# Patient Record
Sex: MALE | Race: WHITE | NOT HISPANIC OR LATINO | Employment: PART TIME | URBAN - METROPOLITAN AREA
[De-identification: names, ages, dates, MRNs, and addresses within clinical notes are randomized per-mention and may not be internally consistent; named-entity substitution may affect disease eponyms.]

---

## 2020-05-14 ENCOUNTER — TRANSFERRED RECORDS (OUTPATIENT)
Dept: HEALTH INFORMATION MANAGEMENT | Facility: CLINIC | Age: 70
End: 2020-05-14

## 2020-05-19 ENCOUNTER — TRANSFERRED RECORDS (OUTPATIENT)
Dept: HEALTH INFORMATION MANAGEMENT | Facility: CLINIC | Age: 70
End: 2020-05-19

## 2020-05-19 LAB — EJECTION FRACTION: >75 %

## 2020-06-12 LAB
CREAT SERPL-MCNC: 0.92 MG/DL (ref 0.72–1.25)
GFR SERPL CREATININE-BSD FRML MDRD: >60 ML/MIN/1.73M2
GLUCOSE SERPL-MCNC: 123 MG/DL (ref 65–100)
POTASSIUM SERPL-SCNC: 3.8 MMOL/L (ref 3.5–5)

## 2020-07-28 ENCOUNTER — HOSPITAL ENCOUNTER (OUTPATIENT)
Dept: CARDIAC REHAB | Facility: CLINIC | Age: 70
End: 2020-07-28
Attending: INTERNAL MEDICINE
Payer: MEDICARE

## 2020-07-28 PROCEDURE — 40000116 ZZH STATISTIC OP CR VISIT: Performed by: OCCUPATIONAL THERAPIST

## 2020-07-28 PROCEDURE — 93798 PHYS/QHP OP CAR RHAB W/ECG: CPT | Performed by: OCCUPATIONAL THERAPIST

## 2020-07-28 PROCEDURE — 93797 PHYS/QHP OP CAR RHAB WO ECG: CPT | Performed by: OCCUPATIONAL THERAPIST

## 2020-07-28 PROCEDURE — 40000575 ZZH STATISTIC OP CARDIAC VISIT #2: Performed by: OCCUPATIONAL THERAPIST

## 2020-07-31 ENCOUNTER — HOSPITAL ENCOUNTER (OUTPATIENT)
Dept: CARDIAC REHAB | Facility: CLINIC | Age: 70
End: 2020-07-31
Attending: INTERNAL MEDICINE
Payer: MEDICARE

## 2020-07-31 PROCEDURE — 40000116 ZZH STATISTIC OP CR VISIT

## 2020-07-31 PROCEDURE — 93798 PHYS/QHP OP CAR RHAB W/ECG: CPT

## 2020-08-04 ENCOUNTER — HOSPITAL ENCOUNTER (OUTPATIENT)
Dept: CARDIAC REHAB | Facility: CLINIC | Age: 70
End: 2020-08-04
Attending: INTERNAL MEDICINE
Payer: MEDICARE

## 2020-08-04 PROCEDURE — 93798 PHYS/QHP OP CAR RHAB W/ECG: CPT

## 2020-08-04 PROCEDURE — 40000116 ZZH STATISTIC OP CR VISIT

## 2020-08-05 ENCOUNTER — HOSPITAL ENCOUNTER (OUTPATIENT)
Dept: CARDIAC REHAB | Facility: CLINIC | Age: 70
End: 2020-08-05
Attending: INTERNAL MEDICINE
Payer: MEDICARE

## 2020-08-05 PROCEDURE — 40000116 ZZH STATISTIC OP CR VISIT

## 2020-08-05 PROCEDURE — 93798 PHYS/QHP OP CAR RHAB W/ECG: CPT

## 2020-08-07 ENCOUNTER — HOSPITAL ENCOUNTER (OUTPATIENT)
Dept: CARDIAC REHAB | Facility: CLINIC | Age: 70
End: 2020-08-07
Attending: INTERNAL MEDICINE
Payer: MEDICARE

## 2020-08-07 PROCEDURE — 40000116 ZZH STATISTIC OP CR VISIT

## 2020-08-07 PROCEDURE — 93798 PHYS/QHP OP CAR RHAB W/ECG: CPT

## 2020-08-10 ENCOUNTER — HOSPITAL ENCOUNTER (OUTPATIENT)
Dept: CARDIAC REHAB | Facility: CLINIC | Age: 70
End: 2020-08-10
Attending: INTERNAL MEDICINE
Payer: MEDICARE

## 2020-08-10 PROCEDURE — 93798 PHYS/QHP OP CAR RHAB W/ECG: CPT

## 2020-08-10 PROCEDURE — 40000116 ZZH STATISTIC OP CR VISIT

## 2020-08-12 ENCOUNTER — HOSPITAL ENCOUNTER (OUTPATIENT)
Dept: CARDIAC REHAB | Facility: CLINIC | Age: 70
End: 2020-08-12
Attending: INTERNAL MEDICINE
Payer: MEDICARE

## 2020-08-12 PROCEDURE — 40000116 ZZH STATISTIC OP CR VISIT

## 2020-08-12 PROCEDURE — 93798 PHYS/QHP OP CAR RHAB W/ECG: CPT

## 2020-08-14 ENCOUNTER — HOSPITAL ENCOUNTER (OUTPATIENT)
Dept: CARDIAC REHAB | Facility: CLINIC | Age: 70
End: 2020-08-14
Attending: INTERNAL MEDICINE
Payer: MEDICARE

## 2020-08-14 PROCEDURE — 40000116 ZZH STATISTIC OP CR VISIT: Performed by: REHABILITATION PRACTITIONER

## 2020-08-14 PROCEDURE — 93798 PHYS/QHP OP CAR RHAB W/ECG: CPT | Performed by: REHABILITATION PRACTITIONER

## 2020-08-18 ENCOUNTER — HOSPITAL ENCOUNTER (OUTPATIENT)
Dept: CARDIAC REHAB | Facility: CLINIC | Age: 70
End: 2020-08-18
Attending: INTERNAL MEDICINE
Payer: MEDICARE

## 2020-08-18 PROCEDURE — 40000116 ZZH STATISTIC OP CR VISIT

## 2020-08-18 PROCEDURE — 93798 PHYS/QHP OP CAR RHAB W/ECG: CPT

## 2020-08-19 ENCOUNTER — HOSPITAL ENCOUNTER (OUTPATIENT)
Dept: CARDIAC REHAB | Facility: CLINIC | Age: 70
End: 2020-08-19
Attending: INTERNAL MEDICINE
Payer: MEDICARE

## 2020-08-19 PROCEDURE — 93798 PHYS/QHP OP CAR RHAB W/ECG: CPT

## 2020-08-19 PROCEDURE — 40000116 ZZH STATISTIC OP CR VISIT

## 2020-08-21 ENCOUNTER — HOSPITAL ENCOUNTER (OUTPATIENT)
Dept: CARDIAC REHAB | Facility: CLINIC | Age: 70
End: 2020-08-21
Attending: INTERNAL MEDICINE
Payer: MEDICARE

## 2020-08-21 PROCEDURE — 93798 PHYS/QHP OP CAR RHAB W/ECG: CPT

## 2020-08-21 PROCEDURE — 40000116 ZZH STATISTIC OP CR VISIT

## 2020-08-24 ENCOUNTER — HOSPITAL ENCOUNTER (OUTPATIENT)
Dept: CARDIAC REHAB | Facility: CLINIC | Age: 70
End: 2020-08-24
Attending: INTERNAL MEDICINE
Payer: MEDICARE

## 2020-08-24 PROCEDURE — 93798 PHYS/QHP OP CAR RHAB W/ECG: CPT

## 2020-08-24 PROCEDURE — 40000116 ZZH STATISTIC OP CR VISIT

## 2020-08-26 ENCOUNTER — HOSPITAL ENCOUNTER (OUTPATIENT)
Dept: CARDIAC REHAB | Facility: CLINIC | Age: 70
End: 2020-08-26
Attending: INTERNAL MEDICINE
Payer: MEDICARE

## 2020-08-26 PROCEDURE — 40000116 ZZH STATISTIC OP CR VISIT: Performed by: REHABILITATION PRACTITIONER

## 2020-08-26 PROCEDURE — 93798 PHYS/QHP OP CAR RHAB W/ECG: CPT | Performed by: REHABILITATION PRACTITIONER

## 2020-09-04 ENCOUNTER — HOSPITAL ENCOUNTER (OUTPATIENT)
Dept: CARDIAC REHAB | Facility: CLINIC | Age: 70
End: 2020-09-04
Attending: INTERNAL MEDICINE
Payer: MEDICARE

## 2020-09-04 PROCEDURE — 40000116 ZZH STATISTIC OP CR VISIT

## 2020-09-04 PROCEDURE — 93798 PHYS/QHP OP CAR RHAB W/ECG: CPT

## 2020-09-09 ENCOUNTER — HOSPITAL ENCOUNTER (OUTPATIENT)
Dept: CARDIAC REHAB | Facility: CLINIC | Age: 70
End: 2020-09-09
Attending: INTERNAL MEDICINE
Payer: MEDICARE

## 2020-09-09 PROCEDURE — 40000116 ZZH STATISTIC OP CR VISIT: Performed by: REHABILITATION PRACTITIONER

## 2020-09-09 PROCEDURE — 93798 PHYS/QHP OP CAR RHAB W/ECG: CPT | Performed by: REHABILITATION PRACTITIONER

## 2020-09-11 ENCOUNTER — HOSPITAL ENCOUNTER (OUTPATIENT)
Dept: CARDIAC REHAB | Facility: CLINIC | Age: 70
End: 2020-09-11
Attending: INTERNAL MEDICINE
Payer: MEDICARE

## 2020-09-11 PROCEDURE — 40000116 ZZH STATISTIC OP CR VISIT: Performed by: REHABILITATION PRACTITIONER

## 2020-09-11 PROCEDURE — 93798 PHYS/QHP OP CAR RHAB W/ECG: CPT | Performed by: REHABILITATION PRACTITIONER

## 2020-09-14 ENCOUNTER — HOSPITAL ENCOUNTER (OUTPATIENT)
Dept: CARDIAC REHAB | Facility: CLINIC | Age: 70
End: 2020-09-14
Attending: INTERNAL MEDICINE
Payer: MEDICARE

## 2020-09-14 PROCEDURE — 40000116 ZZH STATISTIC OP CR VISIT

## 2020-09-14 PROCEDURE — 93798 PHYS/QHP OP CAR RHAB W/ECG: CPT

## 2020-09-16 ENCOUNTER — HOSPITAL ENCOUNTER (OUTPATIENT)
Dept: CARDIAC REHAB | Facility: CLINIC | Age: 70
End: 2020-09-16
Attending: INTERNAL MEDICINE
Payer: MEDICARE

## 2020-09-16 PROCEDURE — 40000116 ZZH STATISTIC OP CR VISIT

## 2020-09-16 PROCEDURE — 93798 PHYS/QHP OP CAR RHAB W/ECG: CPT

## 2020-09-18 ENCOUNTER — HOSPITAL ENCOUNTER (OUTPATIENT)
Dept: CARDIAC REHAB | Facility: CLINIC | Age: 70
End: 2020-09-18
Attending: INTERNAL MEDICINE
Payer: MEDICARE

## 2020-09-18 PROCEDURE — 40000116 ZZH STATISTIC OP CR VISIT

## 2020-09-18 PROCEDURE — 93798 PHYS/QHP OP CAR RHAB W/ECG: CPT

## 2020-09-21 ENCOUNTER — HOSPITAL ENCOUNTER (OUTPATIENT)
Dept: CARDIAC REHAB | Facility: CLINIC | Age: 70
End: 2020-09-21
Attending: INTERNAL MEDICINE
Payer: MEDICARE

## 2020-09-21 PROCEDURE — 40000116 ZZH STATISTIC OP CR VISIT

## 2020-09-21 PROCEDURE — 93798 PHYS/QHP OP CAR RHAB W/ECG: CPT

## 2020-09-23 ENCOUNTER — HOSPITAL ENCOUNTER (OUTPATIENT)
Dept: CARDIAC REHAB | Facility: CLINIC | Age: 70
End: 2020-09-23
Attending: INTERNAL MEDICINE
Payer: MEDICARE

## 2020-09-23 PROCEDURE — 93798 PHYS/QHP OP CAR RHAB W/ECG: CPT | Performed by: REHABILITATION PRACTITIONER

## 2020-09-23 PROCEDURE — 40000116 ZZH STATISTIC OP CR VISIT: Performed by: REHABILITATION PRACTITIONER

## 2020-09-25 ENCOUNTER — HOSPITAL ENCOUNTER (OUTPATIENT)
Dept: CARDIAC REHAB | Facility: CLINIC | Age: 70
End: 2020-09-25
Attending: INTERNAL MEDICINE
Payer: MEDICARE

## 2020-09-25 PROCEDURE — 93798 PHYS/QHP OP CAR RHAB W/ECG: CPT | Performed by: REHABILITATION PRACTITIONER

## 2020-09-25 PROCEDURE — 40000116 ZZH STATISTIC OP CR VISIT: Performed by: REHABILITATION PRACTITIONER

## 2020-09-28 ENCOUNTER — HOSPITAL ENCOUNTER (OUTPATIENT)
Dept: CARDIAC REHAB | Facility: CLINIC | Age: 70
End: 2020-09-28
Attending: INTERNAL MEDICINE
Payer: MEDICARE

## 2020-09-28 PROCEDURE — 93798 PHYS/QHP OP CAR RHAB W/ECG: CPT

## 2020-09-28 PROCEDURE — 40000116 ZZH STATISTIC OP CR VISIT

## 2020-09-30 ENCOUNTER — HOSPITAL ENCOUNTER (OUTPATIENT)
Dept: CARDIAC REHAB | Facility: CLINIC | Age: 70
End: 2020-09-30
Attending: INTERNAL MEDICINE
Payer: MEDICARE

## 2020-09-30 PROCEDURE — 40000116 ZZH STATISTIC OP CR VISIT

## 2020-09-30 PROCEDURE — 93798 PHYS/QHP OP CAR RHAB W/ECG: CPT

## 2020-10-02 ENCOUNTER — HOSPITAL ENCOUNTER (OUTPATIENT)
Dept: CARDIAC REHAB | Facility: CLINIC | Age: 70
End: 2020-10-02
Attending: INTERNAL MEDICINE
Payer: MEDICARE

## 2020-10-02 PROCEDURE — 999N000109 HC STATISTIC OP CR VISIT: Performed by: OCCUPATIONAL THERAPIST

## 2020-10-02 PROCEDURE — 93798 PHYS/QHP OP CAR RHAB W/ECG: CPT | Performed by: OCCUPATIONAL THERAPIST

## 2020-10-05 ENCOUNTER — HOSPITAL ENCOUNTER (OUTPATIENT)
Dept: CARDIAC REHAB | Facility: CLINIC | Age: 70
End: 2020-10-05
Attending: INTERNAL MEDICINE
Payer: MEDICARE

## 2020-10-05 PROCEDURE — 999N000109 HC STATISTIC OP CR VISIT: Performed by: REHABILITATION PRACTITIONER

## 2020-10-05 PROCEDURE — 93798 PHYS/QHP OP CAR RHAB W/ECG: CPT | Performed by: REHABILITATION PRACTITIONER

## 2020-10-07 ENCOUNTER — HOSPITAL ENCOUNTER (OUTPATIENT)
Dept: CARDIAC REHAB | Facility: CLINIC | Age: 70
End: 2020-10-07
Attending: INTERNAL MEDICINE
Payer: MEDICARE

## 2020-10-07 PROCEDURE — 999N000109 HC STATISTIC OP CR VISIT: Performed by: REHABILITATION PRACTITIONER

## 2020-10-07 PROCEDURE — 93798 PHYS/QHP OP CAR RHAB W/ECG: CPT | Performed by: REHABILITATION PRACTITIONER

## 2020-10-09 ENCOUNTER — HOSPITAL ENCOUNTER (OUTPATIENT)
Dept: CARDIAC REHAB | Facility: CLINIC | Age: 70
End: 2020-10-09
Attending: INTERNAL MEDICINE
Payer: MEDICARE

## 2020-10-09 PROCEDURE — 93798 PHYS/QHP OP CAR RHAB W/ECG: CPT

## 2020-10-09 PROCEDURE — 999N000109 HC STATISTIC OP CR VISIT

## 2020-10-12 ENCOUNTER — HOSPITAL ENCOUNTER (OUTPATIENT)
Dept: CARDIAC REHAB | Facility: CLINIC | Age: 70
End: 2020-10-12
Attending: INTERNAL MEDICINE
Payer: MEDICARE

## 2020-10-12 PROCEDURE — 999N000109 HC STATISTIC OP CR VISIT

## 2020-10-12 PROCEDURE — 93798 PHYS/QHP OP CAR RHAB W/ECG: CPT

## 2020-10-14 ENCOUNTER — HOSPITAL ENCOUNTER (OUTPATIENT)
Dept: CARDIAC REHAB | Facility: CLINIC | Age: 70
End: 2020-10-14
Attending: INTERNAL MEDICINE
Payer: MEDICARE

## 2020-10-14 PROCEDURE — 999N000109 HC STATISTIC OP CR VISIT: Performed by: REHABILITATION PRACTITIONER

## 2020-10-14 PROCEDURE — 93798 PHYS/QHP OP CAR RHAB W/ECG: CPT | Performed by: REHABILITATION PRACTITIONER

## 2020-10-16 ENCOUNTER — HOSPITAL ENCOUNTER (OUTPATIENT)
Dept: CARDIAC REHAB | Facility: CLINIC | Age: 70
End: 2020-10-16
Attending: INTERNAL MEDICINE
Payer: MEDICARE

## 2020-10-16 PROCEDURE — 93798 PHYS/QHP OP CAR RHAB W/ECG: CPT

## 2020-10-16 PROCEDURE — 999N000109 HC STATISTIC OP CR VISIT

## 2020-10-19 ENCOUNTER — HOSPITAL ENCOUNTER (OUTPATIENT)
Dept: CARDIAC REHAB | Facility: CLINIC | Age: 70
End: 2020-10-19
Attending: INTERNAL MEDICINE
Payer: MEDICARE

## 2020-10-19 PROCEDURE — 93798 PHYS/QHP OP CAR RHAB W/ECG: CPT

## 2020-10-19 PROCEDURE — 999N000109 HC STATISTIC OP CR VISIT

## 2020-10-21 ENCOUNTER — HOSPITAL ENCOUNTER (OUTPATIENT)
Dept: CARDIAC REHAB | Facility: CLINIC | Age: 70
End: 2020-10-21
Attending: INTERNAL MEDICINE
Payer: MEDICARE

## 2020-10-21 PROCEDURE — 999N000109 HC STATISTIC OP CR VISIT

## 2020-10-21 PROCEDURE — 93798 PHYS/QHP OP CAR RHAB W/ECG: CPT

## 2020-10-23 ENCOUNTER — HOSPITAL ENCOUNTER (OUTPATIENT)
Dept: CARDIAC REHAB | Facility: CLINIC | Age: 70
End: 2020-10-23
Attending: INTERNAL MEDICINE
Payer: MEDICARE

## 2020-10-23 PROCEDURE — 93798 PHYS/QHP OP CAR RHAB W/ECG: CPT

## 2020-10-23 PROCEDURE — 999N000109 HC STATISTIC OP CR VISIT

## 2020-10-26 ENCOUNTER — HOSPITAL ENCOUNTER (OUTPATIENT)
Dept: CARDIAC REHAB | Facility: CLINIC | Age: 70
End: 2020-10-26
Attending: INTERNAL MEDICINE
Payer: MEDICARE

## 2020-10-26 PROCEDURE — 93798 PHYS/QHP OP CAR RHAB W/ECG: CPT

## 2020-10-26 PROCEDURE — 999N000109 HC STATISTIC OP CR VISIT

## 2020-11-01 ENCOUNTER — HOSPITAL ENCOUNTER (EMERGENCY)
Facility: CLINIC | Age: 70
Discharge: HOME | End: 2020-11-01
Attending: EMERGENCY MEDICINE | Admitting: EMERGENCY MEDICINE
Payer: MEDICARE

## 2020-11-01 VITALS
OXYGEN SATURATION: 97 % | DIASTOLIC BLOOD PRESSURE: 67 MMHG | BODY MASS INDEX: 23.7 KG/M2 | TEMPERATURE: 98.4 F | SYSTOLIC BLOOD PRESSURE: 128 MMHG | RESPIRATION RATE: 17 BRPM | HEIGHT: 67 IN | WEIGHT: 151 LBS | HEART RATE: 80 BPM

## 2020-11-01 DIAGNOSIS — R42 VERTIGO: ICD-10-CM

## 2020-11-01 PROCEDURE — 93005 ELECTROCARDIOGRAM TRACING: CPT

## 2020-11-01 PROCEDURE — 99283 EMERGENCY DEPT VISIT LOW MDM: CPT

## 2020-11-01 ASSESSMENT — ENCOUNTER SYMPTOMS
NUMBNESS: 0
LIGHT-HEADEDNESS: 0
DIZZINESS: 1
CHILLS: 0
FEVER: 0
COUGH: 0
SHORTNESS OF BREATH: 0
HEADACHES: 0

## 2020-11-01 ASSESSMENT — MIFFLIN-ST. JEOR: SCORE: 1408.56

## 2020-11-01 NOTE — ED AVS SNAPSHOT
[facility]  [address]  [facility] [address]  Phone: [phone]  Fax: [phone]                                    Jaylen Lamas   MRN: [id number]    Department: [facility]   Date of Visit: 11/1/2020           After Visit Summary Signature Page    I have received my discharge instructions, and my questions have been answered. I have discussed any challenges I see with this plan with the nurse or doctor.    ..........................................................................................................................................  Patient/Patient Representative Signature      ..........................................................................................................................................  Patient Representative Print Name and Relationship to Patient    ..................................................               ................................................  Date                                   Time    ..........................................................................................................................................  Reviewed by Signature/Title    ...................................................              ..............................................  Date                                               Time          22EPIC Rev 08/18

## 2020-11-02 LAB — INTERPRETATION ECG - MUSE: NORMAL

## 2020-11-02 NOTE — ED NOTES
Writing RN just took over care for patient. Patient very upset he cannot get MRI here. MRI tech came and spoke with RN, stating we cannot perform MRI's on patient's with pacemakers here - they would need a special device or programming beforehand. MD updated. Patient very upset and would like to be discharged right now.

## 2020-11-02 NOTE — ED PROVIDER NOTES
History   Chief Complaint  Dizziness    The history is provided by the patient, the spouse and medical records.      Jaylen Lamas is a 70 year old male with a history of atrial fibrillation, CAD, CKD, COPD, and a PE who presents with her wife for evaluation of dizziness. 6 days ago, the patient reports receiving his last heart rehab session in the morning. That afternoon, the patient developed the onset of lightheadedness, dizziness, feeling faint and imbalanced.  The next day, the patient woke up, stood up, then became dizzy. He then had to sit back down which improved his symptoms. Four days ago, the same thing happened, however, he also had one episode of emesis that was clear in nature. He was seen in  that day and was diagnosed with vertigo and discharged with Meclizine. He was told to take one pill when he feels his symptoms develop. Later that day, his symptoms returned, and took one pill. This provided no relief, so he took another one shortly after taking the first. Once again, he saw no relief. His symptoms then persisted and took four more Meclizine pills that day, with no improvement in his symptoms. Yesterday, the patient reports he was trimming his trees, when he slipped and fell, hitting his head on the ground. He did not lose consciousness but complains of pain in his right thumb and an abrasion to his right wrist.    Tonight, the patient developed abnormal movement while at rest, which is usually not the case, that worsens with movement. He denies a headache or a history of vertigo or strokes.     Lab Work from  on 10/28/2020:    CBC: WBC: 12.1 (H), HGB: 13.0 (L), PLT: 294  BMP: WNL (Creatinine: 1.11)    INR: 1.8 (H)  Protime: 21.2 (H)    Allergies  Azithromycin  Cefprozil  Ciprofloxacin  Erythromycin  Metronidazole  Tiotropium  Venlafaxine    Medications  Coumadin  Digoxin  Atorvastatin  Symbicort inhaler  Lomotil  Duoneb  Aspirin 81 mg    Past Medical History  Chronic heart failure with  "preserved ejection fraction  Pulmonary emphysema  Pericardial effusion  PE  Bacterial endocarditis  CKD stage 3  Depression with anxiety  Dyslipidemia  CAD  Atrial fibrillation  COPD    Past Surgical History  Tonsillectomy  Orchiectomy  Mitral valve replacement  Aortic valve replacement  Ablation atrial fibrillation  L4-5 foraminal microdiscectomy  L3-4 and L4-5 hemilaminectomy and microdiscectomy  Pacemaker placement  Tunneled dialysis line placement     Family History  Father - stroke  Mother - pancreatic cancer     Social History  The patient was accompanied to the ED by his wife.  Smoking Status: former smoker  Smokeless Tobacco: never  Alcohol Use: not currently    Review of Systems   Constitutional: Negative for chills and fever.   Respiratory: Negative for cough and shortness of breath.    Neurological: Positive for dizziness. Negative for syncope, light-headedness, numbness and headaches.   All other systems reviewed and are negative.    Physical Exam     Patient Vitals for the past 24 hrs:   BP Temp Temp src Pulse Resp SpO2 Height Weight   11/01/20 2245 -- -- -- 80 17 -- -- --   11/01/20 2230 -- -- -- 80 19 -- -- --   11/01/20 2215 -- -- -- 80 16 -- -- --   11/01/20 2145 -- -- -- -- -- 97 % -- --   11/01/20 2130 128/67 -- -- -- -- 96 % -- --   11/01/20 2101 131/67 98.4  F (36.9  C) Temporal 83 20 94 % 1.702 m (5' 7\") 68.5 kg (151 lb)       Physical Exam    General: Patient is alert and cooperative.  HENT:  Normal appearance; no facial weakness or asymmetry.  Eyes: EOMI. No nystagmus.   Neck:  Normal range of motion and appearance.   Cardiovascular:  Normal rate, regular rhythm.   Pulmonary/Chest:  Effort normal.   Abdominal: Soft. No distension or tenderness.     Musculoskeletal: Normal range of motion. No edema or tenderness.   Neurological: oriented, normal strength, sensation, and coordination, including finger to nose with each arm.    Skin: Warm and dry. No rash or bruising.   Psychiatric: Normal mood " and affect. Normal behavior and judgement.    Emergency Department Course   EKG  Indication: Dizziness  Time: 2119  Vent. Rate 83 bpm. NM interval 176. QRS duration 132. QT/QTc 364/427. P-R-T axis * 109 36. Atrial-paced rhythm. Right bundle branch block. Abnormal ECG. Read time: 2120    Emergency Department Course:  Past medical records, nursing notes, and vitals reviewed.    9:14 PM I physically examined the patient as documented above.    EKG obtained in the ED, see results above.    2330 We were informed that the patient is unable to undergo an MRI secondary to his pacemaker.     2338 I rechecked the patient and discussed the findings of their workup thus far.     Findings and plan explained to the Patient and wife. Patient discharged home with instructions regarding supportive care, medications, and reasons to return. The importance of close follow-up was reviewed.     I personally answered all related questions prior to discharge.     Impression & Plan   Medical Decision Making:      Jaylen Lamas is a 70 year old male who presents for evaluation of vertigo. The differential diagnosis of vertigo is broad and includes common etiologies such as menieres disease, labyrinthitis, benign positional vertigo, otitis media, etc. More serious etiologies considered include central etiologies such as tumor, intracerebral bleed, dissection, ischemic cerebral vascular accident.  Given his age, comorbidities, and persistent symptoms, despite his normal neurologic exam, I recommended neuro imaging to exclude a central process such as a cerebellar stroke or evidence of vertebrobasilar insufficiency.  He has a cardiac pacemaker and aortic valve replacement but reports that he is safely undergone previous MRIs with them on numerous occasions.  This would be the best study to evaluate a central cause as I explained to him and I therefore ordered an MRI of the brain and MRA head and neck.  Unfortunately, due to high volumes, after  waiting over an hour, he was informed by the MR I technician that they are not able to perform an MRI given his pacemaker and valve without arrangements performed ahead of time.  I did not have a chance to speak with the tech but when informed of this, I apologized to patient who understandably was upset and declines an alternative imaging modality such as a CT head citing preference to follow-up with his clinic for further management.      Diagnosis:    ICD-10-CM    1. Vertigo  R42        Disposition:  Discharged to home.    Scribe Disclosure:  I, Iris Morrow, am serving as a scribe on 11/1/2020 at 10:48 PM to personally document services performed by Poncho Hurt MD based on my observations and the provider's statements to me.      Poncho Hurt MD  11/02/20 1121     Improved

## 2020-11-02 NOTE — ED TRIAGE NOTES
Monday started having off and on dizziness and balance issues. This week finished cardiac rehab for valve replacement a few months ago. Saw a provider this week who prescribed meclizine. Says medicine doesn't help. Worsening dizziness tonight. Had a fall yesterday and hurt his head and wrist. Endorses generalized weakness.

## 2023-01-01 ENCOUNTER — HOSPITAL ENCOUNTER (INPATIENT)
Facility: CLINIC | Age: 73
LOS: 2 days | Discharge: HOME | DRG: 177 | End: 2023-05-18
Attending: EMERGENCY MEDICINE | Admitting: STUDENT IN AN ORGANIZED HEALTH CARE EDUCATION/TRAINING PROGRAM
Payer: MEDICARE

## 2023-01-01 ENCOUNTER — PATIENT OUTREACH (OUTPATIENT)
Dept: CARE COORDINATION | Facility: CLINIC | Age: 73
End: 2023-01-01
Payer: MEDICARE

## 2023-01-01 ENCOUNTER — HOSPITAL ENCOUNTER (EMERGENCY)
Facility: CLINIC | Age: 73
Discharge: HOME | End: 2023-12-04
Attending: EMERGENCY MEDICINE | Admitting: EMERGENCY MEDICINE
Payer: MEDICARE

## 2023-01-01 ENCOUNTER — APPOINTMENT (OUTPATIENT)
Dept: CT IMAGING | Facility: CLINIC | Age: 73
End: 2023-01-01
Attending: EMERGENCY MEDICINE
Payer: MEDICARE

## 2023-01-01 ENCOUNTER — APPOINTMENT (OUTPATIENT)
Dept: CT IMAGING | Facility: CLINIC | Age: 73
DRG: 177 | End: 2023-01-01
Attending: EMERGENCY MEDICINE
Payer: MEDICARE

## 2023-01-01 ENCOUNTER — DOCUMENTATION ONLY (OUTPATIENT)
Dept: CARDIOLOGY | Facility: CLINIC | Age: 73
End: 2023-01-01
Payer: MEDICARE

## 2023-01-01 VITALS
WEIGHT: 139.1 LBS | TEMPERATURE: 97.8 F | RESPIRATION RATE: 18 BRPM | HEART RATE: 81 BPM | BODY MASS INDEX: 21.83 KG/M2 | HEIGHT: 67 IN | SYSTOLIC BLOOD PRESSURE: 142 MMHG | DIASTOLIC BLOOD PRESSURE: 80 MMHG | OXYGEN SATURATION: 96 %

## 2023-01-01 VITALS
SYSTOLIC BLOOD PRESSURE: 120 MMHG | RESPIRATION RATE: 19 BRPM | TEMPERATURE: 98.1 F | OXYGEN SATURATION: 96 % | HEART RATE: 80 BPM | DIASTOLIC BLOOD PRESSURE: 63 MMHG

## 2023-01-01 DIAGNOSIS — U07.1 INFECTION DUE TO 2019 NOVEL CORONAVIRUS: Primary | ICD-10-CM

## 2023-01-01 DIAGNOSIS — J96.01 ACUTE RESPIRATORY FAILURE WITH HYPOXIA (H): ICD-10-CM

## 2023-01-01 DIAGNOSIS — J44.0 CHRONIC OBSTRUCTIVE PULMONARY DISEASE WITH ACUTE LOWER RESPIRATORY INFECTION (H): ICD-10-CM

## 2023-01-01 DIAGNOSIS — R27.0 ATAXIA: ICD-10-CM

## 2023-01-01 DIAGNOSIS — H53.2 DOUBLE VISION: ICD-10-CM

## 2023-01-01 LAB
ALBUMIN SERPL BCG-MCNC: 3.2 G/DL (ref 3.5–5.2)
ALP SERPL-CCNC: 82 U/L (ref 40–129)
ALT SERPL W P-5'-P-CCNC: 14 U/L (ref 10–50)
ANION GAP SERPL CALCULATED.3IONS-SCNC: 11 MMOL/L (ref 7–15)
ANION GAP SERPL CALCULATED.3IONS-SCNC: 8 MMOL/L (ref 7–15)
ANION GAP SERPL CALCULATED.3IONS-SCNC: 9 MMOL/L (ref 7–15)
ANION GAP SERPL CALCULATED.3IONS-SCNC: 9 MMOL/L (ref 7–15)
AST SERPL W P-5'-P-CCNC: 28 U/L (ref 10–50)
ATRIAL RATE - MUSE: 84 BPM
BASE EXCESS BLDV CALC-SCNC: 4 MMOL/L (ref -7.7–1.9)
BASOPHILS # BLD AUTO: 0 10E3/UL (ref 0–0.2)
BASOPHILS NFR BLD AUTO: 0 %
BASOPHILS NFR BLD AUTO: 0 %
BASOPHILS NFR BLD AUTO: 1 %
BILIRUB DIRECT SERPL-MCNC: <0.2 MG/DL (ref 0–0.3)
BILIRUB SERPL-MCNC: 0.4 MG/DL
BUN SERPL-MCNC: 16.2 MG/DL (ref 8–23)
BUN SERPL-MCNC: 19.1 MG/DL (ref 8–23)
BUN SERPL-MCNC: 27.4 MG/DL (ref 8–23)
BUN SERPL-MCNC: 30.2 MG/DL (ref 8–23)
CALCIUM SERPL-MCNC: 8.1 MG/DL (ref 8.8–10.2)
CALCIUM SERPL-MCNC: 8.5 MG/DL (ref 8.8–10.2)
CALCIUM SERPL-MCNC: 8.8 MG/DL (ref 8.8–10.2)
CALCIUM SERPL-MCNC: 9 MG/DL (ref 8.8–10.2)
CHLORIDE SERPL-SCNC: 101 MMOL/L (ref 98–107)
CHLORIDE SERPL-SCNC: 102 MMOL/L (ref 98–107)
CHLORIDE SERPL-SCNC: 102 MMOL/L (ref 98–107)
CHLORIDE SERPL-SCNC: 99 MMOL/L (ref 98–107)
CREAT SERPL-MCNC: 0.89 MG/DL (ref 0.67–1.17)
CREAT SERPL-MCNC: 0.94 MG/DL (ref 0.67–1.17)
CREAT SERPL-MCNC: 1.24 MG/DL (ref 0.67–1.17)
CREAT SERPL-MCNC: 1.25 MG/DL (ref 0.67–1.17)
CRP SERPL-MCNC: 141.74 MG/L
CRP SERPL-MCNC: 32.95 MG/L
CRP SERPL-MCNC: 79.27 MG/L
D DIMER PPP FEU-MCNC: 1.48 UG/ML FEU (ref 0–0.5)
D DIMER PPP FEU-MCNC: 1.66 UG/ML FEU (ref 0–0.5)
D DIMER PPP FEU-MCNC: 1.75 UG/ML FEU (ref 0–0.5)
DEPRECATED HCO3 PLAS-SCNC: 24 MMOL/L (ref 22–29)
DEPRECATED HCO3 PLAS-SCNC: 24 MMOL/L (ref 22–29)
DEPRECATED HCO3 PLAS-SCNC: 27 MMOL/L (ref 22–29)
DEPRECATED HCO3 PLAS-SCNC: 28 MMOL/L (ref 22–29)
DIASTOLIC BLOOD PRESSURE - MUSE: NORMAL MMHG
EGFRCR SERPLBLD CKD-EPI 2021: 61 ML/MIN/1.73M2
EOSINOPHIL # BLD AUTO: 0 10E3/UL (ref 0–0.7)
EOSINOPHIL # BLD AUTO: 0.1 10E3/UL (ref 0–0.7)
EOSINOPHIL # BLD AUTO: 0.2 10E3/UL (ref 0–0.7)
EOSINOPHIL NFR BLD AUTO: 0 %
EOSINOPHIL NFR BLD AUTO: 2 %
EOSINOPHIL NFR BLD AUTO: 3 %
ERYTHROCYTE [DISTWIDTH] IN BLOOD BY AUTOMATED COUNT: 13 % (ref 10–15)
ERYTHROCYTE [DISTWIDTH] IN BLOOD BY AUTOMATED COUNT: 13.1 % (ref 10–15)
ERYTHROCYTE [DISTWIDTH] IN BLOOD BY AUTOMATED COUNT: 13.2 % (ref 10–15)
ERYTHROCYTE [DISTWIDTH] IN BLOOD BY AUTOMATED COUNT: 14.7 % (ref 10–15)
GFR SERPL CREATININE-BSD FRML MDRD: 62 ML/MIN/1.73M2
GFR SERPL CREATININE-BSD FRML MDRD: 87 ML/MIN/1.73M2
GFR SERPL CREATININE-BSD FRML MDRD: >90 ML/MIN/1.73M2
GLUCOSE BLDC GLUCOMTR-MCNC: 146 MG/DL (ref 70–99)
GLUCOSE SERPL-MCNC: 108 MG/DL (ref 70–99)
GLUCOSE SERPL-MCNC: 128 MG/DL (ref 70–99)
GLUCOSE SERPL-MCNC: 153 MG/DL (ref 70–99)
GLUCOSE SERPL-MCNC: 161 MG/DL (ref 70–99)
HCO3 BLDV-SCNC: 29 MMOL/L (ref 21–28)
HCT VFR BLD AUTO: 36.9 % (ref 40–53)
HCT VFR BLD AUTO: 37.8 % (ref 40–53)
HCT VFR BLD AUTO: 37.8 % (ref 40–53)
HCT VFR BLD AUTO: 43.1 % (ref 40–53)
HGB BLD-MCNC: 11.8 G/DL (ref 13.3–17.7)
HGB BLD-MCNC: 12 G/DL (ref 13.3–17.7)
HGB BLD-MCNC: 12.1 G/DL (ref 13.3–17.7)
HGB BLD-MCNC: 13.3 G/DL (ref 13.3–17.7)
HOLD SPECIMEN: NORMAL
IMM GRANULOCYTES # BLD: 0 10E3/UL
IMM GRANULOCYTES # BLD: 0 10E3/UL
IMM GRANULOCYTES # BLD: 0.2 10E3/UL
IMM GRANULOCYTES NFR BLD: 1 %
IMM GRANULOCYTES NFR BLD: 1 %
IMM GRANULOCYTES NFR BLD: 2 %
INTERPRETATION ECG - MUSE: NORMAL
LYMPHOCYTES # BLD AUTO: 0.5 10E3/UL (ref 0.8–5.3)
LYMPHOCYTES # BLD AUTO: 0.6 10E3/UL (ref 0.8–5.3)
LYMPHOCYTES # BLD AUTO: 1.2 10E3/UL (ref 0.8–5.3)
LYMPHOCYTES NFR BLD AUTO: 10 %
LYMPHOCYTES NFR BLD AUTO: 15 %
LYMPHOCYTES NFR BLD AUTO: 5 %
MCH RBC QN AUTO: 26.2 PG (ref 26.5–33)
MCH RBC QN AUTO: 27.1 PG (ref 26.5–33)
MCH RBC QN AUTO: 27.1 PG (ref 26.5–33)
MCH RBC QN AUTO: 27.2 PG (ref 26.5–33)
MCHC RBC AUTO-ENTMCNC: 30.9 G/DL (ref 31.5–36.5)
MCHC RBC AUTO-ENTMCNC: 31.7 G/DL (ref 31.5–36.5)
MCHC RBC AUTO-ENTMCNC: 32 G/DL (ref 31.5–36.5)
MCHC RBC AUTO-ENTMCNC: 32 G/DL (ref 31.5–36.5)
MCV RBC AUTO: 85 FL (ref 78–100)
MCV RBC AUTO: 86 FL (ref 78–100)
MONOCYTES # BLD AUTO: 0.3 10E3/UL (ref 0–1.3)
MONOCYTES # BLD AUTO: 0.6 10E3/UL (ref 0–1.3)
MONOCYTES # BLD AUTO: 0.7 10E3/UL (ref 0–1.3)
MONOCYTES NFR BLD AUTO: 11 %
MONOCYTES NFR BLD AUTO: 2 %
MONOCYTES NFR BLD AUTO: 8 %
NEUTROPHILS # BLD AUTO: 4.8 10E3/UL (ref 1.6–8.3)
NEUTROPHILS # BLD AUTO: 6 10E3/UL (ref 1.6–8.3)
NEUTROPHILS # BLD AUTO: 9.6 10E3/UL (ref 1.6–8.3)
NEUTROPHILS NFR BLD AUTO: 73 %
NEUTROPHILS NFR BLD AUTO: 75 %
NEUTROPHILS NFR BLD AUTO: 91 %
NRBC # BLD AUTO: 0 10E3/UL
NRBC BLD AUTO-RTO: 0 /100
NT-PROBNP SERPL-MCNC: 4008 PG/ML (ref 0–900)
O2/TOTAL GAS SETTING VFR VENT: 99 %
P AXIS - MUSE: 79 DEGREES
PCO2 BLDV: 45 MM HG (ref 40–50)
PH BLDV: 7.42 [PH] (ref 7.32–7.43)
PLATELET # BLD AUTO: 183 10E3/UL (ref 150–450)
PLATELET # BLD AUTO: 191 10E3/UL (ref 150–450)
PLATELET # BLD AUTO: 222 10E3/UL (ref 150–450)
PLATELET # BLD AUTO: 262 10E3/UL (ref 150–450)
PO2 BLDV: 38 MM HG (ref 25–47)
POTASSIUM SERPL-SCNC: 4 MMOL/L (ref 3.4–5.3)
POTASSIUM SERPL-SCNC: 4.2 MMOL/L (ref 3.4–5.3)
POTASSIUM SERPL-SCNC: 4.5 MMOL/L (ref 3.4–5.3)
POTASSIUM SERPL-SCNC: 4.8 MMOL/L (ref 3.4–5.3)
PR INTERVAL - MUSE: 210 MS
PROT SERPL-MCNC: 5.9 G/DL (ref 6.4–8.3)
QRS DURATION - MUSE: 148 MS
QT - MUSE: 376 MS
QTC - MUSE: 444 MS
R AXIS - MUSE: 105 DEGREES
RBC # BLD AUTO: 4.36 10E6/UL (ref 4.4–5.9)
RBC # BLD AUTO: 4.41 10E6/UL (ref 4.4–5.9)
RBC # BLD AUTO: 4.46 10E6/UL (ref 4.4–5.9)
RBC # BLD AUTO: 5.08 10E6/UL (ref 4.4–5.9)
SODIUM SERPL-SCNC: 135 MMOL/L (ref 136–145)
SODIUM SERPL-SCNC: 135 MMOL/L (ref 136–145)
SODIUM SERPL-SCNC: 136 MMOL/L (ref 136–145)
SODIUM SERPL-SCNC: 138 MMOL/L (ref 135–145)
SYSTOLIC BLOOD PRESSURE - MUSE: NORMAL MMHG
T AXIS - MUSE: 27 DEGREES
TROPONIN T SERPL HS-MCNC: 27 NG/L
TROPONIN T SERPL HS-MCNC: 32 NG/L
TROPONIN T SERPL HS-MCNC: 33 NG/L
VENTRICULAR RATE- MUSE: 84 BPM
WBC # BLD AUTO: 10.5 10E3/UL (ref 4–11)
WBC # BLD AUTO: 6.3 10E3/UL (ref 4–11)
WBC # BLD AUTO: 7.4 10E3/UL (ref 4–11)
WBC # BLD AUTO: 8 10E3/UL (ref 4–11)

## 2023-01-01 PROCEDURE — 80048 BASIC METABOLIC PNL TOTAL CA: CPT | Performed by: EMERGENCY MEDICINE

## 2023-01-01 PROCEDURE — 250N000012 HC RX MED GY IP 250 OP 636 PS 637: Performed by: PHYSICIAN ASSISTANT

## 2023-01-01 PROCEDURE — 250N000011 HC RX IP 250 OP 636: Performed by: PHYSICIAN ASSISTANT

## 2023-01-01 PROCEDURE — 999N000157 HC STATISTIC RCP TIME EA 10 MIN

## 2023-01-01 PROCEDURE — 258N000003 HC RX IP 258 OP 636: Performed by: PHYSICIAN ASSISTANT

## 2023-01-01 PROCEDURE — C9113 INJ PANTOPRAZOLE SODIUM, VIA: HCPCS | Performed by: PHYSICIAN ASSISTANT

## 2023-01-01 PROCEDURE — 36415 COLL VENOUS BLD VENIPUNCTURE: CPT | Performed by: EMERGENCY MEDICINE

## 2023-01-01 PROCEDURE — 82248 BILIRUBIN DIRECT: CPT | Performed by: PHYSICIAN ASSISTANT

## 2023-01-01 PROCEDURE — 36415 COLL VENOUS BLD VENIPUNCTURE: CPT | Performed by: PHYSICIAN ASSISTANT

## 2023-01-01 PROCEDURE — 82310 ASSAY OF CALCIUM: CPT | Performed by: EMERGENCY MEDICINE

## 2023-01-01 PROCEDURE — G1010 CDSM STANSON: HCPCS

## 2023-01-01 PROCEDURE — 250N000011 HC RX IP 250 OP 636: Performed by: EMERGENCY MEDICINE

## 2023-01-01 PROCEDURE — 84484 ASSAY OF TROPONIN QUANT: CPT | Performed by: EMERGENCY MEDICINE

## 2023-01-01 PROCEDURE — 85027 COMPLETE CBC AUTOMATED: CPT | Performed by: PHYSICIAN ASSISTANT

## 2023-01-01 PROCEDURE — 82962 GLUCOSE BLOOD TEST: CPT

## 2023-01-01 PROCEDURE — 250N000009 HC RX 250: Performed by: EMERGENCY MEDICINE

## 2023-01-01 PROCEDURE — 86140 C-REACTIVE PROTEIN: CPT | Performed by: PHYSICIAN ASSISTANT

## 2023-01-01 PROCEDURE — 70498 CT ANGIOGRAPHY NECK: CPT | Mod: MG

## 2023-01-01 PROCEDURE — 85025 COMPLETE CBC W/AUTO DIFF WBC: CPT | Performed by: STUDENT IN AN ORGANIZED HEALTH CARE EDUCATION/TRAINING PROGRAM

## 2023-01-01 PROCEDURE — 85014 HEMATOCRIT: CPT | Performed by: EMERGENCY MEDICINE

## 2023-01-01 PROCEDURE — 93005 ELECTROCARDIOGRAM TRACING: CPT

## 2023-01-01 PROCEDURE — 36415 COLL VENOUS BLD VENIPUNCTURE: CPT | Performed by: STUDENT IN AN ORGANIZED HEALTH CARE EDUCATION/TRAINING PROGRAM

## 2023-01-01 PROCEDURE — 70496 CT ANGIOGRAPHY HEAD: CPT | Mod: MG

## 2023-01-01 PROCEDURE — 85379 FIBRIN DEGRADATION QUANT: CPT | Performed by: EMERGENCY MEDICINE

## 2023-01-01 PROCEDURE — 85379 FIBRIN DEGRADATION QUANT: CPT | Performed by: PHYSICIAN ASSISTANT

## 2023-01-01 PROCEDURE — 82803 BLOOD GASES ANY COMBINATION: CPT | Performed by: EMERGENCY MEDICINE

## 2023-01-01 PROCEDURE — 84484 ASSAY OF TROPONIN QUANT: CPT | Performed by: PHYSICIAN ASSISTANT

## 2023-01-01 PROCEDURE — 94640 AIRWAY INHALATION TREATMENT: CPT

## 2023-01-01 PROCEDURE — 120N000001 HC R&B MED SURG/OB

## 2023-01-01 PROCEDURE — 99223 1ST HOSP IP/OBS HIGH 75: CPT | Performed by: PHYSICIAN ASSISTANT

## 2023-01-01 PROCEDURE — XW033E5 INTRODUCTION OF REMDESIVIR ANTI-INFECTIVE INTO PERIPHERAL VEIN, PERCUTANEOUS APPROACH, NEW TECHNOLOGY GROUP 5: ICD-10-PCS | Performed by: STUDENT IN AN ORGANIZED HEALTH CARE EDUCATION/TRAINING PROGRAM

## 2023-01-01 PROCEDURE — 99232 SBSQ HOSP IP/OBS MODERATE 35: CPT | Performed by: STUDENT IN AN ORGANIZED HEALTH CARE EDUCATION/TRAINING PROGRAM

## 2023-01-01 PROCEDURE — 82310 ASSAY OF CALCIUM: CPT | Performed by: PHYSICIAN ASSISTANT

## 2023-01-01 PROCEDURE — 96374 THER/PROPH/DIAG INJ IV PUSH: CPT | Mod: 59

## 2023-01-01 PROCEDURE — 80048 BASIC METABOLIC PNL TOTAL CA: CPT | Performed by: PHYSICIAN ASSISTANT

## 2023-01-01 PROCEDURE — 99285 EMERGENCY DEPT VISIT HI MDM: CPT | Mod: 25

## 2023-01-01 PROCEDURE — 250N000013 HC RX MED GY IP 250 OP 250 PS 637: Performed by: PHYSICIAN ASSISTANT

## 2023-01-01 PROCEDURE — 85025 COMPLETE CBC W/AUTO DIFF WBC: CPT | Performed by: EMERGENCY MEDICINE

## 2023-01-01 PROCEDURE — 83880 ASSAY OF NATRIURETIC PEPTIDE: CPT | Performed by: EMERGENCY MEDICINE

## 2023-01-01 PROCEDURE — 99239 HOSP IP/OBS DSCHRG MGMT >30: CPT | Performed by: HOSPITALIST

## 2023-01-01 RX ORDER — CEFTRIAXONE 1 G/1
1 INJECTION, POWDER, FOR SOLUTION INTRAMUSCULAR; INTRAVENOUS ONCE
Status: COMPLETED | OUTPATIENT
Start: 2023-01-01 | End: 2023-01-01

## 2023-01-01 RX ORDER — FUROSEMIDE 10 MG/ML
20 INJECTION INTRAMUSCULAR; INTRAVENOUS ONCE
Status: COMPLETED | OUTPATIENT
Start: 2023-01-01 | End: 2023-01-01

## 2023-01-01 RX ORDER — AMOXICILLIN 250 MG
2 CAPSULE ORAL 2 TIMES DAILY PRN
Status: DISCONTINUED | OUTPATIENT
Start: 2023-01-01 | End: 2023-01-01 | Stop reason: HOSPADM

## 2023-01-01 RX ORDER — FLUTICASONE PROPIONATE 50 MCG
2 SPRAY, SUSPENSION (ML) NASAL DAILY PRN
Status: DISCONTINUED | OUTPATIENT
Start: 2023-01-01 | End: 2023-01-01 | Stop reason: HOSPADM

## 2023-01-01 RX ORDER — ACETAMINOPHEN 325 MG/1
650 TABLET ORAL EVERY 6 HOURS PRN
Status: DISCONTINUED | OUTPATIENT
Start: 2023-01-01 | End: 2023-01-01 | Stop reason: HOSPADM

## 2023-01-01 RX ORDER — LIDOCAINE 40 MG/G
CREAM TOPICAL
Status: DISCONTINUED | OUTPATIENT
Start: 2023-01-01 | End: 2023-01-01 | Stop reason: HOSPADM

## 2023-01-01 RX ORDER — BENZONATATE 100 MG/1
100 CAPSULE ORAL 3 TIMES DAILY PRN
Status: DISCONTINUED | OUTPATIENT
Start: 2023-01-01 | End: 2023-01-01 | Stop reason: HOSPADM

## 2023-01-01 RX ORDER — IOPAMIDOL 755 MG/ML
500 INJECTION, SOLUTION INTRAVASCULAR ONCE
Status: COMPLETED | OUTPATIENT
Start: 2023-01-01 | End: 2023-01-01

## 2023-01-01 RX ORDER — DEXAMETHASONE SODIUM PHOSPHATE 10 MG/ML
6 INJECTION, SOLUTION INTRAMUSCULAR; INTRAVENOUS ONCE
Status: COMPLETED | OUTPATIENT
Start: 2023-01-01 | End: 2023-01-01

## 2023-01-01 RX ORDER — ACETAMINOPHEN 650 MG/1
650 SUPPOSITORY RECTAL EVERY 6 HOURS PRN
Status: DISCONTINUED | OUTPATIENT
Start: 2023-01-01 | End: 2023-01-01 | Stop reason: HOSPADM

## 2023-01-01 RX ORDER — FLUTICASONE FUROATE AND VILANTEROL 100; 25 UG/1; UG/1
1 POWDER RESPIRATORY (INHALATION) DAILY
Status: DISCONTINUED | OUTPATIENT
Start: 2023-01-01 | End: 2023-01-01 | Stop reason: HOSPADM

## 2023-01-01 RX ORDER — AMOXICILLIN 250 MG
1 CAPSULE ORAL 2 TIMES DAILY PRN
Status: DISCONTINUED | OUTPATIENT
Start: 2023-01-01 | End: 2023-01-01 | Stop reason: HOSPADM

## 2023-01-01 RX ORDER — PREDNISONE 10 MG/1
TABLET ORAL
Qty: 30 TABLET | Refills: 0 | Status: SHIPPED | OUTPATIENT
Start: 2023-01-01 | End: 2023-01-01

## 2023-01-01 RX ORDER — ALBUTEROL SULFATE 0.83 MG/ML
2.5 SOLUTION RESPIRATORY (INHALATION)
Status: DISCONTINUED | OUTPATIENT
Start: 2023-01-01 | End: 2023-01-01 | Stop reason: HOSPADM

## 2023-01-01 RX ORDER — METOPROLOL SUCCINATE 50 MG/1
50 TABLET, EXTENDED RELEASE ORAL AT BEDTIME
Status: DISCONTINUED | OUTPATIENT
Start: 2023-01-01 | End: 2023-01-01 | Stop reason: HOSPADM

## 2023-01-01 RX ORDER — LEVOFLOXACIN 750 MG/1
750 TABLET, FILM COATED ORAL DAILY
Start: 2023-01-01 | End: 2024-01-01

## 2023-01-01 RX ORDER — DOXYCYCLINE 100 MG/10ML
100 INJECTION, POWDER, LYOPHILIZED, FOR SOLUTION INTRAVENOUS EVERY 12 HOURS
Status: DISCONTINUED | OUTPATIENT
Start: 2023-01-01 | End: 2023-01-01 | Stop reason: HOSPADM

## 2023-01-01 RX ORDER — FLUTICASONE PROPIONATE 50 MCG
2 SPRAY, SUSPENSION (ML) NASAL DAILY PRN
COMMUNITY
Start: 2023-03-17

## 2023-01-01 RX ORDER — DOXYCYCLINE 100 MG/10ML
100 INJECTION, POWDER, LYOPHILIZED, FOR SOLUTION INTRAVENOUS ONCE
Status: COMPLETED | OUTPATIENT
Start: 2023-01-01 | End: 2023-01-01

## 2023-01-01 RX ORDER — FLUTICASONE FUROATE AND VILANTEROL TRIFENATATE 100; 25 UG/1; UG/1
1 POWDER RESPIRATORY (INHALATION) DAILY
COMMUNITY
Start: 2023-04-19

## 2023-01-01 RX ORDER — ALBUTEROL SULFATE 90 UG/1
2 AEROSOL, METERED RESPIRATORY (INHALATION) EVERY 6 HOURS PRN
COMMUNITY
Start: 2023-04-15

## 2023-01-01 RX ORDER — ONDANSETRON 4 MG/1
4 TABLET, ORALLY DISINTEGRATING ORAL EVERY 6 HOURS PRN
Status: DISCONTINUED | OUTPATIENT
Start: 2023-01-01 | End: 2023-01-01 | Stop reason: HOSPADM

## 2023-01-01 RX ORDER — LEVOFLOXACIN 750 MG/1
1 TABLET, FILM COATED ORAL DAILY
Status: ON HOLD | COMMUNITY
Start: 2023-01-01 | End: 2023-01-01

## 2023-01-01 RX ORDER — ONDANSETRON 2 MG/ML
4 INJECTION INTRAMUSCULAR; INTRAVENOUS EVERY 6 HOURS PRN
Status: DISCONTINUED | OUTPATIENT
Start: 2023-01-01 | End: 2023-01-01 | Stop reason: HOSPADM

## 2023-01-01 RX ORDER — ESCITALOPRAM OXALATE 20 MG/1
TABLET ORAL
COMMUNITY
Start: 2023-03-22

## 2023-01-01 RX ORDER — IOPAMIDOL 755 MG/ML
55 INJECTION, SOLUTION INTRAVASCULAR ONCE
Status: COMPLETED | OUTPATIENT
Start: 2023-01-01 | End: 2023-01-01

## 2023-01-01 RX ORDER — GUAIFENESIN/DEXTROMETHORPHAN 100-10MG/5
10 SYRUP ORAL EVERY 4 HOURS PRN
Status: DISCONTINUED | OUTPATIENT
Start: 2023-01-01 | End: 2023-01-01 | Stop reason: HOSPADM

## 2023-01-01 RX ORDER — ESCITALOPRAM OXALATE 20 MG/1
20 TABLET ORAL DAILY
Status: DISCONTINUED | OUTPATIENT
Start: 2023-01-01 | End: 2023-01-01 | Stop reason: HOSPADM

## 2023-01-01 RX ORDER — METOPROLOL SUCCINATE 50 MG/1
50 TABLET, EXTENDED RELEASE ORAL AT BEDTIME
COMMUNITY
Start: 2023-03-22

## 2023-01-01 RX ORDER — DIGOXIN 125 MCG
125 TABLET ORAL EVERY EVENING
COMMUNITY
Start: 2023-04-13

## 2023-01-01 RX ORDER — APIXABAN 5 MG/1
5 TABLET, FILM COATED ORAL 2 TIMES DAILY
COMMUNITY
Start: 2023-04-27

## 2023-01-01 RX ORDER — CEFTRIAXONE 1 G/1
1 INJECTION, POWDER, FOR SOLUTION INTRAMUSCULAR; INTRAVENOUS EVERY 24 HOURS
Status: DISCONTINUED | OUTPATIENT
Start: 2023-01-01 | End: 2023-01-01 | Stop reason: HOSPADM

## 2023-01-01 RX ORDER — DIGOXIN 125 MCG
125 TABLET ORAL EVERY EVENING
Status: DISCONTINUED | OUTPATIENT
Start: 2023-01-01 | End: 2023-01-01 | Stop reason: HOSPADM

## 2023-01-01 RX ORDER — BENZONATATE 100 MG/1
100 CAPSULE ORAL 3 TIMES DAILY PRN
Qty: 20 CAPSULE | Refills: 0 | Status: SHIPPED | OUTPATIENT
Start: 2023-01-01 | End: 2024-01-01

## 2023-01-01 RX ADMIN — IOPAMIDOL 67 ML: 755 INJECTION, SOLUTION INTRAVENOUS at 19:19

## 2023-01-01 RX ADMIN — APIXABAN 5 MG: 5 TABLET, FILM COATED ORAL at 08:48

## 2023-01-01 RX ADMIN — ESCITALOPRAM OXALATE 20 MG: 20 TABLET, FILM COATED ORAL at 14:45

## 2023-01-01 RX ADMIN — FLUTICASONE FUROATE AND VILANTEROL TRIFENATATE 1 PUFF: 100; 25 POWDER RESPIRATORY (INHALATION) at 08:10

## 2023-01-01 RX ADMIN — METOPROLOL SUCCINATE 50 MG: 50 TABLET, EXTENDED RELEASE ORAL at 21:25

## 2023-01-01 RX ADMIN — DEXAMETHASONE 6 MG: 2 TABLET ORAL at 12:37

## 2023-01-01 RX ADMIN — METOPROLOL SUCCINATE 50 MG: 50 TABLET, EXTENDED RELEASE ORAL at 21:55

## 2023-01-01 RX ADMIN — APIXABAN 5 MG: 5 TABLET, FILM COATED ORAL at 14:45

## 2023-01-01 RX ADMIN — APIXABAN 5 MG: 5 TABLET, FILM COATED ORAL at 10:04

## 2023-01-01 RX ADMIN — APIXABAN 5 MG: 5 TABLET, FILM COATED ORAL at 21:25

## 2023-01-01 RX ADMIN — GUAIFENESIN AND DEXTROMETHORPHAN 10 ML: 100; 10 SYRUP ORAL at 05:02

## 2023-01-01 RX ADMIN — SODIUM CHLORIDE 50 ML: 9 INJECTION, SOLUTION INTRAVENOUS at 12:33

## 2023-01-01 RX ADMIN — CEFTRIAXONE 1 G: 1 INJECTION, POWDER, FOR SOLUTION INTRAMUSCULAR; INTRAVENOUS at 10:17

## 2023-01-01 RX ADMIN — REMDESIVIR 200 MG: 100 INJECTION, POWDER, LYOPHILIZED, FOR SOLUTION INTRAVENOUS at 14:56

## 2023-01-01 RX ADMIN — SODIUM CHLORIDE 50 ML: 9 INJECTION, SOLUTION INTRAVENOUS at 17:55

## 2023-01-01 RX ADMIN — SODIUM CHLORIDE 50 ML: 9 INJECTION, SOLUTION INTRAVENOUS at 13:00

## 2023-01-01 RX ADMIN — DIGOXIN 125 MCG: 125 TABLET ORAL at 21:55

## 2023-01-01 RX ADMIN — DOXYCYCLINE 100 MG: 100 INJECTION, POWDER, LYOPHILIZED, FOR SOLUTION INTRAVENOUS at 11:00

## 2023-01-01 RX ADMIN — DIGOXIN 125 MCG: 125 TABLET ORAL at 21:22

## 2023-01-01 RX ADMIN — FLUTICASONE FUROATE AND VILANTEROL TRIFENATATE 1 PUFF: 100; 25 POWDER RESPIRATORY (INHALATION) at 11:13

## 2023-01-01 RX ADMIN — REMDESIVIR 100 MG: 100 INJECTION, POWDER, LYOPHILIZED, FOR SOLUTION INTRAVENOUS at 12:30

## 2023-01-01 RX ADMIN — DOXYCYCLINE 100 MG: 100 INJECTION, POWDER, LYOPHILIZED, FOR SOLUTION INTRAVENOUS at 11:33

## 2023-01-01 RX ADMIN — APIXABAN 5 MG: 5 TABLET, FILM COATED ORAL at 21:55

## 2023-01-01 RX ADMIN — CEFTRIAXONE 1 G: 1 INJECTION, POWDER, FOR SOLUTION INTRAMUSCULAR; INTRAVENOUS at 08:48

## 2023-01-01 RX ADMIN — IOPAMIDOL 55 ML: 755 INJECTION, SOLUTION INTRAVENOUS at 07:22

## 2023-01-01 RX ADMIN — DOXYCYCLINE 100 MG: 100 INJECTION, POWDER, LYOPHILIZED, FOR SOLUTION INTRAVENOUS at 21:56

## 2023-01-01 RX ADMIN — ESCITALOPRAM OXALATE 20 MG: 20 TABLET, FILM COATED ORAL at 10:04

## 2023-01-01 RX ADMIN — FUROSEMIDE 20 MG: 10 INJECTION, SOLUTION INTRAMUSCULAR; INTRAVENOUS at 09:52

## 2023-01-01 RX ADMIN — DEXAMETHASONE SODIUM PHOSPHATE 6 MG: 10 INJECTION, SOLUTION INTRAMUSCULAR; INTRAVENOUS at 06:30

## 2023-01-01 RX ADMIN — DOXYCYCLINE 100 MG: 100 INJECTION, POWDER, LYOPHILIZED, FOR SOLUTION INTRAVENOUS at 21:25

## 2023-01-01 RX ADMIN — CEFTRIAXONE 1 G: 1 INJECTION, POWDER, FOR SOLUTION INTRAMUSCULAR; INTRAVENOUS at 08:28

## 2023-01-01 RX ADMIN — DOXYCYCLINE 100 MG: 100 INJECTION, POWDER, FOR SOLUTION INTRAVENOUS at 09:13

## 2023-01-01 RX ADMIN — DEXAMETHASONE 6 MG: 2 TABLET ORAL at 12:52

## 2023-01-01 RX ADMIN — BENZONATATE 100 MG: 100 CAPSULE ORAL at 08:48

## 2023-01-01 RX ADMIN — SODIUM CHLORIDE 80 ML: 9 INJECTION, SOLUTION INTRAVENOUS at 19:19

## 2023-01-01 RX ADMIN — PANTOPRAZOLE SODIUM 40 MG: 40 INJECTION, POWDER, LYOPHILIZED, FOR SOLUTION INTRAVENOUS at 10:04

## 2023-01-01 RX ADMIN — PANTOPRAZOLE SODIUM 40 MG: 40 INJECTION, POWDER, LYOPHILIZED, FOR SOLUTION INTRAVENOUS at 08:48

## 2023-01-01 RX ADMIN — ESCITALOPRAM OXALATE 20 MG: 20 TABLET, FILM COATED ORAL at 08:48

## 2023-01-01 RX ADMIN — REMDESIVIR 100 MG: 100 INJECTION, POWDER, LYOPHILIZED, FOR SOLUTION INTRAVENOUS at 12:50

## 2023-01-01 ASSESSMENT — ACTIVITIES OF DAILY LIVING (ADL)
ADLS_ACUITY_SCORE: 22
ADLS_ACUITY_SCORE: 20
ADLS_ACUITY_SCORE: 22
ADLS_ACUITY_SCORE: 35
ADLS_ACUITY_SCORE: 22
ADLS_ACUITY_SCORE: 22
ADLS_ACUITY_SCORE: 35
ADLS_ACUITY_SCORE: 22
ADLS_ACUITY_SCORE: 22
ADLS_ACUITY_SCORE: 20
ADLS_ACUITY_SCORE: 35
ADLS_ACUITY_SCORE: 22
ADLS_ACUITY_SCORE: 35
ADLS_ACUITY_SCORE: 22
ADLS_ACUITY_SCORE: 20
ADLS_ACUITY_SCORE: 22
ADLS_ACUITY_SCORE: 35
ADLS_ACUITY_SCORE: 22
ADLS_ACUITY_SCORE: 22

## 2023-05-16 PROBLEM — J96.01 ACUTE RESPIRATORY FAILURE WITH HYPOXIA (H): Status: ACTIVE | Noted: 2023-01-01

## 2023-05-16 PROBLEM — J44.0 CHRONIC OBSTRUCTIVE PULMONARY DISEASE WITH ACUTE LOWER RESPIRATORY INFECTION (H): Status: ACTIVE | Noted: 2023-01-01

## 2023-05-16 NOTE — ED NOTES
Pt desatted to 83% w/ 0.5L NC,  pt was then bumped up to 2L NC w/ sats returning to 95%. RN and MD notified

## 2023-05-16 NOTE — ED TRIAGE NOTES
Pt comes from home. Wasn't feeling well Sunday and went in to be tested for COVID. Got message Monday morning that it was positive. HX of CHF & COPD. EMS arrived and o2 was in the upper 80's. Placed on 3 liters was 100%. RA on arrival 91%. Reports productive cough.     Triage Assessment     Row Name 05/16/23 0158       Triage Assessment (Adult)    Airway WDL WDL    Additional Documentation Breath Sounds (Group)       Respiratory WDL    Respiratory WDL WDL       Skin Circulation/Temperature WDL    Skin Circulation/Temperature WDL WDL       Cardiac WDL    Cardiac WDL WDL       Peripheral/Neurovascular WDL    Peripheral Neurovascular WDL WDL       Cognitive/Neuro/Behavioral WDL    Cognitive/Neuro/Behavioral WDL WDL

## 2023-05-16 NOTE — H&P
[facility]    History and Physical - Hospitalist Service       Date of Admission:  5/16/2023    Assessment & Plan      Jaylen Lamas is a medically complex 72 year old male with history of COPD, chronic right ventricular dysfunction with preserved ejection fraction, bioprosthetic aortic valve replacement, bioprosthetic mitral valve replacement, paroxysmal atrial fibrillation s/p ablation and MAZE procedure, complete heart block (due to endocarditis)  status post pacemaker insertion, nonobstructive coronary artery disease, history of C. difficile, hypertension, CKD, history of duodenal ulcer, depression/anxiety, history of testicular cancer s/p orchiectomy and previous smoker admitted on 5/16/2023 with known COVID and episode of severe shortness of breath.    In the ED temperature 99.3, pulse 81, pressure 155/93 and breathing comfortably on 2 L of oxygen satting 94%.  Lab work remarkable for creatinine 1.24, sodium 135, normal electrolytes, normal LFTs, glucose 108, BNP 4008, high-sensitivity troponin 33 and CBC remarkable for hemoglobin 11.8 but otherwise normal.  VBG obtained showing a normal pH and PCO2 of 45.  D-dimer elevated at 1.75 and CT PE study showed no PE but extensive consolidation at the right upper lobe with patchy bibasilar opacities noted.  Also noted is peribronchial wall thickening and mucus impaction.  He was given dexamethasone 6 mg, ceftriaxone 1 g and doxycycline with request for admission.    #Acute hypoxemic respiratory failure likely multifactorial related to COVID-19 pneumonia, bacterial pneumonia, underlying COPD and CHF exacerbation  -Symptoms started 5/13 primarily described as cough, shortness of breath and mild chest pressure  -Seen in urgent care on 5/14 with chest x-ray showing airspace opacities throughout the mid and upper right lung and to a lesser extent the left lung.  Provider prescribed Paxlovid which she started on 5/15  -Overnight 5/15 he developed  significant shortness of breath prompting him to come to the ED.  Requiring 2 L of oxygen to keep sats above 90%  -CT PE study is negative for PE but shows a right upper lobe consolidation with patchy bibasilar opacities and peribronchial wall thickening  -On exam I do not hear wheezes or rhonchi but do hear bilateral crackles with BNP elevated to 4008.  He reports breathing is worse if he is lying flat.  -Agree with ED starting ceftriaxone and doxycycline which she will continue  -Start remdesivir in place to Paxlovid  -Continue dexamethasone 6 mg  -Give Lasix 20 mg IV  with strict intake/output and daily weights.  Reassess if further diuresis is needed tomorrow  -We will have Tessalon Perles, Robitussin and albuterol available for cough/congestion  -Encourage pulmonary toileting and wean oxygen as tolerated    #Elevated troponin  -History of nonobstructive coronary artery disease  -Describes mild chest pressure with cough  -Troponin elevation to 33  -Plan to repeat and monitor on telemetry      #History of HFpEF with chronic right ventricular dysfunction  -Last echo in January 2023 shows normal left ventricular systolic function with EF of 61%, right ventricle is mildly enlarged with moderately reduced global RV function.  Aortic and mitral valve appear to be well functioning  -BNP elevated, patient reports some degree of orthopnea with crackles on exam suggesting some degree of fluid overload in the lungs  -Plan for diuresis as above and reassess    #History of COPD  -Normally not oxygen dependent  -No wheezes or rhonchi on exam  -Continue Breo Ellipta, albuterol nebulizer and Flonase    #Bioprosthetic aortic valve replacement  #Bioprosthetic mitral valve replacement  #History of paroxysmal atrial fibrillation s/p ablation and MAZE procedure  #History of complete heart block status post pacemaker placement  #Nonobstructive coronary artery disease  #Hypertension  -Continue PTA Toprol XL 50 mg, digoxin 0.125 mg  daily Lipitor 20 mg  -Apixaban for stroke prophylaxis      #History of C. Difficile  -Positive 4/2019    #CKD  -Baseline creatinine 1-1.1 with admission creatinine slightly elevated 1.24  -Diuresis as above  -Avoid nephrotoxins    #History of duodenal ulcer  -IV protonix while admitted    #Depression/anxiety  -Continue Lexapro    #History of testicular cancer s/p orchiectomy             Diet: Regular Diet Adult    DVT Prophylaxis: DOAC  Krueger Catheter: Not present  Lines: None     Cardiac Monitoring: None  Code Status:  Full code         Disposition Plan      Expected Discharge Date: 05/18/2023                The patient's care was discussed with the Attending Physician, Dr. Wiley, Patient, Patient's Family and ED provider.    Glenys Bella PA-C  Hospitalist Service  [facility]  Securely message with Phone Warrior (more info)  Text page via [facility] Paging/Directory     ______________________________________________________________________    Chief Complaint   Cough with shortness of breath      History of Present Illness   Jaylen Lamas is a 72 year old male who presents with productive cough, shortness of breath and chest pressure.  He states his symptoms started on 5/13.  He went to urgent care and had a COVID test and chest x-ray done.  He was started on Paxlovid on 5/15 a.m. but overnight reports an episode of severe shortness of breath prompting him to come to the ED.  His breathing feels improved after having oxygen put on but he does note if he lays flat his breathing is harder.  He reports coughing up green and brown sputum.  He denies fever, chills, nausea, vomiting and diarrhea.  He has been eating and drinking without difficulty.  He does not smoke cigarettes or drink alcohol and has been taking all of his medicines as prescribed.      Past Medical History    No past medical history on file.    Past Surgical History   No past surgical history on file.    Prior to Admission  Medications   None        Physical Exam   Vital Signs: Temp: 99.3  F (37.4  C) Temp src: Temporal BP: (!) 146/84 Pulse: 79   Resp: 20 SpO2: 92 % O2 Device: Nasal cannula Oxygen Delivery: 2 LPM  Weight: 0 lbs 0 oz    General Appearance: Alert and oriented x3  Respiratory: Patient is breathing comfortably on 2 L.  No wheezes or rhonchi heard on exam but there are fine crackles in the bases bilaterally  Cardiovascular: RRR without murmur  GI: Bowel sounds are present without tenderness  Skin: No rashes or open sores are noted.  Other: No lower extremity swelling noted.    Medical Decision Making       55 MINUTES SPENT BY ME on the date of service doing chart review, history, exam, documentation & further activities per the note.      Data     I have personally reviewed the following data over the past 24 hrs:    6.3  \   11.8 (L)   / 183     135 (L) 99 19.1 /  108 (H)   4.0 28 1.24 (H) \       ALT: 14 AST: 28 AP: 82 TBILI: 0.4   ALB: 3.2 (L) TOT PROTEIN: 5.9 (L) LIPASE: N/A       Trop: 33 (H) BNP: 4,008 (H)       INR:  N/A PTT:  N/A   D-dimer:  1.75 (H) Fibrinogen:  N/A       Imaging results reviewed over the past 24 hrs:   Recent Results (from the past 24 hour(s))   CT Chest Pulmonary Embolism w Contrast    Narrative    CT CHEST PULMONARY EMBOLISM WITH CONTRAST May 16, 2023 7:27 AM    CLINICAL HISTORY: COVID positive. Chronic obstructive pulmonary  disease, sudden shortness of breath last night. D-dimer 1.75.    TECHNIQUE: CT angiogram chest during arterial phase injection IV  contrast. 2D and 3D MIP reconstructions were performed by the CT  technologist. Dose reduction techniques were used.  CONTRAST: 55mL Isovue-370.    COMPARISON: None.    FINDINGS:  ANGIOGRAM CHEST: Pulmonary arteries are normal caliber and negative  for pulmonary emboli. Limited opacification of the thoracic aorta  limits assessment. No convincing dissection. Diffuse thoracic aortic  calcifications. No CT evidence of right heart strain.    LUNGS  AND PLEURA: Trace effusions. Emphysema with prominent pulmonary  bulla at the right mid to inferior lung. Dense consolidation at the  right upper lobe series 7 image 65 and adjacent images. Interstitial  thickening at the lower lungs particularly at the periphery of right  lower lobe and posterior right upper lobe. Patchy opacities at the  bilateral lower lungs and bilateral peribronchial wall thickening with  areas of mucous impaction noted.    MEDIASTINUM/AXILLAE: Left chest pacemaker. Several prominent lymph  nodes are ill-defined. Pretracheal example is 1.3 cm series 6 image  92. Subcarinal presumed lymph node is 1.4 cm image 125. There are  other examples.    CORONARY ARTERY CALCIFICATION: At least moderate.    UPPER ABDOMEN: Prominent spleen measuring 13.2 cm. Some small reflux  of contrast into the hepatic venous system.    MUSCULOSKELETAL: Sternotomy. No acute osseous abnormality.      Impression    IMPRESSION:  1.  Extensive consolidation at the right upper lobe and patchy  bibasilar opacities also noted. Findings worrisome for pneumonia.  There are areas of peribronchial wall thickening and mucus impaction  as well.  2.  No evidence for pulmonary embolism.  3.  Emphysema.  4.  Enlarged thoracic lymph nodes identified that are nonspecific.  5.  Splenomegaly.

## 2023-05-16 NOTE — ED NOTES
RECEIVING UNIT ED HANDOFF REVIEW    Above ED Nurse Handoff Report was reviewed: Yes  Reviewed by: Joseph Braun RN on May 16, 2023 at 10:53 AM         [facility]  ED Nurse Handoff Report    ED Chief complaint: Shortness of Breath  . ED Diagnosis:   Final diagnoses:   Chronic obstructive pulmonary disease with acute lower respiratory infection (H)   Acute respiratory failure with hypoxia (H)       Allergies:   Allergies   Allergen Reactions     Azithromycin Other (See Comments)     Causes C-Diff-  Patients PCP states this is not true     Cefprozil      Other reaction(s): Gastrointestinal, GI Upset     Ciprofloxacin      Other reaction(s): Gastrointestinal, GI Upset     Erythromycin      Other reaction(s): Gastrointestinal, GI Upset     Metronidazole      Other reaction(s): Gastrointestinal     Other (Do Not Use) Unknown     Spiriva caused vision disturbances     Tiotropium Visual Disturbance     Venlafaxine Nausea and Vomiting       Code Status: Full Code    Activity level - Baseline/Home:   Up by self  Activity Level - Current:   Regular activity.   Lift room needed: No.   Bariatric: No   Needed: No   Isolation: Yes.   Infection: Not Applicable  COVID r/o and special precautions.     Respiratory status: Nasal cannula    Vital Signs (within 30 minutes):   Vitals:    05/16/23 0746 05/16/23 0800 05/16/23 0819 05/16/23 0834   BP:  (!) 146/84 (!) 142/123 (!) 143/85   Pulse:  79  82   Resp:    13   Temp:       TempSrc:       SpO2: 92%   100%       Cardiac Rhythm:  ,      Pain level:    Patient confused: No.   Patient Falls Risk: nonskid shoes/slippers when out of bed.   Elimination Status: Has voided     Patient Report - Initial Complaint: NSR.   Focused Assessment: Alert states feels better then this AM. Breathing improved denies any pain. Congested nonproductive cough noted. O2 at 2 nasal cannula saturations will drop into the mid 80's on room air when he falls asleep. Does have a  history of COPD and runs in the high 80's pr patient.      Abnormal Results:   Labs Ordered and Resulted from Time of ED Arrival to Time of ED Departure   D DIMER QUANTITATIVE - Abnormal       Result Value    D-Dimer Quantitative 1.75 (*)    BASIC METABOLIC PANEL - Abnormal    Sodium 135 (*)     Potassium 4.0      Chloride 99      Carbon Dioxide (CO2) 28      Anion Gap 8      Urea Nitrogen 19.1      Creatinine 1.24 (*)     Calcium 8.1 (*)     Glucose 108 (*)     GFR Estimate 62     TROPONIN T, HIGH SENSITIVITY - Abnormal    Troponin T, High Sensitivity 33 (*)    NT PROBNP INPATIENT - Abnormal    N terminal Pro BNP Inpatient 4,008 (*)    BLOOD GAS VENOUS - Abnormal    pH Venous 7.42      pCO2 Venous 45      pO2 Venous 38      Bicarbonate Venous 29 (*)     Base Excess/Deficit (+/-) 4.0 (*)     FIO2 99     CBC WITH PLATELETS AND DIFFERENTIAL - Abnormal    WBC Count 6.3      RBC Count 4.36 (*)     Hemoglobin 11.8 (*)     Hematocrit 36.9 (*)     MCV 85      MCH 27.1      MCHC 32.0      RDW 13.2      Platelet Count 183      % Neutrophils 75      % Lymphocytes 10      % Monocytes 11      % Eosinophils 3      % Basophils 0      % Immature Granulocytes 1      NRBCs per 100 WBC 0      Absolute Neutrophils 4.8      Absolute Lymphocytes 0.6 (*)     Absolute Monocytes 0.7      Absolute Eosinophils 0.2      Absolute Basophils 0.0      Absolute Immature Granulocytes 0.0      Absolute NRBCs 0.0     HEPATIC FUNCTION PANEL - Abnormal    Protein Total 5.9 (*)     Albumin 3.2 (*)     Bilirubin Total 0.4      Alkaline Phosphatase 82      AST 28      ALT 14      Bilirubin Direct <0.20          CT Chest Pulmonary Embolism w Contrast   Preliminary Result   IMPRESSION:   1.  Extensive consolidation at the right upper lobe and patchy   bibasilar opacities also noted. Findings worrisome for pneumonia.   There are areas of peribronchial wall thickening and mucus impaction   as well.   2.  No evidence for pulmonary embolism.   3.  Emphysema.    4.  Enlarged thoracic lymph nodes identified that are nonspecific.   5.  Splenomegaly.          Treatments provided: Antibiotics  Family Comments: No family here  OBS brochure/video discussed/provided to patient:  N/A  ED Medications:   Medications   doxycycline (VIBRAMYCIN) 100 mg vial to attach to  mL bag (has no administration in time range)   dexamethasone PF (DECADRON) injection 6 mg (6 mg Intravenous $Given 5/16/23 9069)   iopamidol (ISOVUE-370) solution 55 mL (55 mLs Intravenous $Given 5/16/23 0722)   sodium chloride (PF) 0.9% PF flush 76 mL (76 mLs Intravenous $Given 5/16/23 0723)   cefTRIAXone (ROCEPHIN) 1 g vial to attach to  mL bag for ADULTS or NS 50 mL bag for PEDS (1 g Intravenous $New Bag 5/16/23 0882)       Drips infusing:  No  For the majority of the shift this patient was Green.   Interventions performed were None.    Sepsis treatment initiated: No    Cares/treatment/interventions/medications to be completed following ED care: See hospitalist note    ED Nurse Name: Izzy Reyes RN  9:06 AM

## 2023-05-16 NOTE — ED NOTES
Pt having episodes of apnea with sleep lasting as long as 10 seconds and dropping his O2 sats into the low 80's (82% at times).  Bounces back up to 94-96% when awake.  Pt states he often feels sleepy when he wakes up in the morning and throughout the day.  Writer encouraged talking to primary about a sleep study.  Will continue to assess and monitor

## 2023-05-16 NOTE — PHARMACY-ADMISSION MEDICATION HISTORY
Pharmacist Admission Medication History    Admission medication history is complete. The information provided in this note is only as accurate as the sources available at the time of the update.    Medication reconciliation/reorder completed by provider prior to medication history? No    Information Source(s): Patient and CareEverywhere/SureScripts via phone    Pertinent Information:     Levofloxacin: Started 5/14 x 5 days  Paxlovid: started 5/15 x 5 days    Changes made to PTA medication list:    Added: All PTA Meds were added    Deleted: None    Changed: None    Medication Affordability:  Not including over the counter (OTC) medications, was there a time in the past 3 months when you did not take your medications as prescribed because of cost?: No    Allergies reviewed with patient and updates made in EHR: yes    Medication History Completed By:    Sandra PaulinoD, [facility]   Emergency Medicine Pharmacist  [phone] or [facility]  May 16, 2023    Prior to Admission medications    Medication Sig Last Dose Taking? Auth Provider Long Term End Date   BREO ELLIPTA 100-25 MCG/ACT inhaler Inhale 1 puff into the lungs daily 5/15/2023 at am Yes Unknown, Entered By History     digoxin (LANOXIN) 125 MCG tablet Take 125 mcg by mouth daily 5/15/2023 at am Yes Unknown, Entered By History Yes    ELIQUIS ANTICOAGULANT 5 MG tablet Take 5 mg by mouth 2 times daily 5/15/2023 at pm Yes Unknown, Entered By History     escitalopram (LEXAPRO) 20 MG tablet take 1 tablet by mouth once daily 5/15/2023 at am Yes Unknown, Entered By History Yes    levofloxacin (LEVAQUIN) 750 MG tablet Take 1 tablet by mouth daily 5/15/2023 at am Yes Unknown, Entered By History  5/18/23   nirmatrelvir and ritonavir (PAXLOVID) 300 mg/100 mg therapy pack Take 2 tablets by mouth 2 times daily 5/15/2023 at pm Yes Unknown, Entered By History  5/19/23   albuterol (PROAIR HFA/PROVENTIL HFA/VENTOLIN HFA) 108 (90 Base) MCG/ACT inhaler Inhale 2 puffs into the  lungs every 6 hours as needed for shortness of breath or cough prn at prn  Unknown, Entered By History Yes    fluticasone (FLONASE) 50 MCG/ACT nasal spray Spray 2 sprays into both nostrils daily as needed for rhinitis or allergies prn at prn  Unknown, Entered By History     metoprolol succinate ER (TOPROL XL) 50 MG 24 hr tablet Take 50 mg by mouth At Bedtime   Unknown, Entered By History Yes

## 2023-05-16 NOTE — ED PROVIDER NOTES
History     Chief Complaint:  Shortness of Breath       HPI   Jaylen Lamas is a 72 year old male with a history of copd  who presents with shortness of breath    Patient is a 72-year-old male with a known history of COPD.  Patient recently developed symptoms of COVID was tested on few days ago and started on Paxlovid.  Patient has had progressively worsening shortness of breath acutely last night felt short of breath woke up he states difficulty getting bright area in and out using inhalers without relief call 911 noted to be hypoxic brought to the emergency room has had some pressure in the back but no chest pain.  No productive cough.  Patient feels better on oxygen..      Independent Historian:   None - Patient Only    Review of External Notes:      ROS:  Review of Systems    Allergies:  Azithromycin  Cefprozil  Ciprofloxacin  Erythromycin  Metronidazole  Other (Do Not Use)  Tiotropium  Venlafaxine     Medications:    No current outpatient medications on file.      Past Medical History:    No past medical history on file.    Past Surgical History:    No past surgical history on file.     Family History:    family history is not on file.    Social History:     PCP: Alexys Bella     Physical Exam   Patient Vitals for the past 24 hrs:   BP Temp Temp src Pulse Resp SpO2   05/16/23 0557 (!) 155/93 99.3  F (37.4  C) Temporal 81 20 94 %        Physical Exam  Vitals reviewed.   HENT:      Head: Normocephalic.   Cardiovascular:      Rate and Rhythm: Normal rate and regular rhythm.   Pulmonary:      Effort: Tachypnea present.      Breath sounds: Wheezing present.   Musculoskeletal:         General: Normal range of motion.      Cervical back: Normal range of motion.   Skin:     General: Skin is warm.      Capillary Refill: Capillary refill takes less than 2 seconds.   Neurological:      General: No focal deficit present.      Mental Status: He is alert.   Psychiatric:         Mood and Affect: Mood normal.            Emergency Department Course       Imaging:  CT Chest Pulmonary Embolism w Contrast   Final Result   IMPRESSION:   1.  Extensive consolidation at the right upper lobe and patchy   bibasilar opacities also noted. Findings worrisome for pneumonia.   There are areas of peribronchial wall thickening and mucus impaction   as well.   2.  No evidence for pulmonary embolism.   3.  Emphysema.   4.  Enlarged thoracic lymph nodes identified that are nonspecific.   5.  Splenomegaly.      LOIS PARK MD            SYSTEM ID:  [id number]         Report per radiology    Laboratory:  Labs Ordered and Resulted from Time of ED Arrival to Time of ED Departure   D DIMER QUANTITATIVE - Abnormal       Result Value    D-Dimer Quantitative 1.75 (*)    BASIC METABOLIC PANEL - Abnormal    Sodium 135 (*)     Potassium 4.0      Chloride 99      Carbon Dioxide (CO2) 28      Anion Gap 8      Urea Nitrogen 19.1      Creatinine 1.24 (*)     Calcium 8.1 (*)     Glucose 108 (*)     GFR Estimate 62     TROPONIN T, HIGH SENSITIVITY - Abnormal    Troponin T, High Sensitivity 33 (*)    NT PROBNP INPATIENT - Abnormal    N terminal Pro BNP Inpatient 4,008 (*)    BLOOD GAS VENOUS - Abnormal    pH Venous 7.42      pCO2 Venous 45      pO2 Venous 38      Bicarbonate Venous 29 (*)     Base Excess/Deficit (+/-) 4.0 (*)     FIO2 99     CBC WITH PLATELETS AND DIFFERENTIAL - Abnormal    WBC Count 6.3      RBC Count 4.36 (*)     Hemoglobin 11.8 (*)     Hematocrit 36.9 (*)     MCV 85      MCH 27.1      MCHC 32.0      RDW 13.2      Platelet Count 183      % Neutrophils 75      % Lymphocytes 10      % Monocytes 11      % Eosinophils 3      % Basophils 0      % Immature Granulocytes 1      NRBCs per 100 WBC 0      Absolute Neutrophils 4.8      Absolute Lymphocytes 0.6 (*)     Absolute Monocytes 0.7      Absolute Eosinophils 0.2      Absolute Basophils 0.0      Absolute Immature Granulocytes 0.0      Absolute NRBCs 0.0     HEPATIC FUNCTION PANEL - Abnormal     Protein Total 5.9 (*)     Albumin 3.2 (*)     Bilirubin Total 0.4      Alkaline Phosphatase 82      AST 28      ALT 14      Bilirubin Direct <0.20          Emergency Department Course & Assessments:             Interventions:  Medications - No data to display     Assessments:      Independent Interpretation  X-rays, CTs, rhythm strip):  None    Consultations/Discussion of Management or Tests:  None        Social Determinants of Health affecting care:   None    Disposition:  The patient was admitted to the hospital under the care of Dr. caceres.     Impression & Plan      Medical Decision Making:  Patient presents with worsening shortness of breath.  Recent history of positive COVID test already placed on Paxlovid.  Sats progressively worse.  Noted to be tachypneic and audibly wheezing.  Lack of improvement with oxygen saturations on 3 L. patient due to acute hypoxia in the setting of pneumonia.  CT performed due to concerns for PE in the setting of COVID and elevated D-dimer.  Due to acute respiratory failure with hypoxia in the setting of pneumonia and COVID we recommend admission for further assessment care was discussed with the hospitalist and admitted as an inpatient.    Critical Care time:  was 0 minutes for this patient excluding procedures.    Diagnosis:    ICD-10-CM    1. Chronic obstructive pulmonary disease with acute lower respiratory infection (H)  J44.0       2. Acute respiratory failure with hypoxia (H)  J96.01            Discharge Medications:  New Prescriptions    No medications on file          Poncho Manzanares MD  5/16/2023   Poncho Manzanares MD Goodman, Brian Samuel, MD  05/17/23 4420

## 2023-05-16 NOTE — PLAN OF CARE
Goal Outcome Evaluation:      Plan of Care Reviewed With: patient    Overall Patient Progress: improvingOverall Patient Progress: improving      Orientation: A&O x4  VS: Temp: 97.6  F (36.4  C) Temp src: Oral BP: 133/73 Pulse: 82   Resp: 24 SpO2: 96 % O2 Device: Nasal cannula Oxygen Delivery: 2 LPM  Resp: 2L Nasal Canular  Pain: Pt. Denies.  Tele: SR, A-Paced  Activity: Standby Assist  Diet: Regular  : WDL  GI: WDL  Skin: Bruising bilateral UE.  LDAs: Rt. PIV infusing remdesivir at 125 ml/hr.    Plan: Expected discharge 5/18/2032

## 2023-05-17 NOTE — PROGRESS NOTES
[facility]    Medicine Progress Note - Hospitalist Service    Date of Admission:  5/16/2023    Assessment & Plan     Jaylen Lamas is a medically complex 72 year old male with history of COPD, chronic right ventricular dysfunction with preserved ejection fraction, bioprosthetic aortic valve replacement, bioprosthetic mitral valve replacement, paroxysmal atrial fibrillation s/p ablation and MAZE procedure, complete heart block (due to endocarditis)  status post pacemaker insertion, nonobstructive coronary artery disease, history of C. difficile, hypertension, CKD, history of duodenal ulcer, depression/anxiety, history of testicular cancer s/p orchiectomy and previous smoker admitted on 5/16/2023 with known COVID and episode of severe shortness of breath.     In the ED temperature 99.3, pulse 81, pressure 155/93 and breathing comfortably on 2 L of oxygen satting 94%.  Lab work remarkable for creatinine 1.24, sodium 135, normal electrolytes, normal LFTs, glucose 108, BNP 4008, high-sensitivity troponin 33 and CBC remarkable for hemoglobin 11.8 but otherwise normal.  VBG obtained showing a normal pH and PCO2 of 45.  D-dimer elevated at 1.75 and CT PE study showed no PE but extensive consolidation at the right upper lobe with patchy bibasilar opacities noted.  Also noted is peribronchial wall thickening and mucus impaction.  He was given dexamethasone 6 mg, ceftriaxone 1 g and doxycycline with request for admission.     #Acute hypoxemic respiratory failure likely multifactorial related to COVID-19 pneumonia, bacterial pneumonia, underlying COPD and CHF exacerbation  -Symptoms started 5/13 primarily described as cough, shortness of breath and mild chest pressure  -Seen in urgent care on 5/14 with chest x-ray showing airspace opacities throughout the mid and upper right lung and to a lesser extent the left lung.  Provider prescribed Paxlovid which she started on 5/15  -Overnight 5/15 he developed  significant shortness of breath prompting him to come to the ED.  Requiring 2 L of oxygen to keep sats above 90%  -CT PE study is negative for PE but shows a right upper lobe consolidation with patchy bibasilar opacities and peribronchial wall thickening  -On exam I do not hear wheezes or rhonchi but do hear bilateral crackles with BNP elevated to 4008.  He reports breathing is worse if he is lying flat.  -Continue ceftriaxone and doxycycline   -Start remdesivir in place to Paxlovid  -Continue dexamethasone 6 mg  -Was given Lasix 20 mg IV x1, IV creatinine is further diuresis  -We will have Tessalon Perles, Robitussin and albuterol available for cough/congestion  -Encourage pulmonary toileting and wean oxygen as tolerated     #Elevated troponin  -History of nonobstructive coronary artery disease  -Describes mild chest pressure with cough  -Troponin elevation to 33  -Plan to repeat and monitor on telemetry        #Acute on chronic heart failure with preserved ejection fraction.  History of HFpEF with chronic right ventricular dysfunction  -Last echo in January 2023 shows normal left ventricular systolic function with EF of 61%, right ventricle is mildly enlarged with moderately reduced global RV function.  Aortic and mitral valve appear to be well functioning  -BNP elevated, patient reports some degree of orthopnea with crackles on exam suggesting some degree of fluid overload in the lungs  -Plan for diuresis as above and reassess     #History of COPD  -Normally not oxygen dependent  -No wheezes or rhonchi on exam  -Continue Breo Ellipta, albuterol nebulizer and Flonase     #Bioprosthetic aortic valve replacement  #Bioprosthetic mitral valve replacement  #History of paroxysmal atrial fibrillation s/p ablation and MAZE procedure  #History of complete heart block status post pacemaker placement  #Nonobstructive coronary artery disease  #Hypertension  -Continue PTA Toprol XL 50 mg, digoxin 0.125 mg daily Lipitor 20  mg  -Apixaban for stroke prophylaxis        #History of C. Difficile  -Positive 4/2019     #LINDA on CKD stage II.  -Baseline creatinine 1-1.1 with admission creatinine slightly elevated 1.24, improved to 0.94 elevated  -Diuresis as above  -Avoid nephrotoxins     #History of duodenal ulcer  -IV protonix while admitted     #Depression/anxiety  -Continue Lexapro     #History of testicular cancer s/p orchiectomy     Diet: Combination Diet Regular Diet Adult    DVT Prophylaxis: DOAC  Krueger Catheter: Not present  Lines: None     Cardiac Monitoring: ACTIVE order. Indication: Chest pain/ ACS rule out (24 hours)  Code Status: Full Code      Clinically Significant Risk Factors          # Hypocalcemia: Lowest Ca = 8.1 mg/dL in last 2 days, will monitor and replace as appropriate     # Hypoalbuminemia: Lowest albumin = 3.2 g/dL at 5/16/2023  6:28 AM, will monitor as appropriate                     Disposition Plan      Expected Discharge Date: 05/18/2023                  Eliel Wiley MD  Hospitalist Service  [facility]  Securely message with [employer] (more info)  Text page via [employer] Paging/Directory   ______________________________________________________________________    Interval History   Denies shortness of breath at rest.  Was able to ambulate but oxygen saturation dropped to 89%.    Physical Exam   Vital Signs: Temp: 98.2  F (36.8  C) Temp src: Oral BP: 134/72 Pulse: 81   Resp: 20 SpO2: 96 % O2 Device: None (Room air) Oxygen Delivery: 2 LPM  Weight: 139 lbs 1.76 oz    Alert awake and x3.  Comfortable at rest.  Chest bilateral crackles.  Cardiac exam, regular rate and rhythm.  Abdomen.  Nontender.  Extremities.  No edema    Medical Decision Making       MANAGEMENT DISCUSSED with the following over the past 24 hours: Patient and RN       Data   Imaging results reviewed over the past 24 hrs:   No results found for this or any previous visit (from the past 24 hour(s)).  Recent Labs   Lab 05/17/23  0739  05/16/23  0628   WBC 7.4 6.3   HGB 12.0* 11.8*   MCV 86 85    183    135*   POTASSIUM 4.2 4.0   CHLORIDE 101 99   CO2 24 28   BUN 27.4* 19.1   CR 0.94 1.24*   ANIONGAP 11 8   MELO 8.5* 8.1*   * 108*   ALBUMIN  --  3.2*   PROTTOTAL  --  5.9*   BILITOTAL  --  0.4   ALKPHOS  --  82   ALT  --  14   AST  --  28

## 2023-05-17 NOTE — PROGRESS NOTES
Patient has been assessed for Home Oxygen needs.  Oxygen readings:   *   RA - at rest  Pulse oximetry SPO2 94 %  *   RA - during activity/with exercise SPO2 89 %  *   O2 at 1  liters/minute (at rest) ...SPO2 96%  *   O2 at  1  liters/minute (during activity/with exercise) ...SPO2 93 %

## 2023-05-17 NOTE — PLAN OF CARE
Goal Outcome Evaluation:      Plan of Care Reviewed With: patient    Overall Patient Progress: improvingOverall Patient Progress: improving      Orientation: A&O x4  VS: Temp: 98.2  F (36.8  C) Temp src: Oral BP: 134/72 Pulse: 81   Resp: 20 SpO2: 96 % O2 Device: None (Room air) Oxygen Delivery: 2 LPM   Resp: RA  Pain: Currently Denies. Reported mild mid back pain for previous shift.  Tele: 100% A-Paced  Activity: Standby Assist  Diet: Regular  : WDL   GI: WDL  Skin: Brusing on forearms.  LDAs: R. PIV Saline Locked.  Needs frequent flushing with 10 mL NS.     Plan: Pt. Discharge pending home oxygen assessment.  Pt. Does not have oxygen at home and believe his SPO2 will drop upon ambulation.      Problem: Breathing Pattern Ineffective  Goal: Effective Breathing Pattern  Outcome: Progressing  Intervention: Promote Improved Breathing Pattern  Recent Flowsheet Documentation  Taken 5/17/2023 1100 by Joseph Braun, RN  Head of Bed (HOB) Positioning: HOB at 30-45 degrees

## 2023-05-17 NOTE — PLAN OF CARE
"Goal Outcome Evaluation:      Plan of Care Reviewed With: patient      Pt A&O x 4. VSS. Up with SBA. On 1L of oxygen. BS+. Denies pain or nausea. PIV SL. On Lasix, Rocephin, Vibramycin and Remdesivir IV. Tolerating regular diet    /73 (BP Location: Left arm)   Pulse 84   Temp 97.6  F (36.4  C) (Oral)   Resp 22   Ht 1.702 m (5' 7\")   Wt 61.2 kg (134 lb 14.4 oz)   SpO2 93%   BMI 21.13 kg/m               "

## 2023-05-17 NOTE — UTILIZATION REVIEW
Admission Status; Secondary Review Determination     Admission Date: 5/16/2023  5:49 AM       Under the authority of the Utilization Management Committee, the utilization review process indicated a secondary review on the above patient.  The review outcome is based on review of the medical records, discussions with staff, and applying clinical experience noted on the date of the review.        (x)      Inpatient Status Appropriate - This patient's medical care is consistent with medical management for inpatient care and reasonable inpatient medical practice.       RATIONALE FOR DETERMINATION      Brief clinical presentation, information copied from the chart, abbreviated and edited for relevant content:     Jaylen Lamas is a medically complex 72 year old male with history of COPD, chronic right ventricular dysfunction with preserved ejection fraction, bioprosthetic aortic valve replacement, bioprosthetic mitral valve replacement, paroxysmal atrial fibrillation s/p ablation and MAZE procedure, complete heart block (due to endocarditis)  status post pacemaker insertion, nonobstructive coronary artery disease, history of C. difficile, hypertension, CKD, history of duodenal ulcer, depression/anxiety, history of testicular cancer s/p orchiectomy and previous smoker admitted on 5/16/2023 with known COVID and episode of severe shortness of breath. Had started Paxlovid but developed shortness of breath. Hypoxic on admission.  D-dimer elevated at 1.75 and CT PE study showed no PE but extensive consolidation at the right upper lobe with patchy bibasilar opacities noted.  Also noted is peribronchial wall thickening and mucus impaction.  He was given dexamethasone 6 mg, ceftriaxone 1 g and doxycycline in ER. admission.BNP elevated to 4008.  He reports breathing is worse if he is lying flat. Plan to continue dexamethasone 6 mg. Started Remdesivir. Trial of IV lasix. Not stable for discharge.      At the time of admission with  the information available to the attending physician, more than 2 nights hospital complex care was anticipated. Also, there was a risk of adverse outcome if patient was treated outpatient or observation. High intensity of services anticipated. Inpatient admission appropriate based on Medicare guidelines.       The information on this document is developed by the utilization review team in order for the business office to ensure compliance.  This only denotes the appropriateness of proper admission status and does not reflect the quality of care rendered.         The definitions of Inpatient Status and Observation Status used in making the determination above are those provided in the CMS Coverage Manual, Chapter 1 and Chapter 6, section 70.4.      Sincerely,      Sonia Gayle MD   Utilization Review/ Case Management  [facility].

## 2023-05-17 NOTE — PLAN OF CARE
Goal Outcome Evaluation:      Plan of Care Reviewed With: patient    Overall Patient Progress: improvingOverall Patient Progress: improving    [id number]: VSS on RA. Spo2 94-95%. LS clear/dim. A&Ox4. SBA. Tele 100% A paced. Denies pain/sob. Voiding & had 1x bowel on nights. On Abx  & antiviral. PIV intact. Safety checks completed and call light within reach. Continue POC.

## 2023-05-18 PROBLEM — U07.1 INFECTION DUE TO 2019 NOVEL CORONAVIRUS: Status: ACTIVE | Noted: 2023-01-01

## 2023-05-18 NOTE — PLAN OF CARE
Goal Outcome Evaluation:         Admitting Diagnosis:  COVID+   Pertinent History:COPD, Afib, HTN, CKD & testicular cancer. Pacemaker. For vitals and assessment please see flow sheet.   Living Situation: Home  Pain plan: denies pain.  Mobility:  assistance, stand-by  Baseline activity: Independent.  Alarms/Safety: steady gait.   LDA's:  PIV.   Pertinent test results: K+ 4.2, Cr: 0.94.   Consults:  none  Abnormals/Pending: none  Other Cares/Comments:A & O,4, VSS, LS diminished/crackles, O2 95% RA. IV abx given. Home o2 test done.   Discharge Disposition:  Possible discharge tomorrow.   Discharge Time:  TBD.

## 2023-05-18 NOTE — DISCHARGE SUMMARY
[facility]  Hospitalist Discharge Summary      Date of Admission:  5/16/2023  Date of Discharge:  5/18/2023  Discharging Provider: Kellee Mcclelland MD  Discharge Service: Hospitalist Service    Discharge Diagnoses   Principal Problem:    Acute respiratory failure with hypoxia (H)  Active Problems:    Chronic obstructive pulmonary disease with acute lower respiratory infection (H)    Infection due to 2019 novel coronavirus      Clinically Significant Risk Factors          Follow-ups Needed After Discharge   Follow-up Appointments     Follow-up and recommended labs and tests       Follow up with primary care provider, Alexys Bella, within 7 days for   hospital follow- up.  No follow up labs or test are needed.             Unresulted Labs Ordered in the Past 30 Days of this Admission     No orders found from 4/16/2023 to 5/17/2023.        Discharge Disposition   Discharged to home  Condition at discharge: Stable    Hospital Course   Jaylen Lamas is a medically complex 72 year old male with history of COPD, chronic right ventricular dysfunction with preserved ejection fraction, bioprosthetic aortic valve replacement, bioprosthetic mitral valve replacement, paroxysmal atrial fibrillation s/p ablation and MAZE procedure, complete heart block (due to endocarditis)  status post pacemaker insertion, nonobstructive coronary artery disease, history of C. difficile, hypertension, CKD, history of duodenal ulcer, depression/anxiety, history of testicular cancer s/p orchiectomy and previous smoker admitted on 5/16/2023 with known COVID and episode of severe shortness of breath.     In the ED temperature 99.3, pulse 81, pressure 155/93 and breathing comfortably on 2 L of oxygen satting 94%.  Lab work remarkable for creatinine 1.24, sodium 135, normal electrolytes, normal LFTs, glucose 108, BNP 4008, high-sensitivity troponin 33 and CBC remarkable for hemoglobin 11.8 but otherwise normal.  VBG obtained  showing a normal pH and PCO2 of 45.  D-dimer elevated at 1.75 and CT PE study showed no PE but extensive consolidation at the right upper lobe with patchy bibasilar opacities noted.  Also noted is peribronchial wall thickening and mucus impaction.  He was given dexamethasone 6 mg, ceftriaxone 1 g and doxycycline with request for admission.     #Acute hypoxemic respiratory failure likely multifactorial related to COVID-19 pneumonia, bacterial pneumonia, underlying COPD and CHF exacerbation  -Symptoms started 5/13 primarily described as cough, shortness of breath and mild chest pressure  -Seen in urgent care on 5/14 with chest x-ray showing airspace opacities throughout the mid and upper right lung and to a lesser extent the left lung.  Provider prescribed Paxlovid which she started on 5/15  -Overnight 5/15 he developed significant shortness of breath prompting him to come to the ED.  Requiring 2 L of oxygen to keep sats above 90%  -CT PE study is negative for PE but shows a right upper lobe consolidation with patchy bibasilar opacities and peribronchial wall thickening  -On exam I do not hear wheezes or rhonchi but do hear bilateral crackles with BNP elevated to 4008.  He reports breathing is worse if he is lying flat.  Patient was started on ceftriaxone, doxycycline and his Paxil it was changed to remdesivir.  Patient was started on Decadron.  Patient improved really well.  Patient is stable to be discharged home.  They did home oxygen qualify yesterday but he did not qualify for it.  Patient is getting discharged on prednisone taper.  He is already on Levaquin so he will complete the course.  Patient already has a pulmonologist who he follows as outpatient.  He will follow-up with them whenever he is scheduled to be seen.  We will follow-up with the PCP within next week     #Elevated troponin  -History of nonobstructive coronary artery disease  -Describes mild chest pressure with cough  -Troponin elevation to  33  -Patient remained stable and he did not complain of any chest pain today.      Acute on Chronic HF with preserved EF-History of HFpEF with chronic right ventricular dysfunction  -Last echo in January 2023 shows normal left ventricular systolic function with EF of 61%, right ventricle is mildly enlarged with moderately reduced global RV function.  Aortic and mitral valve appear to be well functioning  -BNP elevated, patient reports some degree of orthopnea with crackles on exam suggesting some degree of fluid overload in the lungs  -He was diuresed with IV Lasix and he remained stable after that.     History of COPD  -Normally not oxygen dependent  -No wheezes or rhonchi on exam  -Continue Breo Ellipta, albuterol nebulizer and Flonase     #Bioprosthetic aortic valve replacement  #Bioprosthetic mitral valve replacement  #History of paroxysmal atrial fibrillation s/p ablation and MAZE procedure  #History of complete heart block status post pacemaker placement  #Nonobstructive coronary artery disease  #Hypertension  -Continue PTA Toprol XL 50 mg, digoxin 0.125 mg daily Lipitor 20 mg  -Apixaban for stroke prophylaxis        #History of C. Difficile  -Positive 4/2019  - no diarrhea in hospital          Consultations This Hospital Stay   None    Code Status   Full Code    Time Spent on this Encounter   I, Kellee Mcclelland MD, personally saw the patient today and spent greater than 30 minutes discharging this patient.       Kellee Mcclelland MD  [place] [facility] [address] [facility]  [address] [place] [address]  Phone: [phone]  Fax: [phone]  ______________________________________________________________________    Physical Exam   Vital Signs: Temp: 97.8  F (36.6  C) Temp src: Oral BP: (!) 142/80 Pulse: 81   Resp: 18 SpO2: 96 % O2 Device: None (Room air)    Weight: 139 lbs 1.6 oz  Constitutional: awake, alert, cooperative, no apparent distress, and appears stated age  Eyes: Lids and  lashes normal, pupils equal, round and reactive to light, extra ocular muscles intact, sclera clear, conjunctiva normal  Respiratory: No increased work of breathing, good air exchange, clear to auscultation bilaterally, no crackles or wheezing  Cardiovascular: Normal apical impulse, regular rate and rhythm, normal S1 and S2, no S3 or S4, and no murmur noted  GI: No scars, normal bowel sounds, soft, non-distended, non-tender, no masses palpated, no hepatosplenomegally       Primary Care Physician   Alexys Bella    Discharge Orders      Reason for your hospital stay    Acute respiratory failure with pneumonia, COVID     Follow-up and recommended labs and tests     Follow up with primary care provider, Alexys Bella, within 7 days for hospital follow- up.  No follow up labs or test are needed.     Activity    Your activity upon discharge: activity as tolerated     Diet    Follow this diet upon discharge: Orders Placed This Encounter      Combination Diet Regular Diet Adult       Significant Results and Procedures   Most Recent 3 CBC's:  Recent Labs   Lab Test 05/18/23  0700 05/17/23  0739 05/16/23  0628   WBC 10.5 7.4 6.3   HGB 12.1* 12.0* 11.8*   MCV 85 86 85    222 183     Most Recent 3 BMP's:  Recent Labs   Lab Test 05/18/23  0700 05/17/23  0739 05/16/23  0628   * 136 135*   POTASSIUM 4.8 4.2 4.0   CHLORIDE 102 101 99   CO2 24 24 28   BUN 30.2* 27.4* 19.1   CR 0.89 0.94 1.24*   ANIONGAP 9 11 8   MELO 8.8 8.5* 8.1*   * 161* 108*   ,   Results for orders placed or performed during the hospital encounter of 05/16/23   CT Chest Pulmonary Embolism w Contrast    Narrative    CT CHEST PULMONARY EMBOLISM WITH CONTRAST May 16, 2023 7:27 AM    CLINICAL HISTORY: COVID positive. Chronic obstructive pulmonary  disease, sudden shortness of breath last night. D-dimer 1.75.    TECHNIQUE: CT angiogram chest during arterial phase injection IV  contrast. 2D and 3D MIP reconstructions were performed by the  CT  technologist. Dose reduction techniques were used.  CONTRAST: 55mL Isovue-370.    COMPARISON: None.    FINDINGS:  ANGIOGRAM CHEST: Pulmonary arteries are normal caliber and negative  for pulmonary emboli. Limited opacification of the thoracic aorta  limits assessment. No convincing dissection. Diffuse thoracic aortic  calcifications. No CT evidence of right heart strain.    LUNGS AND PLEURA: Trace effusions. Emphysema with prominent pulmonary  bulla at the right mid to inferior lung. Dense consolidation at the  right upper lobe series 7 image 65 and adjacent images. Interstitial  thickening at the lower lungs particularly at the periphery of right  lower lobe and posterior right upper lobe. Patchy opacities at the  bilateral lower lungs and bilateral peribronchial wall thickening with  areas of mucous impaction noted.    MEDIASTINUM/AXILLAE: Left chest pacemaker. Several prominent lymph  nodes are ill-defined. Pretracheal example is 1.3 cm series 6 image  92. Subcarinal presumed lymph node is 1.4 cm image 125. There are  other examples.    CORONARY ARTERY CALCIFICATION: At least moderate.    UPPER ABDOMEN: Prominent spleen measuring 13.2 cm. Some small reflux  of contrast into the hepatic venous system.    MUSCULOSKELETAL: Sternotomy. No acute osseous abnormality.      Impression    IMPRESSION:  1.  Extensive consolidation at the right upper lobe and patchy  bibasilar opacities also noted. Findings worrisome for pneumonia.  There are areas of peribronchial wall thickening and mucus impaction  as well.  2.  No evidence for pulmonary embolism.  3.  Emphysema.  4.  Enlarged thoracic lymph nodes identified that are nonspecific.  5.  Splenomegaly.    LOIS PARK MD         SYSTEM ID:  [id number]       Discharge Medications   Current Discharge Medication List      START taking these medications    Details   benzonatate (TESSALON) 100 MG capsule Take 1 capsule (100 mg) by mouth 3 times daily as needed for cough  Qty: 20  capsule, Refills: 0    Associated Diagnoses: Chronic obstructive pulmonary disease with acute lower respiratory infection (H); Acute respiratory failure with hypoxia (H); Infection due to 2019 novel coronavirus      predniSONE (DELTASONE) 10 MG tablet Take 4 tablets (40 mg) by mouth daily for 3 days, THEN 3 tablets (30 mg) daily for 3 days, THEN 2 tablets (20 mg) daily for 3 days, THEN 1 tablet (10 mg) daily for 3 days.  Qty: 30 tablet, Refills: 0    Associated Diagnoses: Acute respiratory failure with hypoxia (H); Infection due to 2019 novel coronavirus         CONTINUE these medications which have CHANGED    Details   levofloxacin (LEVAQUIN) 750 MG tablet Take 1 tablet (750 mg) by mouth daily    Comments: Complete the course  Associated Diagnoses: Chronic obstructive pulmonary disease with acute lower respiratory infection (H)         CONTINUE these medications which have NOT CHANGED    Details   BREO ELLIPTA 100-25 MCG/ACT inhaler Inhale 1 puff into the lungs daily      digoxin (LANOXIN) 125 MCG tablet Take 125 mcg by mouth every evening      ELIQUIS ANTICOAGULANT 5 MG tablet Take 5 mg by mouth 2 times daily      escitalopram (LEXAPRO) 20 MG tablet take 1 tablet by mouth once daily      metoprolol succinate ER (TOPROL XL) 50 MG 24 hr tablet Take 50 mg by mouth At Bedtime      albuterol (PROAIR HFA/PROVENTIL HFA/VENTOLIN HFA) 108 (90 Base) MCG/ACT inhaler Inhale 2 puffs into the lungs every 6 hours as needed for shortness of breath or cough      fluticasone (FLONASE) 50 MCG/ACT nasal spray Spray 2 sprays into both nostrils daily as needed for rhinitis or allergies         STOP taking these medications       nirmatrelvir and ritonavir (PAXLOVID) 300 mg/100 mg therapy pack Comments:   Reason for Stopping:             Allergies   Allergies   Allergen Reactions     Azithromycin Other (See Comments)     Causes C-Diff-  Patients PCP states this is not true     Cefprozil GI Disturbance     Ciprofloxacin GI Disturbance      Erythromycin GI Disturbance     Metronidazole GI Disturbance     Spiriva Respimat [Tiotropium Bromide Monohydrate] Visual Disturbance     Venlafaxine Nausea and Vomiting

## 2023-05-18 NOTE — PLAN OF CARE
Pt A/O x4.  VSS. On room air. Up SBA. Patient wanted to go home with home oxygen but does not qualify. Advised demanding activity until he can see his lung doctor. IV doxycyline and Remdesivir infused.    Patient discharge documents reviewed and questions answered. Discharged home with medication. Discharged to home with spouse

## 2023-05-18 NOTE — PLAN OF CARE
[id number]: VSS on RA. A&Ox4. Independent/SBA. Denies pain/sob. Voiding well, BM x2 noc. On Abx  & antiviral. Cough syrup given for dry cough. Potential discharge today. PIV intact. Safety checks completed and call light within reach. Continue POC.

## 2023-05-19 NOTE — PROGRESS NOTES
Clinic Care Coordination Contact  [facility]: Post-Discharge Note  SITUATION                                                      Admission:    Admission Date: 05/16/23   Reason for Admission: Acute respiratory failure with pneumonia, COVID  Discharge:   Discharge Date: 05/18/23  Discharge Diagnosis: Acute respiratory failure with hypoxia (H)    Chronic obstructive pulmonary disease with acute lower respiratory infection (H)    Infection due to 2019 novel coronavirus    BACKGROUND                                                      Per hospital discharge summary and inpatient provider notes:  Jaylen Lamas is a medically complex 72 year old male with history of COPD, chronic right ventricular dysfunction with preserved ejection fraction, bioprosthetic aortic valve replacement, bioprosthetic mitral valve replacement, paroxysmal atrial fibrillation s/p ablation and MAZE procedure, complete heart block (due to endocarditis)  status post pacemaker insertion, nonobstructive coronary artery disease, history of C. difficile, hypertension, CKD, history of duodenal ulcer, depression/anxiety, history of testicular cancer s/p orchiectomy and previous smoker admitted on 5/16/2023 with known COVID and episode of severe shortness of breath.     In the ED temperature 99.3, pulse 81, pressure 155/93 and breathing comfortably on 2 L of oxygen satting 94%.  Lab work remarkable for creatinine 1.24, sodium 135, normal electrolytes, normal LFTs, glucose 108, BNP 4008, high-sensitivity troponin 33 and CBC remarkable for hemoglobin 11.8 but otherwise normal.  VBG obtained showing a normal pH and PCO2 of 45.  D-dimer elevated at 1.75 and CT PE study showed no PE but extensive consolidation at the right upper lobe with patchy bibasilar opacities noted.  Also noted is peribronchial wall thickening and mucus impaction.  He was given dexamethasone 6 mg, ceftriaxone 1 g and doxycycline with request for admission.       ASSESSMENT            Discharge Assessment  How are you doing now that you are home?: great had the best experience at the hospital  How are your symptoms? (Red Flag symptoms escalate to triage hotline per guidelines): Improved  Do you feel your condition is stable enough to be safe at home until your provider visit?: Yes  Does the patient have their discharge instructions? : Yes  Does the patient have questions regarding their discharge instructions? : No  Were you started on any new medications or were there changes to any of your previous medications? : Yes  Does the patient have all of their medications?: Yes  Do you have questions regarding any of your medications? : No  Do you have all of your needed medical supplies or equipment (DME)?  (i.e. oxygen tank, CPAP, cane, etc.): Yes  Discharge follow-up appointment scheduled within 14 calendar days? : Yes  Discharge Follow Up Appointment Scheduled with?: Specialty Care Provider                  PLAN                                                      Outpatient Plan:  Follow-up Appointments     Follow-up and recommended labs and tests       Follow up with primary care provider, Alexys Bella, within 7 days for   hospital follow- up.  No follow up labs or test are needed.       No future appointments.      For any urgent concerns, please contact our 24 hour nurse triage line: [phone])         Darlin Boss MA

## 2023-12-05 NOTE — DISCHARGE INSTRUCTIONS
You were seen today for double vision and difficulty walking.  We are concerned that the symptoms could represent a stroke.  Fortunately, your CT scans do not suggest a head bleed, mass, but it is incomplete information.      You need an MRI, and unfortunately we cannot provide this here due to your pacemaker.  [facility], the [facility], and [facility] all should have capabilities to do this test.  Call your doctor or proceed to 1 of these locations tomorrow to try and get this obtained.  You are always welcome to return here as well, but we would need to transfer you in order to get this study.    Please call 911 if you develop worsening symptoms, severe headache, vomiting, facial droop, difficulty speaking, weakness, or for any other concerns.

## 2023-12-05 NOTE — ED TRIAGE NOTES
Patient reports double vision, alerted gait, and nausea that started around 1pm. Patient states he was sent from [facility].      Triage Assessment (Adult)       Row Name 12/04/23 1823          Triage Assessment    Airway WDL WDL        Respiratory WDL    Respiratory WDL WDL        Skin Circulation/Temperature WDL    Skin Circulation/Temperature WDL WDL        Cardiac WDL    Cardiac WDL WDL        Peripheral/Neurovascular WDL    Peripheral Neurovascular WDL WDL        Cognitive/Neuro/Behavioral WDL    Cognitive/Neuro/Behavioral WDL WDL

## 2023-12-05 NOTE — CONSULTS
"  [facility]    Stroke Telephone Note    I was called by Ruslan Regalado on 12/04/23 regarding patient Jaylen Lamas. The patient is a 73 year old male with Afib on eliquis, CAD, MVR, aortic valve replacement comes in with double vision and ataxia. Last known well 11 am. Per Dr Regalado double vision improve on one eye closed. Image from one eye appears to be diagonal to the other eye. Subjective report of imbalance with walking but ED has not attempted ambulation yet. CT head without any acute ischemic stroke and hemorrhage. CTA head and neck very small calibar L vert, seems like some loss of flow in distal V2 segment, athero changes in arch and bilateral carotid less than 50%.   Vitals  BP: 120/63   Pulse: 80   Resp: 19   Temp: 98.1  F (36.7  C)          Impression  High suspicion of acute ischemic stroke involving brain stem    Recommendations  - Neurochecks and Vital Signs every 4h   - Permissive HTN; goal SBP < 220 mmHg  - Statin: will initiate based on lipid profile  - TTE (with Bubble Study if age 60 yrs or less)  - Telemetry, EKG  - Bedside Glucose Monitoring  - A1c, Lipid Panel, Troponin x 3  - PT/OT/SLP  - Stroke Education  - Euthermia, Euglycemia  - MRI brain if able to obtain otherwise repeat CT head in 24 hours. Concern of pacemaker incompatability    My recommendations are based on the information provided over the phone by Jaylen Lamas's in-person providers. They are not intended to replace the clinical judgment of his in-person providers. I was not requested to personally see or examine the patient at this time.     The Stroke Staff is Dr. Green.    Arleth Reyes MD  Vascular Neurology Fellow    To page me or covering stroke neurology team member, click here: [email or web address]  Choose \"On Call\" tab at top, then select \"NEUROLOGY/ALL SITES\" from middle drop-down box, press Enter, then look for \"stroke\" or \"telestroke\" for your site.        "

## 2023-12-09 NOTE — ED PROVIDER NOTES
History     Chief Complaint:  Eye Problem       HPI   Jaylen Lamas is a 73 year old male with past medical history CVA, A-fib on Eliquis, CAD, and more who presents with chief complaint double vision and dizziness.  Symptoms began 1 PM today.  He reports he has had similar symptoms multiple times in the past, but never persistent.  Out of concern for stroke he presented initially to urgent care who referred him here.      Independent Historian:   None - Patient Only    Review of External Notes:   Reviewed urgent care note from prior to arrival.      Medications:    albuterol (PROAIR HFA/PROVENTIL HFA/VENTOLIN HFA) 108 (90 Base) MCG/ACT inhaler  benzonatate (TESSALON) 100 MG capsule  BREO ELLIPTA 100-25 MCG/ACT inhaler  digoxin (LANOXIN) 125 MCG tablet  ELIQUIS ANTICOAGULANT 5 MG tablet  escitalopram (LEXAPRO) 20 MG tablet  fluticasone (FLONASE) 50 MCG/ACT nasal spray  levofloxacin (LEVAQUIN) 750 MG tablet  metoprolol succinate ER (TOPROL XL) 50 MG 24 hr tablet        Past Medical History:    Atrial fibrillation  CVA  CAD  Kidney stone  COPD  Diverticulitis  Hyperlipidemia  Patent foramen ovale    Past Surgical History:    ([place]) [place] ABLATE HEART DYSRHYTHM FOCUS   Ablation, supraventricular arrhythmogenic focus   AORTIC VALVE REPLACEMENT 2009   Aortic valve replacement   atrial fib ablation 11/15/2010   BRONCHOSCOPY 03/12/2018   COLONOSCOPY 2009   nl biopsy   ct guided drain placement RLQ abdomen 5-13-08   abcess-   HX COLECTOMY, appy 4/28/2008   diverticulitis,[facility] laparascopy, lysis of adhesions cecal resection   Left L3-4 and L4-5 Hemilaminectomy & Microdiscectomy 9/30/2016   MITRAL VALVE REPLACEMENT 2009   Mitral valve replacement   ORCHIECTOMY Right 1982   PACEMAKER PLACEMENT Left 03/26/2018   AV block; [employer] [place] XT DR DESI Maloney [id number]   Redo L4-5 foraminal microdiscectomy 05/22/2015   Right L4-5 foraminal microdiscectomy 1/16/2013   CHRISTIN 03/12/2018   TONSILLECTOMY child    tunneled dialysis line placement Right 06/01/2020     Physical Exam     Physical Exam  Nursing note and vitals reviewed.  HENT:   Mouth/Throat: Moist mucous membranes.   Eyes: EOMI, nonicteric sclera  Cardiovascular: Normal rate, regular rhythm, no murmurs, rubs, or gallops  Pulmonary/Chest: Effort normal and breath sounds normal. No respiratory distress. No wheezes. No rales.   Abdominal: Soft. Nontender, nondistended, no guarding or rigidity.   Musculoskeletal: Normal range of motion.   Neurological: Alert. Moves all extremities spontaneously.     CN's II-XII intact. 5/5 BUE and BLE strength. PERRL    EOMI without nystagmus.  Appears to have positive test of skew.  Binocular double vision that corrects with closing of either eye.    Sensation intact to light touch. Negative pronator drift.    Finger to nose intact.   Skin: Skin is warm and dry. No rash noted.         Emergency Department Course   ECG  ECG results from 12/04/23   EKG 12 lead     Value    Systolic Blood Pressure     Diastolic Blood Pressure     Ventricular Rate 84    Atrial Rate 84    OK Interval 210    QRS Duration 148        QTc 444    P Axis 79    R AXIS 105    T Axis 27    Interpretation ECG      Atrial-paced rhythm with prolonged AV conduction  Right bundle branch block  Abnormal ECG  When compared with ECG of 01-NOV-2020 21:19,  No significant change was found  Confirmed by - EMERGENCY ROOM, PHYSICIAN ([id number]),  SUZANNA DUMONT ([id number]) on 12/5/2023 6:42:11 AM           Imaging:  CTA Head Neck w Contrast   Final Result   IMPRESSION:    HEAD CTA:   Normal CTA [place] [facility] [place].      NECK CTA:   1.  Less than 50% narrowing in ICAs bilaterally.   2.  Tiny left vertebral artery is not visualized in its distal aspect; occlusion cannot be excluded.   3.  Mild narrowing at the origin/proximal aspect of right vertebral artery.   4.  Mild to moderate narrowing proximal left subclavian artery.   5.  Significant pleural apical thickening on  the right. Emphysema.   6.  Calcification at the right pleura medially, suggestive of underlying granuloma or asbestos exposure. There is associated soft tissue mass which could represent pleural thickening or a neoplastic lesion. It has increased in size since CT 05/16/2023.    Formal CT chest recommended.         Head CT w/o contrast   Final Result   IMPRESSION:   1.  No acute intracranial process.                Laboratory:  Labs Ordered and Resulted from Time of ED Arrival to Time of ED Departure   GLUCOSE BY METER - Abnormal       Result Value    GLUCOSE BY METER POCT 146 (*)    BASIC METABOLIC PANEL - Abnormal    Sodium 138      Potassium 4.5      Chloride 102      Carbon Dioxide (CO2) 27      Anion Gap 9      Urea Nitrogen 16.2      Creatinine 1.25 (*)     GFR Estimate 61      Calcium 9.0      Glucose 153 (*)    TROPONIN T, HIGH SENSITIVITY - Abnormal    Troponin T, High Sensitivity 32 (*)    CBC WITH PLATELETS AND DIFFERENTIAL - Abnormal    WBC Count 8.0      RBC Count 5.08      Hemoglobin 13.3      Hematocrit 43.1      MCV 85      MCH 26.2 (*)     MCHC 30.9 (*)     RDW 14.7      Platelet Count 191      % Neutrophils 73      % Lymphocytes 15      % Monocytes 8      % Eosinophils 2      % Basophils 1      % Immature Granulocytes 1      NRBCs per 100 WBC 0      Absolute Neutrophils 6.0      Absolute Lymphocytes 1.2      Absolute Monocytes 0.6      Absolute Eosinophils 0.1      Absolute Basophils 0.0      Absolute Immature Granulocytes 0.0      Absolute NRBCs 0.0              Emergency Department Course & Assessments:      Interventions:  Medications   iopamidol (ISOVUE-370) solution 500 mL (67 mLs Intravenous $Given 12/4/23 1919)   CT scan flush (80 mLs Intravenous $Given 12/4/23 1919)          Independent Interpretation (X-rays, CTs, rhythm strip):  None    Consultations/Discussion of Management or Tests:     The patient arrived in triage where vitals were measured and recorded.   The patient was then  escorted back to the emergency department.   The patient's medical records were reviewed.  Nursing notes and vitals were reviewed.    I performed an exam of the patient as documented above. The patient is in agreement with my plan of care.       Social Determinants of Health affecting care:   None    Disposition:  The patient was discharged to home.     Impression & Plan        Medical Decision Making:  Patient presents with chief complaint binocular double vision.  On exam, this is confirmed.  No dysmetria or other cerebellar signs.  No other abnormalities on neurologic exam.  Code stroke not activated given symptoms ongoing greater than 4.5 hours and NIHSS <4.  Patient has a pacemaker therefore MRI unable to be obtained at [facility].  CT CTA were obtained with findings as above.  Vertebral occlusion is chronic.  I discussed case with stroke neurology who recommended transfer to either the [place] or [facility] where MRI can be obtained.  I discussed this with the patient and his wife and neither of them want to be admitted or transferred.  They would like to try and obtain the MRI as an outpatient.  Patient has had this performed previously at [facility].  I discussed with patient his increased risk of CVA in the next 24 to 48 hours and neurology's recommendation for transfer/admission.  He understands reasons for recommendation, and has capacity to respectfully declined.  He states he will call his healthcare team in the morning to facilitate MRI.  I also discussed that he can choose to return to emergency department to also attempt to have a performed.  He knows to return to the emergency department overnight for any new/worsening symptoms. He is in stable condition at the time of discharge, red flags that should merit ED return were discussed as well as recommended follow-up instructions. All questions were answered.        Diagnosis:    ICD-10-CM    1. Double vision  H53.2       2. Ataxia  R27.0                  Ruslan Regalado MD  12/08/23 2019

## 2024-01-01 ENCOUNTER — APPOINTMENT (OUTPATIENT)
Dept: SPEECH THERAPY | Facility: CLINIC | Age: 74
DRG: 164 | End: 2024-01-01
Payer: MEDICARE

## 2024-01-01 ENCOUNTER — HOSPITAL ENCOUNTER (INPATIENT)
Facility: CLINIC | Age: 74
LOS: 8 days | Discharge: HOME HEALTH | DRG: 164 | End: 2024-04-22
Attending: EMERGENCY MEDICINE | Admitting: STUDENT IN AN ORGANIZED HEALTH CARE EDUCATION/TRAINING PROGRAM
Payer: MEDICARE

## 2024-01-01 ENCOUNTER — APPOINTMENT (OUTPATIENT)
Dept: GENERAL RADIOLOGY | Facility: CLINIC | Age: 74
DRG: 871 | End: 2024-01-01
Attending: INTERNAL MEDICINE
Payer: MEDICARE

## 2024-01-01 ENCOUNTER — APPOINTMENT (OUTPATIENT)
Dept: SPEECH THERAPY | Facility: CLINIC | Age: 74
DRG: 871 | End: 2024-01-01
Attending: HOSPITALIST
Payer: MEDICARE

## 2024-01-01 ENCOUNTER — APPOINTMENT (OUTPATIENT)
Dept: SPEECH THERAPY | Facility: CLINIC | Age: 74
DRG: 871 | End: 2024-01-01
Payer: MEDICARE

## 2024-01-01 ENCOUNTER — APPOINTMENT (OUTPATIENT)
Dept: CT IMAGING | Facility: CLINIC | Age: 74
DRG: 871 | End: 2024-01-01
Attending: EMERGENCY MEDICINE
Payer: MEDICARE

## 2024-01-01 ENCOUNTER — DOCUMENTATION ONLY (OUTPATIENT)
Dept: OTHER | Facility: CLINIC | Age: 74
End: 2024-01-01
Payer: MEDICARE

## 2024-01-01 ENCOUNTER — PATIENT OUTREACH (OUTPATIENT)
Dept: CARE COORDINATION | Facility: CLINIC | Age: 74
End: 2024-01-01
Payer: MEDICARE

## 2024-01-01 ENCOUNTER — APPOINTMENT (OUTPATIENT)
Dept: PHYSICAL THERAPY | Facility: CLINIC | Age: 74
DRG: 871 | End: 2024-01-01
Attending: INTERNAL MEDICINE
Payer: MEDICARE

## 2024-01-01 ENCOUNTER — APPOINTMENT (OUTPATIENT)
Dept: SPEECH THERAPY | Facility: CLINIC | Age: 74
DRG: 164 | End: 2024-01-01
Attending: INTERNAL MEDICINE
Payer: MEDICARE

## 2024-01-01 ENCOUNTER — APPOINTMENT (OUTPATIENT)
Dept: CARDIOLOGY | Facility: CLINIC | Age: 74
DRG: 871 | End: 2024-01-01
Attending: HOSPITALIST
Payer: MEDICARE

## 2024-01-01 ENCOUNTER — APPOINTMENT (OUTPATIENT)
Dept: GENERAL RADIOLOGY | Facility: CLINIC | Age: 74
DRG: 164 | End: 2024-01-01
Attending: STUDENT IN AN ORGANIZED HEALTH CARE EDUCATION/TRAINING PROGRAM
Payer: MEDICARE

## 2024-01-01 ENCOUNTER — HOSPITAL ENCOUNTER (INPATIENT)
Facility: CLINIC | Age: 74
LOS: 15 days | DRG: 871 | End: 2024-05-10
Attending: EMERGENCY MEDICINE | Admitting: INTERNAL MEDICINE
Payer: MEDICARE

## 2024-01-01 ENCOUNTER — APPOINTMENT (OUTPATIENT)
Dept: CARDIOLOGY | Facility: CLINIC | Age: 74
DRG: 164 | End: 2024-01-01
Attending: INTERNAL MEDICINE
Payer: MEDICARE

## 2024-01-01 ENCOUNTER — APPOINTMENT (OUTPATIENT)
Dept: CT IMAGING | Facility: CLINIC | Age: 74
DRG: 164 | End: 2024-01-01
Attending: EMERGENCY MEDICINE
Payer: MEDICARE

## 2024-01-01 ENCOUNTER — APPOINTMENT (OUTPATIENT)
Dept: PHYSICAL THERAPY | Facility: CLINIC | Age: 74
DRG: 871 | End: 2024-01-01
Payer: MEDICARE

## 2024-01-01 ENCOUNTER — APPOINTMENT (OUTPATIENT)
Dept: GENERAL RADIOLOGY | Facility: CLINIC | Age: 74
DRG: 871 | End: 2024-01-01
Attending: EMERGENCY MEDICINE
Payer: MEDICARE

## 2024-01-01 ENCOUNTER — APPOINTMENT (OUTPATIENT)
Dept: GENERAL RADIOLOGY | Facility: CLINIC | Age: 74
DRG: 164 | End: 2024-01-01
Attending: EMERGENCY MEDICINE
Payer: MEDICARE

## 2024-01-01 VITALS
WEIGHT: 137.79 LBS | BODY MASS INDEX: 21.63 KG/M2 | OXYGEN SATURATION: 96 % | SYSTOLIC BLOOD PRESSURE: 124 MMHG | RESPIRATION RATE: 1 BRPM | TEMPERATURE: 98.4 F | HEART RATE: 91 BPM | DIASTOLIC BLOOD PRESSURE: 46 MMHG | HEIGHT: 67 IN

## 2024-01-01 VITALS
HEIGHT: 67 IN | OXYGEN SATURATION: 96 % | TEMPERATURE: 97.7 F | RESPIRATION RATE: 18 BRPM | SYSTOLIC BLOOD PRESSURE: 104 MMHG | HEART RATE: 80 BPM | BODY MASS INDEX: 22.87 KG/M2 | DIASTOLIC BLOOD PRESSURE: 47 MMHG | WEIGHT: 145.72 LBS

## 2024-01-01 DIAGNOSIS — J18.9 PNEUMONIA OF RIGHT MIDDLE LOBE DUE TO INFECTIOUS ORGANISM: Primary | ICD-10-CM

## 2024-01-01 DIAGNOSIS — J44.0 CHRONIC OBSTRUCTIVE PULMONARY DISEASE WITH ACUTE LOWER RESPIRATORY INFECTION (H): ICD-10-CM

## 2024-01-01 DIAGNOSIS — J18.9 PNEUMONIA OF RIGHT LOWER LOBE DUE TO INFECTIOUS ORGANISM: ICD-10-CM

## 2024-01-01 DIAGNOSIS — A09 DIARRHEA OF INFECTIOUS ORIGIN: ICD-10-CM

## 2024-01-01 DIAGNOSIS — Z86.19 H/O CLOSTRIDIUM DIFFICILE INFECTION: ICD-10-CM

## 2024-01-01 DIAGNOSIS — R73.9 HYPERGLYCEMIA: ICD-10-CM

## 2024-01-01 DIAGNOSIS — R06.03 RESPIRATORY DISTRESS: ICD-10-CM

## 2024-01-01 LAB
1,3 BETA GLUCAN SER-MCNC: 59 PG/ML
ADV 40+41 DNA STL QL NAA+NON-PROBE: NEGATIVE
ALBUMIN MFR UR ELPH: 10.6 MG/DL
ALBUMIN SERPL BCG-MCNC: 2.4 G/DL (ref 3.5–5.2)
ALBUMIN SERPL BCG-MCNC: 2.4 G/DL (ref 3.5–5.2)
ALBUMIN SERPL BCG-MCNC: 2.7 G/DL (ref 3.5–5.2)
ALBUMIN UR-MCNC: 30 MG/DL
ALBUMIN UR-MCNC: 50 MG/DL
ALLEN'S TEST: YES
ALP SERPL-CCNC: 107 U/L (ref 40–150)
ALP SERPL-CCNC: 107 U/L (ref 40–150)
ALP SERPL-CCNC: 114 U/L (ref 40–150)
ALT SERPL W P-5'-P-CCNC: 20 U/L (ref 0–70)
ALT SERPL W P-5'-P-CCNC: 24 U/L (ref 0–70)
ALT SERPL W P-5'-P-CCNC: 25 U/L (ref 0–70)
ANION GAP SERPL CALCULATED.3IONS-SCNC: 10 MMOL/L (ref 7–15)
ANION GAP SERPL CALCULATED.3IONS-SCNC: 11 MMOL/L (ref 7–15)
ANION GAP SERPL CALCULATED.3IONS-SCNC: 12 MMOL/L (ref 7–15)
ANION GAP SERPL CALCULATED.3IONS-SCNC: 13 MMOL/L (ref 7–15)
ANION GAP SERPL CALCULATED.3IONS-SCNC: 13 MMOL/L (ref 7–15)
ANION GAP SERPL CALCULATED.3IONS-SCNC: 14 MMOL/L (ref 7–15)
ANION GAP SERPL CALCULATED.3IONS-SCNC: 7 MMOL/L (ref 7–15)
ANION GAP SERPL CALCULATED.3IONS-SCNC: 8 MMOL/L (ref 7–15)
ANION GAP SERPL CALCULATED.3IONS-SCNC: 9 MMOL/L (ref 7–15)
ANNOTATION COMMENT IMP: NOT DETECTED
APPEARANCE FLD: ABNORMAL
APPEARANCE UR: ABNORMAL
APPEARANCE UR: CLEAR
ASPERGILLUS AB TITR SER CF: NORMAL {TITER}
AST SERPL W P-5'-P-CCNC: 14 U/L (ref 0–45)
AST SERPL W P-5'-P-CCNC: 19 U/L (ref 0–45)
AST SERPL W P-5'-P-CCNC: 27 U/L (ref 0–45)
ASTRO TYP 1-8 RNA STL QL NAA+NON-PROBE: NEGATIVE
ATRIAL RATE - MUSE: 81 BPM
ATRIAL RATE - MUSE: 89 BPM
B DERMAT AB SER-ACNC: 0.7 IV
BACTERIA #/AREA URNS HPF: ABNORMAL /HPF
BACTERIA BLD CULT: NO GROWTH
BACTERIA BRONCH: NO GROWTH
BACTERIA BRONCH: NORMAL
BACTERIA BRONCH: NORMAL
BACTERIA SPT CULT: NORMAL
BACTERIA UR CULT: NO GROWTH
BASE EXCESS BLDA CALC-SCNC: 2 MMOL/L (ref -3–3)
BASE EXCESS BLDV CALC-SCNC: 13.1 MMOL/L (ref -3–3)
BASE EXCESS BLDV CALC-SCNC: 3.1 MMOL/L (ref -3–3)
BASE EXCESS BLDV CALC-SCNC: 6.5 MMOL/L (ref -3–3)
BASOPHILS # BLD AUTO: 0 10E3/UL (ref 0–0.2)
BASOPHILS NFR BLD AUTO: 0 %
BILIRUB DIRECT SERPL-MCNC: <0.2 MG/DL (ref 0–0.3)
BILIRUB SERPL-MCNC: 0.3 MG/DL
BILIRUB SERPL-MCNC: 0.4 MG/DL
BILIRUB SERPL-MCNC: 0.6 MG/DL
BILIRUB UR QL STRIP: NEGATIVE
BILIRUB UR QL STRIP: NEGATIVE
BUN SERPL-MCNC: 10.8 MG/DL (ref 8–23)
BUN SERPL-MCNC: 11.2 MG/DL (ref 8–23)
BUN SERPL-MCNC: 11.2 MG/DL (ref 8–23)
BUN SERPL-MCNC: 12.7 MG/DL (ref 8–23)
BUN SERPL-MCNC: 12.8 MG/DL (ref 8–23)
BUN SERPL-MCNC: 13 MG/DL (ref 8–23)
BUN SERPL-MCNC: 13.6 MG/DL (ref 8–23)
BUN SERPL-MCNC: 13.7 MG/DL (ref 8–23)
BUN SERPL-MCNC: 13.8 MG/DL (ref 8–23)
BUN SERPL-MCNC: 14 MG/DL (ref 8–23)
BUN SERPL-MCNC: 14.1 MG/DL (ref 8–23)
BUN SERPL-MCNC: 15.1 MG/DL (ref 8–23)
BUN SERPL-MCNC: 15.2 MG/DL (ref 8–23)
BUN SERPL-MCNC: 15.4 MG/DL (ref 8–23)
BUN SERPL-MCNC: 15.5 MG/DL (ref 8–23)
BUN SERPL-MCNC: 17.1 MG/DL (ref 8–23)
BUN SERPL-MCNC: 17.7 MG/DL (ref 8–23)
BUN SERPL-MCNC: 18.2 MG/DL (ref 8–23)
BUN SERPL-MCNC: 20.5 MG/DL (ref 8–23)
BUN SERPL-MCNC: 21.2 MG/DL (ref 8–23)
BUN SERPL-MCNC: 23.8 MG/DL (ref 8–23)
C CAYETANENSIS DNA STL QL NAA+NON-PROBE: NEGATIVE
C DIFF TOX B STL QL: NEGATIVE
C PNEUM DNA SPEC QL NAA+PROBE: NOT DETECTED
C PNEUM DNA SPEC QL NAA+PROBE: NOT DETECTED
CALCIUM SERPL-MCNC: 10 MG/DL (ref 8.8–10.2)
CALCIUM SERPL-MCNC: 10.1 MG/DL (ref 8.8–10.2)
CALCIUM SERPL-MCNC: 8.5 MG/DL (ref 8.8–10.2)
CALCIUM SERPL-MCNC: 8.5 MG/DL (ref 8.8–10.2)
CALCIUM SERPL-MCNC: 8.7 MG/DL (ref 8.8–10.2)
CALCIUM SERPL-MCNC: 8.9 MG/DL (ref 8.8–10.2)
CALCIUM SERPL-MCNC: 9 MG/DL (ref 8.8–10.2)
CALCIUM SERPL-MCNC: 9 MG/DL (ref 8.8–10.2)
CALCIUM SERPL-MCNC: 9.1 MG/DL (ref 8.8–10.2)
CALCIUM SERPL-MCNC: 9.2 MG/DL (ref 8.8–10.2)
CALCIUM SERPL-MCNC: 9.3 MG/DL (ref 8.8–10.2)
CALCIUM SERPL-MCNC: 9.5 MG/DL (ref 8.8–10.2)
CALCIUM SERPL-MCNC: 9.6 MG/DL (ref 8.8–10.2)
CALCIUM SERPL-MCNC: 9.7 MG/DL (ref 8.8–10.2)
CALCIUM SERPL-MCNC: 9.8 MG/DL (ref 8.8–10.2)
CALCIUM SERPL-MCNC: 9.8 MG/DL (ref 8.8–10.2)
CALCIUM SERPL-MCNC: 9.9 MG/DL (ref 8.8–10.2)
CAMPYLOBACTER DNA SPEC NAA+PROBE: NEGATIVE
CAOX CRY #/AREA URNS HPF: ABNORMAL /HPF
CELL COUNT BODY FLUID SOURCE: ABNORMAL
CHLORIDE SERPL-SCNC: 100 MMOL/L (ref 98–107)
CHLORIDE SERPL-SCNC: 100 MMOL/L (ref 98–107)
CHLORIDE SERPL-SCNC: 101 MMOL/L (ref 98–107)
CHLORIDE SERPL-SCNC: 102 MMOL/L (ref 98–107)
CHLORIDE SERPL-SCNC: 103 MMOL/L (ref 98–107)
CHLORIDE SERPL-SCNC: 104 MMOL/L (ref 98–107)
CHLORIDE SERPL-SCNC: 107 MMOL/L (ref 98–107)
CHLORIDE SERPL-SCNC: 92 MMOL/L (ref 98–107)
CHLORIDE SERPL-SCNC: 93 MMOL/L (ref 98–107)
CHLORIDE SERPL-SCNC: 94 MMOL/L (ref 98–107)
CHLORIDE SERPL-SCNC: 95 MMOL/L (ref 98–107)
CHLORIDE SERPL-SCNC: 96 MMOL/L (ref 98–107)
CHLORIDE SERPL-SCNC: 98 MMOL/L (ref 98–107)
CHLORIDE SERPL-SCNC: 98 MMOL/L (ref 98–107)
COCCIDIOIDES AB TITR SER CF: NORMAL {TITER}
COHGB MFR BLD: 97.1 % (ref 95–96)
COLOR FLD: ABNORMAL
COLOR UR AUTO: YELLOW
COLOR UR AUTO: YELLOW
CREAT SERPL-MCNC: 0.86 MG/DL (ref 0.67–1.17)
CREAT SERPL-MCNC: 0.87 MG/DL (ref 0.67–1.17)
CREAT SERPL-MCNC: 0.88 MG/DL (ref 0.67–1.17)
CREAT SERPL-MCNC: 0.89 MG/DL (ref 0.67–1.17)
CREAT SERPL-MCNC: 0.9 MG/DL (ref 0.67–1.17)
CREAT SERPL-MCNC: 0.92 MG/DL (ref 0.67–1.17)
CREAT SERPL-MCNC: 0.95 MG/DL (ref 0.67–1.17)
CREAT SERPL-MCNC: 0.96 MG/DL (ref 0.67–1.17)
CREAT SERPL-MCNC: 0.97 MG/DL (ref 0.67–1.17)
CREAT SERPL-MCNC: 0.98 MG/DL (ref 0.67–1.17)
CREAT SERPL-MCNC: 1.01 MG/DL (ref 0.67–1.17)
CREAT SERPL-MCNC: 1.03 MG/DL (ref 0.67–1.17)
CREAT SERPL-MCNC: 1.03 MG/DL (ref 0.67–1.17)
CREAT SERPL-MCNC: 1.04 MG/DL (ref 0.67–1.17)
CREAT SERPL-MCNC: 1.07 MG/DL (ref 0.67–1.17)
CREAT SERPL-MCNC: 1.11 MG/DL (ref 0.67–1.17)
CREAT SERPL-MCNC: 1.15 MG/DL (ref 0.67–1.17)
CREAT SERPL-MCNC: 1.17 MG/DL (ref 0.67–1.17)
CREAT SERPL-MCNC: 1.18 MG/DL (ref 0.67–1.17)
CREAT SERPL-MCNC: 1.19 MG/DL (ref 0.67–1.17)
CREAT SERPL-MCNC: 1.24 MG/DL (ref 0.67–1.17)
CREAT UR-MCNC: 77 MG/DL
CRP SERPL-MCNC: 130.99 MG/L
CRP SERPL-MCNC: 87.74 MG/L
CRP SERPL-MCNC: 92.45 MG/L
CRYPTOSP DNA STL QL NAA+NON-PROBE: NEGATIVE
DEPRECATED HCO3 PLAS-SCNC: 20 MMOL/L (ref 22–29)
DEPRECATED HCO3 PLAS-SCNC: 22 MMOL/L (ref 22–29)
DEPRECATED HCO3 PLAS-SCNC: 23 MMOL/L (ref 22–29)
DEPRECATED HCO3 PLAS-SCNC: 25 MMOL/L (ref 22–29)
DEPRECATED HCO3 PLAS-SCNC: 26 MMOL/L (ref 22–29)
DEPRECATED HCO3 PLAS-SCNC: 26 MMOL/L (ref 22–29)
DEPRECATED HCO3 PLAS-SCNC: 27 MMOL/L (ref 22–29)
DEPRECATED HCO3 PLAS-SCNC: 28 MMOL/L (ref 22–29)
DEPRECATED HCO3 PLAS-SCNC: 30 MMOL/L (ref 22–29)
DEPRECATED HCO3 PLAS-SCNC: 34 MMOL/L (ref 22–29)
DEPRECATED HCO3 PLAS-SCNC: 35 MMOL/L (ref 22–29)
DEPRECATED HCO3 PLAS-SCNC: 35 MMOL/L (ref 22–29)
DEPRECATED M TB RPOB XXX QL NAA+PROBE: NORMAL
DIASTOLIC BLOOD PRESSURE - MUSE: NORMAL MMHG
DIASTOLIC BLOOD PRESSURE - MUSE: NORMAL MMHG
E COLI O157 DNA STL QL NAA+NON-PROBE: NORMAL
E HISTOLYT DNA STL QL NAA+NON-PROBE: NEGATIVE
EAEC ASTA GENE ISLT QL NAA+PROBE: NEGATIVE
EC STX1+STX2 GENES STL QL NAA+NON-PROBE: NEGATIVE
EGFRCR SERPLBLD CKD-EPI 2021: 62 ML/MIN/1.73M2
EGFRCR SERPLBLD CKD-EPI 2021: 65 ML/MIN/1.73M2
EGFRCR SERPLBLD CKD-EPI 2021: 66 ML/MIN/1.73M2
EGFRCR SERPLBLD CKD-EPI 2021: 66 ML/MIN/1.73M2
EGFRCR SERPLBLD CKD-EPI 2021: 68 ML/MIN/1.73M2
EGFRCR SERPLBLD CKD-EPI 2021: 71 ML/MIN/1.73M2
EGFRCR SERPLBLD CKD-EPI 2021: 74 ML/MIN/1.73M2
EGFRCR SERPLBLD CKD-EPI 2021: 76 ML/MIN/1.73M2
EGFRCR SERPLBLD CKD-EPI 2021: 77 ML/MIN/1.73M2
EGFRCR SERPLBLD CKD-EPI 2021: 77 ML/MIN/1.73M2
EGFRCR SERPLBLD CKD-EPI 2021: 79 ML/MIN/1.73M2
EGFRCR SERPLBLD CKD-EPI 2021: 82 ML/MIN/1.73M2
EGFRCR SERPLBLD CKD-EPI 2021: 83 ML/MIN/1.73M2
EGFRCR SERPLBLD CKD-EPI 2021: 84 ML/MIN/1.73M2
EGFRCR SERPLBLD CKD-EPI 2021: 85 ML/MIN/1.73M2
EGFRCR SERPLBLD CKD-EPI 2021: 88 ML/MIN/1.73M2
EGFRCR SERPLBLD CKD-EPI 2021: >90 ML/MIN/1.73M2
EOSINOPHIL # BLD AUTO: 0 10E3/UL (ref 0–0.7)
EOSINOPHIL NFR BLD AUTO: 0 %
EPEC EAE GENE STL QL NAA+NON-PROBE: NEGATIVE
ERYTHROCYTE [DISTWIDTH] IN BLOOD BY AUTOMATED COUNT: 14 % (ref 10–15)
ERYTHROCYTE [DISTWIDTH] IN BLOOD BY AUTOMATED COUNT: 14.1 % (ref 10–15)
ERYTHROCYTE [DISTWIDTH] IN BLOOD BY AUTOMATED COUNT: 14.2 % (ref 10–15)
ERYTHROCYTE [DISTWIDTH] IN BLOOD BY AUTOMATED COUNT: 14.3 % (ref 10–15)
ERYTHROCYTE [DISTWIDTH] IN BLOOD BY AUTOMATED COUNT: 14.3 % (ref 10–15)
ERYTHROCYTE [DISTWIDTH] IN BLOOD BY AUTOMATED COUNT: 14.4 % (ref 10–15)
ERYTHROCYTE [DISTWIDTH] IN BLOOD BY AUTOMATED COUNT: 14.5 % (ref 10–15)
ERYTHROCYTE [DISTWIDTH] IN BLOOD BY AUTOMATED COUNT: 15 % (ref 10–15)
ERYTHROCYTE [DISTWIDTH] IN BLOOD BY AUTOMATED COUNT: 15 % (ref 10–15)
ERYTHROCYTE [DISTWIDTH] IN BLOOD BY AUTOMATED COUNT: 15.2 % (ref 10–15)
ERYTHROCYTE [DISTWIDTH] IN BLOOD BY AUTOMATED COUNT: 15.6 % (ref 10–15)
ETEC LTA+ST1A+ST1B TOX ST NAA+NON-PROBE: NEGATIVE
FERRITIN SERPL-MCNC: 4 NG/ML (ref 31–409)
FLUAV H1 2009 PAND RNA SPEC QL NAA+PROBE: NOT DETECTED
FLUAV H1 RNA SPEC QL NAA+PROBE: NOT DETECTED
FLUAV H3 RNA SPEC QL NAA+PROBE: NOT DETECTED
FLUAV RNA SPEC QL NAA+PROBE: NEGATIVE
FLUAV RNA SPEC QL NAA+PROBE: NEGATIVE
FLUAV RNA SPEC QL NAA+PROBE: NOT DETECTED
FLUBV RNA RESP QL NAA+PROBE: NEGATIVE
FLUBV RNA RESP QL NAA+PROBE: NEGATIVE
FLUBV RNA SPEC QL NAA+PROBE: NOT DETECTED
G LAMBLIA DNA STL QL NAA+NON-PROBE: NEGATIVE
GALACTOMANNAN AG BAL QL: NEGATIVE
GALACTOMANNAN AG SPEC IA-ACNC: 0.11
GLUCOSE SERPL-MCNC: 100 MG/DL (ref 70–99)
GLUCOSE SERPL-MCNC: 103 MG/DL (ref 70–99)
GLUCOSE SERPL-MCNC: 106 MG/DL (ref 70–99)
GLUCOSE SERPL-MCNC: 107 MG/DL (ref 70–99)
GLUCOSE SERPL-MCNC: 109 MG/DL (ref 70–99)
GLUCOSE SERPL-MCNC: 110 MG/DL (ref 70–99)
GLUCOSE SERPL-MCNC: 111 MG/DL (ref 70–99)
GLUCOSE SERPL-MCNC: 116 MG/DL (ref 70–99)
GLUCOSE SERPL-MCNC: 119 MG/DL (ref 70–99)
GLUCOSE SERPL-MCNC: 121 MG/DL (ref 70–99)
GLUCOSE SERPL-MCNC: 123 MG/DL (ref 70–99)
GLUCOSE SERPL-MCNC: 124 MG/DL (ref 70–99)
GLUCOSE SERPL-MCNC: 128 MG/DL (ref 70–99)
GLUCOSE SERPL-MCNC: 138 MG/DL (ref 70–99)
GLUCOSE SERPL-MCNC: 151 MG/DL (ref 70–99)
GLUCOSE SERPL-MCNC: 156 MG/DL (ref 70–99)
GLUCOSE SERPL-MCNC: 90 MG/DL (ref 70–99)
GLUCOSE SERPL-MCNC: 94 MG/DL (ref 70–99)
GLUCOSE SERPL-MCNC: 98 MG/DL (ref 70–99)
GLUCOSE UR STRIP-MCNC: NEGATIVE MG/DL
GLUCOSE UR STRIP-MCNC: NEGATIVE MG/DL
GRAM STAIN RESULT: NORMAL
H CAPSUL AG UR QL IA: NOT DETECTED
H CAPSUL AG UR-MCNC: NOT DETECTED NG/ML
H CAPSUL MYC AB TITR SER CF: NORMAL {TITER}
H CAPSUL YST AB TITR SER CF: NORMAL {TITER}
HADV DNA SPEC QL NAA+PROBE: NOT DETECTED
HADV DNA SPEC QL NAA+PROBE: NOT DETECTED
HBA1C MFR BLD: 6.3 %
HCO3 BLD-SCNC: 27 MMOL/L (ref 21–28)
HCO3 BLDV-SCNC: 27 MMOL/L (ref 21–28)
HCO3 BLDV-SCNC: 30 MMOL/L (ref 21–28)
HCO3 BLDV-SCNC: 32 MMOL/L (ref 21–28)
HCO3 BLDV-SCNC: 39 MMOL/L (ref 21–28)
HCOV PNL SPEC NAA+PROBE: NOT DETECTED
HCT VFR BLD AUTO: 29.7 % (ref 40–53)
HCT VFR BLD AUTO: 31.1 % (ref 40–53)
HCT VFR BLD AUTO: 31.3 % (ref 40–53)
HCT VFR BLD AUTO: 31.5 % (ref 40–53)
HCT VFR BLD AUTO: 31.6 % (ref 40–53)
HCT VFR BLD AUTO: 31.6 % (ref 40–53)
HCT VFR BLD AUTO: 32.1 % (ref 40–53)
HCT VFR BLD AUTO: 32.7 % (ref 40–53)
HCT VFR BLD AUTO: 34 % (ref 40–53)
HCT VFR BLD AUTO: 35.3 % (ref 40–53)
HCT VFR BLD AUTO: 35.8 % (ref 40–53)
HCT VFR BLD AUTO: 36.2 % (ref 40–53)
HCT VFR BLD AUTO: 42 % (ref 40–53)
HGB BLD-MCNC: 10.1 G/DL (ref 13.3–17.7)
HGB BLD-MCNC: 10.2 G/DL (ref 13.3–17.7)
HGB BLD-MCNC: 10.7 G/DL (ref 13.3–17.7)
HGB BLD-MCNC: 10.7 G/DL (ref 13.3–17.7)
HGB BLD-MCNC: 11.2 G/DL (ref 13.3–17.7)
HGB BLD-MCNC: 13 G/DL (ref 13.3–17.7)
HGB BLD-MCNC: 9.1 G/DL (ref 13.3–17.7)
HGB BLD-MCNC: 9.4 G/DL (ref 13.3–17.7)
HGB BLD-MCNC: 9.5 G/DL (ref 13.3–17.7)
HGB BLD-MCNC: 9.6 G/DL (ref 13.3–17.7)
HGB BLD-MCNC: 9.6 G/DL (ref 13.3–17.7)
HGB BLD-MCNC: 9.7 G/DL (ref 13.3–17.7)
HGB BLD-MCNC: 9.8 G/DL (ref 13.3–17.7)
HGB UR QL STRIP: ABNORMAL
HGB UR QL STRIP: NEGATIVE
HMPV RNA SPEC QL NAA+PROBE: NOT DETECTED
HOLD SPECIMEN: NORMAL
HOLD SPECIMEN: NORMAL
HPIV1 RNA SPEC QL NAA+PROBE: NOT DETECTED
HPIV2 RNA SPEC QL NAA+PROBE: NOT DETECTED
HPIV3 RNA SPEC QL NAA+PROBE: NOT DETECTED
HPIV4 RNA SPEC QL NAA+PROBE: NOT DETECTED
HSV1 DNA SPEC QL NAA+PROBE: NEGATIVE
HSV2 DNA SPEC QL NAA+PROBE: NEGATIVE
HYALINE CASTS: 41 /LPF
IMM GRANULOCYTES # BLD: 0.1 10E3/UL
IMM GRANULOCYTES # BLD: 0.2 10E3/UL
IMM GRANULOCYTES NFR BLD: 1 %
INR PPP: 1.85 (ref 0.85–1.15)
INTERPRETATION ECG - MUSE: NORMAL
INTERPRETATION ECG - MUSE: NORMAL
IRON BINDING CAPACITY (ROCHE): 199 UG/DL (ref 240–430)
IRON SATN MFR SERPL: 11 % (ref 15–46)
IRON SERPL-MCNC: 21 UG/DL (ref 61–157)
KETONES UR STRIP-MCNC: ABNORMAL MG/DL
KETONES UR STRIP-MCNC: NEGATIVE MG/DL
KOH PREPARATION: NORMAL
KOH PREPARATION: NORMAL
L PNEUMO DNA SPEC QL NAA+PROBE: NOT DETECTED
L PNEUMO1 AG UR QL IA: NEGATIVE
LACTATE BLD-SCNC: 1.2 MMOL/L
LACTATE SERPL-SCNC: 1.4 MMOL/L (ref 0.7–2)
LACTATE SERPL-SCNC: 1.7 MMOL/L (ref 0.7–2)
LEGIONELLA DNA SPEC NAA+PROBE: NOT DETECTED
LEUKOCYTE ESTERASE UR QL STRIP: NEGATIVE
LEUKOCYTE ESTERASE UR QL STRIP: NEGATIVE
LVEF ECHO: NORMAL
LVEF ECHO: NORMAL
LYMPHOCYTES # BLD AUTO: 0.7 10E3/UL (ref 0.8–5.3)
LYMPHOCYTES # BLD AUTO: 0.7 10E3/UL (ref 0.8–5.3)
LYMPHOCYTES # BLD AUTO: 1 10E3/UL (ref 0.8–5.3)
LYMPHOCYTES # BLD AUTO: 1.2 10E3/UL (ref 0.8–5.3)
LYMPHOCYTES NFR BLD AUTO: 10 %
LYMPHOCYTES NFR BLD AUTO: 6 %
LYMPHOCYTES NFR BLD AUTO: 7 %
LYMPHOCYTES NFR BLD AUTO: 7 %
M PNEUMO DNA SPEC QL NAA+PROBE: NOT DETECTED
M PNEUMO DNA SPEC QL NAA+PROBE: NOT DETECTED
M TB DNA SPEC QL NAA+PROBE: NOT DETECTED
MAGNESIUM SERPL-MCNC: 1.6 MG/DL (ref 1.7–2.3)
MAGNESIUM SERPL-MCNC: 1.7 MG/DL (ref 1.7–2.3)
MAGNESIUM SERPL-MCNC: 1.8 MG/DL (ref 1.7–2.3)
MAGNESIUM SERPL-MCNC: 1.8 MG/DL (ref 1.7–2.3)
MCH RBC QN AUTO: 25.4 PG (ref 26.5–33)
MCH RBC QN AUTO: 25.4 PG (ref 26.5–33)
MCH RBC QN AUTO: 25.5 PG (ref 26.5–33)
MCH RBC QN AUTO: 25.5 PG (ref 26.5–33)
MCH RBC QN AUTO: 25.6 PG (ref 26.5–33)
MCH RBC QN AUTO: 25.6 PG (ref 26.5–33)
MCH RBC QN AUTO: 25.7 PG (ref 26.5–33)
MCH RBC QN AUTO: 25.8 PG (ref 26.5–33)
MCH RBC QN AUTO: 25.8 PG (ref 26.5–33)
MCH RBC QN AUTO: 25.9 PG (ref 26.5–33)
MCH RBC QN AUTO: 26.1 PG (ref 26.5–33)
MCH RBC QN AUTO: 26.2 PG (ref 26.5–33)
MCH RBC QN AUTO: 26.4 PG (ref 26.5–33)
MCHC RBC AUTO-ENTMCNC: 29.6 G/DL (ref 31.5–36.5)
MCHC RBC AUTO-ENTMCNC: 29.9 G/DL (ref 31.5–36.5)
MCHC RBC AUTO-ENTMCNC: 29.9 G/DL (ref 31.5–36.5)
MCHC RBC AUTO-ENTMCNC: 30 G/DL (ref 31.5–36.5)
MCHC RBC AUTO-ENTMCNC: 30.1 G/DL (ref 31.5–36.5)
MCHC RBC AUTO-ENTMCNC: 30.2 G/DL (ref 31.5–36.5)
MCHC RBC AUTO-ENTMCNC: 30.6 G/DL (ref 31.5–36.5)
MCHC RBC AUTO-ENTMCNC: 30.7 G/DL (ref 31.5–36.5)
MCHC RBC AUTO-ENTMCNC: 30.8 G/DL (ref 31.5–36.5)
MCHC RBC AUTO-ENTMCNC: 30.9 G/DL (ref 31.5–36.5)
MCHC RBC AUTO-ENTMCNC: 31 G/DL (ref 31.5–36.5)
MCHC RBC AUTO-ENTMCNC: 31 G/DL (ref 31.5–36.5)
MCHC RBC AUTO-ENTMCNC: 31.7 G/DL (ref 31.5–36.5)
MCV RBC AUTO: 83 FL (ref 78–100)
MCV RBC AUTO: 83 FL (ref 78–100)
MCV RBC AUTO: 84 FL (ref 78–100)
MCV RBC AUTO: 85 FL (ref 78–100)
MCV RBC AUTO: 86 FL (ref 78–100)
MCV RBC AUTO: 87 FL (ref 78–100)
MONOCYTES # BLD AUTO: 1 10E3/UL (ref 0–1.3)
MONOCYTES # BLD AUTO: 1.1 10E3/UL (ref 0–1.3)
MONOCYTES # BLD AUTO: 1.1 10E3/UL (ref 0–1.3)
MONOCYTES # BLD AUTO: 1.3 10E3/UL (ref 0–1.3)
MONOCYTES NFR BLD AUTO: 10 %
MONOCYTES NFR BLD AUTO: 9 %
MRSA DNA SPEC QL NAA+PROBE: NEGATIVE
MUCOUS THREADS #/AREA URNS LPF: PRESENT /LPF
NEUTROPHILS # BLD AUTO: 12.1 10E3/UL (ref 1.6–8.3)
NEUTROPHILS # BLD AUTO: 8.3 10E3/UL (ref 1.6–8.3)
NEUTROPHILS # BLD AUTO: 9.5 10E3/UL (ref 1.6–8.3)
NEUTROPHILS # BLD AUTO: 9.7 10E3/UL (ref 1.6–8.3)
NEUTROPHILS NFR BLD AUTO: 80 %
NEUTROPHILS NFR BLD AUTO: 83 %
NITRATE UR QL: NEGATIVE
NITRATE UR QL: NEGATIVE
NOROVIRUS GI+II RNA STL QL NAA+NON-PROBE: NEGATIVE
NRBC # BLD AUTO: 0 10E3/UL
NRBC BLD AUTO-RTO: 0 /100
NT-PROBNP SERPL-MCNC: 3602 PG/ML (ref 0–900)
NT-PROBNP SERPL-MCNC: 5084 PG/ML (ref 0–900)
NT-PROBNP SERPL-MCNC: 7193 PG/ML (ref 0–900)
O2/TOTAL GAS SETTING VFR VENT: 2 %
O2/TOTAL GAS SETTING VFR VENT: 2 %
O2/TOTAL GAS SETTING VFR VENT: 35 %
O2/TOTAL GAS SETTING VFR VENT: 5 %
OBSERVATION IMP: NEGATIVE
OXYHGB MFR BLDV: 31 % (ref 70–75)
OXYHGB MFR BLDV: 68 % (ref 70–75)
OXYHGB MFR BLDV: 86 % (ref 70–75)
P AXIS - MUSE: 80 DEGREES
P AXIS - MUSE: 85 DEGREES
P JIROVECII DNA SPEC QL NAA+PROBE: NOT DETECTED
P SHIGELLOIDES DNA STL QL NAA+NON-PROBE: NEGATIVE
PATH REPORT.COMMENTS IMP SPEC: NORMAL
PATH REPORT.FINAL DX SPEC: NORMAL
PATH REPORT.GROSS SPEC: NORMAL
PATH REPORT.MICROSCOPIC SPEC OTHER STN: NORMAL
PATH REPORT.RELEVANT HX SPEC: NORMAL
PCO2 BLD: 42 MM HG (ref 35–45)
PCO2 BLDV: 44 MM HG (ref 40–50)
PCO2 BLDV: 51 MM HG (ref 40–50)
PCO2 BLDV: 52 MM HG (ref 40–50)
PCO2 BLDV: 56 MM HG (ref 40–50)
PH BLD: 7.41 [PH] (ref 7.35–7.45)
PH BLDV: 7.37 [PH] (ref 7.32–7.43)
PH BLDV: 7.4 [PH] (ref 7.32–7.43)
PH BLDV: 7.41 [PH] (ref 7.32–7.43)
PH BLDV: 7.45 [PH] (ref 7.32–7.43)
PH UR STRIP: 5.5 [PH] (ref 5–7)
PH UR STRIP: 5.5 [PH] (ref 5–7)
PHOSPHATE SERPL-MCNC: 1.8 MG/DL (ref 2.5–4.5)
PHOSPHATE SERPL-MCNC: 1.8 MG/DL (ref 2.5–4.5)
PHOSPHATE SERPL-MCNC: 1.9 MG/DL (ref 2.5–4.5)
PHOSPHATE SERPL-MCNC: 2.1 MG/DL (ref 2.5–4.5)
PHOSPHATE SERPL-MCNC: 2.2 MG/DL (ref 2.5–4.5)
PHOSPHATE SERPL-MCNC: 2.5 MG/DL (ref 2.5–4.5)
PHOSPHATE SERPL-MCNC: 2.6 MG/DL (ref 2.5–4.5)
PHOSPHATE SERPL-MCNC: 2.6 MG/DL (ref 2.5–4.5)
PLATELET # BLD AUTO: 236 10E3/UL (ref 150–450)
PLATELET # BLD AUTO: 243 10E3/UL (ref 150–450)
PLATELET # BLD AUTO: 268 10E3/UL (ref 150–450)
PLATELET # BLD AUTO: 268 10E3/UL (ref 150–450)
PLATELET # BLD AUTO: 269 10E3/UL (ref 150–450)
PLATELET # BLD AUTO: 272 10E3/UL (ref 150–450)
PLATELET # BLD AUTO: 273 10E3/UL (ref 150–450)
PLATELET # BLD AUTO: 276 10E3/UL (ref 150–450)
PLATELET # BLD AUTO: 279 10E3/UL (ref 150–450)
PLATELET # BLD AUTO: 280 10E3/UL (ref 150–450)
PLATELET # BLD AUTO: 286 10E3/UL (ref 150–450)
PLATELET # BLD AUTO: 303 10E3/UL (ref 150–450)
PLATELET # BLD AUTO: 327 10E3/UL (ref 150–450)
PO2 BLD: 87 MM HG (ref 80–105)
PO2 BLDV: 21 MM HG (ref 25–47)
PO2 BLDV: 23 MM HG (ref 25–47)
PO2 BLDV: 37 MM HG (ref 25–47)
PO2 BLDV: 55 MM HG (ref 25–47)
POTASSIUM SERPL-SCNC: 2.8 MMOL/L (ref 3.4–5.3)
POTASSIUM SERPL-SCNC: 3 MMOL/L (ref 3.4–5.3)
POTASSIUM SERPL-SCNC: 3.1 MMOL/L (ref 3.4–5.3)
POTASSIUM SERPL-SCNC: 3.2 MMOL/L (ref 3.4–5.3)
POTASSIUM SERPL-SCNC: 3.3 MMOL/L (ref 3.4–5.3)
POTASSIUM SERPL-SCNC: 3.4 MMOL/L (ref 3.4–5.3)
POTASSIUM SERPL-SCNC: 3.4 MMOL/L (ref 3.4–5.3)
POTASSIUM SERPL-SCNC: 3.5 MMOL/L (ref 3.4–5.3)
POTASSIUM SERPL-SCNC: 3.6 MMOL/L (ref 3.4–5.3)
POTASSIUM SERPL-SCNC: 3.7 MMOL/L (ref 3.4–5.3)
POTASSIUM SERPL-SCNC: 3.8 MMOL/L (ref 3.4–5.3)
POTASSIUM SERPL-SCNC: 3.9 MMOL/L (ref 3.4–5.3)
POTASSIUM SERPL-SCNC: 3.9 MMOL/L (ref 3.4–5.3)
POTASSIUM SERPL-SCNC: 4 MMOL/L (ref 3.4–5.3)
POTASSIUM SERPL-SCNC: 4 MMOL/L (ref 3.4–5.3)
POTASSIUM SERPL-SCNC: 4.3 MMOL/L (ref 3.4–5.3)
PR INTERVAL - MUSE: 166 MS
PR INTERVAL - MUSE: 176 MS
PROCALCITONIN SERPL IA-MCNC: 0.14 NG/ML
PROCALCITONIN SERPL IA-MCNC: 0.15 NG/ML
PROT SERPL-MCNC: 5.2 G/DL (ref 6.4–8.3)
PROT SERPL-MCNC: 5.2 G/DL (ref 6.4–8.3)
PROT SERPL-MCNC: 6.1 G/DL (ref 6.4–8.3)
PROT/CREAT 24H UR: 0.14 MG/MG CR (ref 0–0.2)
QRS DURATION - MUSE: 152 MS
QRS DURATION - MUSE: 152 MS
QT - MUSE: 388 MS
QT - MUSE: 394 MS
QTC - MUSE: 457 MS
QTC - MUSE: 472 MS
R AXIS - MUSE: 100 DEGREES
R AXIS - MUSE: 102 DEGREES
RBC # BLD AUTO: 3.55 10E6/UL (ref 4.4–5.9)
RBC # BLD AUTO: 3.68 10E6/UL (ref 4.4–5.9)
RBC # BLD AUTO: 3.69 10E6/UL (ref 4.4–5.9)
RBC # BLD AUTO: 3.74 10E6/UL (ref 4.4–5.9)
RBC # BLD AUTO: 3.76 10E6/UL (ref 4.4–5.9)
RBC # BLD AUTO: 3.91 10E6/UL (ref 4.4–5.9)
RBC # BLD AUTO: 4.02 10E6/UL (ref 4.4–5.9)
RBC # BLD AUTO: 4.16 10E6/UL (ref 4.4–5.9)
RBC # BLD AUTO: 4.18 10E6/UL (ref 4.4–5.9)
RBC # BLD AUTO: 4.25 10E6/UL (ref 4.4–5.9)
RBC # BLD AUTO: 5.04 10E6/UL (ref 4.4–5.9)
RBC URINE: 6 /HPF
RBC URINE: >182 /HPF
RSV RNA SPEC NAA+PROBE: NEGATIVE
RSV RNA SPEC NAA+PROBE: NEGATIVE
RSV RNA SPEC QL NAA+PROBE: NOT DETECTED
RSV RNA SPEC QL NAA+PROBE: NOT DETECTED
RV+EV RNA SPEC QL NAA+PROBE: NOT DETECTED
RVA RNA STL QL NAA+NON-PROBE: NEGATIVE
S PNEUM AG SPEC QL: NEGATIVE
SA TARGET DNA: NEGATIVE
SALMONELLA SP RPOD STL QL NAA+PROBE: NEGATIVE
SAO2 % BLDA: 95 % (ref 92–100)
SAO2 % BLDV: 31.1 % (ref 70–75)
SAO2 % BLDV: 40 % (ref 70–75)
SAO2 % BLDV: 69.4 % (ref 70–75)
SAO2 % BLDV: 88 % (ref 70–75)
SAPO I+II+IV+V RNA STL QL NAA+NON-PROBE: NEGATIVE
SARS-COV-2 RNA RESP QL NAA+PROBE: NEGATIVE
SARS-COV-2 RNA RESP QL NAA+PROBE: NEGATIVE
SCANNED LAB RESULT: ABNORMAL
SCANNED LAB RESULT: ABNORMAL
SCANNED LAB RESULT: NORMAL
SHIGELLA SP+EIEC IPAH ST NAA+NON-PROBE: NEGATIVE
SODIUM SERPL-SCNC: 132 MMOL/L (ref 135–145)
SODIUM SERPL-SCNC: 133 MMOL/L (ref 135–145)
SODIUM SERPL-SCNC: 134 MMOL/L (ref 135–145)
SODIUM SERPL-SCNC: 135 MMOL/L (ref 135–145)
SODIUM SERPL-SCNC: 136 MMOL/L (ref 135–145)
SODIUM SERPL-SCNC: 137 MMOL/L (ref 135–145)
SODIUM SERPL-SCNC: 138 MMOL/L (ref 135–145)
SODIUM SERPL-SCNC: 139 MMOL/L (ref 135–145)
SODIUM SERPL-SCNC: 139 MMOL/L (ref 135–145)
SODIUM SERPL-SCNC: 140 MMOL/L (ref 135–145)
SP GR UR STRIP: 1.02 (ref 1–1.03)
SP GR UR STRIP: 1.03 (ref 1–1.03)
SQUAMOUS EPITHELIAL: 1 /HPF
SYSTOLIC BLOOD PRESSURE - MUSE: NORMAL MMHG
SYSTOLIC BLOOD PRESSURE - MUSE: NORMAL MMHG
T AXIS - MUSE: 55 DEGREES
T AXIS - MUSE: 68 DEGREES
TROPONIN T SERPL HS-MCNC: 33 NG/L
TROPONIN T SERPL HS-MCNC: 33 NG/L
TROPONIN T SERPL HS-MCNC: 50 NG/L
TSH SERPL DL<=0.005 MIU/L-ACNC: 3.3 UIU/ML (ref 0.3–4.2)
UROBILINOGEN UR STRIP-MCNC: NORMAL MG/DL
UROBILINOGEN UR STRIP-MCNC: NORMAL MG/DL
V CHOLERAE DNA SPEC QL NAA+PROBE: NEGATIVE
VENTRICULAR RATE- MUSE: 81 BPM
VENTRICULAR RATE- MUSE: 89 BPM
VIBRIO DNA SPEC NAA+PROBE: NEGATIVE
WBC # BLD AUTO: 10.2 10E3/UL (ref 4–11)
WBC # BLD AUTO: 11.5 10E3/UL (ref 4–11)
WBC # BLD AUTO: 12.2 10E3/UL (ref 4–11)
WBC # BLD AUTO: 12.8 10E3/UL (ref 4–11)
WBC # BLD AUTO: 13.9 10E3/UL (ref 4–11)
WBC # BLD AUTO: 14.3 10E3/UL (ref 4–11)
WBC # BLD AUTO: 14.7 10E3/UL (ref 4–11)
WBC # BLD AUTO: 14.9 10E3/UL (ref 4–11)
WBC # BLD AUTO: 15.2 10E3/UL (ref 4–11)
WBC # BLD AUTO: 15.5 10E3/UL (ref 4–11)
WBC # BLD AUTO: 16.7 10E3/UL (ref 4–11)
WBC # BLD AUTO: 16.8 10E3/UL (ref 4–11)
WBC # BLD AUTO: 9.8 10E3/UL (ref 4–11)
WBC # FLD AUTO: ABNORMAL 10*3/UL
WBC URINE: 2 /HPF
WBC URINE: 5 /HPF
Y ENTEROCOL DNA STL QL NAA+PROBE: NEGATIVE

## 2024-01-01 PROCEDURE — 94799 UNLISTED PULMONARY SVC/PX: CPT

## 2024-01-01 PROCEDURE — 84484 ASSAY OF TROPONIN QUANT: CPT | Performed by: EMERGENCY MEDICINE

## 2024-01-01 PROCEDURE — 84132 ASSAY OF SERUM POTASSIUM: CPT | Performed by: PHYSICIAN ASSISTANT

## 2024-01-01 PROCEDURE — 87086 URINE CULTURE/COLONY COUNT: CPT | Performed by: EMERGENCY MEDICINE

## 2024-01-01 PROCEDURE — 99232 SBSQ HOSP IP/OBS MODERATE 35: CPT | Performed by: INTERNAL MEDICINE

## 2024-01-01 PROCEDURE — 36415 COLL VENOUS BLD VENIPUNCTURE: CPT | Performed by: INTERNAL MEDICINE

## 2024-01-01 PROCEDURE — 83735 ASSAY OF MAGNESIUM: CPT | Performed by: HOSPITALIST

## 2024-01-01 PROCEDURE — 999N000157 HC STATISTIC RCP TIME EA 10 MIN

## 2024-01-01 PROCEDURE — 36415 COLL VENOUS BLD VENIPUNCTURE: CPT | Performed by: HOSPITALIST

## 2024-01-01 PROCEDURE — 80048 BASIC METABOLIC PNL TOTAL CA: CPT | Performed by: INTERNAL MEDICINE

## 2024-01-01 PROCEDURE — 87640 STAPH A DNA AMP PROBE: CPT | Performed by: STUDENT IN AN ORGANIZED HEALTH CARE EDUCATION/TRAINING PROGRAM

## 2024-01-01 PROCEDURE — 71045 X-RAY EXAM CHEST 1 VIEW: CPT

## 2024-01-01 PROCEDURE — 87507 IADNA-DNA/RNA PROBE TQ 12-25: CPT | Performed by: INTERNAL MEDICINE

## 2024-01-01 PROCEDURE — 250N000011 HC RX IP 250 OP 636: Performed by: INTERNAL MEDICINE

## 2024-01-01 PROCEDURE — 250N000013 HC RX MED GY IP 250 OP 250 PS 637: Performed by: INTERNAL MEDICINE

## 2024-01-01 PROCEDURE — 250N000011 HC RX IP 250 OP 636: Performed by: PHYSICIAN ASSISTANT

## 2024-01-01 PROCEDURE — 250N000013 HC RX MED GY IP 250 OP 250 PS 637: Performed by: NURSE PRACTITIONER

## 2024-01-01 PROCEDURE — 250N000013 HC RX MED GY IP 250 OP 250 PS 637: Performed by: PHYSICIAN ASSISTANT

## 2024-01-01 PROCEDURE — 94640 AIRWAY INHALATION TREATMENT: CPT

## 2024-01-01 PROCEDURE — 80053 COMPREHEN METABOLIC PANEL: CPT | Performed by: EMERGENCY MEDICINE

## 2024-01-01 PROCEDURE — 84100 ASSAY OF PHOSPHORUS: CPT | Performed by: HOSPITALIST

## 2024-01-01 PROCEDURE — 93306 TTE W/DOPPLER COMPLETE: CPT | Mod: 26 | Performed by: INTERNAL MEDICINE

## 2024-01-01 PROCEDURE — 258N000003 HC RX IP 258 OP 636: Performed by: PHYSICIAN ASSISTANT

## 2024-01-01 PROCEDURE — 258N000003 HC RX IP 258 OP 636: Performed by: EMERGENCY MEDICINE

## 2024-01-01 PROCEDURE — 99233 SBSQ HOSP IP/OBS HIGH 50: CPT | Performed by: NURSE PRACTITIONER

## 2024-01-01 PROCEDURE — 250N000011 HC RX IP 250 OP 636: Performed by: HOSPITALIST

## 2024-01-01 PROCEDURE — 83605 ASSAY OF LACTIC ACID: CPT | Performed by: HOSPITALIST

## 2024-01-01 PROCEDURE — 120N000001 HC R&B MED SURG/OB

## 2024-01-01 PROCEDURE — 250N000009 HC RX 250: Performed by: STUDENT IN AN ORGANIZED HEALTH CARE EDUCATION/TRAINING PROGRAM

## 2024-01-01 PROCEDURE — 87305 ASPERGILLUS AG IA: CPT | Performed by: INTERNAL MEDICINE

## 2024-01-01 PROCEDURE — 83735 ASSAY OF MAGNESIUM: CPT | Performed by: INTERNAL MEDICINE

## 2024-01-01 PROCEDURE — 36415 COLL VENOUS BLD VENIPUNCTURE: CPT | Performed by: STUDENT IN AN ORGANIZED HEALTH CARE EDUCATION/TRAINING PROGRAM

## 2024-01-01 PROCEDURE — 0B9F8ZZ DRAINAGE OF RIGHT LOWER LUNG LOBE, VIA NATURAL OR ARTIFICIAL OPENING ENDOSCOPIC: ICD-10-PCS | Performed by: INTERNAL MEDICINE

## 2024-01-01 PROCEDURE — 84156 ASSAY OF PROTEIN URINE: CPT | Performed by: INTERNAL MEDICINE

## 2024-01-01 PROCEDURE — 36415 COLL VENOUS BLD VENIPUNCTURE: CPT | Performed by: EMERGENCY MEDICINE

## 2024-01-01 PROCEDURE — 250N000011 HC RX IP 250 OP 636: Performed by: EMERGENCY MEDICINE

## 2024-01-01 PROCEDURE — 94640 AIRWAY INHALATION TREATMENT: CPT | Mod: 76

## 2024-01-01 PROCEDURE — 83036 HEMOGLOBIN GLYCOSYLATED A1C: CPT | Performed by: EMERGENCY MEDICINE

## 2024-01-01 PROCEDURE — 83550 IRON BINDING TEST: CPT | Performed by: INTERNAL MEDICINE

## 2024-01-01 PROCEDURE — 99152 MOD SED SAME PHYS/QHP 5/>YRS: CPT | Performed by: INTERNAL MEDICINE

## 2024-01-01 PROCEDURE — 87449 NOS EACH ORGANISM AG IA: CPT | Performed by: INTERNAL MEDICINE

## 2024-01-01 PROCEDURE — 258N000003 HC RX IP 258 OP 636: Performed by: INTERNAL MEDICINE

## 2024-01-01 PROCEDURE — 87486 CHLMYD PNEUM DNA AMP PROBE: CPT | Performed by: INTERNAL MEDICINE

## 2024-01-01 PROCEDURE — 999N000208 ECHOCARDIOGRAM COMPLETE

## 2024-01-01 PROCEDURE — 99233 SBSQ HOSP IP/OBS HIGH 50: CPT | Performed by: INTERNAL MEDICINE

## 2024-01-01 PROCEDURE — 84100 ASSAY OF PHOSPHORUS: CPT | Performed by: INTERNAL MEDICINE

## 2024-01-01 PROCEDURE — 99239 HOSP IP/OBS DSCHRG MGMT >30: CPT | Performed by: STUDENT IN AN ORGANIZED HEALTH CARE EDUCATION/TRAINING PROGRAM

## 2024-01-01 PROCEDURE — 85027 COMPLETE CBC AUTOMATED: CPT | Performed by: PHYSICIAN ASSISTANT

## 2024-01-01 PROCEDURE — 36415 COLL VENOUS BLD VENIPUNCTURE: CPT | Performed by: PHYSICIAN ASSISTANT

## 2024-01-01 PROCEDURE — 80048 BASIC METABOLIC PNL TOTAL CA: CPT | Performed by: STUDENT IN AN ORGANIZED HEALTH CARE EDUCATION/TRAINING PROGRAM

## 2024-01-01 PROCEDURE — 120N000013 HC R&B IMCU

## 2024-01-01 PROCEDURE — 85004 AUTOMATED DIFF WBC COUNT: CPT | Performed by: INTERNAL MEDICINE

## 2024-01-01 PROCEDURE — 87385 HISTOPLASMA CAPSUL AG IA: CPT | Performed by: INTERNAL MEDICINE

## 2024-01-01 PROCEDURE — 250N000013 HC RX MED GY IP 250 OP 250 PS 637: Performed by: HOSPITALIST

## 2024-01-01 PROCEDURE — 84145 PROCALCITONIN (PCT): CPT | Performed by: PHYSICIAN ASSISTANT

## 2024-01-01 PROCEDURE — 88305 TISSUE EXAM BY PATHOLOGIST: CPT | Mod: 26 | Performed by: PATHOLOGY

## 2024-01-01 PROCEDURE — G0500 MOD SEDAT ENDO SERVICE >5YRS: HCPCS | Performed by: INTERNAL MEDICINE

## 2024-01-01 PROCEDURE — 87071 CULTURE AEROBIC QUANT OTHER: CPT | Performed by: INTERNAL MEDICINE

## 2024-01-01 PROCEDURE — 99222 1ST HOSP IP/OBS MODERATE 55: CPT | Performed by: STUDENT IN AN ORGANIZED HEALTH CARE EDUCATION/TRAINING PROGRAM

## 2024-01-01 PROCEDURE — 83735 ASSAY OF MAGNESIUM: CPT | Performed by: PHYSICIAN ASSISTANT

## 2024-01-01 PROCEDURE — 99207 PR NO BILLABLE SERVICE THIS VISIT: CPT | Performed by: NURSE PRACTITIONER

## 2024-01-01 PROCEDURE — 81003 URINALYSIS AUTO W/O SCOPE: CPT | Performed by: INTERNAL MEDICINE

## 2024-01-01 PROCEDURE — 250N000011 HC RX IP 250 OP 636: Performed by: NURSE PRACTITIONER

## 2024-01-01 PROCEDURE — 250N000009 HC RX 250: Performed by: PHYSICIAN ASSISTANT

## 2024-01-01 PROCEDURE — 97530 THERAPEUTIC ACTIVITIES: CPT | Mod: GP | Performed by: PHYSICAL THERAPIST

## 2024-01-01 PROCEDURE — 36600 WITHDRAWAL OF ARTERIAL BLOOD: CPT

## 2024-01-01 PROCEDURE — 92526 ORAL FUNCTION THERAPY: CPT | Mod: GN | Performed by: SPEECH-LANGUAGE PATHOLOGIST

## 2024-01-01 PROCEDURE — 31624 DX BRONCHOSCOPE/LAVAGE: CPT | Performed by: INTERNAL MEDICINE

## 2024-01-01 PROCEDURE — 87637 SARSCOV2&INF A&B&RSV AMP PRB: CPT | Performed by: PHYSICIAN ASSISTANT

## 2024-01-01 PROCEDURE — 87637 SARSCOV2&INF A&B&RSV AMP PRB: CPT | Performed by: EMERGENCY MEDICINE

## 2024-01-01 PROCEDURE — 99233 SBSQ HOSP IP/OBS HIGH 50: CPT | Performed by: HOSPITALIST

## 2024-01-01 PROCEDURE — 87116 MYCOBACTERIA CULTURE: CPT | Performed by: INTERNAL MEDICINE

## 2024-01-01 PROCEDURE — 86140 C-REACTIVE PROTEIN: CPT | Performed by: PHYSICIAN ASSISTANT

## 2024-01-01 PROCEDURE — 83880 ASSAY OF NATRIURETIC PEPTIDE: CPT | Performed by: STUDENT IN AN ORGANIZED HEALTH CARE EDUCATION/TRAINING PROGRAM

## 2024-01-01 PROCEDURE — 87556 M.TUBERCULO DNA AMP PROBE: CPT | Performed by: INTERNAL MEDICINE

## 2024-01-01 PROCEDURE — 84132 ASSAY OF SERUM POTASSIUM: CPT | Performed by: INTERNAL MEDICINE

## 2024-01-01 PROCEDURE — 94660 CPAP INITIATION&MGMT: CPT

## 2024-01-01 PROCEDURE — 99223 1ST HOSP IP/OBS HIGH 75: CPT | Mod: AI | Performed by: PHYSICIAN ASSISTANT

## 2024-01-01 PROCEDURE — 87493 C DIFF AMPLIFIED PROBE: CPT | Performed by: EMERGENCY MEDICINE

## 2024-01-01 PROCEDURE — 250N000009 HC RX 250: Performed by: HOSPITALIST

## 2024-01-01 PROCEDURE — 82805 BLOOD GASES W/O2 SATURATION: CPT | Performed by: HOSPITALIST

## 2024-01-01 PROCEDURE — 97530 THERAPEUTIC ACTIVITIES: CPT | Mod: GP

## 2024-01-01 PROCEDURE — 83880 ASSAY OF NATRIURETIC PEPTIDE: CPT | Performed by: EMERGENCY MEDICINE

## 2024-01-01 PROCEDURE — 93005 ELECTROCARDIOGRAM TRACING: CPT

## 2024-01-01 PROCEDURE — 99222 1ST HOSP IP/OBS MODERATE 55: CPT | Mod: 25 | Performed by: INTERNAL MEDICINE

## 2024-01-01 PROCEDURE — 87581 M.PNEUMON DNA AMP PROBE: CPT | Performed by: INTERNAL MEDICINE

## 2024-01-01 PROCEDURE — 85027 COMPLETE CBC AUTOMATED: CPT | Performed by: STUDENT IN AN ORGANIZED HEALTH CARE EDUCATION/TRAINING PROGRAM

## 2024-01-01 PROCEDURE — 250N000013 HC RX MED GY IP 250 OP 250 PS 637: Performed by: STUDENT IN AN ORGANIZED HEALTH CARE EDUCATION/TRAINING PROGRAM

## 2024-01-01 PROCEDURE — 250N000009 HC RX 250: Performed by: INTERNAL MEDICINE

## 2024-01-01 PROCEDURE — 74230 X-RAY XM SWLNG FUNCJ C+: CPT

## 2024-01-01 PROCEDURE — 85014 HEMATOCRIT: CPT | Performed by: INTERNAL MEDICINE

## 2024-01-01 PROCEDURE — 5A09357 ASSISTANCE WITH RESPIRATORY VENTILATION, LESS THAN 24 CONSECUTIVE HOURS, CONTINUOUS POSITIVE AIRWAY PRESSURE: ICD-10-PCS | Performed by: INTERNAL MEDICINE

## 2024-01-01 PROCEDURE — 92610 EVALUATE SWALLOWING FUNCTION: CPT | Mod: GN

## 2024-01-01 PROCEDURE — 250N000011 HC RX IP 250 OP 636: Mod: JZ | Performed by: HOSPITALIST

## 2024-01-01 PROCEDURE — 84132 ASSAY OF SERUM POTASSIUM: CPT | Performed by: HOSPITALIST

## 2024-01-01 PROCEDURE — 999N000156 HC STATISTIC RCP CONSULT EA 30 MIN

## 2024-01-01 PROCEDURE — 87529 HSV DNA AMP PROBE: CPT | Performed by: INTERNAL MEDICINE

## 2024-01-01 PROCEDURE — 99418 PROLNG IP/OBS E/M EA 15 MIN: CPT | Performed by: NURSE PRACTITIONER

## 2024-01-01 PROCEDURE — 92526 ORAL FUNCTION THERAPY: CPT | Mod: GN

## 2024-01-01 PROCEDURE — 85027 COMPLETE CBC AUTOMATED: CPT | Performed by: INTERNAL MEDICINE

## 2024-01-01 PROCEDURE — 96365 THER/PROPH/DIAG IV INF INIT: CPT | Mod: 59

## 2024-01-01 PROCEDURE — 85025 COMPLETE CBC W/AUTO DIFF WBC: CPT | Performed by: EMERGENCY MEDICINE

## 2024-01-01 PROCEDURE — 87205 SMEAR GRAM STAIN: CPT | Performed by: PHYSICIAN ASSISTANT

## 2024-01-01 PROCEDURE — 87040 BLOOD CULTURE FOR BACTERIA: CPT | Performed by: EMERGENCY MEDICINE

## 2024-01-01 PROCEDURE — 97161 PT EVAL LOW COMPLEX 20 MIN: CPT | Mod: GP | Performed by: PHYSICAL THERAPIST

## 2024-01-01 PROCEDURE — 87210 SMEAR WET MOUNT SALINE/INK: CPT | Performed by: INTERNAL MEDICINE

## 2024-01-01 PROCEDURE — 71250 CT THORAX DX C-: CPT | Mod: MA

## 2024-01-01 PROCEDURE — 82805 BLOOD GASES W/O2 SATURATION: CPT | Performed by: INTERNAL MEDICINE

## 2024-01-01 PROCEDURE — 96361 HYDRATE IV INFUSION ADD-ON: CPT

## 2024-01-01 PROCEDURE — 85610 PROTHROMBIN TIME: CPT | Performed by: EMERGENCY MEDICINE

## 2024-01-01 PROCEDURE — 82728 ASSAY OF FERRITIN: CPT | Performed by: INTERNAL MEDICINE

## 2024-01-01 PROCEDURE — 85041 AUTOMATED RBC COUNT: CPT | Performed by: STUDENT IN AN ORGANIZED HEALTH CARE EDUCATION/TRAINING PROGRAM

## 2024-01-01 PROCEDURE — 82805 BLOOD GASES W/O2 SATURATION: CPT | Performed by: PHYSICIAN ASSISTANT

## 2024-01-01 PROCEDURE — 87798 DETECT AGENT NOS DNA AMP: CPT | Performed by: INTERNAL MEDICINE

## 2024-01-01 PROCEDURE — 99232 SBSQ HOSP IP/OBS MODERATE 35: CPT | Performed by: HOSPITALIST

## 2024-01-01 PROCEDURE — 87633 RESP VIRUS 12-25 TARGETS: CPT | Performed by: INTERNAL MEDICINE

## 2024-01-01 PROCEDURE — 99223 1ST HOSP IP/OBS HIGH 75: CPT | Mod: AI | Performed by: INTERNAL MEDICINE

## 2024-01-01 PROCEDURE — 87899 AGENT NOS ASSAY W/OPTIC: CPT | Performed by: PHYSICIAN ASSISTANT

## 2024-01-01 PROCEDURE — 87799 DETECT AGENT NOS DNA QUANT: CPT | Performed by: INTERNAL MEDICINE

## 2024-01-01 PROCEDURE — 87449 NOS EACH ORGANISM AG IA: CPT | Performed by: PHYSICIAN ASSISTANT

## 2024-01-01 PROCEDURE — 85027 COMPLETE CBC AUTOMATED: CPT | Performed by: HOSPITALIST

## 2024-01-01 PROCEDURE — 87205 SMEAR GRAM STAIN: CPT | Performed by: INTERNAL MEDICINE

## 2024-01-01 PROCEDURE — 250N000009 HC RX 250: Performed by: NURSE PRACTITIONER

## 2024-01-01 PROCEDURE — 80048 BASIC METABOLIC PNL TOTAL CA: CPT | Performed by: PHYSICIAN ASSISTANT

## 2024-01-01 PROCEDURE — 96366 THER/PROPH/DIAG IV INF ADDON: CPT

## 2024-01-01 PROCEDURE — 71046 X-RAY EXAM CHEST 2 VIEWS: CPT

## 2024-01-01 PROCEDURE — 86140 C-REACTIVE PROTEIN: CPT | Performed by: INTERNAL MEDICINE

## 2024-01-01 PROCEDURE — 82374 ASSAY BLOOD CARBON DIOXIDE: CPT | Performed by: INTERNAL MEDICINE

## 2024-01-01 PROCEDURE — 87102 FUNGUS ISOLATION CULTURE: CPT | Performed by: INTERNAL MEDICINE

## 2024-01-01 PROCEDURE — 255N000002 HC RX 255 OP 636: Performed by: STUDENT IN AN ORGANIZED HEALTH CARE EDUCATION/TRAINING PROGRAM

## 2024-01-01 PROCEDURE — 84145 PROCALCITONIN (PCT): CPT | Performed by: EMERGENCY MEDICINE

## 2024-01-01 PROCEDURE — 96365 THER/PROPH/DIAG IV INF INIT: CPT

## 2024-01-01 PROCEDURE — 99233 SBSQ HOSP IP/OBS HIGH 50: CPT | Performed by: STUDENT IN AN ORGANIZED HEALTH CARE EDUCATION/TRAINING PROGRAM

## 2024-01-01 PROCEDURE — 84132 ASSAY OF SERUM POTASSIUM: CPT | Performed by: STUDENT IN AN ORGANIZED HEALTH CARE EDUCATION/TRAINING PROGRAM

## 2024-01-01 PROCEDURE — 84443 ASSAY THYROID STIM HORMONE: CPT | Performed by: EMERGENCY MEDICINE

## 2024-01-01 PROCEDURE — 99222 1ST HOSP IP/OBS MODERATE 55: CPT | Performed by: INTERNAL MEDICINE

## 2024-01-01 PROCEDURE — 81001 URINALYSIS AUTO W/SCOPE: CPT | Performed by: EMERGENCY MEDICINE

## 2024-01-01 PROCEDURE — 80053 COMPREHEN METABOLIC PANEL: CPT | Performed by: INTERNAL MEDICINE

## 2024-01-01 PROCEDURE — 88305 TISSUE EXAM BY PATHOLOGIST: CPT | Mod: TC | Performed by: INTERNAL MEDICINE

## 2024-01-01 PROCEDURE — 0W3Q8ZZ CONTROL BLEEDING IN RESPIRATORY TRACT, VIA NATURAL OR ARTIFICIAL OPENING ENDOSCOPIC: ICD-10-PCS | Performed by: INTERNAL MEDICINE

## 2024-01-01 PROCEDURE — 86698 HISTOPLASMA ANTIBODY: CPT | Performed by: INTERNAL MEDICINE

## 2024-01-01 PROCEDURE — 71260 CT THORAX DX C+: CPT | Mod: MA

## 2024-01-01 PROCEDURE — 99232 SBSQ HOSP IP/OBS MODERATE 35: CPT | Performed by: STUDENT IN AN ORGANIZED HEALTH CARE EDUCATION/TRAINING PROGRAM

## 2024-01-01 PROCEDURE — 99497 ADVNCD CARE PLAN 30 MIN: CPT | Performed by: NURSE PRACTITIONER

## 2024-01-01 PROCEDURE — 250N000013 HC RX MED GY IP 250 OP 250 PS 637: Performed by: EMERGENCY MEDICINE

## 2024-01-01 PROCEDURE — 87040 BLOOD CULTURE FOR BACTERIA: CPT | Performed by: INTERNAL MEDICINE

## 2024-01-01 PROCEDURE — 99207 PR NO CHARGE LOS: CPT | Performed by: HOSPITALIST

## 2024-01-01 PROCEDURE — 82310 ASSAY OF CALCIUM: CPT | Performed by: INTERNAL MEDICINE

## 2024-01-01 PROCEDURE — 87106 FUNGI IDENTIFICATION YEAST: CPT | Performed by: INTERNAL MEDICINE

## 2024-01-01 PROCEDURE — C8929 TTE W OR WO FOL WCON,DOPPLER: HCPCS

## 2024-01-01 PROCEDURE — 258N000003 HC RX IP 258 OP 636: Performed by: HOSPITALIST

## 2024-01-01 PROCEDURE — 82374 ASSAY BLOOD CARBON DIOXIDE: CPT | Performed by: STUDENT IN AN ORGANIZED HEALTH CARE EDUCATION/TRAINING PROGRAM

## 2024-01-01 PROCEDURE — 99207 PR NO CHARGE LOS: CPT | Performed by: STUDENT IN AN ORGANIZED HEALTH CARE EDUCATION/TRAINING PROGRAM

## 2024-01-01 PROCEDURE — 87206 SMEAR FLUORESCENT/ACID STAI: CPT | Performed by: INTERNAL MEDICINE

## 2024-01-01 PROCEDURE — 92611 MOTION FLUOROSCOPY/SWALLOW: CPT | Mod: GN

## 2024-01-01 PROCEDURE — 87081 CULTURE SCREEN ONLY: CPT | Performed by: INTERNAL MEDICINE

## 2024-01-01 PROCEDURE — 86140 C-REACTIVE PROTEIN: CPT | Performed by: STUDENT IN AN ORGANIZED HEALTH CARE EDUCATION/TRAINING PROGRAM

## 2024-01-01 PROCEDURE — 99285 EMERGENCY DEPT VISIT HI MDM: CPT | Mod: 25

## 2024-01-01 PROCEDURE — 255N000002 HC RX 255 OP 636: Performed by: EMERGENCY MEDICINE

## 2024-01-01 PROCEDURE — 82803 BLOOD GASES ANY COMBINATION: CPT

## 2024-01-01 PROCEDURE — 99223 1ST HOSP IP/OBS HIGH 75: CPT | Mod: AI | Performed by: NURSE PRACTITIONER

## 2024-01-01 PROCEDURE — 999N000185 HC STATISTIC TRANSPORT TIME EA 15 MIN

## 2024-01-01 PROCEDURE — 83880 ASSAY OF NATRIURETIC PEPTIDE: CPT | Performed by: INTERNAL MEDICINE

## 2024-01-01 PROCEDURE — 250N000011 HC RX IP 250 OP 636: Performed by: STUDENT IN AN ORGANIZED HEALTH CARE EDUCATION/TRAINING PROGRAM

## 2024-01-01 PROCEDURE — 89050 BODY FLUID CELL COUNT: CPT | Performed by: INTERNAL MEDICINE

## 2024-01-01 PROCEDURE — 85025 COMPLETE CBC W/AUTO DIFF WBC: CPT | Performed by: INTERNAL MEDICINE

## 2024-01-01 PROCEDURE — 86606 ASPERGILLUS ANTIBODY: CPT | Performed by: INTERNAL MEDICINE

## 2024-01-01 RX ORDER — DIPHENHYDRAMINE HYDROCHLORIDE 50 MG/ML
50 INJECTION INTRAMUSCULAR; INTRAVENOUS
Status: DISCONTINUED | OUTPATIENT
Start: 2024-01-01 | End: 2024-01-01

## 2024-01-01 RX ORDER — FLUTICASONE FUROATE AND VILANTEROL 100; 25 UG/1; UG/1
1 POWDER RESPIRATORY (INHALATION) DAILY
Status: DISCONTINUED | OUTPATIENT
Start: 2024-01-01 | End: 2024-01-01

## 2024-01-01 RX ORDER — SENNOSIDES 8.6 MG
1 TABLET ORAL 2 TIMES DAILY PRN
Status: DISCONTINUED | OUTPATIENT
Start: 2024-01-01 | End: 2024-01-01 | Stop reason: HOSPADM

## 2024-01-01 RX ORDER — NALOXONE HYDROCHLORIDE 0.4 MG/ML
0.2 INJECTION, SOLUTION INTRAMUSCULAR; INTRAVENOUS; SUBCUTANEOUS
Status: DISCONTINUED | OUTPATIENT
Start: 2024-01-01 | End: 2024-01-01 | Stop reason: HOSPADM

## 2024-01-01 RX ORDER — CARBOXYMETHYLCELLULOSE SODIUM 5 MG/ML
1 SOLUTION/ DROPS OPHTHALMIC
Status: DISCONTINUED | OUTPATIENT
Start: 2024-01-01 | End: 2024-01-01

## 2024-01-01 RX ORDER — FUROSEMIDE 10 MG/ML
20 INJECTION INTRAMUSCULAR; INTRAVENOUS ONCE
Status: DISCONTINUED | OUTPATIENT
Start: 2024-01-01 | End: 2024-01-01

## 2024-01-01 RX ORDER — NALOXONE HYDROCHLORIDE 0.4 MG/ML
0.4 INJECTION, SOLUTION INTRAMUSCULAR; INTRAVENOUS; SUBCUTANEOUS
Status: DISCONTINUED | OUTPATIENT
Start: 2024-01-01 | End: 2024-01-01 | Stop reason: HOSPADM

## 2024-01-01 RX ORDER — VANCOMYCIN HYDROCHLORIDE 125 MG/1
125 CAPSULE ORAL 2 TIMES DAILY
Status: COMPLETED | OUTPATIENT
Start: 2024-01-01 | End: 2024-01-01

## 2024-01-01 RX ORDER — ESCITALOPRAM OXALATE 10 MG/1
20 TABLET ORAL DAILY
Status: DISCONTINUED | OUTPATIENT
Start: 2024-01-01 | End: 2024-01-01

## 2024-01-01 RX ORDER — BENZONATATE 100 MG/1
200 CAPSULE ORAL 3 TIMES DAILY PRN
Status: DISCONTINUED | OUTPATIENT
Start: 2024-01-01 | End: 2024-01-01 | Stop reason: HOSPADM

## 2024-01-01 RX ORDER — METOPROLOL SUCCINATE 50 MG/1
50 TABLET, EXTENDED RELEASE ORAL AT BEDTIME
Status: DISCONTINUED | OUTPATIENT
Start: 2024-01-01 | End: 2024-01-01 | Stop reason: HOSPADM

## 2024-01-01 RX ORDER — BENZONATATE 200 MG/1
200 CAPSULE ORAL 3 TIMES DAILY PRN
Status: DISCONTINUED | OUTPATIENT
Start: 2024-01-01 | End: 2024-01-01

## 2024-01-01 RX ORDER — PIPERACILLIN SODIUM, TAZOBACTAM SODIUM 4; .5 G/20ML; G/20ML
4.5 INJECTION, POWDER, LYOPHILIZED, FOR SOLUTION INTRAVENOUS ONCE
Status: COMPLETED | OUTPATIENT
Start: 2024-01-01 | End: 2024-01-01

## 2024-01-01 RX ORDER — GLYCOPYRROLATE 0.2 MG/ML
0.2 INJECTION, SOLUTION INTRAMUSCULAR; INTRAVENOUS EVERY 4 HOURS PRN
Status: DISCONTINUED | OUTPATIENT
Start: 2024-01-01 | End: 2024-01-01 | Stop reason: HOSPADM

## 2024-01-01 RX ORDER — DIGOXIN 125 MCG
125 TABLET ORAL EVERY EVENING
Status: DISCONTINUED | OUTPATIENT
Start: 2024-01-01 | End: 2024-01-01 | Stop reason: HOSPADM

## 2024-01-01 RX ORDER — FLUTICASONE PROPIONATE 50 MCG
2 SPRAY, SUSPENSION (ML) NASAL DAILY PRN
Status: DISCONTINUED | OUTPATIENT
Start: 2024-01-01 | End: 2024-01-01 | Stop reason: HOSPADM

## 2024-01-01 RX ORDER — PANTOPRAZOLE SODIUM 40 MG/1
40 TABLET, DELAYED RELEASE ORAL
Status: DISCONTINUED | OUTPATIENT
Start: 2024-01-01 | End: 2024-01-01 | Stop reason: HOSPADM

## 2024-01-01 RX ORDER — IPRATROPIUM BROMIDE AND ALBUTEROL SULFATE 2.5; .5 MG/3ML; MG/3ML
1 SOLUTION RESPIRATORY (INHALATION) 3 TIMES DAILY
COMMUNITY

## 2024-01-01 RX ORDER — FUROSEMIDE 10 MG/ML
40 INJECTION INTRAMUSCULAR; INTRAVENOUS ONCE
Status: COMPLETED | OUTPATIENT
Start: 2024-01-01 | End: 2024-01-01

## 2024-01-01 RX ORDER — HALOPERIDOL 5 MG/ML
2 INJECTION INTRAMUSCULAR EVERY 4 HOURS PRN
Status: DISCONTINUED | OUTPATIENT
Start: 2024-01-01 | End: 2024-01-01 | Stop reason: HOSPADM

## 2024-01-01 RX ORDER — BENZONATATE 200 MG/1
200 CAPSULE ORAL 3 TIMES DAILY PRN
COMMUNITY

## 2024-01-01 RX ORDER — POTASSIUM CHLORIDE 1.5 G/1.58G
40 POWDER, FOR SOLUTION ORAL ONCE
Status: COMPLETED | OUTPATIENT
Start: 2024-01-01 | End: 2024-01-01

## 2024-01-01 RX ORDER — TRAZODONE HYDROCHLORIDE 50 MG/1
50 TABLET, FILM COATED ORAL
Status: DISCONTINUED | OUTPATIENT
Start: 2024-01-01 | End: 2024-01-01 | Stop reason: HOSPADM

## 2024-01-01 RX ORDER — OLANZAPINE 5 MG/1
5 TABLET, ORALLY DISINTEGRATING ORAL AT BEDTIME
Status: DISCONTINUED | OUTPATIENT
Start: 2024-01-01 | End: 2024-01-01

## 2024-01-01 RX ORDER — LIDOCAINE HYDROCHLORIDE AND EPINEPHRINE 10; 10 MG/ML; UG/ML
INJECTION, SOLUTION INFILTRATION; PERINEURAL PRN
Status: DISCONTINUED | OUTPATIENT
Start: 2024-01-01 | End: 2024-01-01 | Stop reason: HOSPADM

## 2024-01-01 RX ORDER — PIPERACILLIN SODIUM, TAZOBACTAM SODIUM 4; .5 G/20ML; G/20ML
4.5 INJECTION, POWDER, LYOPHILIZED, FOR SOLUTION INTRAVENOUS EVERY 6 HOURS
Qty: 560 ML | Refills: 0 | Status: DISCONTINUED | OUTPATIENT
Start: 2024-01-01 | End: 2024-01-01

## 2024-01-01 RX ORDER — ALBUTEROL SULFATE 5 MG/ML
2.5 SOLUTION RESPIRATORY (INHALATION) EVERY 6 HOURS PRN
Status: DISCONTINUED | OUTPATIENT
Start: 2024-01-01 | End: 2024-01-01

## 2024-01-01 RX ORDER — HALOPERIDOL 5 MG/ML
2 INJECTION INTRAMUSCULAR EVERY 6 HOURS PRN
Status: DISCONTINUED | OUTPATIENT
Start: 2024-01-01 | End: 2024-01-01

## 2024-01-01 RX ORDER — VANCOMYCIN HYDROCHLORIDE 125 MG/1
125 CAPSULE ORAL 2 TIMES DAILY
Qty: 20 CAPSULE | Refills: 0 | Status: SHIPPED | OUTPATIENT
Start: 2024-01-01 | End: 2024-01-01

## 2024-01-01 RX ORDER — LORAZEPAM 2 MG/ML
1 INJECTION INTRAMUSCULAR EVERY 4 HOURS PRN
Status: DISCONTINUED | OUTPATIENT
Start: 2024-01-01 | End: 2024-01-01 | Stop reason: HOSPADM

## 2024-01-01 RX ORDER — QUETIAPINE FUMARATE 25 MG/1
25 TABLET, FILM COATED ORAL 2 TIMES DAILY
Status: DISCONTINUED | OUTPATIENT
Start: 2024-01-01 | End: 2024-01-01

## 2024-01-01 RX ORDER — MORPHINE SULFATE 2 MG/ML
2 INJECTION, SOLUTION INTRAMUSCULAR; INTRAVENOUS
Status: DISCONTINUED | OUTPATIENT
Start: 2024-01-01 | End: 2024-01-01 | Stop reason: HOSPADM

## 2024-01-01 RX ORDER — LACTOBACILLUS RHAMNOSUS GG 10B CELL
1 CAPSULE ORAL 2 TIMES DAILY
Qty: 20 CAPSULE | Refills: 0 | Status: SHIPPED | OUTPATIENT
Start: 2024-01-01

## 2024-01-01 RX ORDER — LIDOCAINE HYDROCHLORIDE 10 MG/ML
INJECTION, SOLUTION INFILTRATION; PERINEURAL PRN
Status: DISCONTINUED | OUTPATIENT
Start: 2024-01-01 | End: 2024-01-01 | Stop reason: HOSPADM

## 2024-01-01 RX ORDER — MINERAL OIL/HYDROPHIL PETROLAT
OINTMENT (GRAM) TOPICAL
Status: DISCONTINUED | OUTPATIENT
Start: 2024-01-01 | End: 2024-01-01 | Stop reason: HOSPADM

## 2024-01-01 RX ORDER — AMOXICILLIN 250 MG
2 CAPSULE ORAL 2 TIMES DAILY PRN
Status: DISCONTINUED | OUTPATIENT
Start: 2024-01-01 | End: 2024-01-01

## 2024-01-01 RX ORDER — LEVALBUTEROL INHALATION SOLUTION 0.63 MG/3ML
0.63 SOLUTION RESPIRATORY (INHALATION) EVERY 4 HOURS PRN
Status: DISCONTINUED | OUTPATIENT
Start: 2024-01-01 | End: 2024-01-01 | Stop reason: HOSPADM

## 2024-01-01 RX ORDER — FUROSEMIDE 10 MG/ML
40 INJECTION INTRAMUSCULAR; INTRAVENOUS
Status: DISCONTINUED | OUTPATIENT
Start: 2024-01-01 | End: 2024-01-01

## 2024-01-01 RX ORDER — POTASSIUM CHLORIDE 1500 MG/1
40 TABLET, EXTENDED RELEASE ORAL ONCE
Status: COMPLETED | OUTPATIENT
Start: 2024-01-01 | End: 2024-01-01

## 2024-01-01 RX ORDER — SALIVA STIMULANT COMB. NO.3
2 SPRAY, NON-AEROSOL (ML) MUCOUS MEMBRANE 4 TIMES DAILY
Status: DISCONTINUED | OUTPATIENT
Start: 2024-01-01 | End: 2024-01-01

## 2024-01-01 RX ORDER — METHYLPREDNISOLONE SODIUM SUCCINATE 125 MG/2ML
125 INJECTION, POWDER, LYOPHILIZED, FOR SOLUTION INTRAMUSCULAR; INTRAVENOUS
Status: DISCONTINUED | OUTPATIENT
Start: 2024-01-01 | End: 2024-01-01 | Stop reason: HOSPADM

## 2024-01-01 RX ORDER — POTASSIUM CHLORIDE 1500 MG/1
20 TABLET, EXTENDED RELEASE ORAL ONCE
Status: COMPLETED | OUTPATIENT
Start: 2024-01-01 | End: 2024-01-01

## 2024-01-01 RX ORDER — GUAIFENESIN/DEXTROMETHORPHAN 100-10MG/5
10 SYRUP ORAL EVERY 4 HOURS PRN
Status: DISCONTINUED | OUTPATIENT
Start: 2024-01-01 | End: 2024-01-01 | Stop reason: HOSPADM

## 2024-01-01 RX ORDER — METOPROLOL SUCCINATE 50 MG/1
50 TABLET, EXTENDED RELEASE ORAL AT BEDTIME
Status: DISCONTINUED | OUTPATIENT
Start: 2024-01-01 | End: 2024-01-01

## 2024-01-01 RX ORDER — AZITHROMYCIN 500 MG/5ML
500 INJECTION, POWDER, LYOPHILIZED, FOR SOLUTION INTRAVENOUS ONCE
Status: COMPLETED | OUTPATIENT
Start: 2024-01-01 | End: 2024-01-01

## 2024-01-01 RX ORDER — MORPHINE SULFATE 20 MG/ML
5 SOLUTION ORAL
Status: DISCONTINUED | OUTPATIENT
Start: 2024-01-01 | End: 2024-01-01 | Stop reason: HOSPADM

## 2024-01-01 RX ORDER — ATORVASTATIN CALCIUM 40 MG/1
40 TABLET, FILM COATED ORAL EVERY EVENING
Status: ON HOLD | COMMUNITY
End: 2024-01-01

## 2024-01-01 RX ORDER — OLANZAPINE 5 MG/1
5 TABLET, ORALLY DISINTEGRATING ORAL EVERY 6 HOURS PRN
Status: DISCONTINUED | OUTPATIENT
Start: 2024-01-01 | End: 2024-01-01 | Stop reason: HOSPADM

## 2024-01-01 RX ORDER — ESCITALOPRAM OXALATE 10 MG/1
20 TABLET ORAL DAILY
Status: DISCONTINUED | OUTPATIENT
Start: 2024-01-01 | End: 2024-01-01 | Stop reason: HOSPADM

## 2024-01-01 RX ORDER — FENTANYL CITRATE 50 UG/ML
25 INJECTION, SOLUTION INTRAMUSCULAR; INTRAVENOUS EVERY 5 MIN PRN
Status: DISCONTINUED | OUTPATIENT
Start: 2024-01-01 | End: 2024-01-01

## 2024-01-01 RX ORDER — ACETAMINOPHEN 325 MG/1
650 TABLET ORAL EVERY 4 HOURS PRN
Status: DISCONTINUED | OUTPATIENT
Start: 2024-01-01 | End: 2024-01-01 | Stop reason: HOSPADM

## 2024-01-01 RX ORDER — MORPHINE SULFATE 2 MG/ML
1 INJECTION, SOLUTION INTRAMUSCULAR; INTRAVENOUS
Status: DISCONTINUED | OUTPATIENT
Start: 2024-01-01 | End: 2024-01-01 | Stop reason: HOSPADM

## 2024-01-01 RX ORDER — PIPERACILLIN SODIUM, TAZOBACTAM SODIUM 4; .5 G/20ML; G/20ML
4.5 INJECTION, POWDER, LYOPHILIZED, FOR SOLUTION INTRAVENOUS EVERY 6 HOURS
Status: DISCONTINUED | OUTPATIENT
Start: 2024-01-01 | End: 2024-01-01

## 2024-01-01 RX ORDER — AMOXICILLIN 250 MG
1 CAPSULE ORAL 2 TIMES DAILY PRN
Status: DISCONTINUED | OUTPATIENT
Start: 2024-01-01 | End: 2024-01-01

## 2024-01-01 RX ORDER — IOPAMIDOL 755 MG/ML
70 INJECTION, SOLUTION INTRAVASCULAR ONCE
Status: COMPLETED | OUTPATIENT
Start: 2024-01-01 | End: 2024-01-01

## 2024-01-01 RX ORDER — PIPERACILLIN SODIUM, TAZOBACTAM SODIUM 4; .5 G/20ML; G/20ML
4.5 INJECTION, POWDER, LYOPHILIZED, FOR SOLUTION INTRAVENOUS EVERY 6 HOURS
Qty: 40 ML | Refills: 0 | Status: COMPLETED | OUTPATIENT
Start: 2024-01-01 | End: 2024-01-01

## 2024-01-01 RX ORDER — MORPHINE SULFATE 20 MG/ML
5 SOLUTION ORAL EVERY 4 HOURS
Status: DISCONTINUED | OUTPATIENT
Start: 2024-01-01 | End: 2024-01-01 | Stop reason: HOSPADM

## 2024-01-01 RX ORDER — MULTIPLE VITAMINS W/ MINERALS TAB 9MG-400MCG
1 TAB ORAL DAILY
Status: DISCONTINUED | OUTPATIENT
Start: 2024-01-01 | End: 2024-01-01

## 2024-01-01 RX ORDER — CALCIUM CARBONATE 500 MG/1
1000 TABLET, CHEWABLE ORAL 4 TIMES DAILY PRN
Status: DISCONTINUED | OUTPATIENT
Start: 2024-01-01 | End: 2024-01-01 | Stop reason: HOSPADM

## 2024-01-01 RX ORDER — LACTOBACILLUS RHAMNOSUS GG 10B CELL
1 CAPSULE ORAL 2 TIMES DAILY
Status: DISCONTINUED | OUTPATIENT
Start: 2024-01-01 | End: 2024-01-01 | Stop reason: HOSPADM

## 2024-01-01 RX ORDER — HALOPERIDOL 5 MG/ML
2 INJECTION INTRAMUSCULAR ONCE
Status: COMPLETED | OUTPATIENT
Start: 2024-01-01 | End: 2024-01-01

## 2024-01-01 RX ORDER — SALIVA STIMULANT COMB. NO.3
2 SPRAY, NON-AEROSOL (ML) MUCOUS MEMBRANE
Status: DISCONTINUED | OUTPATIENT
Start: 2024-01-01 | End: 2024-01-01 | Stop reason: HOSPADM

## 2024-01-01 RX ORDER — FUROSEMIDE 10 MG/ML
40 INJECTION INTRAMUSCULAR; INTRAVENOUS DAILY
Status: DISCONTINUED | OUTPATIENT
Start: 2024-01-01 | End: 2024-01-01

## 2024-01-01 RX ORDER — SODIUM CHLORIDE, SODIUM LACTATE, POTASSIUM CHLORIDE, CALCIUM CHLORIDE 600; 310; 30; 20 MG/100ML; MG/100ML; MG/100ML; MG/100ML
INJECTION, SOLUTION INTRAVENOUS CONTINUOUS
Status: ACTIVE | OUTPATIENT
Start: 2024-01-01 | End: 2024-01-01

## 2024-01-01 RX ORDER — LIDOCAINE 40 MG/G
CREAM TOPICAL
Status: DISCONTINUED | OUTPATIENT
Start: 2024-01-01 | End: 2024-01-01 | Stop reason: HOSPADM

## 2024-01-01 RX ORDER — ACETYLCYSTEINE 100 MG/ML
4 SOLUTION ORAL; RESPIRATORY (INHALATION) EVERY 6 HOURS
Status: DISCONTINUED | OUTPATIENT
Start: 2024-01-01 | End: 2024-01-01

## 2024-01-01 RX ORDER — FENTANYL CITRATE 50 UG/ML
50 INJECTION, SOLUTION INTRAMUSCULAR; INTRAVENOUS
Status: DISCONTINUED | OUTPATIENT
Start: 2024-01-01 | End: 2024-01-01

## 2024-01-01 RX ORDER — LACTOBACILLUS RHAMNOSUS GG 10B CELL
1 CAPSULE ORAL 2 TIMES DAILY
Qty: 20 CAPSULE | Refills: 0 | Status: SHIPPED | OUTPATIENT
Start: 2024-01-01 | End: 2024-01-01

## 2024-01-01 RX ORDER — OLANZAPINE 5 MG/1
5 TABLET, ORALLY DISINTEGRATING ORAL 2 TIMES DAILY
Status: DISCONTINUED | OUTPATIENT
Start: 2024-01-01 | End: 2024-01-01 | Stop reason: HOSPADM

## 2024-01-01 RX ORDER — SCOLOPAMINE TRANSDERMAL SYSTEM 1 MG/1
1 PATCH, EXTENDED RELEASE TRANSDERMAL
Status: DISCONTINUED | OUTPATIENT
Start: 2024-01-01 | End: 2024-01-01 | Stop reason: HOSPADM

## 2024-01-01 RX ORDER — BARIUM SULFATE 400 MG/ML
SUSPENSION ORAL ONCE
Status: DISCONTINUED | OUTPATIENT
Start: 2024-01-01 | End: 2024-01-01

## 2024-01-01 RX ORDER — LIDOCAINE 40 MG/G
CREAM TOPICAL
Status: DISCONTINUED | OUTPATIENT
Start: 2024-01-01 | End: 2024-01-01

## 2024-01-01 RX ORDER — FUROSEMIDE 10 MG/ML
40 INJECTION INTRAMUSCULAR; INTRAVENOUS ONCE
Qty: 4 ML | Refills: 0 | Status: COMPLETED | OUTPATIENT
Start: 2024-01-01 | End: 2024-01-01

## 2024-01-01 RX ORDER — METHYLPREDNISOLONE SODIUM SUCCINATE 125 MG/2ML
125 INJECTION, POWDER, LYOPHILIZED, FOR SOLUTION INTRAMUSCULAR; INTRAVENOUS
Status: DISCONTINUED | OUTPATIENT
Start: 2024-01-01 | End: 2024-01-01

## 2024-01-01 RX ORDER — IPRATROPIUM BROMIDE AND ALBUTEROL SULFATE 2.5; .5 MG/3ML; MG/3ML
3 SOLUTION RESPIRATORY (INHALATION)
Status: DISCONTINUED | OUTPATIENT
Start: 2024-01-01 | End: 2024-01-01 | Stop reason: HOSPADM

## 2024-01-01 RX ORDER — LORAZEPAM 2 MG/ML
1 CONCENTRATE ORAL EVERY 4 HOURS PRN
Status: DISCONTINUED | OUTPATIENT
Start: 2024-01-01 | End: 2024-01-01 | Stop reason: HOSPADM

## 2024-01-01 RX ORDER — BARIUM SULFATE 400 MG/ML
SUSPENSION ORAL ONCE
Status: COMPLETED | OUTPATIENT
Start: 2024-01-01 | End: 2024-01-01

## 2024-01-01 RX ORDER — VANCOMYCIN HYDROCHLORIDE 125 MG/1
125 CAPSULE ORAL 2 TIMES DAILY
Status: DISCONTINUED | OUTPATIENT
Start: 2024-01-01 | End: 2024-01-01

## 2024-01-01 RX ORDER — CARBOXYMETHYLCELLULOSE SODIUM 5 MG/ML
1-2 SOLUTION/ DROPS OPHTHALMIC
Status: DISCONTINUED | OUTPATIENT
Start: 2024-01-01 | End: 2024-01-01 | Stop reason: HOSPADM

## 2024-01-01 RX ORDER — ACETAMINOPHEN 650 MG/1
650 SUPPOSITORY RECTAL EVERY 4 HOURS PRN
Status: DISCONTINUED | OUTPATIENT
Start: 2024-01-01 | End: 2024-01-01 | Stop reason: HOSPADM

## 2024-01-01 RX ORDER — GUAIFENESIN 200 MG/10ML
200 LIQUID ORAL EVERY 4 HOURS PRN
Status: DISCONTINUED | OUTPATIENT
Start: 2024-01-01 | End: 2024-01-01 | Stop reason: HOSPADM

## 2024-01-01 RX ORDER — ATORVASTATIN CALCIUM 40 MG/1
40 TABLET, FILM COATED ORAL EVERY EVENING
Status: DISCONTINUED | OUTPATIENT
Start: 2024-01-01 | End: 2024-01-01 | Stop reason: HOSPADM

## 2024-01-01 RX ORDER — ATROPINE SULFATE 10 MG/ML
2 SOLUTION/ DROPS OPHTHALMIC
Status: DISCONTINUED | OUTPATIENT
Start: 2024-01-01 | End: 2024-01-01 | Stop reason: HOSPADM

## 2024-01-01 RX ORDER — DIPHENOXYLATE HCL/ATROPINE 2.5-.025MG
1-2 TABLET ORAL 4 TIMES DAILY PRN
Status: DISCONTINUED | OUTPATIENT
Start: 2024-01-01 | End: 2024-01-01 | Stop reason: HOSPADM

## 2024-01-01 RX ORDER — DIGOXIN 125 MCG
125 TABLET ORAL EVERY EVENING
Status: DISCONTINUED | OUTPATIENT
Start: 2024-01-01 | End: 2024-01-01

## 2024-01-01 RX ORDER — BISACODYL 10 MG
10 SUPPOSITORY, RECTAL RECTAL
Status: DISCONTINUED | OUTPATIENT
Start: 2024-05-11 | End: 2024-01-01 | Stop reason: HOSPADM

## 2024-01-01 RX ORDER — GLYCOPYRROLATE 1 MG/1
2 TABLET ORAL EVERY 4 HOURS PRN
Status: DISCONTINUED | OUTPATIENT
Start: 2024-01-01 | End: 2024-01-01 | Stop reason: HOSPADM

## 2024-01-01 RX ORDER — FUROSEMIDE 10 MG/ML
40 INJECTION INTRAMUSCULAR; INTRAVENOUS DAILY
Status: COMPLETED | OUTPATIENT
Start: 2024-01-01 | End: 2024-01-01

## 2024-01-01 RX ORDER — IPRATROPIUM BROMIDE AND ALBUTEROL SULFATE 2.5; .5 MG/3ML; MG/3ML
1 SOLUTION RESPIRATORY (INHALATION) 3 TIMES DAILY PRN
Status: DISCONTINUED | OUTPATIENT
Start: 2024-01-01 | End: 2024-01-01 | Stop reason: HOSPADM

## 2024-01-01 RX ORDER — LOPERAMIDE HCL 2 MG
2 CAPSULE ORAL 4 TIMES DAILY PRN
Status: DISCONTINUED | OUTPATIENT
Start: 2024-01-01 | End: 2024-01-01 | Stop reason: HOSPADM

## 2024-01-01 RX ORDER — ATORVASTATIN CALCIUM 40 MG/1
40 TABLET, FILM COATED ORAL EVERY EVENING
Status: DISCONTINUED | OUTPATIENT
Start: 2024-01-01 | End: 2024-01-01

## 2024-01-01 RX ORDER — ALBUTEROL SULFATE 0.83 MG/ML
2.5 SOLUTION RESPIRATORY (INHALATION)
Status: DISCONTINUED | OUTPATIENT
Start: 2024-01-01 | End: 2024-01-01

## 2024-01-01 RX ORDER — MORPHINE SULFATE 20 MG/ML
10 SOLUTION ORAL
Status: DISCONTINUED | OUTPATIENT
Start: 2024-01-01 | End: 2024-01-01 | Stop reason: HOSPADM

## 2024-01-01 RX ORDER — LACTOBACILLUS RHAMNOSUS GG 10B CELL
1 CAPSULE ORAL 2 TIMES DAILY
Status: DISCONTINUED | OUTPATIENT
Start: 2024-01-01 | End: 2024-01-01

## 2024-01-01 RX ORDER — DIPHENHYDRAMINE HYDROCHLORIDE 50 MG/ML
50 INJECTION INTRAMUSCULAR; INTRAVENOUS
Status: DISCONTINUED | OUTPATIENT
Start: 2024-01-01 | End: 2024-01-01 | Stop reason: HOSPADM

## 2024-01-01 RX ORDER — IBUPROFEN 400 MG/1
400 TABLET, FILM COATED ORAL EVERY 6 HOURS PRN
Status: DISCONTINUED | OUTPATIENT
Start: 2024-01-01 | End: 2024-01-01

## 2024-01-01 RX ORDER — ALBUTEROL SULFATE 90 UG/1
2 AEROSOL, METERED RESPIRATORY (INHALATION) EVERY 6 HOURS PRN
Status: DISCONTINUED | OUTPATIENT
Start: 2024-01-01 | End: 2024-01-01 | Stop reason: HOSPADM

## 2024-01-01 RX ORDER — POTASSIUM CHLORIDE 1500 MG/1
40 TABLET, EXTENDED RELEASE ORAL ONCE
Qty: 2 TABLET | Refills: 0 | Status: COMPLETED | OUTPATIENT
Start: 2024-01-01 | End: 2024-01-01

## 2024-01-01 RX ORDER — FLUTICASONE FUROATE AND VILANTEROL 100; 25 UG/1; UG/1
1 POWDER RESPIRATORY (INHALATION) DAILY
Status: DISCONTINUED | OUTPATIENT
Start: 2024-01-01 | End: 2024-01-01 | Stop reason: HOSPADM

## 2024-01-01 RX ADMIN — PIPERACILLIN AND TAZOBACTAM 4.5 G: 4; .5 INJECTION, POWDER, FOR SOLUTION INTRAVENOUS at 10:18

## 2024-01-01 RX ADMIN — IPRATROPIUM BROMIDE AND ALBUTEROL SULFATE 3 ML: .5; 3 SOLUTION RESPIRATORY (INHALATION) at 20:41

## 2024-01-01 RX ADMIN — AMOXICILLIN AND CLAVULANATE POTASSIUM 1 TABLET: 875; 125 TABLET, FILM COATED ORAL at 10:19

## 2024-01-01 RX ADMIN — AZITHROMYCIN MONOHYDRATE 250 MG: 500 INJECTION, POWDER, LYOPHILIZED, FOR SOLUTION INTRAVENOUS at 17:26

## 2024-01-01 RX ADMIN — ALBUTEROL SULFATE 2.5 MG: 2.5 SOLUTION RESPIRATORY (INHALATION) at 02:15

## 2024-01-01 RX ADMIN — VANCOMYCIN HYDROCHLORIDE 125 MG: 125 CAPSULE ORAL at 10:18

## 2024-01-01 RX ADMIN — APIXABAN 5 MG: 5 TABLET, FILM COATED ORAL at 20:25

## 2024-01-01 RX ADMIN — IPRATROPIUM BROMIDE AND ALBUTEROL SULFATE 3 ML: .5; 3 SOLUTION RESPIRATORY (INHALATION) at 07:47

## 2024-01-01 RX ADMIN — IPRATROPIUM BROMIDE AND ALBUTEROL SULFATE 3 ML: .5; 3 SOLUTION RESPIRATORY (INHALATION) at 19:24

## 2024-01-01 RX ADMIN — PIPERACILLIN AND TAZOBACTAM 4.5 G: 4; .5 INJECTION, POWDER, FOR SOLUTION INTRAVENOUS at 09:41

## 2024-01-01 RX ADMIN — ALBUTEROL SULFATE 2.5 MG: 2.5 SOLUTION RESPIRATORY (INHALATION) at 13:29

## 2024-01-01 RX ADMIN — APIXABAN 5 MG: 5 TABLET, FILM COATED ORAL at 20:39

## 2024-01-01 RX ADMIN — AMOXICILLIN AND CLAVULANATE POTASSIUM 1 TABLET: 875; 125 TABLET, FILM COATED ORAL at 21:06

## 2024-01-01 RX ADMIN — DICLOFENAC SODIUM 4 G: 10 GEL TOPICAL at 22:08

## 2024-01-01 RX ADMIN — SODIUM CHLORIDE 1000 ML: 9 INJECTION, SOLUTION INTRAVENOUS at 16:12

## 2024-01-01 RX ADMIN — ACETYLCYSTEINE 4 ML: 100 SOLUTION ORAL; RESPIRATORY (INHALATION) at 02:21

## 2024-01-01 RX ADMIN — Medication 1 TABLET: at 08:44

## 2024-01-01 RX ADMIN — ACETYLCYSTEINE 4 ML: 100 SOLUTION ORAL; RESPIRATORY (INHALATION) at 03:01

## 2024-01-01 RX ADMIN — AMOXICILLIN AND CLAVULANATE POTASSIUM 1 TABLET: 875; 125 TABLET, FILM COATED ORAL at 09:28

## 2024-01-01 RX ADMIN — ALBUTEROL SULFATE 2.5 MG: 2.5 SOLUTION RESPIRATORY (INHALATION) at 02:53

## 2024-01-01 RX ADMIN — APIXABAN 5 MG: 5 TABLET, FILM COATED ORAL at 09:06

## 2024-01-01 RX ADMIN — IPRATROPIUM BROMIDE AND ALBUTEROL SULFATE 3 ML: .5; 3 SOLUTION RESPIRATORY (INHALATION) at 15:51

## 2024-01-01 RX ADMIN — ESCITALOPRAM OXALATE 20 MG: 10 TABLET ORAL at 08:49

## 2024-01-01 RX ADMIN — MICONAZOLE NITRATE: 20 POWDER TOPICAL at 21:14

## 2024-01-01 RX ADMIN — IPRATROPIUM BROMIDE AND ALBUTEROL SULFATE 3 ML: .5; 3 SOLUTION RESPIRATORY (INHALATION) at 20:16

## 2024-01-01 RX ADMIN — Medication 1 CAPSULE: at 20:47

## 2024-01-01 RX ADMIN — ALBUTEROL SULFATE 2.5 MG: 2.5 SOLUTION RESPIRATORY (INHALATION) at 08:37

## 2024-01-01 RX ADMIN — DIGOXIN 125 MCG: 0.12 TABLET ORAL at 20:09

## 2024-01-01 RX ADMIN — ATROPINE SULFATE 2 DROP: 10 SOLUTION/ DROPS OPHTHALMIC at 22:49

## 2024-01-01 RX ADMIN — ALBUTEROL SULFATE 2.5 MG: 2.5 SOLUTION RESPIRATORY (INHALATION) at 07:26

## 2024-01-01 RX ADMIN — MORPHINE SULFATE 2 MG: 2 INJECTION, SOLUTION INTRAMUSCULAR; INTRAVENOUS at 06:23

## 2024-01-01 RX ADMIN — AZITHROMYCIN MONOHYDRATE 250 MG: 500 INJECTION, POWDER, LYOPHILIZED, FOR SOLUTION INTRAVENOUS at 14:13

## 2024-01-01 RX ADMIN — GLYCOPYRROLATE 0.2 MG: 0.2 INJECTION INTRAMUSCULAR; INTRAVENOUS at 17:08

## 2024-01-01 RX ADMIN — POTASSIUM & SODIUM PHOSPHATES POWDER PACK 280-160-250 MG 1 PACKET: 280-160-250 PACK at 17:50

## 2024-01-01 RX ADMIN — APIXABAN 5 MG: 5 TABLET, FILM COATED ORAL at 21:06

## 2024-01-01 RX ADMIN — AMOXICILLIN AND CLAVULANATE POTASSIUM 1 TABLET: 875; 125 TABLET, FILM COATED ORAL at 13:13

## 2024-01-01 RX ADMIN — ACETYLCYSTEINE 4 ML: 100 SOLUTION ORAL; RESPIRATORY (INHALATION) at 02:45

## 2024-01-01 RX ADMIN — ACETYLCYSTEINE 4 ML: 100 SOLUTION ORAL; RESPIRATORY (INHALATION) at 13:00

## 2024-01-01 RX ADMIN — IPRATROPIUM BROMIDE AND ALBUTEROL SULFATE 3 ML: .5; 3 SOLUTION RESPIRATORY (INHALATION) at 15:15

## 2024-01-01 RX ADMIN — APIXABAN 5 MG: 5 TABLET, FILM COATED ORAL at 20:47

## 2024-01-01 RX ADMIN — ACETAMINOPHEN 650 MG: 325 TABLET, FILM COATED ORAL at 10:57

## 2024-01-01 RX ADMIN — SODIUM CHLORIDE 1000 ML: 9 INJECTION, SOLUTION INTRAVENOUS at 00:28

## 2024-01-01 RX ADMIN — DICLOFENAC SODIUM 4 G: 10 GEL TOPICAL at 10:35

## 2024-01-01 RX ADMIN — ATROPINE SULFATE 2 DROP: 10 SOLUTION/ DROPS OPHTHALMIC at 01:46

## 2024-01-01 RX ADMIN — ALBUTEROL SULFATE 2.5 MG: 2.5 SOLUTION RESPIRATORY (INHALATION) at 13:00

## 2024-01-01 RX ADMIN — Medication 1 CAPSULE: at 21:44

## 2024-01-01 RX ADMIN — GLYCOPYRROLATE 0.2 MG: 0.2 INJECTION INTRAMUSCULAR; INTRAVENOUS at 03:57

## 2024-01-01 RX ADMIN — BENZONATATE 200 MG: 100 CAPSULE ORAL at 21:44

## 2024-01-01 RX ADMIN — ACETYLCYSTEINE 4 ML: 100 SOLUTION ORAL; RESPIRATORY (INHALATION) at 19:50

## 2024-01-01 RX ADMIN — ESCITALOPRAM 20 MG: 5 TABLET, FILM COATED ORAL at 09:09

## 2024-01-01 RX ADMIN — APIXABAN 5 MG: 5 TABLET, FILM COATED ORAL at 21:53

## 2024-01-01 RX ADMIN — ACETYLCYSTEINE 4 ML: 100 SOLUTION ORAL; RESPIRATORY (INHALATION) at 03:24

## 2024-01-01 RX ADMIN — LEVALBUTEROL HYDROCHLORIDE 0.63 MG: 0.63 SOLUTION RESPIRATORY (INHALATION) at 00:51

## 2024-01-01 RX ADMIN — APIXABAN 5 MG: 5 TABLET, FILM COATED ORAL at 10:18

## 2024-01-01 RX ADMIN — METOPROLOL SUCCINATE 50 MG: 50 TABLET, EXTENDED RELEASE ORAL at 20:22

## 2024-01-01 RX ADMIN — MORPHINE SULFATE 2 MG: 2 INJECTION, SOLUTION INTRAMUSCULAR; INTRAVENOUS at 21:45

## 2024-01-01 RX ADMIN — IPRATROPIUM BROMIDE AND ALBUTEROL SULFATE 3 ML: .5; 3 SOLUTION RESPIRATORY (INHALATION) at 11:17

## 2024-01-01 RX ADMIN — ESCITALOPRAM OXALATE 20 MG: 10 TABLET ORAL at 10:01

## 2024-01-01 RX ADMIN — FUROSEMIDE 40 MG: 10 INJECTION, SOLUTION INTRAMUSCULAR; INTRAVENOUS at 10:19

## 2024-01-01 RX ADMIN — ACETYLCYSTEINE 4 ML: 100 SOLUTION ORAL; RESPIRATORY (INHALATION) at 20:31

## 2024-01-01 RX ADMIN — AMOXICILLIN AND CLAVULANATE POTASSIUM 1 TABLET: 875; 125 TABLET, FILM COATED ORAL at 09:43

## 2024-01-01 RX ADMIN — ALBUTEROL SULFATE 2.5 MG: 2.5 SOLUTION RESPIRATORY (INHALATION) at 07:53

## 2024-01-01 RX ADMIN — AMOXICILLIN AND CLAVULANATE POTASSIUM 1 TABLET: 875; 125 TABLET, FILM COATED ORAL at 21:09

## 2024-01-01 RX ADMIN — BENZONATATE 200 MG: 100 CAPSULE ORAL at 02:45

## 2024-01-01 RX ADMIN — DIGOXIN 125 MCG: 0.12 TABLET ORAL at 21:43

## 2024-01-01 RX ADMIN — ATROPINE SULFATE 2 DROP: 10 SOLUTION/ DROPS OPHTHALMIC at 08:28

## 2024-01-01 RX ADMIN — VANCOMYCIN HYDROCHLORIDE 125 MG: 125 CAPSULE ORAL at 20:20

## 2024-01-01 RX ADMIN — PIPERACILLIN AND TAZOBACTAM 4.5 G: 4; .5 INJECTION, POWDER, FOR SOLUTION INTRAVENOUS at 14:05

## 2024-01-01 RX ADMIN — DICLOFENAC SODIUM 4 G: 10 GEL TOPICAL at 09:25

## 2024-01-01 RX ADMIN — PIPERACILLIN AND TAZOBACTAM 4.5 G: 4; .5 INJECTION, POWDER, FOR SOLUTION INTRAVENOUS at 21:57

## 2024-01-01 RX ADMIN — ESCITALOPRAM 20 MG: 5 TABLET, FILM COATED ORAL at 07:56

## 2024-01-01 RX ADMIN — APIXABAN 5 MG: 5 TABLET, FILM COATED ORAL at 21:09

## 2024-01-01 RX ADMIN — Medication 1 CAPSULE: at 07:56

## 2024-01-01 RX ADMIN — LORAZEPAM 1 MG: 2 INJECTION INTRAMUSCULAR; INTRAVENOUS at 17:03

## 2024-01-01 RX ADMIN — SCOPALAMINE 1 PATCH: 1 PATCH, EXTENDED RELEASE TRANSDERMAL at 15:28

## 2024-01-01 RX ADMIN — PIPERACILLIN AND TAZOBACTAM 4.5 G: 4; .5 INJECTION, POWDER, FOR SOLUTION INTRAVENOUS at 20:51

## 2024-01-01 RX ADMIN — ATORVASTATIN CALCIUM 40 MG: 40 TABLET, FILM COATED ORAL at 20:52

## 2024-01-01 RX ADMIN — GUAIFENESIN SYRUP AND DEXTROMETHORPHAN 10 ML: 100; 10 SYRUP ORAL at 21:46

## 2024-01-01 RX ADMIN — Medication 1 CAPSULE: at 08:49

## 2024-01-01 RX ADMIN — AMOXICILLIN AND CLAVULANATE POTASSIUM 1 TABLET: 875; 125 TABLET, FILM COATED ORAL at 08:49

## 2024-01-01 RX ADMIN — PIPERACILLIN AND TAZOBACTAM 4.5 G: 4; .5 INJECTION, POWDER, FOR SOLUTION INTRAVENOUS at 10:02

## 2024-01-01 RX ADMIN — FENTANYL CITRATE 25 MCG: 50 INJECTION, SOLUTION INTRAMUSCULAR; INTRAVENOUS at 10:47

## 2024-01-01 RX ADMIN — IRON SUCROSE 200 MG: 20 INJECTION, SOLUTION INTRAVENOUS at 21:35

## 2024-01-01 RX ADMIN — ACETYLCYSTEINE 4 ML: 100 SOLUTION ORAL; RESPIRATORY (INHALATION) at 08:37

## 2024-01-01 RX ADMIN — ESCITALOPRAM OXALATE 20 MG: 10 TABLET ORAL at 09:43

## 2024-01-01 RX ADMIN — ACETYLCYSTEINE 4 ML: 100 SOLUTION ORAL; RESPIRATORY (INHALATION) at 16:06

## 2024-01-01 RX ADMIN — METOPROLOL SUCCINATE 50 MG: 50 TABLET, EXTENDED RELEASE ORAL at 20:42

## 2024-01-01 RX ADMIN — Medication 1 CAPSULE: at 20:52

## 2024-01-01 RX ADMIN — GLYCOPYRROLATE 0.2 MG: 0.2 INJECTION INTRAMUSCULAR; INTRAVENOUS at 05:23

## 2024-01-01 RX ADMIN — ATORVASTATIN CALCIUM 40 MG: 40 TABLET, FILM COATED ORAL at 21:47

## 2024-01-01 RX ADMIN — AZITHROMYCIN MONOHYDRATE 250 MG: 500 INJECTION, POWDER, LYOPHILIZED, FOR SOLUTION INTRAVENOUS at 17:00

## 2024-01-01 RX ADMIN — ACETYLCYSTEINE 4 ML: 100 SOLUTION ORAL; RESPIRATORY (INHALATION) at 15:03

## 2024-01-01 RX ADMIN — ALBUTEROL SULFATE 2.5 MG: 2.5 SOLUTION RESPIRATORY (INHALATION) at 08:26

## 2024-01-01 RX ADMIN — PIPERACILLIN AND TAZOBACTAM 4.5 G: 4; .5 INJECTION, POWDER, FOR SOLUTION INTRAVENOUS at 01:33

## 2024-01-01 RX ADMIN — MICONAZOLE NITRATE: 20 POWDER TOPICAL at 21:52

## 2024-01-01 RX ADMIN — ACETYLCYSTEINE 4 ML: 100 SOLUTION ORAL; RESPIRATORY (INHALATION) at 20:17

## 2024-01-01 RX ADMIN — ESCITALOPRAM OXALATE 20 MG: 10 TABLET ORAL at 08:31

## 2024-01-01 RX ADMIN — POTASSIUM & SODIUM PHOSPHATES POWDER PACK 280-160-250 MG 2 PACKET: 280-160-250 PACK at 13:24

## 2024-01-01 RX ADMIN — DIPHENOXYLATE HYDROCHLORIDE AND ATROPINE SULFATE 2 TABLET: 2.5; .025 TABLET ORAL at 14:24

## 2024-01-01 RX ADMIN — ACETYLCYSTEINE 4 ML: 100 SOLUTION ORAL; RESPIRATORY (INHALATION) at 16:24

## 2024-01-01 RX ADMIN — APIXABAN 5 MG: 5 TABLET, FILM COATED ORAL at 09:03

## 2024-01-01 RX ADMIN — VANCOMYCIN HYDROCHLORIDE 125 MG: 125 CAPSULE ORAL at 21:20

## 2024-01-01 RX ADMIN — ATORVASTATIN CALCIUM 40 MG: 40 TABLET, FILM COATED ORAL at 20:39

## 2024-01-01 RX ADMIN — DIGOXIN 125 MCG: 0.12 TABLET ORAL at 20:22

## 2024-01-01 RX ADMIN — APIXABAN 5 MG: 5 TABLET, FILM COATED ORAL at 10:02

## 2024-01-01 RX ADMIN — BENZONATATE 200 MG: 100 CAPSULE ORAL at 09:21

## 2024-01-01 RX ADMIN — LORAZEPAM 1 MG: 2 INJECTION INTRAMUSCULAR; INTRAVENOUS at 17:55

## 2024-01-01 RX ADMIN — ESCITALOPRAM 20 MG: 5 TABLET, FILM COATED ORAL at 10:19

## 2024-01-01 RX ADMIN — IPRATROPIUM BROMIDE AND ALBUTEROL SULFATE 3 ML: .5; 3 SOLUTION RESPIRATORY (INHALATION) at 20:05

## 2024-01-01 RX ADMIN — IPRATROPIUM BROMIDE AND ALBUTEROL SULFATE 3 ML: .5; 3 SOLUTION RESPIRATORY (INHALATION) at 08:19

## 2024-01-01 RX ADMIN — APIXABAN 5 MG: 5 TABLET, FILM COATED ORAL at 10:34

## 2024-01-01 RX ADMIN — ATORVASTATIN CALCIUM 40 MG: 40 TABLET, FILM COATED ORAL at 21:59

## 2024-01-01 RX ADMIN — ALBUTEROL SULFATE 2.5 MG: 2.5 SOLUTION RESPIRATORY (INHALATION) at 15:45

## 2024-01-01 RX ADMIN — ALBUTEROL SULFATE 2.5 MG: 2.5 SOLUTION RESPIRATORY (INHALATION) at 16:05

## 2024-01-01 RX ADMIN — POTASSIUM CHLORIDE 40 MEQ: 1500 TABLET, EXTENDED RELEASE ORAL at 08:49

## 2024-01-01 RX ADMIN — FLUTICASONE FUROATE AND VILANTEROL 1 PUFF: 100; 25 POWDER RESPIRATORY (INHALATION) at 08:51

## 2024-01-01 RX ADMIN — ALBUTEROL SULFATE 2.5 MG: 2.5 SOLUTION RESPIRATORY (INHALATION) at 21:07

## 2024-01-01 RX ADMIN — MORPHINE SULFATE 2 MG: 2 INJECTION, SOLUTION INTRAMUSCULAR; INTRAVENOUS at 01:57

## 2024-01-01 RX ADMIN — IPRATROPIUM BROMIDE AND ALBUTEROL SULFATE 3 ML: .5; 3 SOLUTION RESPIRATORY (INHALATION) at 20:24

## 2024-01-01 RX ADMIN — ACETYLCYSTEINE 4 ML: 100 SOLUTION ORAL; RESPIRATORY (INHALATION) at 03:00

## 2024-01-01 RX ADMIN — ATORVASTATIN CALCIUM 40 MG: 40 TABLET, FILM COATED ORAL at 20:20

## 2024-01-01 RX ADMIN — ACETAMINOPHEN 650 MG: 325 TABLET, FILM COATED ORAL at 22:15

## 2024-01-01 RX ADMIN — DICLOFENAC SODIUM 4 G: 10 GEL TOPICAL at 17:00

## 2024-01-01 RX ADMIN — ESCITALOPRAM 20 MG: 5 TABLET, FILM COATED ORAL at 09:06

## 2024-01-01 RX ADMIN — DICLOFENAC SODIUM 4 G: 10 GEL TOPICAL at 14:47

## 2024-01-01 RX ADMIN — DICLOFENAC SODIUM 4 G: 10 GEL TOPICAL at 09:24

## 2024-01-01 RX ADMIN — PIPERACILLIN AND TAZOBACTAM 4.5 G: 4; .5 INJECTION, POWDER, FOR SOLUTION INTRAVENOUS at 20:09

## 2024-01-01 RX ADMIN — VANCOMYCIN HYDROCHLORIDE 125 MG: 125 CAPSULE ORAL at 08:42

## 2024-01-01 RX ADMIN — METOPROLOL SUCCINATE 50 MG: 50 TABLET, EXTENDED RELEASE ORAL at 21:06

## 2024-01-01 RX ADMIN — PANTOPRAZOLE SODIUM 40 MG: 40 TABLET, DELAYED RELEASE ORAL at 09:44

## 2024-01-01 RX ADMIN — ACETYLCYSTEINE 4 ML: 100 SOLUTION ORAL; RESPIRATORY (INHALATION) at 07:34

## 2024-01-01 RX ADMIN — VANCOMYCIN HYDROCHLORIDE 125 MG: 125 CAPSULE ORAL at 08:51

## 2024-01-01 RX ADMIN — AZITHROMYCIN MONOHYDRATE 250 MG: 500 INJECTION, POWDER, LYOPHILIZED, FOR SOLUTION INTRAVENOUS at 14:36

## 2024-01-01 RX ADMIN — ALBUTEROL SULFATE 2.5 MG: 2.5 SOLUTION RESPIRATORY (INHALATION) at 03:10

## 2024-01-01 RX ADMIN — APIXABAN 5 MG: 5 TABLET, FILM COATED ORAL at 20:20

## 2024-01-01 RX ADMIN — DICLOFENAC SODIUM 4 G: 10 GEL TOPICAL at 18:00

## 2024-01-01 RX ADMIN — APIXABAN 5 MG: 5 TABLET, FILM COATED ORAL at 08:44

## 2024-01-01 RX ADMIN — ESCITALOPRAM 20 MG: 5 TABLET, FILM COATED ORAL at 08:48

## 2024-01-01 RX ADMIN — AMOXICILLIN AND CLAVULANATE POTASSIUM 1 TABLET: 875; 125 TABLET, FILM COATED ORAL at 21:53

## 2024-01-01 RX ADMIN — FUROSEMIDE 40 MG: 10 INJECTION, SOLUTION INTRAMUSCULAR; INTRAVENOUS at 17:49

## 2024-01-01 RX ADMIN — POTASSIUM CHLORIDE 40 MEQ: 1.5 POWDER, FOR SOLUTION ORAL at 14:11

## 2024-01-01 RX ADMIN — MICONAZOLE NITRATE: 20 POWDER TOPICAL at 21:08

## 2024-01-01 RX ADMIN — PIPERACILLIN AND TAZOBACTAM 4.5 G: 4; .5 INJECTION, POWDER, FOR SOLUTION INTRAVENOUS at 10:39

## 2024-01-01 RX ADMIN — MORPHINE SULFATE 10 MG: 20 SOLUTION ORAL at 08:33

## 2024-01-01 RX ADMIN — POTASSIUM CHLORIDE 40 MEQ: 1500 TABLET, EXTENDED RELEASE ORAL at 09:08

## 2024-01-01 RX ADMIN — PIPERACILLIN AND TAZOBACTAM 4.5 G: 4; .5 INJECTION, POWDER, FOR SOLUTION INTRAVENOUS at 03:09

## 2024-01-01 RX ADMIN — Medication 1 CAPSULE: at 20:11

## 2024-01-01 RX ADMIN — PIPERACILLIN AND TAZOBACTAM 4.5 G: 4; .5 INJECTION, POWDER, FOR SOLUTION INTRAVENOUS at 21:09

## 2024-01-01 RX ADMIN — SODIUM PHOSPHATE, MONOBASIC, MONOHYDRATE AND SODIUM PHOSPHATE, DIBASIC, ANHYDROUS 15 MMOL: 142; 276 INJECTION, SOLUTION INTRAVENOUS at 10:17

## 2024-01-01 RX ADMIN — LORAZEPAM 1 MG: 2 INJECTION INTRAMUSCULAR; INTRAVENOUS at 06:12

## 2024-01-01 RX ADMIN — IPRATROPIUM BROMIDE AND ALBUTEROL SULFATE 3 ML: .5; 3 SOLUTION RESPIRATORY (INHALATION) at 07:50

## 2024-01-01 RX ADMIN — ESCITALOPRAM 20 MG: 5 TABLET, FILM COATED ORAL at 09:22

## 2024-01-01 RX ADMIN — ACETYLCYSTEINE 4 ML: 100 SOLUTION ORAL; RESPIRATORY (INHALATION) at 07:26

## 2024-01-01 RX ADMIN — HUMAN ALBUMIN MICROSPHERES AND PERFLUTREN 3 ML: 10; .22 INJECTION, SOLUTION INTRAVENOUS at 14:09

## 2024-01-01 RX ADMIN — PIPERACILLIN AND TAZOBACTAM 4.5 G: 4; .5 INJECTION, POWDER, FOR SOLUTION INTRAVENOUS at 07:08

## 2024-01-01 RX ADMIN — PIPERACILLIN AND TAZOBACTAM 4.5 G: 4; .5 INJECTION, POWDER, FOR SOLUTION INTRAVENOUS at 13:40

## 2024-01-01 RX ADMIN — POTASSIUM & SODIUM PHOSPHATES POWDER PACK 280-160-250 MG 1 PACKET: 280-160-250 PACK at 10:18

## 2024-01-01 RX ADMIN — PIPERACILLIN AND TAZOBACTAM 4.5 G: 4; .5 INJECTION, POWDER, FOR SOLUTION INTRAVENOUS at 20:39

## 2024-01-01 RX ADMIN — FUROSEMIDE 40 MG: 10 INJECTION, SOLUTION INTRAMUSCULAR; INTRAVENOUS at 10:05

## 2024-01-01 RX ADMIN — FUROSEMIDE 40 MG: 10 INJECTION, SOLUTION INTRAMUSCULAR; INTRAVENOUS at 14:59

## 2024-01-01 RX ADMIN — ACETYLCYSTEINE 4 ML: 100 SOLUTION ORAL; RESPIRATORY (INHALATION) at 15:24

## 2024-01-01 RX ADMIN — DICLOFENAC SODIUM 4 G: 10 GEL TOPICAL at 10:55

## 2024-01-01 RX ADMIN — PIPERACILLIN AND TAZOBACTAM 4.5 G: 4; .5 INJECTION, POWDER, FOR SOLUTION INTRAVENOUS at 02:14

## 2024-01-01 RX ADMIN — FLUTICASONE FUROATE AND VILANTEROL 1 PUFF: 100; 25 POWDER RESPIRATORY (INHALATION) at 07:26

## 2024-01-01 RX ADMIN — APIXABAN 5 MG: 5 TABLET, FILM COATED ORAL at 08:42

## 2024-01-01 RX ADMIN — AMOXICILLIN AND CLAVULANATE POTASSIUM 1 TABLET: 875; 125 TABLET, FILM COATED ORAL at 09:51

## 2024-01-01 RX ADMIN — OLANZAPINE 5 MG: 5 TABLET, ORALLY DISINTEGRATING ORAL at 08:27

## 2024-01-01 RX ADMIN — ALBUTEROL SULFATE 2.5 MG: 2.5 SOLUTION RESPIRATORY (INHALATION) at 16:06

## 2024-01-01 RX ADMIN — PANTOPRAZOLE SODIUM 40 MG: 40 TABLET, DELAYED RELEASE ORAL at 10:17

## 2024-01-01 RX ADMIN — ACETYLCYSTEINE 4 ML: 100 SOLUTION ORAL; RESPIRATORY (INHALATION) at 01:33

## 2024-01-01 RX ADMIN — PIPERACILLIN AND TAZOBACTAM 4.5 G: 4; .5 INJECTION, POWDER, FOR SOLUTION INTRAVENOUS at 07:58

## 2024-01-01 RX ADMIN — PANTOPRAZOLE SODIUM 40 MG: 40 TABLET, DELAYED RELEASE ORAL at 06:34

## 2024-01-01 RX ADMIN — METOPROLOL SUCCINATE 50 MG: 50 TABLET, EXTENDED RELEASE ORAL at 20:20

## 2024-01-01 RX ADMIN — PIPERACILLIN AND TAZOBACTAM 4.5 G: 4; .5 INJECTION, POWDER, FOR SOLUTION INTRAVENOUS at 04:02

## 2024-01-01 RX ADMIN — METOPROLOL SUCCINATE 50 MG: 50 TABLET, EXTENDED RELEASE ORAL at 20:52

## 2024-01-01 RX ADMIN — GUAIFENESIN SYRUP AND DEXTROMETHORPHAN 10 ML: 100; 10 SYRUP ORAL at 12:23

## 2024-01-01 RX ADMIN — ACETAMINOPHEN 650 MG: 325 TABLET, FILM COATED ORAL at 12:58

## 2024-01-01 RX ADMIN — APIXABAN 5 MG: 5 TABLET, FILM COATED ORAL at 20:52

## 2024-01-01 RX ADMIN — PIPERACILLIN AND TAZOBACTAM 4.5 G: 4; .5 INJECTION, POWDER, FOR SOLUTION INTRAVENOUS at 10:16

## 2024-01-01 RX ADMIN — ACETYLCYSTEINE 4 ML: 100 SOLUTION ORAL; RESPIRATORY (INHALATION) at 08:51

## 2024-01-01 RX ADMIN — ACETYLCYSTEINE 4 ML: 100 SOLUTION ORAL; RESPIRATORY (INHALATION) at 07:53

## 2024-01-01 RX ADMIN — FLUTICASONE FUROATE AND VILANTEROL TRIFENATATE 1 PUFF: 100; 25 POWDER RESPIRATORY (INHALATION) at 07:47

## 2024-01-01 RX ADMIN — ATORVASTATIN CALCIUM 40 MG: 40 TABLET, FILM COATED ORAL at 20:09

## 2024-01-01 RX ADMIN — DIPHENOXYLATE HYDROCHLORIDE AND ATROPINE SULFATE 2 TABLET: 2.5; .025 TABLET ORAL at 16:46

## 2024-01-01 RX ADMIN — VANCOMYCIN HYDROCHLORIDE 125 MG: 125 CAPSULE ORAL at 20:50

## 2024-01-01 RX ADMIN — VANCOMYCIN HYDROCHLORIDE 125 MG: 125 CAPSULE ORAL at 20:42

## 2024-01-01 RX ADMIN — MIDAZOLAM 1 MG: 1 INJECTION INTRAMUSCULAR; INTRAVENOUS at 10:51

## 2024-01-01 RX ADMIN — APIXABAN 5 MG: 5 TABLET, FILM COATED ORAL at 10:19

## 2024-01-01 RX ADMIN — ACETYLCYSTEINE 4 ML: 100 SOLUTION ORAL; RESPIRATORY (INHALATION) at 15:21

## 2024-01-01 RX ADMIN — ATORVASTATIN CALCIUM 40 MG: 40 TABLET, FILM COATED ORAL at 20:21

## 2024-01-01 RX ADMIN — PIPERACILLIN AND TAZOBACTAM 4.5 G: 4; .5 INJECTION, POWDER, FOR SOLUTION INTRAVENOUS at 20:32

## 2024-01-01 RX ADMIN — METOPROLOL SUCCINATE 50 MG: 50 TABLET, EXTENDED RELEASE ORAL at 21:47

## 2024-01-01 RX ADMIN — PANTOPRAZOLE SODIUM 40 MG: 40 TABLET, DELAYED RELEASE ORAL at 08:31

## 2024-01-01 RX ADMIN — ALBUTEROL SULFATE 2.5 MG: 2.5 SOLUTION RESPIRATORY (INHALATION) at 20:17

## 2024-01-01 RX ADMIN — ALBUTEROL SULFATE 2.5 MG: 2.5 SOLUTION RESPIRATORY (INHALATION) at 02:59

## 2024-01-01 RX ADMIN — ATORVASTATIN CALCIUM 40 MG: 40 TABLET, FILM COATED ORAL at 20:24

## 2024-01-01 RX ADMIN — PIPERACILLIN AND TAZOBACTAM 4.5 G: 4; .5 INJECTION, POWDER, FOR SOLUTION INTRAVENOUS at 02:25

## 2024-01-01 RX ADMIN — FLUTICASONE FUROATE AND VILANTEROL 1 PUFF: 100; 25 POWDER RESPIRATORY (INHALATION) at 08:35

## 2024-01-01 RX ADMIN — IOPAMIDOL 70 ML: 755 INJECTION, SOLUTION INTRAVENOUS at 14:24

## 2024-01-01 RX ADMIN — DICLOFENAC SODIUM 4 G: 10 GEL TOPICAL at 21:08

## 2024-01-01 RX ADMIN — DICLOFENAC SODIUM 4 G: 10 GEL TOPICAL at 14:33

## 2024-01-01 RX ADMIN — ALBUTEROL SULFATE 2.5 MG: 2.5 SOLUTION RESPIRATORY (INHALATION) at 01:32

## 2024-01-01 RX ADMIN — METOPROLOL SUCCINATE 50 MG: 50 TABLET, EXTENDED RELEASE ORAL at 22:59

## 2024-01-01 RX ADMIN — PIPERACILLIN AND TAZOBACTAM 4.5 G: 4; .5 INJECTION, POWDER, FOR SOLUTION INTRAVENOUS at 15:24

## 2024-01-01 RX ADMIN — IPRATROPIUM BROMIDE AND ALBUTEROL SULFATE 3 ML: .5; 3 SOLUTION RESPIRATORY (INHALATION) at 20:53

## 2024-01-01 RX ADMIN — GLYCOPYRROLATE 0.2 MG: 0.2 INJECTION INTRAMUSCULAR; INTRAVENOUS at 22:42

## 2024-01-01 RX ADMIN — MICONAZOLE NITRATE: 20 POWDER TOPICAL at 21:48

## 2024-01-01 RX ADMIN — ACETYLCYSTEINE 4 ML: 100 SOLUTION ORAL; RESPIRATORY (INHALATION) at 19:48

## 2024-01-01 RX ADMIN — ESCITALOPRAM OXALATE 20 MG: 10 TABLET ORAL at 17:38

## 2024-01-01 RX ADMIN — MORPHINE SULFATE 5 MG: 20 SOLUTION ORAL at 05:53

## 2024-01-01 RX ADMIN — ALBUTEROL SULFATE 2.5 MG: 2.5 SOLUTION RESPIRATORY (INHALATION) at 08:13

## 2024-01-01 RX ADMIN — IRON SUCROSE 200 MG: 20 INJECTION, SOLUTION INTRAVENOUS at 22:53

## 2024-01-01 RX ADMIN — Medication 1 CAPSULE: at 13:03

## 2024-01-01 RX ADMIN — OLANZAPINE 5 MG: 5 TABLET, ORALLY DISINTEGRATING ORAL at 15:29

## 2024-01-01 RX ADMIN — APIXABAN 5 MG: 5 TABLET, FILM COATED ORAL at 21:19

## 2024-01-01 RX ADMIN — AZITHROMYCIN MONOHYDRATE 500 MG: 500 INJECTION, POWDER, LYOPHILIZED, FOR SOLUTION INTRAVENOUS at 14:55

## 2024-01-01 RX ADMIN — ALBUTEROL SULFATE 2.5 MG: 2.5 SOLUTION RESPIRATORY (INHALATION) at 12:55

## 2024-01-01 RX ADMIN — Medication 1 TABLET: at 17:57

## 2024-01-01 RX ADMIN — PIPERACILLIN AND TAZOBACTAM 4.5 G: 4; .5 INJECTION, POWDER, FOR SOLUTION INTRAVENOUS at 14:40

## 2024-01-01 RX ADMIN — ACETYLCYSTEINE 4 ML: 100 SOLUTION ORAL; RESPIRATORY (INHALATION) at 08:26

## 2024-01-01 RX ADMIN — ACETYLCYSTEINE 4 ML: 100 SOLUTION ORAL; RESPIRATORY (INHALATION) at 02:15

## 2024-01-01 RX ADMIN — POTASSIUM CHLORIDE 40 MEQ: 1500 TABLET, EXTENDED RELEASE ORAL at 09:21

## 2024-01-01 RX ADMIN — AMOXICILLIN AND CLAVULANATE POTASSIUM 1 TABLET: 875; 125 TABLET, FILM COATED ORAL at 21:59

## 2024-01-01 RX ADMIN — PANTOPRAZOLE SODIUM 40 MG: 40 TABLET, DELAYED RELEASE ORAL at 09:08

## 2024-01-01 RX ADMIN — VANCOMYCIN HYDROCHLORIDE 125 MG: 125 CAPSULE ORAL at 08:31

## 2024-01-01 RX ADMIN — APIXABAN 5 MG: 5 TABLET, FILM COATED ORAL at 08:48

## 2024-01-01 RX ADMIN — MORPHINE SULFATE 5 MG: 20 SOLUTION ORAL at 14:23

## 2024-01-01 RX ADMIN — POTASSIUM CHLORIDE 40 MEQ: 1500 TABLET, EXTENDED RELEASE ORAL at 08:13

## 2024-01-01 RX ADMIN — MORPHINE SULFATE 2 MG: 2 INJECTION, SOLUTION INTRAMUSCULAR; INTRAVENOUS at 19:03

## 2024-01-01 RX ADMIN — IPRATROPIUM BROMIDE AND ALBUTEROL SULFATE 3 ML: .5; 3 SOLUTION RESPIRATORY (INHALATION) at 11:46

## 2024-01-01 RX ADMIN — HALOPERIDOL LACTATE 2 MG: 5 INJECTION, SOLUTION INTRAMUSCULAR at 10:24

## 2024-01-01 RX ADMIN — MORPHINE SULFATE 5 MG: 20 SOLUTION ORAL at 16:45

## 2024-01-01 RX ADMIN — DICLOFENAC SODIUM 4 G: 10 GEL TOPICAL at 16:25

## 2024-01-01 RX ADMIN — APIXABAN 5 MG: 5 TABLET, FILM COATED ORAL at 14:07

## 2024-01-01 RX ADMIN — POTASSIUM & SODIUM PHOSPHATES POWDER PACK 280-160-250 MG 1 PACKET: 280-160-250 PACK at 00:11

## 2024-01-01 RX ADMIN — GUAIFENESIN SYRUP AND DEXTROMETHORPHAN 10 ML: 100; 10 SYRUP ORAL at 14:36

## 2024-01-01 RX ADMIN — AMOXICILLIN AND CLAVULANATE POTASSIUM 1 TABLET: 875; 125 TABLET, FILM COATED ORAL at 10:34

## 2024-01-01 RX ADMIN — ATROPINE SULFATE 2 DROP: 10 SOLUTION/ DROPS OPHTHALMIC at 19:34

## 2024-01-01 RX ADMIN — Medication 1 CAPSULE: at 09:44

## 2024-01-01 RX ADMIN — PIPERACILLIN AND TAZOBACTAM 4.5 G: 4; .5 INJECTION, POWDER, FOR SOLUTION INTRAVENOUS at 13:03

## 2024-01-01 RX ADMIN — PIPERACILLIN AND TAZOBACTAM 4.5 G: 4; .5 INJECTION, POWDER, FOR SOLUTION INTRAVENOUS at 15:59

## 2024-01-01 RX ADMIN — ACETYLCYSTEINE 4 ML: 100 SOLUTION ORAL; RESPIRATORY (INHALATION) at 19:39

## 2024-01-01 RX ADMIN — DIGOXIN 125 MCG: 0.12 TABLET ORAL at 20:27

## 2024-01-01 RX ADMIN — APIXABAN 5 MG: 5 TABLET, FILM COATED ORAL at 09:09

## 2024-01-01 RX ADMIN — VANCOMYCIN HYDROCHLORIDE 1250 MG: 5 INJECTION, POWDER, LYOPHILIZED, FOR SOLUTION INTRAVENOUS at 05:42

## 2024-01-01 RX ADMIN — PIPERACILLIN AND TAZOBACTAM 4.5 G: 4; .5 INJECTION, POWDER, FOR SOLUTION INTRAVENOUS at 04:26

## 2024-01-01 RX ADMIN — MICONAZOLE NITRATE: 20 POWDER TOPICAL at 22:09

## 2024-01-01 RX ADMIN — PIPERACILLIN AND TAZOBACTAM 4.5 G: 4; .5 INJECTION, POWDER, FOR SOLUTION INTRAVENOUS at 18:26

## 2024-01-01 RX ADMIN — IPRATROPIUM BROMIDE AND ALBUTEROL SULFATE 3 ML: .5; 3 SOLUTION RESPIRATORY (INHALATION) at 11:32

## 2024-01-01 RX ADMIN — PIPERACILLIN AND TAZOBACTAM 4.5 G: 4; .5 INJECTION, POWDER, FOR SOLUTION INTRAVENOUS at 20:21

## 2024-01-01 RX ADMIN — PIPERACILLIN AND TAZOBACTAM 4.5 G: 4; .5 INJECTION, POWDER, FOR SOLUTION INTRAVENOUS at 14:48

## 2024-01-01 RX ADMIN — PIPERACILLIN AND TAZOBACTAM 4.5 G: 4; .5 INJECTION, POWDER, FOR SOLUTION INTRAVENOUS at 09:00

## 2024-01-01 RX ADMIN — ESCITALOPRAM OXALATE 20 MG: 10 TABLET ORAL at 09:03

## 2024-01-01 RX ADMIN — FENTANYL CITRATE 25 MCG: 50 INJECTION, SOLUTION INTRAMUSCULAR; INTRAVENOUS at 10:51

## 2024-01-01 RX ADMIN — DICLOFENAC SODIUM 4 G: 10 GEL TOPICAL at 21:59

## 2024-01-01 RX ADMIN — ACETYLCYSTEINE 4 ML: 100 SOLUTION ORAL; RESPIRATORY (INHALATION) at 07:46

## 2024-01-01 RX ADMIN — PANTOPRAZOLE SODIUM 40 MG: 40 TABLET, DELAYED RELEASE ORAL at 09:03

## 2024-01-01 RX ADMIN — PIPERACILLIN AND TAZOBACTAM 4.5 G: 4; .5 INJECTION, POWDER, FOR SOLUTION INTRAVENOUS at 21:48

## 2024-01-01 RX ADMIN — DIGOXIN 125 MCG: 125 TABLET ORAL at 21:45

## 2024-01-01 RX ADMIN — ALBUTEROL SULFATE 2.5 MG: 2.5 SOLUTION RESPIRATORY (INHALATION) at 07:52

## 2024-01-01 RX ADMIN — ACETYLCYSTEINE 4 ML: 100 SOLUTION ORAL; RESPIRATORY (INHALATION) at 08:13

## 2024-01-01 RX ADMIN — BENZONATATE 200 MG: 100 CAPSULE ORAL at 21:46

## 2024-01-01 RX ADMIN — PANTOPRAZOLE SODIUM 40 MG: 40 TABLET, DELAYED RELEASE ORAL at 17:38

## 2024-01-01 RX ADMIN — VANCOMYCIN HYDROCHLORIDE 125 MG: 125 CAPSULE ORAL at 09:03

## 2024-01-01 RX ADMIN — ACETYLCYSTEINE 4 ML: 100 SOLUTION ORAL; RESPIRATORY (INHALATION) at 13:29

## 2024-01-01 RX ADMIN — DICLOFENAC SODIUM 4 G: 10 GEL TOPICAL at 21:48

## 2024-01-01 RX ADMIN — IPRATROPIUM BROMIDE AND ALBUTEROL SULFATE 3 ML: .5; 3 SOLUTION RESPIRATORY (INHALATION) at 17:39

## 2024-01-01 RX ADMIN — DICLOFENAC SODIUM 4 G: 10 GEL TOPICAL at 17:50

## 2024-01-01 RX ADMIN — ACETYLCYSTEINE 4 ML: 100 SOLUTION ORAL; RESPIRATORY (INHALATION) at 16:05

## 2024-01-01 RX ADMIN — METOPROLOL SUCCINATE 50 MG: 50 TABLET, EXTENDED RELEASE ORAL at 21:58

## 2024-01-01 RX ADMIN — FLUTICASONE FUROATE AND VILANTEROL 1 PUFF: 100; 25 POWDER RESPIRATORY (INHALATION) at 07:46

## 2024-01-01 RX ADMIN — ALBUTEROL SULFATE 2.5 MG: 2.5 SOLUTION RESPIRATORY (INHALATION) at 08:35

## 2024-01-01 RX ADMIN — MORPHINE SULFATE 1 MG: 2 INJECTION, SOLUTION INTRAMUSCULAR; INTRAVENOUS at 15:54

## 2024-01-01 RX ADMIN — APIXABAN 5 MG: 5 TABLET, FILM COATED ORAL at 20:42

## 2024-01-01 RX ADMIN — FENTANYL CITRATE 25 MCG: 50 INJECTION, SOLUTION INTRAMUSCULAR; INTRAVENOUS at 10:39

## 2024-01-01 RX ADMIN — ACETYLCYSTEINE 4 ML: 100 SOLUTION ORAL; RESPIRATORY (INHALATION) at 02:59

## 2024-01-01 RX ADMIN — AMOXICILLIN AND CLAVULANATE POTASSIUM 1 TABLET: 875; 125 TABLET, FILM COATED ORAL at 20:22

## 2024-01-01 RX ADMIN — FUROSEMIDE 40 MG: 10 INJECTION, SOLUTION INTRAMUSCULAR; INTRAVENOUS at 10:34

## 2024-01-01 RX ADMIN — ALBUTEROL SULFATE 2.5 MG: 2.5 SOLUTION RESPIRATORY (INHALATION) at 02:44

## 2024-01-01 RX ADMIN — ATORVASTATIN CALCIUM 40 MG: 40 TABLET, FILM COATED ORAL at 21:06

## 2024-01-01 RX ADMIN — APIXABAN 5 MG: 5 TABLET, FILM COATED ORAL at 08:31

## 2024-01-01 RX ADMIN — ALBUTEROL SULFATE 2.5 MG: 2.5 SOLUTION RESPIRATORY (INHALATION) at 13:57

## 2024-01-01 RX ADMIN — ATORVASTATIN CALCIUM 40 MG: 40 TABLET, FILM COATED ORAL at 21:42

## 2024-01-01 RX ADMIN — ALBUTEROL SULFATE 2.5 MG: 2.5 SOLUTION RESPIRATORY (INHALATION) at 03:24

## 2024-01-01 RX ADMIN — VANCOMYCIN HYDROCHLORIDE 125 MG: 125 CAPSULE ORAL at 20:52

## 2024-01-01 RX ADMIN — IPRATROPIUM BROMIDE AND ALBUTEROL SULFATE 3 ML: .5; 3 SOLUTION RESPIRATORY (INHALATION) at 11:23

## 2024-01-01 RX ADMIN — MORPHINE SULFATE 2 MG: 2 INJECTION, SOLUTION INTRAMUSCULAR; INTRAVENOUS at 22:43

## 2024-01-01 RX ADMIN — DIGOXIN 125 MCG: 0.12 TABLET ORAL at 22:01

## 2024-01-01 RX ADMIN — ALBUTEROL SULFATE 2.5 MG: 2.5 SOLUTION RESPIRATORY (INHALATION) at 07:46

## 2024-01-01 RX ADMIN — TRAZODONE HYDROCHLORIDE 50 MG: 50 TABLET ORAL at 00:56

## 2024-01-01 RX ADMIN — DIPHENOXYLATE HYDROCHLORIDE AND ATROPINE SULFATE 1 TABLET: 2.5; .025 TABLET ORAL at 09:47

## 2024-01-01 RX ADMIN — PIPERACILLIN AND TAZOBACTAM 4.5 G: 4; .5 INJECTION, POWDER, FOR SOLUTION INTRAVENOUS at 02:12

## 2024-01-01 RX ADMIN — METOPROLOL SUCCINATE 50 MG: 50 TABLET, EXTENDED RELEASE ORAL at 20:24

## 2024-01-01 RX ADMIN — ALBUTEROL SULFATE 2.5 MG: 2.5 SOLUTION RESPIRATORY (INHALATION) at 19:49

## 2024-01-01 RX ADMIN — IPRATROPIUM BROMIDE AND ALBUTEROL SULFATE 3 ML: .5; 3 SOLUTION RESPIRATORY (INHALATION) at 16:22

## 2024-01-01 RX ADMIN — POTASSIUM & SODIUM PHOSPHATES POWDER PACK 280-160-250 MG 1 PACKET: 280-160-250 PACK at 04:24

## 2024-01-01 RX ADMIN — ESCITALOPRAM 20 MG: 5 TABLET, FILM COATED ORAL at 09:50

## 2024-01-01 RX ADMIN — DICLOFENAC SODIUM 4 G: 10 GEL TOPICAL at 10:20

## 2024-01-01 RX ADMIN — DICLOFENAC SODIUM 4 G: 10 GEL TOPICAL at 21:36

## 2024-01-01 RX ADMIN — ATORVASTATIN CALCIUM 40 MG: 40 TABLET, FILM COATED ORAL at 21:09

## 2024-01-01 RX ADMIN — DIGOXIN 125 MCG: 0.12 TABLET ORAL at 20:39

## 2024-01-01 RX ADMIN — BENZONATATE 200 MG: 100 CAPSULE ORAL at 10:44

## 2024-01-01 RX ADMIN — FLUTICASONE FUROATE AND VILANTEROL 1 PUFF: 100; 25 POWDER RESPIRATORY (INHALATION) at 07:57

## 2024-01-01 RX ADMIN — DIGOXIN 125 MCG: 0.12 TABLET ORAL at 21:06

## 2024-01-01 RX ADMIN — ALBUTEROL SULFATE 2.5 MG: 2.5 SOLUTION RESPIRATORY (INHALATION) at 20:57

## 2024-01-01 RX ADMIN — PANTOPRAZOLE SODIUM 40 MG: 40 TABLET, DELAYED RELEASE ORAL at 08:42

## 2024-01-01 RX ADMIN — IPRATROPIUM BROMIDE AND ALBUTEROL SULFATE 3 ML: .5; 3 SOLUTION RESPIRATORY (INHALATION) at 07:37

## 2024-01-01 RX ADMIN — ALBUTEROL SULFATE 2.5 MG: 2.5 SOLUTION RESPIRATORY (INHALATION) at 19:39

## 2024-01-01 RX ADMIN — APIXABAN 5 MG: 5 TABLET, FILM COATED ORAL at 20:22

## 2024-01-01 RX ADMIN — FLUTICASONE FUROATE AND VILANTEROL TRIFENATATE 1 PUFF: 100; 25 POWDER RESPIRATORY (INHALATION) at 08:29

## 2024-01-01 RX ADMIN — METOPROLOL SUCCINATE 50 MG: 50 TABLET, EXTENDED RELEASE ORAL at 21:20

## 2024-01-01 RX ADMIN — APIXABAN 5 MG: 5 TABLET, FILM COATED ORAL at 09:22

## 2024-01-01 RX ADMIN — ALBUTEROL SULFATE 2.5 MG: 2.5 SOLUTION RESPIRATORY (INHALATION) at 21:13

## 2024-01-01 RX ADMIN — IPRATROPIUM BROMIDE AND ALBUTEROL SULFATE 3 ML: .5; 3 SOLUTION RESPIRATORY (INHALATION) at 08:29

## 2024-01-01 RX ADMIN — DICLOFENAC SODIUM 4 G: 10 GEL TOPICAL at 09:52

## 2024-01-01 RX ADMIN — DICLOFENAC SODIUM 4 G: 10 GEL TOPICAL at 21:13

## 2024-01-01 RX ADMIN — ACETYLCYSTEINE 4 ML: 100 SOLUTION ORAL; RESPIRATORY (INHALATION) at 03:10

## 2024-01-01 RX ADMIN — APIXABAN 5 MG: 5 TABLET, FILM COATED ORAL at 21:47

## 2024-01-01 RX ADMIN — PIPERACILLIN AND TAZOBACTAM 4.5 G: 4; .5 INJECTION, POWDER, FOR SOLUTION INTRAVENOUS at 14:07

## 2024-01-01 RX ADMIN — ACETYLCYSTEINE 4 ML: 100 SOLUTION ORAL; RESPIRATORY (INHALATION) at 20:35

## 2024-01-01 RX ADMIN — DIPHENOXYLATE HYDROCHLORIDE AND ATROPINE SULFATE 2 TABLET: 2.5; .025 TABLET ORAL at 17:35

## 2024-01-01 RX ADMIN — DICLOFENAC SODIUM 4 G: 10 GEL TOPICAL at 09:13

## 2024-01-01 RX ADMIN — FUROSEMIDE 40 MG: 10 INJECTION, SOLUTION INTRAMUSCULAR; INTRAVENOUS at 17:43

## 2024-01-01 RX ADMIN — ALBUTEROL SULFATE 2.5 MG: 2.5 SOLUTION RESPIRATORY (INHALATION) at 15:20

## 2024-01-01 RX ADMIN — ESCITALOPRAM OXALATE 20 MG: 10 TABLET ORAL at 10:17

## 2024-01-01 RX ADMIN — IPRATROPIUM BROMIDE AND ALBUTEROL SULFATE 3 ML: .5; 3 SOLUTION RESPIRATORY (INHALATION) at 21:22

## 2024-01-01 RX ADMIN — QUETIAPINE FUMARATE 25 MG: 25 TABLET ORAL at 17:21

## 2024-01-01 RX ADMIN — APIXABAN 5 MG: 5 TABLET, FILM COATED ORAL at 09:28

## 2024-01-01 RX ADMIN — FUROSEMIDE 40 MG: 10 INJECTION, SOLUTION INTRAMUSCULAR; INTRAVENOUS at 09:09

## 2024-01-01 RX ADMIN — IRON SUCROSE 200 MG: 20 INJECTION, SOLUTION INTRAVENOUS at 14:20

## 2024-01-01 RX ADMIN — DICLOFENAC SODIUM 4 G: 10 GEL TOPICAL at 20:13

## 2024-01-01 RX ADMIN — ATROPINE SULFATE 2 DROP: 10 SOLUTION/ DROPS OPHTHALMIC at 05:30

## 2024-01-01 RX ADMIN — FUROSEMIDE 40 MG: 10 INJECTION, SOLUTION INTRAMUSCULAR; INTRAVENOUS at 09:28

## 2024-01-01 RX ADMIN — BARIUM SULFATE: 400 SUSPENSION ORAL at 15:02

## 2024-01-01 RX ADMIN — ACETYLCYSTEINE 4 ML: 100 SOLUTION ORAL; RESPIRATORY (INHALATION) at 12:48

## 2024-01-01 RX ADMIN — ALBUTEROL SULFATE 2.5 MG: 2.5 SOLUTION RESPIRATORY (INHALATION) at 20:34

## 2024-01-01 RX ADMIN — VANCOMYCIN HYDROCHLORIDE 125 MG: 125 CAPSULE ORAL at 10:02

## 2024-01-01 RX ADMIN — PIPERACILLIN AND TAZOBACTAM 4.5 G: 4; .5 INJECTION, POWDER, FOR SOLUTION INTRAVENOUS at 14:24

## 2024-01-01 RX ADMIN — PIPERACILLIN AND TAZOBACTAM 4.5 G: 4; .5 INJECTION, POWDER, FOR SOLUTION INTRAVENOUS at 20:56

## 2024-01-01 RX ADMIN — MICONAZOLE NITRATE: 20 POWDER TOPICAL at 10:20

## 2024-01-01 RX ADMIN — HALOPERIDOL LACTATE 2 MG: 5 INJECTION, SOLUTION INTRAMUSCULAR at 19:45

## 2024-01-01 RX ADMIN — PIPERACILLIN AND TAZOBACTAM 4.5 G: 4; .5 INJECTION, POWDER, FOR SOLUTION INTRAVENOUS at 20:46

## 2024-01-01 RX ADMIN — ACETYLCYSTEINE 4 ML: 100 SOLUTION ORAL; RESPIRATORY (INHALATION) at 07:52

## 2024-01-01 RX ADMIN — DIGOXIN 125 MCG: 0.12 TABLET ORAL at 21:09

## 2024-01-01 RX ADMIN — APIXABAN 5 MG: 5 TABLET, FILM COATED ORAL at 20:12

## 2024-01-01 RX ADMIN — PIPERACILLIN AND TAZOBACTAM 4.5 G: 4; .5 INJECTION, POWDER, FOR SOLUTION INTRAVENOUS at 08:50

## 2024-01-01 RX ADMIN — APIXABAN 5 MG: 5 TABLET, FILM COATED ORAL at 21:59

## 2024-01-01 RX ADMIN — LORAZEPAM 1 MG: 2 INJECTION INTRAMUSCULAR; INTRAVENOUS at 00:01

## 2024-01-01 RX ADMIN — Medication 1 CAPSULE: at 08:44

## 2024-01-01 RX ADMIN — MORPHINE SULFATE 2 MG: 2 INJECTION, SOLUTION INTRAMUSCULAR; INTRAVENOUS at 10:23

## 2024-01-01 RX ADMIN — ALBUTEROL SULFATE 2.5 MG: 2.5 SOLUTION RESPIRATORY (INHALATION) at 19:45

## 2024-01-01 RX ADMIN — MORPHINE SULFATE 2 MG: 2 INJECTION, SOLUTION INTRAMUSCULAR; INTRAVENOUS at 00:26

## 2024-01-01 RX ADMIN — PIPERACILLIN AND TAZOBACTAM 4.5 G: 4; .5 INJECTION, POWDER, FOR SOLUTION INTRAVENOUS at 04:45

## 2024-01-01 RX ADMIN — APIXABAN 5 MG: 5 TABLET, FILM COATED ORAL at 09:51

## 2024-01-01 RX ADMIN — DIGOXIN 125 MCG: 125 TABLET ORAL at 20:52

## 2024-01-01 RX ADMIN — IPRATROPIUM BROMIDE AND ALBUTEROL SULFATE 3 ML: .5; 3 SOLUTION RESPIRATORY (INHALATION) at 15:34

## 2024-01-01 RX ADMIN — IPRATROPIUM BROMIDE AND ALBUTEROL SULFATE 3 ML: .5; 3 SOLUTION RESPIRATORY (INHALATION) at 11:31

## 2024-01-01 RX ADMIN — DICLOFENAC SODIUM 4 G: 10 GEL TOPICAL at 16:20

## 2024-01-01 RX ADMIN — GUAIFENESIN 200 MG: 100 LIQUID ORAL at 17:46

## 2024-01-01 RX ADMIN — POTASSIUM & SODIUM PHOSPHATES POWDER PACK 280-160-250 MG 1 PACKET: 280-160-250 PACK at 17:57

## 2024-01-01 RX ADMIN — ACETYLCYSTEINE 4 ML: 100 SOLUTION ORAL; RESPIRATORY (INHALATION) at 19:49

## 2024-01-01 RX ADMIN — MORPHINE SULFATE 2 MG: 2 INJECTION, SOLUTION INTRAMUSCULAR; INTRAVENOUS at 20:17

## 2024-01-01 RX ADMIN — DICLOFENAC SODIUM 4 G: 10 GEL TOPICAL at 21:16

## 2024-01-01 RX ADMIN — GLYCOPYRROLATE 0.2 MG: 0.2 INJECTION INTRAMUSCULAR; INTRAVENOUS at 15:39

## 2024-01-01 RX ADMIN — ACETYLCYSTEINE 4 ML: 100 SOLUTION ORAL; RESPIRATORY (INHALATION) at 20:18

## 2024-01-01 RX ADMIN — IPRATROPIUM BROMIDE AND ALBUTEROL SULFATE 3 ML: .5; 3 SOLUTION RESPIRATORY (INHALATION) at 09:07

## 2024-01-01 RX ADMIN — ATORVASTATIN CALCIUM 40 MG: 40 TABLET, FILM COATED ORAL at 20:42

## 2024-01-01 RX ADMIN — METOPROLOL SUCCINATE 50 MG: 50 TABLET, EXTENDED RELEASE ORAL at 21:39

## 2024-01-01 RX ADMIN — GUAIFENESIN SYRUP AND DEXTROMETHORPHAN 10 ML: 100; 10 SYRUP ORAL at 09:21

## 2024-01-01 RX ADMIN — ATORVASTATIN CALCIUM 40 MG: 40 TABLET, FILM COATED ORAL at 20:47

## 2024-01-01 RX ADMIN — POTASSIUM & SODIUM PHOSPHATES POWDER PACK 280-160-250 MG 1 PACKET: 280-160-250 PACK at 12:32

## 2024-01-01 RX ADMIN — ALBUTEROL SULFATE 2.5 MG: 2.5 SOLUTION RESPIRATORY (INHALATION) at 15:01

## 2024-01-01 RX ADMIN — IPRATROPIUM BROMIDE AND ALBUTEROL SULFATE 3 ML: .5; 3 SOLUTION RESPIRATORY (INHALATION) at 08:21

## 2024-01-01 RX ADMIN — GLYCOPYRROLATE 0.2 MG: 0.2 INJECTION INTRAMUSCULAR; INTRAVENOUS at 09:46

## 2024-01-01 RX ADMIN — APIXABAN 5 MG: 5 TABLET, FILM COATED ORAL at 16:30

## 2024-01-01 RX ADMIN — METOPROLOL SUCCINATE 50 MG: 50 TABLET, EXTENDED RELEASE ORAL at 21:53

## 2024-01-01 RX ADMIN — ALBUTEROL SULFATE 2.5 MG: 2.5 SOLUTION RESPIRATORY (INHALATION) at 07:34

## 2024-01-01 RX ADMIN — POTASSIUM & SODIUM PHOSPHATES POWDER PACK 280-160-250 MG 1 PACKET: 280-160-250 PACK at 09:28

## 2024-01-01 RX ADMIN — PIPERACILLIN AND TAZOBACTAM 4.5 G: 4; .5 INJECTION, POWDER, FOR SOLUTION INTRAVENOUS at 08:07

## 2024-01-01 RX ADMIN — PIPERACILLIN AND TAZOBACTAM 4.5 G: 4; .5 INJECTION, POWDER, FOR SOLUTION INTRAVENOUS at 05:45

## 2024-01-01 RX ADMIN — ATORVASTATIN CALCIUM 40 MG: 40 TABLET, FILM COATED ORAL at 21:19

## 2024-01-01 RX ADMIN — VANCOMYCIN HYDROCHLORIDE 125 MG: 125 CAPSULE ORAL at 09:08

## 2024-01-01 RX ADMIN — PIPERACILLIN AND TAZOBACTAM 4.5 G: 4; .5 INJECTION, POWDER, FOR SOLUTION INTRAVENOUS at 16:30

## 2024-01-01 RX ADMIN — POTASSIUM CHLORIDE 40 MEQ: 1500 TABLET, EXTENDED RELEASE ORAL at 10:17

## 2024-01-01 RX ADMIN — ACETYLCYSTEINE 4 ML: 100 SOLUTION ORAL; RESPIRATORY (INHALATION) at 02:53

## 2024-01-01 RX ADMIN — AMOXICILLIN AND CLAVULANATE POTASSIUM 1 TABLET: 875; 125 TABLET, FILM COATED ORAL at 09:09

## 2024-01-01 RX ADMIN — ACETYLCYSTEINE 4 ML: 100 SOLUTION ORAL; RESPIRATORY (INHALATION) at 07:57

## 2024-01-01 RX ADMIN — GUAIFENESIN SYRUP AND DEXTROMETHORPHAN 10 ML: 100; 10 SYRUP ORAL at 22:15

## 2024-01-01 RX ADMIN — APIXABAN 5 MG: 5 TABLET, FILM COATED ORAL at 09:43

## 2024-01-01 RX ADMIN — ALBUTEROL SULFATE 2.5 MG: 2.5 SOLUTION RESPIRATORY (INHALATION) at 19:51

## 2024-01-01 RX ADMIN — DICLOFENAC SODIUM 4 G: 10 GEL TOPICAL at 18:42

## 2024-01-01 RX ADMIN — GLYCOPYRROLATE 0.2 MG: 0.2 INJECTION INTRAMUSCULAR; INTRAVENOUS at 21:49

## 2024-01-01 RX ADMIN — METOPROLOL SUCCINATE 50 MG: 50 TABLET, EXTENDED RELEASE ORAL at 21:09

## 2024-01-01 RX ADMIN — FLUTICASONE FUROATE AND VILANTEROL TRIFENATATE 1 PUFF: 100; 25 POWDER RESPIRATORY (INHALATION) at 07:50

## 2024-01-01 RX ADMIN — APIXABAN 5 MG: 5 TABLET, FILM COATED ORAL at 20:50

## 2024-01-01 RX ADMIN — Medication 1 CAPSULE: at 10:02

## 2024-01-01 RX ADMIN — FLUTICASONE FUROATE AND VILANTEROL TRIFENATATE 1 PUFF: 100; 25 POWDER RESPIRATORY (INHALATION) at 08:21

## 2024-01-01 RX ADMIN — SODIUM CHLORIDE, POTASSIUM CHLORIDE, SODIUM LACTATE AND CALCIUM CHLORIDE: 600; 310; 30; 20 INJECTION, SOLUTION INTRAVENOUS at 17:47

## 2024-01-01 RX ADMIN — DICLOFENAC SODIUM 4 G: 10 GEL TOPICAL at 13:44

## 2024-01-01 RX ADMIN — DICLOFENAC SODIUM 4 G: 10 GEL TOPICAL at 12:11

## 2024-01-01 RX ADMIN — ACETYLCYSTEINE 4 ML: 100 SOLUTION ORAL; RESPIRATORY (INHALATION) at 15:45

## 2024-01-01 RX ADMIN — FLUTICASONE FUROATE AND VILANTEROL TRIFENATATE 1 PUFF: 100; 25 POWDER RESPIRATORY (INHALATION) at 09:10

## 2024-01-01 RX ADMIN — AMOXICILLIN AND CLAVULANATE POTASSIUM 1 TABLET: 875; 125 TABLET, FILM COATED ORAL at 21:45

## 2024-01-01 RX ADMIN — APIXABAN 5 MG: 5 TABLET, FILM COATED ORAL at 21:42

## 2024-01-01 RX ADMIN — FENTANYL CITRATE 25 MCG: 50 INJECTION, SOLUTION INTRAMUSCULAR; INTRAVENOUS at 10:44

## 2024-01-01 RX ADMIN — FLUTICASONE FUROATE AND VILANTEROL TRIFENATATE 1 PUFF: 100; 25 POWDER RESPIRATORY (INHALATION) at 07:37

## 2024-01-01 RX ADMIN — APIXABAN 5 MG: 5 TABLET, FILM COATED ORAL at 02:12

## 2024-01-01 RX ADMIN — FLUTICASONE FUROATE AND VILANTEROL 1 PUFF: 100; 25 POWDER RESPIRATORY (INHALATION) at 07:58

## 2024-01-01 RX ADMIN — IPRATROPIUM BROMIDE AND ALBUTEROL SULFATE 3 ML: .5; 3 SOLUTION RESPIRATORY (INHALATION) at 11:19

## 2024-01-01 RX ADMIN — DIGOXIN 125 MCG: 0.12 TABLET ORAL at 20:50

## 2024-01-01 RX ADMIN — METOPROLOL SUCCINATE 50 MG: 50 TABLET, EXTENDED RELEASE ORAL at 21:50

## 2024-01-01 RX ADMIN — MIDAZOLAM 1 MG: 1 INJECTION INTRAMUSCULAR; INTRAVENOUS at 10:44

## 2024-01-01 RX ADMIN — ESCITALOPRAM OXALATE 20 MG: 10 TABLET ORAL at 09:08

## 2024-01-01 RX ADMIN — ALBUTEROL SULFATE 2.5 MG: 2.5 SOLUTION RESPIRATORY (INHALATION) at 20:31

## 2024-01-01 RX ADMIN — VANCOMYCIN HYDROCHLORIDE 125 MG: 125 CAPSULE ORAL at 20:24

## 2024-01-01 RX ADMIN — ACETYLCYSTEINE 4 ML: 100 SOLUTION ORAL; RESPIRATORY (INHALATION) at 20:57

## 2024-01-01 RX ADMIN — POTASSIUM CHLORIDE 40 MEQ: 1500 TABLET, EXTENDED RELEASE ORAL at 09:09

## 2024-01-01 RX ADMIN — ATORVASTATIN CALCIUM 40 MG: 40 TABLET, FILM COATED ORAL at 20:55

## 2024-01-01 RX ADMIN — ALBUTEROL SULFATE 2.5 MG: 2.5 SOLUTION RESPIRATORY (INHALATION) at 15:24

## 2024-01-01 RX ADMIN — POTASSIUM & SODIUM PHOSPHATES POWDER PACK 280-160-250 MG 2 PACKET: 280-160-250 PACK at 09:49

## 2024-01-01 RX ADMIN — APIXABAN 5 MG: 5 TABLET, FILM COATED ORAL at 07:56

## 2024-01-01 RX ADMIN — FLUTICASONE FUROATE AND VILANTEROL TRIFENATATE 1 PUFF: 100; 25 POWDER RESPIRATORY (INHALATION) at 08:39

## 2024-01-01 RX ADMIN — GUAIFENESIN SYRUP AND DEXTROMETHORPHAN 10 ML: 100; 10 SYRUP ORAL at 02:09

## 2024-01-01 RX ADMIN — FLUTICASONE FUROATE AND VILANTEROL 1 PUFF: 100; 25 POWDER RESPIRATORY (INHALATION) at 14:25

## 2024-01-01 RX ADMIN — PIPERACILLIN AND TAZOBACTAM 4.5 G: 4; .5 INJECTION, POWDER, FOR SOLUTION INTRAVENOUS at 20:41

## 2024-01-01 RX ADMIN — POTASSIUM CHLORIDE 40 MEQ: 1500 TABLET, EXTENDED RELEASE ORAL at 22:23

## 2024-01-01 RX ADMIN — GUAIFENESIN 200 MG: 100 LIQUID ORAL at 20:47

## 2024-01-01 RX ADMIN — POTASSIUM CHLORIDE 20 MEQ: 1500 TABLET, EXTENDED RELEASE ORAL at 23:56

## 2024-01-01 RX ADMIN — SODIUM PHOSPHATE, MONOBASIC, MONOHYDRATE AND SODIUM PHOSPHATE, DIBASIC, ANHYDROUS 15 MMOL: 142; 276 INJECTION, SOLUTION INTRAVENOUS at 15:51

## 2024-01-01 RX ADMIN — ALBUTEROL SULFATE 2.5 MG: 2.5 SOLUTION RESPIRATORY (INHALATION) at 03:01

## 2024-01-01 RX ADMIN — ALBUTEROL SULFATE 2.5 MG: 2.5 SOLUTION RESPIRATORY (INHALATION) at 07:57

## 2024-01-01 RX ADMIN — PIPERACILLIN AND TAZOBACTAM 4.5 G: 4; .5 INJECTION, POWDER, FOR SOLUTION INTRAVENOUS at 02:09

## 2024-01-01 RX ADMIN — PIPERACILLIN AND TAZOBACTAM 4.5 G: 4; .5 INJECTION, POWDER, FOR SOLUTION INTRAVENOUS at 03:26

## 2024-01-01 RX ADMIN — MORPHINE SULFATE 2 MG: 2 INJECTION, SOLUTION INTRAMUSCULAR; INTRAVENOUS at 20:08

## 2024-01-01 RX ADMIN — POTASSIUM & SODIUM PHOSPHATES POWDER PACK 280-160-250 MG 2 PACKET: 280-160-250 PACK at 16:59

## 2024-01-01 RX ADMIN — ATORVASTATIN CALCIUM 40 MG: 40 TABLET, FILM COATED ORAL at 20:50

## 2024-01-01 RX ADMIN — ACETYLCYSTEINE 4 ML: 100 SOLUTION ORAL; RESPIRATORY (INHALATION) at 12:55

## 2024-01-01 RX ADMIN — VANCOMYCIN HYDROCHLORIDE 125 MG: 125 CAPSULE ORAL at 21:42

## 2024-01-01 RX ADMIN — PIPERACILLIN AND TAZOBACTAM 4.5 G: 4; .5 INJECTION, POWDER, FOR SOLUTION INTRAVENOUS at 09:07

## 2024-01-01 RX ADMIN — PANTOPRAZOLE SODIUM 40 MG: 40 TABLET, DELAYED RELEASE ORAL at 10:02

## 2024-01-01 RX ADMIN — ESCITALOPRAM OXALATE 20 MG: 10 TABLET ORAL at 08:42

## 2024-01-01 RX ADMIN — ESCITALOPRAM 20 MG: 5 TABLET, FILM COATED ORAL at 09:28

## 2024-01-01 RX ADMIN — IPRATROPIUM BROMIDE AND ALBUTEROL SULFATE 3 ML: .5; 3 SOLUTION RESPIRATORY (INHALATION) at 16:28

## 2024-01-01 RX ADMIN — DICLOFENAC SODIUM 4 G: 10 GEL TOPICAL at 17:41

## 2024-01-01 RX ADMIN — BENZONATATE 200 MG: 100 CAPSULE ORAL at 14:36

## 2024-01-01 RX ADMIN — ACETYLCYSTEINE 4 ML: 100 SOLUTION ORAL; RESPIRATORY (INHALATION) at 20:14

## 2024-01-01 RX ADMIN — PIPERACILLIN AND TAZOBACTAM 4.5 G: 4; .5 INJECTION, POWDER, FOR SOLUTION INTRAVENOUS at 17:37

## 2024-01-01 RX ADMIN — MORPHINE SULFATE 1 MG: 2 INJECTION, SOLUTION INTRAMUSCULAR; INTRAVENOUS at 05:21

## 2024-01-01 RX ADMIN — IPRATROPIUM BROMIDE AND ALBUTEROL SULFATE 3 ML: .5; 3 SOLUTION RESPIRATORY (INHALATION) at 16:26

## 2024-01-01 RX ADMIN — AMOXICILLIN AND CLAVULANATE POTASSIUM 1 TABLET: 875; 125 TABLET, FILM COATED ORAL at 21:48

## 2024-01-01 RX ADMIN — AMOXICILLIN AND CLAVULANATE POTASSIUM 1 TABLET: 875; 125 TABLET, FILM COATED ORAL at 09:22

## 2024-01-01 RX ADMIN — FUROSEMIDE 40 MG: 10 INJECTION, SOLUTION INTRAMUSCULAR; INTRAVENOUS at 08:50

## 2024-01-01 RX ADMIN — ALBUTEROL SULFATE 2.5 MG: 2.5 SOLUTION RESPIRATORY (INHALATION) at 08:51

## 2024-01-01 RX ADMIN — MORPHINE SULFATE 5 MG: 20 SOLUTION ORAL at 18:25

## 2024-01-01 RX ADMIN — DICLOFENAC SODIUM 4 G: 10 GEL TOPICAL at 21:53

## 2024-01-01 RX ADMIN — MORPHINE SULFATE 2 MG: 2 INJECTION, SOLUTION INTRAMUSCULAR; INTRAVENOUS at 09:55

## 2024-01-01 RX ADMIN — ATORVASTATIN CALCIUM 40 MG: 40 TABLET, FILM COATED ORAL at 21:45

## 2024-01-01 RX ADMIN — PIPERACILLIN AND TAZOBACTAM 4.5 G: 4; .5 INJECTION, POWDER, FOR SOLUTION INTRAVENOUS at 02:05

## 2024-01-01 RX ADMIN — BENZONATATE 200 MG: 100 CAPSULE ORAL at 12:23

## 2024-01-01 RX ADMIN — ACETYLCYSTEINE 4 ML: 100 SOLUTION ORAL; RESPIRATORY (INHALATION) at 13:57

## 2024-01-01 RX ADMIN — ALBUTEROL SULFATE 2.5 MG: 2.5 SOLUTION RESPIRATORY (INHALATION) at 20:18

## 2024-01-01 RX ADMIN — ESCITALOPRAM 20 MG: 5 TABLET, FILM COATED ORAL at 10:03

## 2024-01-01 RX ADMIN — ALBUTEROL SULFATE 2.5 MG: 2.5 SOLUTION RESPIRATORY (INHALATION) at 01:33

## 2024-01-01 RX ADMIN — Medication 1 CAPSULE: at 20:39

## 2024-01-01 RX ADMIN — ESCITALOPRAM 20 MG: 5 TABLET, FILM COATED ORAL at 10:34

## 2024-01-01 RX ADMIN — POTASSIUM CHLORIDE 20 MEQ: 1500 TABLET, EXTENDED RELEASE ORAL at 09:24

## 2024-01-01 RX ADMIN — ACETYLCYSTEINE 4 ML: 100 SOLUTION ORAL; RESPIRATORY (INHALATION) at 01:32

## 2024-01-01 RX ADMIN — METOPROLOL SUCCINATE 50 MG: 50 TABLET, EXTENDED RELEASE ORAL at 21:45

## 2024-01-01 RX ADMIN — ALBUTEROL SULFATE 2.5 MG: 2.5 SOLUTION RESPIRATORY (INHALATION) at 02:18

## 2024-01-01 RX ADMIN — POTASSIUM & SODIUM PHOSPHATES POWDER PACK 280-160-250 MG 1 PACKET: 280-160-250 PACK at 20:22

## 2024-01-01 RX ADMIN — ESCITALOPRAM 20 MG: 5 TABLET, FILM COATED ORAL at 08:44

## 2024-01-01 RX ADMIN — ALBUTEROL SULFATE 2.5 MG: 2.5 SOLUTION RESPIRATORY (INHALATION) at 12:48

## 2024-01-01 RX ADMIN — HALOPERIDOL LACTATE 2 MG: 5 INJECTION, SOLUTION INTRAMUSCULAR at 14:27

## 2024-01-01 RX ADMIN — ALBUTEROL SULFATE 2.5 MG: 2.5 SOLUTION RESPIRATORY (INHALATION) at 20:14

## 2024-01-01 RX ADMIN — ACETYLCYSTEINE 4 ML: 100 SOLUTION ORAL; RESPIRATORY (INHALATION) at 08:35

## 2024-01-01 RX ADMIN — FUROSEMIDE 40 MG: 10 INJECTION, SOLUTION INTRAMUSCULAR; INTRAVENOUS at 16:01

## 2024-01-01 RX ADMIN — DIGOXIN 125 MCG: 0.12 TABLET ORAL at 20:55

## 2024-01-01 RX ADMIN — ATORVASTATIN CALCIUM 40 MG: 40 TABLET, FILM COATED ORAL at 21:53

## 2024-01-01 RX ADMIN — POTASSIUM & SODIUM PHOSPHATES POWDER PACK 280-160-250 MG 1 PACKET: 280-160-250 PACK at 12:57

## 2024-01-01 RX ADMIN — GUAIFENESIN SYRUP AND DEXTROMETHORPHAN 10 ML: 100; 10 SYRUP ORAL at 21:24

## 2024-01-01 RX ADMIN — ALBUTEROL SULFATE 2.5 MG: 2.5 SOLUTION RESPIRATORY (INHALATION) at 03:00

## 2024-01-01 RX ADMIN — MORPHINE SULFATE 2 MG: 2 INJECTION, SOLUTION INTRAMUSCULAR; INTRAVENOUS at 09:35

## 2024-01-01 RX ADMIN — APIXABAN 5 MG: 5 TABLET, FILM COATED ORAL at 09:08

## 2024-01-01 RX ADMIN — ACETYLCYSTEINE 4 ML: 100 SOLUTION ORAL; RESPIRATORY (INHALATION) at 21:13

## 2024-01-01 RX ADMIN — MICONAZOLE NITRATE: 20 POWDER TOPICAL at 13:24

## 2024-01-01 RX ADMIN — IPRATROPIUM BROMIDE AND ALBUTEROL SULFATE 3 ML: .5; 3 SOLUTION RESPIRATORY (INHALATION) at 08:38

## 2024-01-01 RX ADMIN — PIPERACILLIN AND TAZOBACTAM 4.5 G: 4; .5 INJECTION, POWDER, FOR SOLUTION INTRAVENOUS at 14:11

## 2024-01-01 RX ADMIN — IRON SUCROSE 200 MG: 20 INJECTION, SOLUTION INTRAVENOUS at 21:26

## 2024-01-01 RX ADMIN — GUAIFENESIN SYRUP AND DEXTROMETHORPHAN 10 ML: 100; 10 SYRUP ORAL at 16:00

## 2024-01-01 RX ADMIN — ATORVASTATIN CALCIUM 40 MG: 40 TABLET, FILM COATED ORAL at 20:12

## 2024-01-01 RX ADMIN — FUROSEMIDE 40 MG: 10 INJECTION, SOLUTION INTRAMUSCULAR; INTRAVENOUS at 09:56

## 2024-01-01 RX ADMIN — APIXABAN 5 MG: 5 TABLET, FILM COATED ORAL at 08:51

## 2024-01-01 RX ADMIN — IRON SUCROSE 200 MG: 20 INJECTION, SOLUTION INTRAVENOUS at 14:34

## 2024-01-01 RX ADMIN — HUMAN ALBUMIN MICROSPHERES AND PERFLUTREN 3 ML: 10; .22 INJECTION, SOLUTION INTRAVENOUS at 07:53

## 2024-01-01 RX ADMIN — MIDAZOLAM 1 MG: 1 INJECTION INTRAMUSCULAR; INTRAVENOUS at 10:39

## 2024-01-01 RX ADMIN — Medication 1 TABLET: at 10:34

## 2024-01-01 RX ADMIN — ACETYLCYSTEINE 4 ML: 100 SOLUTION ORAL; RESPIRATORY (INHALATION) at 21:08

## 2024-01-01 RX ADMIN — PIPERACILLIN AND TAZOBACTAM 4.5 G: 4; .5 INJECTION, POWDER, FOR SOLUTION INTRAVENOUS at 21:02

## 2024-01-01 RX ADMIN — ESCITALOPRAM 20 MG: 5 TABLET, FILM COATED ORAL at 08:13

## 2024-01-01 RX ADMIN — IPRATROPIUM BROMIDE AND ALBUTEROL SULFATE 3 ML: .5; 3 SOLUTION RESPIRATORY (INHALATION) at 20:52

## 2024-01-01 RX ADMIN — DICLOFENAC SODIUM 4 G: 10 GEL TOPICAL at 20:54

## 2024-01-01 RX ADMIN — APIXABAN 5 MG: 5 TABLET, FILM COATED ORAL at 10:03

## 2024-01-01 RX ADMIN — IPRATROPIUM BROMIDE AND ALBUTEROL SULFATE 3 ML: .5; 3 SOLUTION RESPIRATORY (INHALATION) at 13:04

## 2024-01-01 RX ADMIN — ALBUTEROL SULFATE 2.5 MG: 2.5 SOLUTION RESPIRATORY (INHALATION) at 02:21

## 2024-01-01 RX ADMIN — PIPERACILLIN AND TAZOBACTAM 4.5 G: 4; .5 INJECTION, POWDER, FOR SOLUTION INTRAVENOUS at 21:20

## 2024-01-01 RX ADMIN — DICLOFENAC SODIUM 4 G: 10 GEL TOPICAL at 15:26

## 2024-01-01 RX ADMIN — ALBUTEROL SULFATE 2.5 MG: 2.5 SOLUTION RESPIRATORY (INHALATION) at 16:01

## 2024-01-01 RX ADMIN — DIGOXIN 125 MCG: 0.12 TABLET ORAL at 21:58

## 2024-01-01 RX ADMIN — ACETAMINOPHEN 650 MG: 325 TABLET, FILM COATED ORAL at 16:33

## 2024-01-01 RX ADMIN — DICLOFENAC SODIUM 4 G: 10 GEL TOPICAL at 09:26

## 2024-01-01 RX ADMIN — PIPERACILLIN AND TAZOBACTAM 4.5 G: 4; .5 INJECTION, POWDER, FOR SOLUTION INTRAVENOUS at 01:56

## 2024-01-01 RX ADMIN — GUAIFENESIN SYRUP AND DEXTROMETHORPHAN 10 ML: 100; 10 SYRUP ORAL at 10:44

## 2024-01-01 RX ADMIN — ALBUTEROL SULFATE 2.5 MG: 2.5 SOLUTION RESPIRATORY (INHALATION) at 16:25

## 2024-01-01 RX ADMIN — APIXABAN 5 MG: 5 TABLET, FILM COATED ORAL at 21:45

## 2024-01-01 RX ADMIN — PIPERACILLIN AND TAZOBACTAM 4.5 G: 4; .5 INJECTION, POWDER, FOR SOLUTION INTRAVENOUS at 01:57

## 2024-01-01 RX ADMIN — ACETAMINOPHEN 650 MG: 325 TABLET, FILM COATED ORAL at 11:14

## 2024-01-01 ASSESSMENT — ACTIVITIES OF DAILY LIVING (ADL)
ADLS_ACUITY_SCORE: 46
ADLS_ACUITY_SCORE: 60
ADLS_ACUITY_SCORE: 58
ADLS_ACUITY_SCORE: 59
ADLS_ACUITY_SCORE: 60
ADLS_ACUITY_SCORE: 54
ADLS_ACUITY_SCORE: 29
ADLS_ACUITY_SCORE: 52
ADLS_ACUITY_SCORE: 59
ADLS_ACUITY_SCORE: 27
ADLS_ACUITY_SCORE: 57
ADLS_ACUITY_SCORE: 25
ADLS_ACUITY_SCORE: 59
ADLS_ACUITY_SCORE: 25
ADLS_ACUITY_SCORE: 46
ADLS_ACUITY_SCORE: 30
ADLS_ACUITY_SCORE: 50
ADLS_ACUITY_SCORE: 30
ADLS_ACUITY_SCORE: 25
ADLS_ACUITY_SCORE: 52
ADLS_ACUITY_SCORE: 53
ADLS_ACUITY_SCORE: 54
ADLS_ACUITY_SCORE: 25
ADLS_ACUITY_SCORE: 28
ADLS_ACUITY_SCORE: 25
ADLS_ACUITY_SCORE: 26
ADLS_ACUITY_SCORE: 55
ADLS_ACUITY_SCORE: 58
ADLS_ACUITY_SCORE: 28
ADLS_ACUITY_SCORE: 46
ADLS_ACUITY_SCORE: 28
ADLS_ACUITY_SCORE: 59
ADLS_ACUITY_SCORE: 55
ADLS_ACUITY_SCORE: 28
ADLS_ACUITY_SCORE: 25
ADLS_ACUITY_SCORE: 53
ADLS_ACUITY_SCORE: 42
ADLS_ACUITY_SCORE: 27
ADLS_ACUITY_SCORE: 28
ADLS_ACUITY_SCORE: 60
ADLS_ACUITY_SCORE: 42
ADLS_ACUITY_SCORE: 32
ADLS_ACUITY_SCORE: 27
ADLS_ACUITY_SCORE: 28
ADLS_ACUITY_SCORE: 25
ADLS_ACUITY_SCORE: 59
ADLS_ACUITY_SCORE: 58
ADLS_ACUITY_SCORE: 59
ADLS_ACUITY_SCORE: 28
ADLS_ACUITY_SCORE: 35
ADLS_ACUITY_SCORE: 58
ADLS_ACUITY_SCORE: 42
ADLS_ACUITY_SCORE: 59
ADLS_ACUITY_SCORE: 55
ADLS_ACUITY_SCORE: 57
ADLS_ACUITY_SCORE: 50
ADLS_ACUITY_SCORE: 58
ADLS_ACUITY_SCORE: 25
ADLS_ACUITY_SCORE: 28
ADLS_ACUITY_SCORE: 52
ADLS_ACUITY_SCORE: 50
ADLS_ACUITY_SCORE: 57
ADLS_ACUITY_SCORE: 30
ADLS_ACUITY_SCORE: 30
ADLS_ACUITY_SCORE: 23
ADLS_ACUITY_SCORE: 54
ADLS_ACUITY_SCORE: 50
ADLS_ACUITY_SCORE: 58
ADLS_ACUITY_SCORE: 44
ADLS_ACUITY_SCORE: 59
ADLS_ACUITY_SCORE: 55
ADLS_ACUITY_SCORE: 51
ADLS_ACUITY_SCORE: 54
ADLS_ACUITY_SCORE: 44
ADLS_ACUITY_SCORE: 26
ADLS_ACUITY_SCORE: 59
ADLS_ACUITY_SCORE: 57
ADLS_ACUITY_SCORE: 55
ADLS_ACUITY_SCORE: 59
ADLS_ACUITY_SCORE: 53
ADLS_ACUITY_SCORE: 28
ADLS_ACUITY_SCORE: 27
ADLS_ACUITY_SCORE: 60
ADLS_ACUITY_SCORE: 50
ADLS_ACUITY_SCORE: 60
ADLS_ACUITY_SCORE: 28
ADLS_ACUITY_SCORE: 30
ADLS_ACUITY_SCORE: 57
ADLS_ACUITY_SCORE: 57
ADLS_ACUITY_SCORE: 58
ADLS_ACUITY_SCORE: 37
ADLS_ACUITY_SCORE: 58
ADLS_ACUITY_SCORE: 27
ADLS_ACUITY_SCORE: 25
ADLS_ACUITY_SCORE: 46
ADLS_ACUITY_SCORE: 53
ADLS_ACUITY_SCORE: 50
ADLS_ACUITY_SCORE: 54
ADLS_ACUITY_SCORE: 53
ADLS_ACUITY_SCORE: 53
ADLS_ACUITY_SCORE: 57
ADLS_ACUITY_SCORE: 27
ADLS_ACUITY_SCORE: 48
ADLS_ACUITY_SCORE: 50
ADLS_ACUITY_SCORE: 30
ADLS_ACUITY_SCORE: 30
ADLS_ACUITY_SCORE: 46
ADLS_ACUITY_SCORE: 52
ADLS_ACUITY_SCORE: 50
ADLS_ACUITY_SCORE: 46
ADLS_ACUITY_SCORE: 30
ADLS_ACUITY_SCORE: 57
ADLS_ACUITY_SCORE: 55
ADLS_ACUITY_SCORE: 27
ADLS_ACUITY_SCORE: 58
ADLS_ACUITY_SCORE: 57
ADLS_ACUITY_SCORE: 59
ADLS_ACUITY_SCORE: 27
DEPENDENT_IADLS:: CLEANING;COOKING;LAUNDRY;SHOPPING;MEAL PREPARATION;MEDICATION MANAGEMENT;TRANSPORTATION;INCONTINENCE
ADLS_ACUITY_SCORE: 38
ADLS_ACUITY_SCORE: 55
ADLS_ACUITY_SCORE: 59
ADLS_ACUITY_SCORE: 25
ADLS_ACUITY_SCORE: 25
ADLS_ACUITY_SCORE: 53
ADLS_ACUITY_SCORE: 23
ADLS_ACUITY_SCORE: 26
ADLS_ACUITY_SCORE: 26
ADLS_ACUITY_SCORE: 59
ADLS_ACUITY_SCORE: 53
ADLS_ACUITY_SCORE: 44
ADLS_ACUITY_SCORE: 27
ADLS_ACUITY_SCORE: 27
ADLS_ACUITY_SCORE: 50
ADLS_ACUITY_SCORE: 25
ADLS_ACUITY_SCORE: 59
ADLS_ACUITY_SCORE: 59
ADLS_ACUITY_SCORE: 51
ADLS_ACUITY_SCORE: 59
ADLS_ACUITY_SCORE: 27
ADLS_ACUITY_SCORE: 29
ADLS_ACUITY_SCORE: 46
ADLS_ACUITY_SCORE: 27
ADLS_ACUITY_SCORE: 26
ADLS_ACUITY_SCORE: 57
ADLS_ACUITY_SCORE: 42
ADLS_ACUITY_SCORE: 25
ADLS_ACUITY_SCORE: 48
ADLS_ACUITY_SCORE: 42
ADLS_ACUITY_SCORE: 55
ADLS_ACUITY_SCORE: 46
ADLS_ACUITY_SCORE: 48
ADLS_ACUITY_SCORE: 52
ADLS_ACUITY_SCORE: 46
ADLS_ACUITY_SCORE: 55
ADLS_ACUITY_SCORE: 56
ADLS_ACUITY_SCORE: 55
ADLS_ACUITY_SCORE: 52
ADLS_ACUITY_SCORE: 57
ADLS_ACUITY_SCORE: 59
ADLS_ACUITY_SCORE: 28
ADLS_ACUITY_SCORE: 37
ADLS_ACUITY_SCORE: 25
ADLS_ACUITY_SCORE: 28
ADLS_ACUITY_SCORE: 50
ADLS_ACUITY_SCORE: 57
ADLS_ACUITY_SCORE: 27
ADLS_ACUITY_SCORE: 52
ADLS_ACUITY_SCORE: 55
ADLS_ACUITY_SCORE: 59
ADLS_ACUITY_SCORE: 42
ADLS_ACUITY_SCORE: 54
ADLS_ACUITY_SCORE: 29
ADLS_ACUITY_SCORE: 42
ADLS_ACUITY_SCORE: 42
ADLS_ACUITY_SCORE: 32
ADLS_ACUITY_SCORE: 27
ADLS_ACUITY_SCORE: 55
ADLS_ACUITY_SCORE: 55
ADLS_ACUITY_SCORE: 25
ADLS_ACUITY_SCORE: 28
ADLS_ACUITY_SCORE: 51
ADLS_ACUITY_SCORE: 25
ADLS_ACUITY_SCORE: 28
ADLS_ACUITY_SCORE: 53
ADLS_ACUITY_SCORE: 25
ADLS_ACUITY_SCORE: 59
ADLS_ACUITY_SCORE: 59
ADLS_ACUITY_SCORE: 32
ADLS_ACUITY_SCORE: 52
ADLS_ACUITY_SCORE: 30
ADLS_ACUITY_SCORE: 55
ADLS_ACUITY_SCORE: 58
ADLS_ACUITY_SCORE: 29
ADLS_ACUITY_SCORE: 26
ADLS_ACUITY_SCORE: 27
ADLS_ACUITY_SCORE: 53
ADLS_ACUITY_SCORE: 38
ADLS_ACUITY_SCORE: 25
ADLS_ACUITY_SCORE: 55
ADLS_ACUITY_SCORE: 55
ADLS_ACUITY_SCORE: 26
ADLS_ACUITY_SCORE: 42
ADLS_ACUITY_SCORE: 25
ADLS_ACUITY_SCORE: 27
ADLS_ACUITY_SCORE: 38
ADLS_ACUITY_SCORE: 27
ADLS_ACUITY_SCORE: 38
ADLS_ACUITY_SCORE: 53
ADLS_ACUITY_SCORE: 59
ADLS_ACUITY_SCORE: 58
ADLS_ACUITY_SCORE: 32
ADLS_ACUITY_SCORE: 59
ADLS_ACUITY_SCORE: 27
ADLS_ACUITY_SCORE: 53
ADLS_ACUITY_SCORE: 53
ADLS_ACUITY_SCORE: 25
ADLS_ACUITY_SCORE: 42
ADLS_ACUITY_SCORE: 38
ADLS_ACUITY_SCORE: 27
ADLS_ACUITY_SCORE: 53
ADLS_ACUITY_SCORE: 54
ADLS_ACUITY_SCORE: 54
ADLS_ACUITY_SCORE: 57
ADLS_ACUITY_SCORE: 54
ADLS_ACUITY_SCORE: 28
ADLS_ACUITY_SCORE: 25
ADLS_ACUITY_SCORE: 55
ADLS_ACUITY_SCORE: 28
ADLS_ACUITY_SCORE: 57
ADLS_ACUITY_SCORE: 53
ADLS_ACUITY_SCORE: 55
ADLS_ACUITY_SCORE: 53
ADLS_ACUITY_SCORE: 50
ADLS_ACUITY_SCORE: 57
ADLS_ACUITY_SCORE: 30
ADLS_ACUITY_SCORE: 59
ADLS_ACUITY_SCORE: 29
ADLS_ACUITY_SCORE: 46
ADLS_ACUITY_SCORE: 46
ADLS_ACUITY_SCORE: 38
ADLS_ACUITY_SCORE: 29
ADLS_ACUITY_SCORE: 25
ADLS_ACUITY_SCORE: 29
ADLS_ACUITY_SCORE: 59
ADLS_ACUITY_SCORE: 46
ADLS_ACUITY_SCORE: 48
ADLS_ACUITY_SCORE: 54
ADLS_ACUITY_SCORE: 25
ADLS_ACUITY_SCORE: 52
ADLS_ACUITY_SCORE: 38
ADLS_ACUITY_SCORE: 57
ADLS_ACUITY_SCORE: 38
ADLS_ACUITY_SCORE: 28
ADLS_ACUITY_SCORE: 57
ADLS_ACUITY_SCORE: 27
ADLS_ACUITY_SCORE: 25
ADLS_ACUITY_SCORE: 60
ADLS_ACUITY_SCORE: 55
ADLS_ACUITY_SCORE: 55
ADLS_ACUITY_SCORE: 50
ADLS_ACUITY_SCORE: 57
ADLS_ACUITY_SCORE: 25
ADLS_ACUITY_SCORE: 59
ADLS_ACUITY_SCORE: 46
ADLS_ACUITY_SCORE: 37
ADLS_ACUITY_SCORE: 58
ADLS_ACUITY_SCORE: 59
ADLS_ACUITY_SCORE: 51
ADLS_ACUITY_SCORE: 59
ADLS_ACUITY_SCORE: 30
ADLS_ACUITY_SCORE: 57
ADLS_ACUITY_SCORE: 25
ADLS_ACUITY_SCORE: 27
ADLS_ACUITY_SCORE: 37
ADLS_ACUITY_SCORE: 57
ADLS_ACUITY_SCORE: 28
ADLS_ACUITY_SCORE: 27
ADLS_ACUITY_SCORE: 57
ADLS_ACUITY_SCORE: 36
ADLS_ACUITY_SCORE: 46
ADLS_ACUITY_SCORE: 29
ADLS_ACUITY_SCORE: 28
ADLS_ACUITY_SCORE: 44
ADLS_ACUITY_SCORE: 50
ADLS_ACUITY_SCORE: 53
ADLS_ACUITY_SCORE: 27
ADLS_ACUITY_SCORE: 60
ADLS_ACUITY_SCORE: 27
ADLS_ACUITY_SCORE: 60
ADLS_ACUITY_SCORE: 59
ADLS_ACUITY_SCORE: 54
ADLS_ACUITY_SCORE: 30
ADLS_ACUITY_SCORE: 42
ADLS_ACUITY_SCORE: 30
ADLS_ACUITY_SCORE: 27
ADLS_ACUITY_SCORE: 52
ADLS_ACUITY_SCORE: 57
ADLS_ACUITY_SCORE: 52
ADLS_ACUITY_SCORE: 27
ADLS_ACUITY_SCORE: 28
ADLS_ACUITY_SCORE: 38
ADLS_ACUITY_SCORE: 50
ADLS_ACUITY_SCORE: 53
ADLS_ACUITY_SCORE: 42
ADLS_ACUITY_SCORE: 32
ADLS_ACUITY_SCORE: 53
ADLS_ACUITY_SCORE: 53
ADLS_ACUITY_SCORE: 28
ADLS_ACUITY_SCORE: 38
ADLS_ACUITY_SCORE: 52
ADLS_ACUITY_SCORE: 59
ADLS_ACUITY_SCORE: 42
ADLS_ACUITY_SCORE: 44
ADLS_ACUITY_SCORE: 58
ADLS_ACUITY_SCORE: 52
ADLS_ACUITY_SCORE: 38
ADLS_ACUITY_SCORE: 25
ADLS_ACUITY_SCORE: 55
ADLS_ACUITY_SCORE: 55
ADLS_ACUITY_SCORE: 50
ADLS_ACUITY_SCORE: 27
ADLS_ACUITY_SCORE: 27
ADLS_ACUITY_SCORE: 46
ADLS_ACUITY_SCORE: 42
ADLS_ACUITY_SCORE: 28
ADLS_ACUITY_SCORE: 25
ADLS_ACUITY_SCORE: 27
ADLS_ACUITY_SCORE: 59
ADLS_ACUITY_SCORE: 25
ADLS_ACUITY_SCORE: 27
ADLS_ACUITY_SCORE: 57
ADLS_ACUITY_SCORE: 46
ADLS_ACUITY_SCORE: 53
ADLS_ACUITY_SCORE: 57
ADLS_ACUITY_SCORE: 50
ADLS_ACUITY_SCORE: 53
ADLS_ACUITY_SCORE: 32
ADLS_ACUITY_SCORE: 34
ADLS_ACUITY_SCORE: 23
ADLS_ACUITY_SCORE: 27
ADLS_ACUITY_SCORE: 57
ADLS_ACUITY_SCORE: 38
ADLS_ACUITY_SCORE: 55
ADLS_ACUITY_SCORE: 27
ADLS_ACUITY_SCORE: 57
ADLS_ACUITY_SCORE: 57
ADLS_ACUITY_SCORE: 30
ADLS_ACUITY_SCORE: 52
ADLS_ACUITY_SCORE: 57
ADLS_ACUITY_SCORE: 48
ADLS_ACUITY_SCORE: 46
ADLS_ACUITY_SCORE: 50
ADLS_ACUITY_SCORE: 26
ADLS_ACUITY_SCORE: 59
ADLS_ACUITY_SCORE: 30
ADLS_ACUITY_SCORE: 38
ADLS_ACUITY_SCORE: 59
ADLS_ACUITY_SCORE: 53
ADLS_ACUITY_SCORE: 57
ADLS_ACUITY_SCORE: 50
ADLS_ACUITY_SCORE: 42
ADLS_ACUITY_SCORE: 37
ADLS_ACUITY_SCORE: 48
ADLS_ACUITY_SCORE: 26
ADLS_ACUITY_SCORE: 28
ADLS_ACUITY_SCORE: 46
ADLS_ACUITY_SCORE: 59
ADLS_ACUITY_SCORE: 38
ADLS_ACUITY_SCORE: 28
ADLS_ACUITY_SCORE: 53
ADLS_ACUITY_SCORE: 46
ADLS_ACUITY_SCORE: 59
ADLS_ACUITY_SCORE: 53
ADLS_ACUITY_SCORE: 25
ADLS_ACUITY_SCORE: 50
ADLS_ACUITY_SCORE: 59
ADLS_ACUITY_SCORE: 58
ADLS_ACUITY_SCORE: 27
ADLS_ACUITY_SCORE: 60
ADLS_ACUITY_SCORE: 48
ADLS_ACUITY_SCORE: 57
ADLS_ACUITY_SCORE: 30
ADLS_ACUITY_SCORE: 42
ADLS_ACUITY_SCORE: 30
ADLS_ACUITY_SCORE: 60
ADLS_ACUITY_SCORE: 27
ADLS_ACUITY_SCORE: 28
ADLS_ACUITY_SCORE: 42
ADLS_ACUITY_SCORE: 27
ADLS_ACUITY_SCORE: 42
ADLS_ACUITY_SCORE: 46
ADLS_ACUITY_SCORE: 42
ADLS_ACUITY_SCORE: 25
ADLS_ACUITY_SCORE: 52
ADLS_ACUITY_SCORE: 44
ADLS_ACUITY_SCORE: 59
ADLS_ACUITY_SCORE: 28
ADLS_ACUITY_SCORE: 28
ADLS_ACUITY_SCORE: 26
ADLS_ACUITY_SCORE: 23
ADLS_ACUITY_SCORE: 25
ADLS_ACUITY_SCORE: 27
ADLS_ACUITY_SCORE: 52
ADLS_ACUITY_SCORE: 58
ADLS_ACUITY_SCORE: 25
ADLS_ACUITY_SCORE: 46
ADLS_ACUITY_SCORE: 50
ADLS_ACUITY_SCORE: 56
ADLS_ACUITY_SCORE: 50
ADLS_ACUITY_SCORE: 37
ADLS_ACUITY_SCORE: 58
ADLS_ACUITY_SCORE: 55
ADLS_ACUITY_SCORE: 55
ADLS_ACUITY_SCORE: 57
ADLS_ACUITY_SCORE: 26
ADLS_ACUITY_SCORE: 48
ADLS_ACUITY_SCORE: 55
ADLS_ACUITY_SCORE: 29
ADLS_ACUITY_SCORE: 29
ADLS_ACUITY_SCORE: 52
ADLS_ACUITY_SCORE: 32
ADLS_ACUITY_SCORE: 59
ADLS_ACUITY_SCORE: 55
ADLS_ACUITY_SCORE: 25
ADLS_ACUITY_SCORE: 59
ADLS_ACUITY_SCORE: 59
ADLS_ACUITY_SCORE: 55
ADLS_ACUITY_SCORE: 37
ADLS_ACUITY_SCORE: 36
ADLS_ACUITY_SCORE: 42
ADLS_ACUITY_SCORE: 30
ADLS_ACUITY_SCORE: 50
ADLS_ACUITY_SCORE: 28
ADLS_ACUITY_SCORE: 27
ADLS_ACUITY_SCORE: 25
ADLS_ACUITY_SCORE: 53
ADLS_ACUITY_SCORE: 60
ADLS_ACUITY_SCORE: 53
ADLS_ACUITY_SCORE: 60
ADLS_ACUITY_SCORE: 42
ADLS_ACUITY_SCORE: 52
ADLS_ACUITY_SCORE: 46
ADLS_ACUITY_SCORE: 55
ADLS_ACUITY_SCORE: 26
ADLS_ACUITY_SCORE: 55
ADLS_ACUITY_SCORE: 25
ADLS_ACUITY_SCORE: 30
ADLS_ACUITY_SCORE: 25
ADLS_ACUITY_SCORE: 53
ADLS_ACUITY_SCORE: 55
ADLS_ACUITY_SCORE: 53
ADLS_ACUITY_SCORE: 28
ADLS_ACUITY_SCORE: 58
ADLS_ACUITY_SCORE: 55
ADLS_ACUITY_SCORE: 42
ADLS_ACUITY_SCORE: 28
ADLS_ACUITY_SCORE: 52
ADLS_ACUITY_SCORE: 28
ADLS_ACUITY_SCORE: 58
ADLS_ACUITY_SCORE: 38
ADLS_ACUITY_SCORE: 28
ADLS_ACUITY_SCORE: 38
ADLS_ACUITY_SCORE: 50
ADLS_ACUITY_SCORE: 46
ADLS_ACUITY_SCORE: 58
ADLS_ACUITY_SCORE: 50
ADLS_ACUITY_SCORE: 53
ADLS_ACUITY_SCORE: 59
ADLS_ACUITY_SCORE: 30
ADLS_ACUITY_SCORE: 27
ADLS_ACUITY_SCORE: 50
ADLS_ACUITY_SCORE: 27
ADLS_ACUITY_SCORE: 27
ADLS_ACUITY_SCORE: 30
ADLS_ACUITY_SCORE: 42
ADLS_ACUITY_SCORE: 52
ADLS_ACUITY_SCORE: 36
ADLS_ACUITY_SCORE: 25
ADLS_ACUITY_SCORE: 50
ADLS_ACUITY_SCORE: 51
ADLS_ACUITY_SCORE: 50
ADLS_ACUITY_SCORE: 50
ADLS_ACUITY_SCORE: 46
ADLS_ACUITY_SCORE: 52
ADLS_ACUITY_SCORE: 55
ADLS_ACUITY_SCORE: 59
ADLS_ACUITY_SCORE: 50
ADLS_ACUITY_SCORE: 27
ADLS_ACUITY_SCORE: 53
ADLS_ACUITY_SCORE: 44
ADLS_ACUITY_SCORE: 46
ADLS_ACUITY_SCORE: 55
ADLS_ACUITY_SCORE: 46
ADLS_ACUITY_SCORE: 26
ADLS_ACUITY_SCORE: 44
ADLS_ACUITY_SCORE: 58
ADLS_ACUITY_SCORE: 46
ADLS_ACUITY_SCORE: 54
ADLS_ACUITY_SCORE: 54
ADLS_ACUITY_SCORE: 55
ADLS_ACUITY_SCORE: 59
ADLS_ACUITY_SCORE: 53
ADLS_ACUITY_SCORE: 53
ADLS_ACUITY_SCORE: 50
ADLS_ACUITY_SCORE: 29
ADLS_ACUITY_SCORE: 27
ADLS_ACUITY_SCORE: 60
ADLS_ACUITY_SCORE: 59
ADLS_ACUITY_SCORE: 55
ADLS_ACUITY_SCORE: 26
ADLS_ACUITY_SCORE: 50
ADLS_ACUITY_SCORE: 59
ADLS_ACUITY_SCORE: 28
ADLS_ACUITY_SCORE: 27
ADLS_ACUITY_SCORE: 27
ADLS_ACUITY_SCORE: 55
ADLS_ACUITY_SCORE: 27
ADLS_ACUITY_SCORE: 32
ADLS_ACUITY_SCORE: 57
ADLS_ACUITY_SCORE: 28
ADLS_ACUITY_SCORE: 29
ADLS_ACUITY_SCORE: 25
ADLS_ACUITY_SCORE: 50
ADLS_ACUITY_SCORE: 46
ADLS_ACUITY_SCORE: 57
ADLS_ACUITY_SCORE: 50
ADLS_ACUITY_SCORE: 46
ADLS_ACUITY_SCORE: 38
ADLS_ACUITY_SCORE: 36
ADLS_ACUITY_SCORE: 42
ADLS_ACUITY_SCORE: 30
ADLS_ACUITY_SCORE: 57
ADLS_ACUITY_SCORE: 58
ADLS_ACUITY_SCORE: 27
ADLS_ACUITY_SCORE: 58
ADLS_ACUITY_SCORE: 25
ADLS_ACUITY_SCORE: 44
ADLS_ACUITY_SCORE: 32
ADLS_ACUITY_SCORE: 36
ADLS_ACUITY_SCORE: 29
ADLS_ACUITY_SCORE: 25
ADLS_ACUITY_SCORE: 58
ADLS_ACUITY_SCORE: 38

## 2024-01-01 ASSESSMENT — COLUMBIA-SUICIDE SEVERITY RATING SCALE - C-SSRS
6. HAVE YOU EVER DONE ANYTHING, STARTED TO DO ANYTHING, OR PREPARED TO DO ANYTHING TO END YOUR LIFE?: NO
2. HAVE YOU ACTUALLY HAD ANY THOUGHTS OF KILLING YOURSELF IN THE PAST MONTH?: NO
1. IN THE PAST MONTH, HAVE YOU WISHED YOU WERE DEAD OR WISHED YOU COULD GO TO SLEEP AND NOT WAKE UP?: NO

## 2024-04-14 PROBLEM — J18.9 PNEUMONIA OF RIGHT MIDDLE LOBE DUE TO INFECTIOUS ORGANISM: Status: ACTIVE | Noted: 2024-01-01

## 2024-04-14 NOTE — H&P
[facility]    History and Physical - Hospitalist Service       Date of Admission:  4/14/2024    Assessment & Plan Jaylen Lamas is a 73 year old male with Pmhx of COPD, tobacco use, HFpEF, bioprosthetic aortic valve replacement, bioprosthetic mitral valve replacement, paroxysmal atrial fibrillation s/p ablation and MAZE procedure, 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 difficile, hypertension, CKD, history of duodenal ulcer, depression/anxiety, testicular cancer s/p orchiectomy, who was admitted on 4/14/2024 after presenting for generalized weakness, persistent productive cough.     In the ED afebrile, no hypoxia. Blood pressure softer. Lab work with neutrophilic leukocytosis.  Mild hypokalemia.  Slightly elevated BUN with normal GFR. Albumin, protein low. Blood cultures drawn. No chest pain - High-sensitivity troponin detectable and flat at 33, EKG with atrial paced rhythm, right bundle branch block.   Chest x-ray with moderate size consult of change of the right perihilar region similar to prior CXR on March 15th  concerning for pneumonia with underlying scarring and changes suggestive of COPD.  CT chest abdomen pelvis is in process.    In the ED given Zosyn, azithromycin, 40 meq potassium, 1 Liter normal saline. Admission was requested from hospitalist service for further work up and possible failure of outpatient pneumonia treatment.     Right Lower Lobe Opacities concerning for Pneumonia  Patient has been ill since early March, 2024. Presented to ED for ongoing productive cough, dyspnea on exertion, chills, global weakness. No respiratory distress or hypoxia on presentation.   He has now failed treatment with two separate courses of antibiotic (levquin & doxycycline), both with steroids.   With lack of improvement and systemic symptoms, there is concern for failed outpatient treatment of bacterial pneumonia vs non  bacterial etiology for pneumonia. CT Imaging shows new dense consolidative airspace opacity within the superior segment right lower lobe with some patchy consolidative and groundglass opacities throughout the remainder of the right lower lobe concerning for pneumonia, bilateral upper lobe paramediastinal subpleural opacity that is unchanged.  No lymphadenopathy.   No history of tuberculosis, HIV. No travel history. Hx of COPD, non oxygen dependent.   -admit IP  -further work up for Legionella, Urine antigen for Strep pneumoniae.    -obtain sputum culture, gram stain  -consider fungal etiology, added 1,3 Beta D Glucan   -Check CRP, procalcitonin   -Follow blood cultures  -monitor continous pulse oximetry  -check VBG if worsening clincial status   -Antibiotics: continue broadened coverage with Zosyn, zithromax for now for atypical coverage. He is on digoxin (Holding 4/14) which interacts with Azithromycin. Ideally will be able to discontinue azithromycin if not requiring. Would not replace azithromycin with Doxycyline as recently completed 14 day course.   -IV fluids with LR  -supportive cares, antitussives prn  -consider ID, Pulmonology input pending clinical course  -Continue PTA Duo neb treatments and PRN Albuterol. No wheezing or clinical signs currently of COPD exacerbation would not further steroids at this time.   -droplet precautions  -Viral PCR for influenza, COVID and RSV is pending.  -Follow up CT scan recommended in 8 weeks time to ensure resolution     Hypokalemia  Mild. Replaced in ED.  -BMP with AM labs    Weight Loss  At risk of malnutrition with ongoing low appetite and illness.  Albumin and protein are low.  Patient has lost 7 pounds since March.  -Added appointments as needed.  Consider nutrition consultation    History of C difficile   Positive 4/2019.  Has had loose stool, last episode 4/12. No abdominal pain, nausea, vomiting. No colitis on imaging.   -start prophylactic Vanocmycin course while  being treated again with antibiotics given multiple courses of antibiotic therapy     Prediabetes   A1c checked in ED, 6.3.  -monitor blood sugar with AM bmp     COPD  At baseline not oxygen dependent.  -No wheezes or rhonchi on exam  -Continue Breo Ellipta, albuterol nebulizer and Flonase      HFpEF  Chronic right ventricular dysfunction  HT  Mitral and Aortic Bioprosthetic valve replacements   Nonobstructive coronary artery disease  Appears compensated, more hypovolemic. Did have leg swelling last week which improved after he took a previously prescribed dose of lasix.  Last echo in January 2023 shows normal left ventricular systolic function with EF of 61%, right ventricle is mildly enlarged with moderately reduced global RV function.  Aortic and mitral valve appear to be well functioning.  -Continue PTA Toprol XL 50 mg, digoxin 0.125 mg daily Lipitor 20 mg  -limit use of IV fluids   -no diuresis needed at this time.     Paroxysmal atrial fibrillation s/p ablation and MAZE procedure  History of complete heart block status post pacemaker placement  Hypertension  Rate controlled.   -Continue PTA Toprol XL 50 mg, Lipitor 20 mg  -He is on digoxin (Holding 4/14) which interacts with Azithromycin. Ideally will be able to discontinue azithromycin if not requiring. Would not replace azithromycin at this time with Doxycyline as recently completed 14 day course.   -continue Apixaban for stroke prophylaxis.   -monitor on telemetry night of admission, if stable discontinue     CKD stage II.  -Near baseline of Creatinine 1-1.1   -bmp in AM    History of duodenal ulcer  -protonix while admitted for GI PPX, has been on steroids and systemically anticoagulated      Depression/anxiety  -Continue Lexapro    Testicular Cancer   S/p orchiectomy, chemotherapy and radiation     Incidental imaging findings   -Bilateral nonobstructing nephrolithiasis with at least one punctate right renal calculi and 5 left renal calculi measuring 4 mm  "or less. No ureteral calculus nor hydronephrosis.   -cholelithiasis, asymptomatic.           Diet:  Regular     DVT Prophylaxis: DOAC  [place] Catheter: Not present    Cardiac Monitoring: None    Code Status:  Full Code as discussed on admission     Clinically Significant Risk Factors Present on Admission        # Hypokalemia: Lowest K = 3.3 mmol/L in last 2 days, will replace as needed    # Hypercalcemia: corrected calcium is >10.1, will monitor as appropriate    # Hypoalbuminemia: Lowest albumin = 2.7 g/dL at 4/14/2024 12:42 PM, will monitor as appropriate  # Drug Induced Coagulation Defect: home medication list includes an anticoagulant medication                    Disposition Plan      Expected Discharge Date: 04/16/2024                Cinthia Lockwood PA-C    ______________________________________________________________________    Chief Complaint   \"Generalized weakness\"     History is obtained from the patient, with assistance from patient's wife at bedside.     History of Present Illness   Jaylen Lamas is a 73 year old male who was brought to the ED for evaluation of generalized weakness, chills, fatigue, persistent productive cough, low appetite, shortness of breath with activity.    He has been ill since early March with productive cough.  The patient was evaluated at urgent care on 3/15/24 as well as 4/4/24 for a cough and congestion. On 3/15/24 he was Levaquin, chest x-ray then with moderate size consult of change of the right perihilar region. On 4/14/24 he was prescribed Medrol dosepak, Codeine, Tessalon pearls, and Doxycycline.     He completed prednisone and doxycyline 3 days ago.     Felt some improvement initially when on the antibiotics and steroids however symptoms remain persistent.     Presented to the ED today as has been feeling progressive fatigued, short of breath with walking 15-20 feet. No shortness of breath at rest. He is having ongoing chills. Cough is still very productive of gold and " yellow phlegm, no blood. Worse in the morning. No measured fever through any of this. No wheezing, sore throat.     Has lost 7 pounds since being ill and appetite is low.     He has had a few episodes of diarrhea which ended 4/13, no abdominal pain or hematochezia.  He denies nausea, vomiting, abdominal pain, fevers, chest pain, shortness of breath, or urinary symptoms.     Did have leg swelling last week which improved after he took a previously prescribed doses of lasix. Compliant with blood thinner. No orthopnea or chest pain. No travel. No history of HIV, fungal infection.       In the ED afebrile, no hypoxia.   Lab work with neutrophilic leukocytosis.  Mild hypokalemia.  Slightly elevated BUN with normal GFR.  Urinalysis with few bacteria.  Blood and urine cultures drawn.  Chest x-ray with moderate size consult of change of the right perihilar region similar to prior CXR on March 15th  concerning for pneumonia with underlying scarring and changes suggestive of COPD.  CT chest abdomen pelvis is in process. Reviewed CT from 5/2023 when admitted for pneumonia showing extensive consolidation at the right upper lobe with patchy bibasilar opacities noted.     No chest pain. High-sensitivity troponin detectable at 33, EKG with atrial paced rhythm, right bundle branch block.    In the ED given Zosyn, azithromycin, 40 meq potassium.     Admission was requested from hospitalist service for further cares and monitoring.     Past Medical History    As above.    Past Surgical History   Reviewed.     Prior to Admission Medications   Prior to Admission Medications   Prescriptions Last Dose Informant Patient Reported? Taking?   BREO ELLIPTA 100-25 MCG/ACT inhaler   Yes No   Sig: Inhale 1 puff into the lungs daily   ELIQUIS ANTICOAGULANT 5 MG tablet   Yes No   Sig: Take 5 mg by mouth 2 times daily   albuterol (PROAIR HFA/PROVENTIL HFA/VENTOLIN HFA) 108 (90 Base) MCG/ACT inhaler   Yes No   Sig: Inhale 2 puffs into the lungs  every 6 hours as needed for shortness of breath or cough   benzonatate (TESSALON) 100 MG capsule   No No   Sig: Take 1 capsule (100 mg) by mouth 3 times daily as needed for cough   digoxin (LANOXIN) 125 MCG tablet   Yes No   Sig: Take 125 mcg by mouth every evening   escitalopram (LEXAPRO) 20 MG tablet   Yes No   Sig: take 1 tablet by mouth once daily   fluticasone (FLONASE) 50 MCG/ACT nasal spray   Yes No   Sig: Spray 2 sprays into both nostrils daily as needed for rhinitis or allergies   levofloxacin (LEVAQUIN) 750 MG tablet   No No   Sig: Take 1 tablet (750 mg) by mouth daily   metoprolol succinate ER (TOPROL XL) 50 MG 24 hr tablet   Yes No   Sig: Take 50 mg by mouth At Bedtime      Facility-Administered Medications: None        Review of Systems    The 10 point Review of Systems is negative other than noted in the HPI or here.     Social History   I have reviewed this patient's social history and updated it with pertinent information if needed. Lives with wife, former tobacco use.         Physical Exam   Vital Signs: Temp: 99.2  F (37.3  C)   BP: 107/48 Pulse: 83   Resp: 15 SpO2: 100 % O2 Device: None (Room air)    Weight: 0 lbs 0 oz    Constitutional: Awake, alert,  no apparent distress. Fatigued appearing.   Eyes: Conjunctiva and pupils examined and normal.  HEENT: Dry mucous membranes, normal dentition.  Respiratory: No abnormal work of breathing. Diminished lung sounds throughout right. No wheezing.   Cardiovascular: Regular rate and rhythm, normal S1 and S2, and murmur noted.  GI: Soft, non-distended, non-tender, bowel sounds present. No rebound tenderness or guarding.  Lymph/Hematologic: No anterior cervical or supraclavicular adenopathy.  Skin: No rashes, no cyanosis, no edema.  Musculoskeletal: No deformities noted.  No erythema or tenderness. Moving all extremities.  Neurologic: No focal deficits noted. Speech is clear. Coordination and strength grossly normal.   Psychiatric: Appropriate affect.        Medical Decision Making       75 MINUTES SPENT BY ME on the date of service doing chart review, history, exam, documentation & further activities per the note.      Data   ------------------------- PAST 24 HR DATA REVIEWED -----------------------------------------------

## 2024-04-14 NOTE — ED PROVIDER NOTES
History     Chief Complaint:  Generalized Weakness     HPI   Jaylen Lamas is a 73 year old male with history of COPD, CHF, CKD, and atrial fibrillation on Eliquis who presents to the ED with his wife for evaluation of generalized weakness. The patient reports over the last three week he has had increased weakness, productive cough, lightheadedness, and loss of roughly 7 lbs. He has had a few episodes of diarrhea and has had decreased appetite. He denies nausea, vomiting, abdominal pain, fevers, chest pain, shortness of breath, or urinary symptoms. Over the last week he has had bilateral leg swelling and started taking previously prescribed Lasix with improvement to his leg swelling.    Independent Historian:   None - Patient Only    Review of External Notes:   The patient was evaluated at urgent care on 3/15/24 as well as 4/4/24 for a cough and congestion. On 3/15/24 he was Levaquin and on 4/14/24 he was prescribed Medrol dosepak, Codeine, Tessalon pearls, and Doxycycline.     Medications:    albuterol (PROAIR HFA/PROVENTIL HFA/VENTOLIN HFA) 108 (90 Base) MCG/ACT inhaler  benzonatate (TESSALON) 100 MG capsule  BREO ELLIPTA 100-25 MCG/ACT inhaler  digoxin (LANOXIN) 125 MCG tablet  ELIQUIS ANTICOAGULANT 5 MG tablet  escitalopram (LEXAPRO) 20 MG tablet  fluticasone (FLONASE) 50 MCG/ACT nasal spray  levofloxacin (LEVAQUIN) 750 MG tablet  metoprolol succinate ER (TOPROL XL) 50 MG 24 hr tablet      Past Medical History:    Atrial fibrillation  COPD  CKD  CHF  CAD  Sepsis  Pneumonia  Atrial flutter  Melena  Aortic stenosis  Pulmonary embolism  COVID-19  Debility  Pericardial effusion  Duodenal ulcer  Lower back pain  Depression  Dyslipidemia  Hypervolemia  Metabolic acidosis  ROHIT  Kidney stones  Diverticulitis  Headache  Patent foramen ovale  Colon polyp    Past Surgical History:    Tonsillectomy  Orchiectomy  Colectomy  Mitral valve replacement  Aortic valve replacement  Ablation A.Fib.  Hemilaminectomy &  "Microdiscectomy   Pacemaker placement    Physical Exam   Patient Vitals for the past 24 hrs:   BP Temp Pulse Resp SpO2 Height   04/14/24 1138 107/48 -- -- -- -- --   04/14/24 1135 -- 99.2  F (37.3  C) 83 15 93 % 1.702 m (5' 7\")        Physical Exam  Nursing note and vitals reviewed.  Constitutional:  Appears well-developed and well-nourished.   HENT:   Head:    Atraumatic.   Mouth/Throat:   Oropharynx is clear and moist. No oropharyngeal exudate.   Eyes:    Pupils are equal, round, and reactive to light.   Neck:    Normal range of motion. Neck supple.      No tracheal deviation present. No thyromegaly present.   Cardiovascular:  Normal rate, regular rhythm, no murmur   Pulmonary/Chest: Breath sounds are clear and equal without wheezes or crackles.  Abdominal:   Soft. Bowel sounds are normal. Exhibits no distension and      no mass. There is no tenderness.      There is no rebound and no guarding.   Musculoskeletal:  Exhibits no edema.   Lymphadenopathy:  No cervical adenopathy.   Neurological:   Alert and oriented to person, place, and time.   Skin:    Skin is warm and dry. No rash noted. No pallor.     Emergency Department Course   ECG  ECG taken at 1210, ECG read at 1212  No STEMI  Atrial-paced rhythm  Right Bundle Branch Block  Abnormal ECG   Rate 89 bpm. GA interval 166 ms. QRS duration 152 ms. QT/QTc 388/472 ms. P-R-T axes 85/ 100/ 55.     Imaging:  No orders to display      Laboratory:  Labs Ordered and Resulted from Time of ED Arrival to Time of ED Departure - No data to display     Emergency Department Course & Assessments:    Interventions:  Medications - No data to display     Assessments:  1218 Initial Examination    Independent Interpretation (X-rays, CTs, rhythm strip):  I reviewed his chest x-ray images and see the large consolidative infiltrate in the right mid to lower lung field    Consultations/Discussion of Management or Tests:  none    Social Determinants of Health affecting care:   Healthcare " Access/Compliance    Disposition:  The patient was admitted to the hospital under the care of Dr. Cinthia Lockwood and Dr. Herzog.     Impression & Plan      Medical Decision Making:  Jaylen Lamas is a 73 year old male who I found to have a large consolidative infiltrate in the right lower lobe which is most concerning for possible pneumonia.  The patient has been failing outpatient antibiotic treatment since he completed a course of both Levaquin and then doxycycline.  He has been losing weight with a persistent cough, generalized weakness and lightheadedness.  I also considered the possibility of underlying lung cancer since he is a former smoker.  He is anticoagulant with Eliquis and I did not have concern for pulmonary embolism.  Patient's blood sugar is mildly elevated with an elevated hemoglobin A1c so there would be concern for possible diabetes mellitus, however his blood sugar could also be elevated because he was recently on steroids.  The patient was given broad-spectrum antibiotics of Zosyn and azithromycin and admitted to the care of the hospitalist service for further evaluation treatment.    Diagnosis:    ICD-10-CM    1. Pneumonia of right lower lobe due to infectious organism  J18.9       2. Hyperglycemia  R73.9            Discharge Medications:  New Prescriptions    No medications on file      Scribe Disclosure:  I, Gregorio Berkowitz, am serving as a scribe at 12:21 PM on 4/14/2024 to document services personally performed by Yanna Enamorado MD based on my observations and the provider's statements to me.     4/14/2024   Yanna Enamorado MD [place], Cheri Lee, MD  04/14/24 8045

## 2024-04-14 NOTE — PROVIDER NOTIFICATION
X Cover    Notified by RN SpO2 bouncing 87-90% at rest placed on 1 LPM NC with saturation of 93%. Not hypoxic or on oxygen in ED.  Patient reported mild shortness of breath. Vitals otherwise normal.     -Check blood gas   -continuous pulse oximetry monitoring, oxygen to maintain sats above 90% at rest.  Notify if worsening hypoxia or escalating supplemental oxygen requirements  -Continue bronchodilator treatments, change albuterol to Xopenex    -F/up of VBG: Saturations stable on 1 LPM. VBG is compensated pH midly elevated pco2 of 52. Continue current plan of care.

## 2024-04-14 NOTE — PLAN OF CARE
ROOM # 203    Living Situation (if not independent, order SW consult): Home w/ Wife  Facility name:  : Wife - [facility]    Activity level at baseline: Ind  Activity level on admit: SBA    Who will be transporting you at discharge: Ruiz Randall    Patient registered to observation; given Patient Bill of Rights; given the opportunity to ask questions about observation status and their plan of care.  Patient has been oriented to the observation room, bathroom and call light is in place.    Discussed discharge goals and expectations with patient/family.

## 2024-04-14 NOTE — PLAN OF CARE
"PRIMARY DIAGNOSIS: PNEUMONIA  OUTPATIENT/OBSERVATION GOALS TO BE MET BEFORE DISCHARGE:  Dyspnea improved and O2 sats >88% on RA or back to baseline O2 levels: No   SpO2: 93 %, O2 Device: Nasal cannula 1lpm    Tolerating oral abx or appropriate plans made outpatient infusion: No    Vitals signs normal or return to baseline: No    Short term supplemental O2 needed with activity at home: No    Tolerate oral intake to maintain hydration: Yes    Return to near baseline physical activity: Yes    Discharge Planner Nurse   Safe discharge environment identified: Yes  Barriers to discharge: Yes       Entered by: Pan Lai RN 04/14/2024 6:24 PM    /57 (BP Location: Left arm)   Pulse 91   Temp 98.2  F (36.8  C) (Oral)   Resp 22   Ht 1.702 m (5' 7\")   Wt 59.3 kg (130 lb 11.7 oz)   SpO2 93%   BMI 20.48 kg/m        Problem: Adult Inpatient Plan of Care  Goal: Plan of Care Review  Description: The Plan of Care Review/Shift note should be completed every shift.  The Outcome Evaluation is a brief statement about your assessment that the patient is improving, declining, or no change.  This information will be displayed automatically on your shift  note.  4/14/2024 1824 by Pan Lai, RN  Outcome: Progressing  4/14/2024 1815 by Pan Lai, RN  Outcome: Progressing  Goal: Patient-Specific Goal (Individualized)  Description: You can add care plan individualizations to a care plan. Examples of Individualization might be:  \"Parent requests to be called daily at 9am for status\", \"I have a hard time hearing out of my right ear\", or \"Do not touch me to wake me up as it startles  me\".  4/14/2024 1824 by Pan Lai, RN  Outcome: Progressing  4/14/2024 1815 by Pan Lai, RN  Outcome: Progressing  Goal: Absence of Hospital-Acquired Illness or Injury  4/14/2024 1824 by Pan Lai, RN  Outcome: Progressing  4/14/2024 1815 by Pan Lai, RN  Outcome: Progressing  Intervention: Identify and " Manage Fall Risk  Recent Flowsheet Documentation  Taken 4/14/2024 1758 by Pan Lai, RN  Safety Promotion/Fall Prevention:   assistive device/personal items within reach   clutter free environment maintained   lighting adjusted   nonskid shoes/slippers when out of bed   patient and family education   room organization consistent   safety round/check completed  Intervention: Prevent Skin Injury  Recent Flowsheet Documentation  Taken 4/14/2024 1758 by Pan Lai, RN  Body Position: position changed independently  Taken 4/14/2024 1755 by Pan Lai, RN  Body Position: position changed independently  Intervention: Prevent and Manage VTE (Venous Thromboembolism) Risk  Recent Flowsheet Documentation  Taken 4/14/2024 1758 by Pan Lai, RN  VTE Prevention/Management:   compression stockings off   foot pump device off   SCDs (sequential compression devices) off  Intervention: Prevent Infection  Recent Flowsheet Documentation  Taken 4/14/2024 1758 by Pan Lai, RN  Infection Prevention:   environmental surveillance performed   equipment surfaces disinfected   hand hygiene promoted   personal protective equipment utilized   rest/sleep promoted   single patient room provided  Goal: Optimal Comfort and Wellbeing  4/14/2024 1824 by Pan Lai, RN  Outcome: Progressing  4/14/2024 1815 by Pan Lai, RN  Outcome: Progressing  Goal: Readiness for Transition of Care  4/14/2024 1824 by Pan Lai, RN  Outcome: Progressing  4/14/2024 1815 by Pan Lai, RN  Outcome: Progressing  Intervention: Mutually Develop Transition Plan  Recent Flowsheet Documentation  Taken 4/14/2024 1700 by Pan Lai, RN  Equipment Currently Used at Home: none     Problem: Gas Exchange Impaired  Goal: Optimal Gas Exchange  4/14/2024 1824 by Pan Lai, RN  Outcome: Progressing  4/14/2024 1815 by Pan Lai, RN  Outcome: Progressing  Intervention: Optimize Oxygenation and  Ventilation  Recent Flowsheet Documentation  Taken 4/14/2024 1755 by Pan Lai, RN  Head of Bed (HOB) Positioning: HOB at 20-30 degrees     Problem: Comorbidity Management  Goal: Maintenance of COPD Symptom Control  4/14/2024 1824 by Pan Lai, RN  Outcome: Progressing  4/14/2024 1815 by Pan Lai, RN  Outcome: Progressing  Intervention: Maintain COPD Symptom Control  Recent Flowsheet Documentation  Taken 4/14/2024 1758 by Pan Lai, RN  Medication Review/Management: medications reviewed     Please review provider order for any additional goals.   Nurse to notify provider when observation goals have been met and patient is ready for discharge.

## 2024-04-14 NOTE — PHARMACY-ADMISSION MEDICATION HISTORY
Pharmacy Intern Admission Medication History    Admission medication history is complete. The information provided in this note is only as accurate as the sources available at the time of the update.    Information Source(s): Patient and CareEverywhere/SureScripts via in-person    Pertinent Information: None    Changes made to PTA medication list:  Added: Atorvastatin, Ipratropium-Albuterol  Deleted: Levofloxacin  Changed: Benzonatate 100 mg -> Benzonatate 200 mg    Allergies reviewed with patient and updates made in EHR: no    Medication History Completed By: Ivan Chapa 4/14/2024 4:09 PM    PTA Med List   Medication Sig Last Dose    albuterol (PROAIR HFA/PROVENTIL HFA/VENTOLIN HFA) 108 (90 Base) MCG/ACT inhaler Inhale 2 puffs into the lungs every 6 hours as needed for shortness of breath or cough Unknown at prn    atorvastatin (LIPITOR) 40 MG tablet Take 40 mg by mouth every evening 4/13/2024 at PM    benzonatate (TESSALON) 200 MG capsule Take 200 mg by mouth 3 times daily as needed for cough Unknown at prn    BREO ELLIPTA 100-25 MCG/ACT inhaler Inhale 1 puff into the lungs daily 4/14/2024 at AM    digoxin (LANOXIN) 125 MCG tablet Take 125 mcg by mouth every evening 4/13/2024 at PM    ELIQUIS ANTICOAGULANT 5 MG tablet Take 5 mg by mouth 2 times daily 4/14/2024 at AM x1 dose    escitalopram (LEXAPRO) 20 MG tablet take 1 tablet by mouth once daily 4/13/2024 at AM    fluticasone (FLONASE) 50 MCG/ACT nasal spray Spray 2 sprays into both nostrils daily as needed for rhinitis or allergies Unknown at prn    ipratropium - albuterol 0.5 mg/2.5 mg/3 mL (DUONEB) 0.5-2.5 (3) MG/3ML neb solution Take 1 vial by nebulization 3 times daily 4/13/2024 at PM    metoprolol succinate ER (TOPROL XL) 50 MG 24 hr tablet Take 50 mg by mouth At Bedtime 4/13/2024 at Bedtime

## 2024-04-14 NOTE — ED TRIAGE NOTES
"Patient arrives with complaints of generalized weakness for the last 4 weeks. Today, patient was \"sick of it\". ABCs intact        "

## 2024-04-14 NOTE — ED NOTES
"[facility]  ED Nurse Handoff Report    ED Chief complaint: Generalized Weakness  . ED Diagnosis:   Final diagnoses:   Pneumonia of right middle lobe due to infectious organism       Allergies:   Allergies   Allergen Reactions    Azithromycin Other (See Comments)     Causes C-Diff-  Patients PCP states this is not true    Cefprozil GI Disturbance    Ciprofloxacin GI Disturbance    Erythromycin GI Disturbance    Metronidazole GI Disturbance    Spiriva Respimat [Tiotropium Bromide Monohydrate] Visual Disturbance    Venlafaxine Nausea and Vomiting       Code Status: Full Code    Activity level - Baseline/Home:  independent.  Activity Level - Current:   assist of 1.   Lift room needed: No.   Bariatric: No   Needed: No   Isolation: No.   Infection: Not Applicable.     Respiratory status: Room air    Vital Signs (within 30 minutes):   Vitals:    04/14/24 1135 04/14/24 1138 04/14/24 1408 04/14/24 1413   BP:  107/48     Pulse: 83      Resp: 15      Temp: 99.2  F (37.3  C)      SpO2: 93%  97% 100%   Height: 1.702 m (5' 7\")          Cardiac Rhythm:  ,      Pain level:    Patient confused: No.   Patient Falls Risk: nonskid shoes/slippers when out of bed, arm band in place, and patient and family education.   Elimination Status: Has voided     Patient Report - Initial Complaint: Jaylen Lamas is a 73 year old male with history of COPD, CHF, CKD, and atrial fibrillation on Eliquis who presents to the ED with his wife for evaluation of generalized weakness. The patient reports over the last three week he has had increased weakness, productive cough, lightheadedness, and loss of roughly 7 lbs. He has had a few episodes of diarrhea and has had decreased appetite. He denies nausea, vomiting, abdominal pain, fevers, chest pain, shortness of breath, or urinary symptoms. Over the last week he has had bilateral leg swelling and started taking previously prescribed Lasix with improvement to his leg " swelling .   Focused Assessment:Pt is alert and oriented times four. Pleasant and cooperative. Respirations are even and easy.     Abnormal Results:   Labs Ordered and Resulted from Time of ED Arrival to Time of ED Departure   COMPREHENSIVE METABOLIC PANEL - Abnormal       Result Value    Sodium 135      Potassium 3.3 (*)     Carbon Dioxide (CO2) 27      Anion Gap 12      Urea Nitrogen 23.8 (*)     Creatinine 1.15      GFR Estimate 68      Calcium 9.2      Chloride 96 (*)     Glucose 156 (*)     Alkaline Phosphatase 107      AST 14      ALT 20      Protein Total 6.1 (*)     Albumin 2.7 (*)     Bilirubin Total 0.6     TROPONIN T, HIGH SENSITIVITY - Abnormal    Troponin T, High Sensitivity 33 (*)    ROUTINE UA WITH MICROSCOPIC REFLEX TO CULTURE - Abnormal    Color Urine Yellow      Appearance Urine Slightly Cloudy (*)     Glucose Urine Negative      Bilirubin Urine Negative      Ketones Urine Negative      Specific Gravity Urine 1.025      Blood Urine Negative      pH Urine 5.5      Protein Albumin Urine 50 (*)     Urobilinogen Urine Normal      Nitrite Urine Negative      Leukocyte Esterase Urine Negative      Bacteria Urine Few (*)     Mucus Urine Present (*)     RBC Urine 6 (*)     WBC Urine 5      Squamous Epithelials Urine 1      Hyaline Casts Urine 41 (*)    CBC WITH PLATELETS AND DIFFERENTIAL - Abnormal    WBC Count 14.7 (*)     RBC Count 5.04      Hemoglobin 13.0 (*)     Hematocrit 42.0      MCV 83      MCH 25.8 (*)     MCHC 31.0 (*)     RDW 14.2      Platelet Count 276      % Neutrophils 83      % Lymphocytes 7      % Monocytes 9      % Eosinophils 0      % Basophils 0      % Immature Granulocytes 1      NRBCs per 100 WBC 0      Absolute Neutrophils 12.1 (*)     Absolute Lymphocytes 1.0      Absolute Monocytes 1.3      Absolute Eosinophils 0.0      Absolute Basophils 0.0      Absolute Immature Granulocytes 0.2      Absolute NRBCs 0.0     HEMOGLOBIN A1C - Abnormal    Hemoglobin A1C 6.3 (*)    TSH WITH FREE T4  REFLEX - Normal    TSH 3.30     TROPONIN T, HIGH SENSITIVITY   PROCALCITONIN   URINE CULTURE   BLOOD CULTURE   BLOOD CULTURE        Chest XR,  PA & LAT   Preliminary Result   IMPRESSION: There is a new moderate-sized area of consolidative change involving the right perihilar region concerning for pneumonia. Hyperinflation of the lungs suggesting underlying COPD. Scarring of the mid left lung. No pleural fluid. Normal heart    size and pulmonary vascularity. Aortic calcification. Median sternotomy with previous aortic and mitral valve repair. Dual-lead cardiac pacemaker device is unchanged.         CT Chest/Abdomen/Pelvis w Contrast    (Results Pending)       Treatments provided: See MAR, Results review  Family Comments: Present  OBS brochure/video discussed/provided to patient:  Yes  ED Medications:   Medications   azithromycin 500 mg (ZITHROMAX) in 0.9% NaCl 250 mL intermittent infusion 500 mg (has no administration in time range)   potassium chloride (KLOR-CON) Packet 40 mEq (40 mEq Oral $Given 4/14/24 1411)   piperacillin-tazobactam (ZOSYN) 4.5 g vial to attach to  mL bag (4.5 g Intravenous $New Bag 4/14/24 1411)   iopamidol (ISOVUE-370) solution 70 mL (70 mLs Intravenous $Given 4/14/24 1424)   sodium chloride (PF) 0.9% PF flush 57 mL (57 mLs Intravenous $Given 4/14/24 1425)       Drips infusing:  No  For the majority of the shift this patient was Green.   Interventions performed were None.    Sepsis treatment initiated: No    Cares/treatment/interventions/medications to be completed following ED care: Per hospitalist order    ED Nurse Name: Iris Cristobal RN  2:44 PM     RECEIVING UNIT ED HANDOFF REVIEW    Above ED Nurse Handoff Report was reviewed: Yes  Reviewed by: Pan Lai RN on April 14, 2024 at 4:41 PM

## 2024-04-14 NOTE — PROVIDER NOTIFICATION
6:28 PM  Provider: Fabián  Message: Pt requiring 1lpm NC to maintain SpO2 >90%. He kept bouncing from 87-90.

## 2024-04-15 NOTE — PLAN OF CARE
"Vitals are Temp: 99.8  F (37.7  C) Temp src: Oral BP: 115/45 Pulse: 89   Resp: 18 SpO2: 97 %.  Patient is Alert and Oriented x4. They are SBA with no assistive devices .  Patient is on Droplet precuations for Pneumonia.  Pt is a Regular diet.  They are denying pain.  Patient has Lactated Ringer's running at 100 mL per hour. Denies nausea/dizziness. SOB. 1 LPM NC. IS at bedside, patient able to self administer. Continuous pulse ox on. Cultures pending. IV Zosyn, azithromycin, and PO vancomycin. On potassium replacement protocol, recheck AM. Plan is to monitor for symptom improvement, await cultures, and return home when medically ready.     Goal Outcome Evaluation:      Plan of Care Reviewed With: patient    Overall Patient Progress: improvingOverall Patient Progress: improving    Outcome Evaluation: 1 LPM NC. SOB. IS. Cultures pending. IV and PO ABO.      Problem: Adult Inpatient Plan of Care  Goal: Plan of Care Review  Description: The Plan of Care Review/Shift note should be completed every shift.  The Outcome Evaluation is a brief statement about your assessment that the patient is improving, declining, or no change.  This information will be displayed automatically on your shift  note.  Outcome: Progressing  Flowsheets (Taken 4/14/2024 2219)  Outcome Evaluation: 1 LPM NC. SOB. IS. Cultures pending. IV and PO ABO.  Plan of Care Reviewed With: patient  Overall Patient Progress: improving  Goal: Patient-Specific Goal (Individualized)  Description: You can add care plan individualizations to a care plan. Examples of Individualization might be:  \"Parent requests to be called daily at 9am for status\", \"I have a hard time hearing out of my right ear\", or \"Do not touch me to wake me up as it startles  me\".  Outcome: Progressing  Goal: Absence of Hospital-Acquired Illness or Injury  Outcome: Progressing  Intervention: Identify and Manage Fall Risk  Recent Flowsheet Documentation  Taken 4/14/2024 2020 by Viviana Rodrigez " ELOISA RN  Safety Promotion/Fall Prevention:   activity supervised   assistive device/personal items within reach   clutter free environment maintained   nonskid shoes/slippers when out of bed   room near nurse's station   supervised activity   safety round/check completed  Intervention: Prevent Skin Injury  Recent Flowsheet Documentation  Taken 4/14/2024 2020 by Viviana Rodrigez RN  Body Position: position changed independently  Intervention: Prevent and Manage VTE (Venous Thromboembolism) Risk  Recent Flowsheet Documentation  Taken 4/14/2024 2020 by Viviana Rodrigez RN  VTE Prevention/Management: SCDs (sequential compression devices) off  Goal: Optimal Comfort and Wellbeing  Outcome: Progressing  Goal: Readiness for Transition of Care  Outcome: Progressing     Problem: Gas Exchange Impaired  Goal: Optimal Gas Exchange  Outcome: Progressing  Intervention: Optimize Oxygenation and Ventilation  Recent Flowsheet Documentation  Taken 4/14/2024 2020 by Viviana Rodrigez RN  Head of Bed (HOB) Positioning: HOB at 20-30 degrees     Problem: Comorbidity Management  Goal: Maintenance of COPD Symptom Control  Outcome: Progressing  Intervention: Maintain COPD Symptom Control  Recent Flowsheet Documentation  Taken 4/14/2024 2020 by Viviana Rodrigez RN  Medication Review/Management: medications reviewed

## 2024-04-15 NOTE — PROVIDER NOTIFICATION
RCAT Treatment Plan    Patient Score: 12  Patient Acuity: 3    Clinical Indication for Therapy: COPD    Therapy Ordered: Duoneb QID/prn, Incentive spirometer, deep breathing and coughing techniques    Education: deep breathing and coughing techniques    Assessment Summary:      04/14/24 1920   RCAT Assessment   Reason for Assessment COPD   Pulmonary Status 3   Surgical Status 0   Chest X-ray 2   Respiratory Pattern 2   Mental Status 0   Breath Sounds 2   Cough Effectiveness 1   Level of Activity 1   O2 Required for SpO2>=92% 1   Acuity Level (points) 12   Acuity Level  3   Re-eval Interval Guideline Every 3 days   Re-evaluation Date 04/17/24   Clinical Indications/Symptoms   Aerosol Therapy RCAT protocol   Broncho-pulmonary Hygiene Productive cough   Volume Expansion Decreased breath sounds   Aerosol Therapy Plan   RT Treatment Nebulizer   Anticholinergic/Beta-Andrenergic Agonist Duoneb soln (0.5mg/3mg per 3mL) neb Max 6 doses/24h   Aerosol Treatment Frequency Acuity Level 3: QID/PRN @noc-Mod wheezing/Hx asthma/secretion removal   Aerosol Therapy (SVN)   Patient Position HOB elevated   Broncho-Pulmonary Hygiene Plan   Broncho-Pulmonary Hygiene Treatment Coughing techniques   Broncho-Pulm Hygiene Frequency Acuity Level 3: TID-Small amounts secretions/poor cough, hx of secretions   Volume Expansion Plan   Volume Expansion Treatment Incentive Spirometer   Volume Expansion Frequency Acuity Level 3: TID-At risk for developing atelectasis         Bianca Schwartz, RT  4/14/2024

## 2024-04-15 NOTE — PROGRESS NOTES
[facility]    Medicine Progress Note - Hospitalist Service    Date of Admission:  4/14/2024    Assessment & Plan     Jaylen Lamas is a 73 year old male with Pmhx of COPD, tobacco use, HFpEF, bioprosthetic aortic valve replacement, bioprosthetic mitral valve replacement, paroxysmal atrial fibrillation s/p ablation and MAZE procedure, 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 difficile, hypertension, CKD, history of duodenal ulcer, depression/anxiety, testicular cancer s/p orchiectomy, who was admitted on 4/14/2024 after presenting for generalized weakness, persistent productive cough and was found to have right perihilar/lower lobe pneumonia.    Right Lower Lobe Opacities concerning for Pneumonia  Patient has been ill since early March, 2024. Presented to ED for ongoing productive cough, dyspnea on exertion, chills, global weakness. No respiratory distress or hypoxia on presentation.   He has now failed treatment with two separate courses of antibiotic (levquin & doxycycline), both with steroids. With lack of improvement and systemic symptoms, there is concern for failed outpatient treatment of bacterial pneumonia vs non bacterial etiology for pneumonia. CT Imaging shows new dense consolidative airspace opacity within the superior segment right lower lobe with some patchy consolidative and groundglass opacities throughout the remainder of the right lower lobe concerning for pneumonia, bilateral upper lobe paramediastinal subpleural opacity that is unchanged.  No lymphadenopathy. No history of tuberculosis, HIV. No travel history. Hx of COPD, non oxygen dependent.  COVID, influenza A/B, RSV negative.  Legionella, strep pneumo were negative.  Sputum culture is in process.  Fungitell is in process as well.  CRP elevated and Pro-Bhavin negative.  Negative Pro-Bhavin likely in the setting of recent 2 rounds of antibiotic course    -Follow-up  sputum cultures  -Follow-up fungal etiology, added 1,3 Beta D Glucan   -Follow blood cultures  -monitor continous pulse oximetry  -check VBG if worsening clincial status   -Check MRSA nares  -Antibiotics: continue broadened coverage with Zosyn, zithromax for now for atypical coverage. He is on digoxin (Holding 4/14) which interacts with Azithromycin. Ideally will be able to discontinue azithromycin if not requiring. Would not replace azithromycin with Doxycyline as recently completed 14 day course.   -supportive cares, antitussives prn  -consider ID, Pulmonology input pending clinical course  -Continue PTA Duo neb treatments and PRN Albuterol. No wheezing or clinical signs currently of COPD exacerbation would not further steroids at this time.   -droplet precautions  -Follow up CT scan recommended in 8 weeks time to ensure resolution      Hypokalemia  Mild. Replaced in ED.  -Monitor     Weight Loss  At risk of malnutrition with ongoing low appetite and illness.  Albumin and protein are low.  Patient has lost 7 pounds since March.  -Added appointments as needed.  Consider nutrition consultation     History of C difficile   Positive 4/2019.  Has had loose stool, last episode 4/12. No abdominal pain, nausea, vomiting. No colitis on imaging.   -Continue prophylactic Vanocmycin course while being treated again with antibiotics given multiple courses of antibiotic therapy      Prediabetes   A1c checked in ED, 6.3.  -monitor blood sugar      COPD  At baseline not oxygen dependent.  -No wheezes or rhonchi on exam  -Continue Breo Ellipta, albuterol nebulizer and Flonase        HFpEF  Chronic right ventricular dysfunction  HT  Mitral and Aortic Bioprosthetic valve replacements   Nonobstructive coronary artery disease  Appears compensated, more hypovolemic. Did have leg swelling last week which improved after he took a previously prescribed dose of lasix.  Last echo in January 2023 shows normal left ventricular systolic  function with EF of 61%, right ventricle is mildly enlarged with moderately reduced global RV function.  Aortic and mitral valve appear to be well functioning.  -Continue PTA Toprol XL 50 mg, digoxin 0.125 mg daily Lipitor 20 mg  -limit use of IV fluids   -no diuresis needed at this time.     Paroxysmal atrial fibrillation s/p ablation and MAZE procedure  History of complete heart block status post pacemaker placement  Hypertension  Rate controlled.   -Continue PTA Toprol XL 50 mg, Lipitor 20 mg  -He is on digoxin (Holding 4/14) which interacts with Azithromycin. Ideally will be able to discontinue azithromycin if not requiring. Would not replace azithromycin at this time with Doxycyline as recently completed 14 day course.   -continue Apixaban for stroke prophylaxis.   -monitor on telemetry night of admission, if stable discontinue      CKD stage II.  -Near baseline of Creatinine 1-1.1   -bmp in AM     History of duodenal ulcer  -protonix while admitted for GI PPX, has been on steroids and systemically anticoagulated      Depression/anxiety  -Continue Lexapro     Testicular Cancer   S/p orchiectomy, chemotherapy and radiation      Incidental imaging findings   -Bilateral nonobstructing nephrolithiasis with at least one punctate right renal calculi and 5 left renal calculi measuring 4 mm or less. No ureteral calculus nor hydronephrosis.   -cholelithiasis, asymptomatic.        Diet: Combination Diet Regular Diet Adult  Snacks/Supplements Adult: Beneprotein; Between Meals  Snacks/Supplements Adult: Ensure Enlive; With Meals  Snacks/Supplements Adult: Magic Cup; With Meals    DVT Prophylaxis: DOAC  Krueger Catheter: Not present  Lines: None     Cardiac Monitoring: ACTIVE order. Indication: Acute decompensated heart failure (48 hours)  Code Status: Full Code      Clinically Significant Risk Factors Present on Admission        # Hypokalemia: Lowest K = 3.3 mmol/L in last 2 days, will replace as needed    # Hypercalcemia:  corrected calcium is >10.1, will monitor as appropriate    # Hypoalbuminemia: Lowest albumin = 2.7 g/dL at 4/14/2024 12:42 PM, will monitor as appropriate  # Drug Induced Coagulation Defect: home medication list includes an anticoagulant medication          # Moderate Malnutrition: based on nutrition assessment            Disposition Plan     Medically Ready for Discharge: Anticipated in 2-4 Days             Qing Moeller MD  Hospitalist Service  [facility]  Securely message with Vocera (more info)  Text page via [employer] Paging/Directory   ______________________________________________________________________    Interval History   No acute event overnight.  Patient continues to feel the same.  Denied any chest pain.  No worsening cough or phlegm production.  No audible wheezing.  4 point review of systems otherwise negative    Physical Exam   Vital Signs: Temp: 98.2  F (36.8  C) Temp src: Oral BP: 104/48 Pulse: 87   Resp: 18 SpO2: 93 % O2 Device: Nasal cannula Oxygen Delivery: 1 LPM  Weight: 131 lbs 1.6 oz    Constitutional: Awake, alert,  no apparent distress. Fatigued appearing.   Eyes: Conjunctiva and pupils examined and normal.  HEENT: Moist mucous membranes, normal dentition.  Respiratory: No abnormal work of breathing. Diminished lung sounds throughout right. No wheezing or crackles  Cardiovascular: Regular rate and rhythm, normal S1 and S2, and murmur noted.  GI: Soft, non-distended, non-tender, bowel sounds present. No rebound tenderness or guarding.  Lymph/Hematologic: No anterior cervical or supraclavicular adenopathy.  Skin: No rashes, no cyanosis, no edema.  Musculoskeletal: No deformities noted.  No erythema or tenderness. Moving all extremities.  Neurologic: No focal deficits noted. Speech is clear. Coordination and strength grossly normal.   Psychiatric: Appropriate affect.    Medical Decision Making       45 MINUTES SPENT BY ME on the date of service doing chart review, history,  exam, documentation & further activities per the note.      Data   ------------------------- PAST 24 HR DATA REVIEWED -----------------------------------------------

## 2024-04-15 NOTE — PLAN OF CARE
BP 96/49  Pulse 67   Temp 98.3  F (36.8  C) (Oral)   Resp 18  SpO2 94% on 1L O2     VSS on 1L O2 via NC - attempted to wean, patient did not tolerate with sats 83-86% on RA. Frequent cough noted - tessalon/robitussin given. Sputum yellow per patient. LS dim, fine crackles noted in RLL. IS at bedside. RT following. Tolerating diet. Elequis continued. Zosyn/azithromycin continued. Vanco given for Cdiff prophylaxis. Digoxin on hold while on azithromycin. Potassium protocol calls for recheck in AM. Tele a-paced HR 79 per tele tech. Up with SBA. Intermittently adamantly refuses bed alarm - states 'when I have to go, I have to go'. Wife bedside this afternoon.     Goal Outcome Evaluation:    Plan of Care Reviewed With: patient, spouse    Overall Patient Progress: improving    Outcome Evaluation: Continues on 1L O2 via NC. Zosyn/azithromycin continued. Vanco ordered for Cdiff prophylaxis.    Problem: Gas Exchange Impaired  Goal: Optimal Gas Exchange  Outcome: Not Progressing  Intervention: Optimize Oxygenation and Ventilation  Recent Flowsheet Documentation  Taken 4/15/2024 0903 by Stacy Prado RN  Head of Bed (HOB) Positioning: HOB at 20-30 degrees     Problem: Pneumonia  Goal: Effective Oxygenation and Ventilation  Outcome: Not Progressing  Intervention: Promote Airway Secretion Clearance  Recent Flowsheet Documentation  Taken 4/15/2024 0903 by Stacy Prado RN  Cough And Deep Breathing: done independently per patient  Intervention: Optimize Oxygenation and Ventilation  Recent Flowsheet Documentation  Taken 4/15/2024 0903 by Stacy Prado RN  Head of Bed (HOB) Positioning: HOB at 20-30 degrees

## 2024-04-15 NOTE — PLAN OF CARE
"Pneumonia R middle Lobe/ Weakness  Pt alert and orientated x4, regular diet,  mL/hr. Droplet precaution. 1 LPM NC. SOB. IS encouraged. Cultures pending. IV and PO Abx. K recheck.   Problem: Adult Inpatient Plan of Care  Goal: Plan of Care Review  Description: The Plan of Care Review/Shift note should be completed every shift.  The Outcome Evaluation is a brief statement about your assessment that the patient is improving, declining, or no change.  This information will be displayed automatically on your shift  note.  Outcome: Progressing  Flowsheets (Taken 4/15/2024 7808)  Outcome Evaluation: 1 LPM NC. SOB. IS encouraged. Cultures pending. IV and PO Abx.  Plan of Care Reviewed With: patient  Overall Patient Progress: improving  Goal: Patient-Specific Goal (Individualized)  Description: You can add care plan individualizations to a care plan. Examples of Individualization might be:  \"Parent requests to be called daily at 9am for status\", \"I have a hard time hearing out of my right ear\", or \"Do not touch me to wake me up as it startles  me\".  Outcome: Progressing  Goal: Absence of Hospital-Acquired Illness or Injury  Outcome: Progressing  Goal: Optimal Comfort and Wellbeing  Outcome: Progressing  Goal: Readiness for Transition of Care  Outcome: Progressing     Problem: Gas Exchange Impaired  Goal: Optimal Gas Exchange  Outcome: Progressing     Problem: Comorbidity Management  Goal: Maintenance of COPD Symptom Control  Outcome: Progressing   Goal Outcome Evaluation:      Plan of Care Reviewed With: patient    Overall Patient Progress: improvingOverall Patient Progress: improving    Outcome Evaluation: 1 LPM NC. SOB. IS encouraged. Cultures pending. IV and PO Abx.      "

## 2024-04-15 NOTE — PROGRESS NOTES
CLINICAL NUTRITION SERVICES  -  ASSESSMENT NOTE    Recommendations Ordered by Registered Dietitian (RD):   Strawberry Ensure with breakfast and dinner, but other flavors  barker Magic Cup with lunch and dinner   Future/Additional Recommendations: monitor po vs wt trends   Malnutrition: 4/15  % Weight Loss:  5% in 1 month (moderate malnutrition) - 5% wt loss in 1 month   % Intake:  No decreased intake noted  Subcutaneous Fat Loss:  None observed - suspect low baseline stores  Muscle Loss:  Clavicle bone region moderate depletion and Upper Arm region moderate depletion  Fluid Retention:  None noted    Malnutrition Diagnosis: Moderate malnutrition  In Context of: Chronic illness or disease     REASON FOR ASSESSMENT  Jaylen Lamas is a 73 year old male seen by Registered Dietitian for Admission Nutrition Risk Screen for positive Malnutrition Score: 2 (04/14/24 0064) for unintentional wt loss and poor appetite.     PMH of: COPD, tobacco use, HFpEF, bioprosthetic aortic valve replacement, bioprosthetic mitral valve replacement, paroxysmal atrial fibrillation s/p ablation and MAZE procedure, 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 difficile, hypertension, CKD, history of duodenal ulcer, depression/anxiety, testicular cancer s/p orchiectomy, who was admitted on 4/14/2024 after presenting for generalized weakness, persistent productive cough.     NUTRITION HISTORY  - Information obtained from chart review, spouse, and pt at bedside, who endorsed the wt loss and denied the poor appetite.   - Pt is on a regular diet at home, a typical day of food/fluid intake is 3 meals/day when they have a good appetite, and stated he's always been a good eater, its just difficult to gain weight. He even said when he tries to eating candy late at night it won't lead to wt gain.   - Jaylen reported a stable wt of 150-152 lb for most of his life. He endorsed the wt trends below,  "explaining he was  \"see-sawing\" up/down from 139 lbs in May of 2023 to his current wt. He also noted that after his open heart surgeries over a decade ago he lost wt and had a difficult to re-gaining it.   - Per H&P, \"Patient has lost 7 pounds since March.\"   - Pt and spouse reported his clothes have been fitting more loosely and his spouse noted she recently bought new jeans for pt. Pt also said he felt like he's been losing lean muscle mass as the skin on stomach has been getting more \"flabby\" looking/feeling.     - Use of oral supplements: None but spouse was going to be getting some PTA   Pt wanted to receive Strawberry Ensure with breakfast and dinner, but other flavors are ok too. Also barker Magic Cup with lunch and dinner    CURRENT NUTRITION ORDERS  Diet Order:   Regular     Current Intake/Tolerance: Pt was able to order and receive food without difficulty    NUTRITION FOCUSED PHYSICAL ASSESSMENT FOR DIAGNOSING MALNUTRITION)  Yes    Observed:    Muscle wasting (refer to documentation in Malnutrition section)    ANTHROPOMETRICS  Height: 5' 7\"  Weight: 131 lbs 1.6 oz   Body mass index is 20.53 kg/m .  Weight Status:  Normal BMI  IBW: 145 lb 12 oz  % IBW: 90%  Weight History: 5% wt loss in 1 month   Wt Readings from Last 10 Encounters:   04/15/24 59.5 kg (131 lb 1.6 oz)   05/18/23 63.1 kg (139 lb 1.6 oz)   11/01/20 68.5 kg (151 lb)     LABS  Labs reviewed     MEDICATIONS  Medications reviewed     ASSESSED NUTRITION NEEDS PER APPROVED PRACTICE GUIDELINES:  Dosing Weight 59.5 kg (actual)  Estimated Energy Needs: 4243-4244 kcals (30-35 Kcal/Kg)  Justification: COPD and underweight   Estimated Protein Needs: 60-89 grams protein (1-1.5 g pro/Kg)  Justification:  CKD II and Repletion  Estimated Fluid Needs: 3013-1795  mL (1 mL/Kcal)  Justification: maintenance    MALNUTRITION:   % Weight Loss:  5% in 1 month (moderate malnutrition) - 5% wt loss in 1 month   % Intake:  No decreased intake noted  Subcutaneous Fat Loss: "  None observed - suspect low baseline stores  Muscle Loss:  Clavicle bone region moderate depletion and Upper Arm region moderate depletion  Fluid Retention:  None noted    Malnutrition Diagnosis: Moderate malnutrition  In Context of: Chronic illness or disease    NUTRITION DIAGNOSIS:  Inadequate protein-energy intake related to Increased nutrient needs 2/2 multiple co-morbidities as evidenced by 5% wt loss in 1 month and moderate muscle wasting    NUTRITION INTERVENTIONS  Recommendations / Nutrition Prescription  Strawberry Ensure with breakfast and dinner, but other flavors  barker Magic Cup with lunch and dinner    Implementation  Nutrition education: Per Provider order if indicated   Medical Food Supplement    Nutrition Goals  PO intake - >75% of meal trays, or the equivalent in supplements, TID   Weight - Maintain    MONITORING AND EVALUATION:  Progress towards goals will be monitored and evaluated per protocol and Practice Guidelines    Damon Briceño RD, LD

## 2024-04-16 NOTE — PLAN OF CARE
"AOx4. Flat affect- irritable. Up SBA. Productive cough- PRN antitussives administered per pt request and IS encouraged. O2 sats falling to high 80's on RA- continue 1L O2 NC to keep sats >90%.  +1 bilateral edema in ankles and feet- 1 time dose IV lasix ordered, to be administered.  K+ 3.4 this am-replaced per protocol. Demand A-paced HR 70s per tele. Plan to continue IV azithromycin, zosyn for PNA and PO vanco for C-diff prophylaxis- previous C-diff infection.     Problem: Adult Inpatient Plan of Care  Goal: Plan of Care Review  Description: The Plan of Care Review/Shift note should be completed every shift.  The Outcome Evaluation is a brief statement about your assessment that the patient is improving, declining, or no change.  This information will be displayed automatically on your shift  note.  Outcome: Progressing  Flowsheets (Taken 4/16/2024 1500)  Outcome Evaluation: continues on 1L O2 NC- 1 time dose IV lasix to be given for bilat edema- K+ replaced per protocol, productive cough-antitussives given  Plan of Care Reviewed With:   patient   spouse  Overall Patient Progress: improving  Goal: Patient-Specific Goal (Individualized)  Description: You can add care plan individualizations to a care plan. Examples of Individualization might be:  \"Parent requests to be called daily at 9am for status\", \"I have a hard time hearing out of my right ear\", or \"Do not touch me to wake me up as it startles  me\".  Outcome: Progressing  Goal: Absence of Hospital-Acquired Illness or Injury  Outcome: Progressing  Intervention: Identify and Manage Fall Risk  Recent Flowsheet Documentation  Taken 4/16/2024 0900 by Blossom Singh RN  Safety Promotion/Fall Prevention: activity supervised  Intervention: Prevent Skin Injury  Recent Flowsheet Documentation  Taken 4/16/2024 0900 by Blossom Singh, RN  Body Position: position changed independently  Intervention: Prevent and Manage VTE (Venous Thromboembolism) Risk  Recent Flowsheet " Documentation  Taken 4/16/2024 0900 by Blossom Singh RN  VTE Prevention/Management: (Eliquis for A-fib PTA, continued) other (see comments)  Intervention: Prevent Infection  Recent Flowsheet Documentation  Taken 4/16/2024 0900 by Blossom Singh RN  Infection Prevention: environmental surveillance performed  Goal: Optimal Comfort and Wellbeing  Outcome: Progressing  Goal: Readiness for Transition of Care  Outcome: Progressing   Goal Outcome Evaluation:      Plan of Care Reviewed With: patient, spouse    Overall Patient Progress: improvingOverall Patient Progress: improving    Outcome Evaluation: continues on 1L O2 NC- 1 time dose IV lasix to be given for bilat edema- K+ replaced per protocol, productive cough-antitussives given

## 2024-04-16 NOTE — PLAN OF CARE
"PRIMARY DIAGNOSIS: PNEUMONIA  OUTPATIENT/OBSERVATION GOALS TO BE MET BEFORE DISCHARGE:  Dyspnea improved and O2 sats >88% on RA or back to baseline O2 levels: No   SpO2: 95 %, O2 Device: 1L Nasal cannula    Tolerating oral Vancomycin Yes    Vitals signs normal or return to baseline: Yes    Short term supplemental O2 needed with activity at home: Yes    Tolerate oral intake to maintain hydration: Yes    Return to near baseline physical activity: Yes    Discharge Planner Nurse   Safe discharge environment identified: Yes  Barriers to discharge: Yes       Entered by: Radha Burger RN    Please review provider order for any additional goals.   Nurse to notify provider when observation goals have been met and patient is ready for discharge.  Problem: Adult Inpatient Plan of Care  Goal: Plan of Care Review  Description: The Plan of Care Review/Shift note should be completed every shift.  The Outcome Evaluation is a brief statement about your assessment that the patient is improving, declining, or no change.  This information will be displayed automatically on your shift  note.  Outcome: Progressing  Goal: Patient-Specific Goal (Individualized)  Description: You can add care plan individualizations to a care plan. Examples of Individualization might be:  \"Parent requests to be called daily at 9am for status\", \"I have a hard time hearing out of my right ear\", or \"Do not touch me to wake me up as it startles  me\".  Outcome: Progressing  Goal: Absence of Hospital-Acquired Illness or Injury  Outcome: Progressing  Intervention: Prevent Skin Injury  Recent Flowsheet Documentation  Taken 4/15/2024 2100 by Radha Burger, RN  Body Position: position changed independently  Goal: Optimal Comfort and Wellbeing  Outcome: Progressing  Goal: Readiness for Transition of Care  Outcome: Progressing     Problem: Gas Exchange Impaired  Goal: Optimal Gas Exchange  Outcome: Progressing  Intervention: Optimize Oxygenation and " Ventilation  Recent Flowsheet Documentation  Taken 4/15/2024 2100 by Radha Burger, RN  Head of Bed (HOB) Positioning: HOB at 20-30 degrees     Problem: Comorbidity Management  Goal: Maintenance of COPD Symptom Control  Outcome: Progressing     Problem: Pneumonia  Goal: Fluid Balance  Outcome: Progressing  Goal: Resolution of Infection Signs and Symptoms  Outcome: Progressing  Goal: Effective Oxygenation and Ventilation  Outcome: Progressing  Intervention: Optimize Oxygenation and Ventilation  Recent Flowsheet Documentation  Taken 4/15/2024 2100 by Radha Burger, RN  Head of Bed (HOB) Positioning: HOB at 20-30 degrees   Goal Outcome Evaluation:

## 2024-04-16 NOTE — PROGRESS NOTES
[facility]    Medicine Progress Note - Hospitalist Service    Date of Admission:  4/14/2024  Date of Service: 04/16/2024    Assessment & Plan     Jaylen Lamas is a 73 year old male with Pmhx of COPD, tobacco use, HFpEF, bioprosthetic aortic valve replacement, bioprosthetic mitral valve replacement, paroxysmal atrial fibrillation s/p ablation and MAZE procedure, 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 difficile, hypertension, CKD, history of duodenal ulcer, depression/anxiety, testicular cancer s/p orchiectomy, who was admitted on 4/14/2024 after presenting for generalized weakness, persistent productive cough and was found to have right perihilar/lower lobe pneumonia.    Right Lower Lobe Opacities concerning for Pneumonia  Patient has been ill since early March, 2024. Presented to ED for ongoing productive cough, dyspnea on exertion, chills, global weakness. No respiratory distress or hypoxia on presentation.   He has now failed treatment with two separate courses of antibiotic (levquin & doxycycline), both with steroids. With lack of improvement and systemic symptoms, there is concern for failed outpatient treatment of bacterial pneumonia vs non bacterial etiology for pneumonia. CT Imaging shows new dense consolidative airspace opacity within the superior segment right lower lobe with some patchy consolidative and groundglass opacities throughout the remainder of the right lower lobe concerning for pneumonia, bilateral upper lobe paramediastinal subpleural opacity that is unchanged.  No lymphadenopathy. No history of tuberculosis, HIV. No travel history. Hx of COPD, non oxygen dependent.  COVID, influenza A/B, RSV negative.  Legionella, strep pneumo were negative.  Sputum culture is in process.  Fungitell is in process as well.  CRP elevated and Pro-Bhavin negative.  Negative Pro-Bhavin likely in the setting of recent 2 rounds of  antibiotic course  Plan:  -Follow WBC  -Follow-up sputum cultures  -Follow-up fungal etiology, added 1,3 Beta D Glucan   -Follow blood cultures  -monitor continous pulse oximetry  -Antibiotics: continue broadened coverage with Zosyn, zithromax for now for atypical coverage. He is on digoxin (Holding 4/14) which interacts with Azithromycin. Ideally will be able to discontinue azithromycin if not requiring. Would not replace azithromycin with Doxycyline as recently completed 14 day course.   -supportive cares, antitussives prn  -consider ID, Pulmonology input pending clinical course  -Continue PTA Duo neb treatments and PRN Albuterol. No wheezing or clinical signs currently of COPD exacerbation would not further steroids at this time.   -droplet precautions  -Follow up CT scan recommended in 8 weeks time to ensure resolution      Hypokalemia  Mild. Replaced in ED.  -Monitor     Weight Loss  At risk of malnutrition with ongoing low appetite and illness.  Albumin and protein are low.  Patient has lost 7 pounds since March.  -Added appointments as needed.  Consider nutrition consultation     History of C difficile   Positive 4/2019.  Has had loose stool, last episode 4/12. No abdominal pain, nausea, vomiting. No colitis on imaging.   -Continue prophylactic Vanocmycin course while being treated again with antibiotics given multiple courses of antibiotic therapy      Prediabetes   A1c checked in ED, 6.3.  -monitor blood sugar      COPD  At baseline not oxygen dependent.  -No wheezes or rhonchi on exam  -Continue Breo Ellipta, albuterol nebulizer and Flonase        HFpEF  Chronic right ventricular dysfunction  HT  Mitral and Aortic Bioprosthetic valve replacements   Nonobstructive coronary artery disease  Appears compensated, more hypovolemic. Did have leg swelling last week which improved after he took a previously prescribed dose of lasix.  Last echo in January 2023 shows normal left ventricular systolic function with EF  of 61%, right ventricle is mildly enlarged with moderately reduced global RV function.  Aortic and mitral valve appear to be well functioning.  -Continue PTA Toprol XL 50 mg, digoxin 0.125 mg daily Lipitor 20 mg  -limit use of IV fluids   -no diuresis needed at this time.     Paroxysmal atrial fibrillation s/p ablation and MAZE procedure  History of complete heart block status post pacemaker placement  Hypertension  Rate controlled.   -Continue PTA Toprol XL 50 mg, Lipitor 20 mg  -He is on digoxin (Holding 4/14) which interacts with Azithromycin. Ideally will be able to discontinue azithromycin if not requiring. Would not replace azithromycin at this time with Doxycyline as recently completed 14 day course.   -continue Apixaban for stroke prophylaxis.   -monitor on telemetry night of admission, if stable discontinue      CKD stage II.  -Near baseline of Creatinine 1-1.1   -bmp in AM     History of duodenal ulcer  -protonix while admitted for GI PPX, has been on steroids and systemically anticoagulated      Depression/anxiety  -Continue Lexapro     Testicular Cancer   S/p orchiectomy, chemotherapy and radiation      Incidental imaging findings   -Bilateral nonobstructing nephrolithiasis with at least one punctate right renal calculi and 5 left renal calculi measuring 4 mm or less. No ureteral calculus nor hydronephrosis.   -cholelithiasis, asymptomatic.        Diet: Combination Diet Regular Diet Adult  Snacks/Supplements Adult: Beneprotein; Between Meals  Snacks/Supplements Adult: Ensure Enlive; With Meals  Snacks/Supplements Adult: Magic Cup; With Meals    DVT Prophylaxis: DOAC  Krueger Catheter: Not present  Lines: None     Cardiac Monitoring: ACTIVE order. Indication: Acute decompensated heart failure (48 hours)  Code Status: Full Code      Clinically Significant Risk Factors        # Hypokalemia: Lowest K = 3.3 mmol/L in last 2 days, will replace as needed    # Hypercalcemia: corrected calcium is >10.1, will  monitor as appropriate    # Hypoalbuminemia: Lowest albumin = 2.7 g/dL at 4/14/2024 12:42 PM, will monitor as appropriate               # Moderate Malnutrition: based on nutrition assessment, PRESENT ON ADMISSION          Disposition Plan     Medically Ready for Discharge: Anticipated in 2-4 Days             Lester Baum MD  Hospitalist Service  [facility]  Securely message with [employer] (more info)  Text page via [employer] Paging/Directory   ______________________________________________________________________    Interval History     No acute event overnight.  Patient continues to feel the same.  Denied any chest pain. Still with significant coughing / productive. No audible wheezing.  No fevers  No nausea / vomiting  Endorses significant weakness  No new complaints today otherwiuse    Physical Exam   Vital Signs: Temp: 98  F (36.7  C) Temp src: Oral BP: 108/49 Pulse: 87   Resp: 18 SpO2: 95 % O2 Device: Nasal cannula Oxygen Delivery: 1 LPM  Weight: 131 lbs 1.6 oz    Constitutional: Awake, alert,  no apparent distress. Fatigued appearing.   Respiratory: No abnormal work of breathing. Diminished lung sounds throughout right. No wheezing or crackles  Cardiovascular: Regular rate and rhythm, normal S1 and S2, and murmur noted.  GI: Soft, non-distended, non-tender, bowel sounds present. No rebound tenderness or guarding.  Lymph/Hematologic: No anterior cervical or supraclavicular adenopathy.  Skin: No rashes, no cyanosis, no edema.  Musculoskeletal: No deformities noted.  No erythema or tenderness.   Neurologic: No focal deficits noted. Speech is clear. Coordination and strength grossly normal. Amarilis.  Psychiatric: Appropriate affect.    Medical Decision Making       50 MINUTES SPENT BY ME on the date of service doing chart review, history, exam, documentation & further activities per the note.      Data   ------------------------- PAST 24 HR DATA REVIEWED  -----------------------------------------------

## 2024-04-17 NOTE — PLAN OF CARE
"INPATIENT PLAN OF CARE PROGRESS NOTE    A&Ox4. Remains on 1.5L of O2 via NC, unable to wean patient off of O2 without sats dropping to below 90%. Continous pulse-oximeter remains in place. Patient states he feels weak today, BP's soft. C/o of intermittent dizziness w/ ambulation. BS active x4, tolerating regular diet, poor/fair appetite, encouraged po intake, passing flatus. Continues to have loose stools. Plan to continue scheduled abx, neb tx, encouraged use of IS, mobility and po intake. Continues K+ (3.7) standard protocol, no replacement indicated today. Re-check tomorrow AM. Remote tele discontinued this AM. Infectious disease, pulmonary consulted. Urine sample to be collected and sent to lab. Droplet precautions maintained.     Problem: Adult Inpatient Plan of Care  Goal: Plan of Care Review  Description: The Plan of Care Review/Shift note should be completed every shift.  The Outcome Evaluation is a brief statement about your assessment that the patient is improving, declining, or no change.  This information will be displayed automatically on your shift  note.  Outcome: Not Progressing  Flowsheets (Taken 4/17/2024 0942)  Outcome Evaluation: Remains on 1.5L of O2 via NC, unable to wean patient off of O2 without sats dropping to below 90%. Patient states he feels weak today. Continues scheduled abx, neb tx, encouraged use of IS, mobility and po intake. Continous pulse-oximeter in place.  Plan of Care Reviewed With: patient  Overall Patient Progress: no change  Goal: Patient-Specific Goal (Individualized)  Description: You can add care plan individualizations to a care plan. Examples of Individualization might be:  \"Parent requests to be called daily at 9am for status\", \"I have a hard time hearing out of my right ear\", or \"Do not touch me to wake me up as it startles  me\".  Outcome: Not Progressing  Goal: Absence of Hospital-Acquired Illness or Injury  Outcome: Not Progressing  Intervention: Identify and " Manage Fall Risk  Recent Flowsheet Documentation  Taken 4/17/2024 0839 by Elise Ramirez RN  Safety Promotion/Fall Prevention:   activity supervised   assistive device/personal items within reach   clutter free environment maintained   mobility aid in reach   nonskid shoes/slippers when out of bed   patient and family education   room organization consistent   safety round/check completed   supervised activity   toileting scheduled   treat reversible contributory factors   treat underlying cause  Intervention: Prevent Skin Injury  Recent Flowsheet Documentation  Taken 4/17/2024 0840 by Elise Ramirez RN  Body Position: position changed independently  Taken 4/17/2024 0839 by Elise Ramirez RN  Skin Protection:   pulse oximeter probe site changed   adhesive use limited  Device Skin Pressure Protection:   adhesive use limited   tubing/devices free from skin contact  Intervention: Prevent and Manage VTE (Venous Thromboembolism) Risk  Recent Flowsheet Documentation  Taken 4/17/2024 0839 by Elise Ramirez RN  VTE Prevention/Management: SCDs (sequential compression devices) off  Intervention: Prevent Infection  Recent Flowsheet Documentation  Taken 4/17/2024 0839 by Elise Ramirez RN  Infection Prevention:   hand hygiene promoted   rest/sleep promoted   single patient room provided   equipment surfaces disinfected   personal protective equipment utilized  Goal: Optimal Comfort and Wellbeing  Outcome: Not Progressing  Intervention: Monitor Pain and Promote Comfort  Recent Flowsheet Documentation  Taken 4/17/2024 0840 by Elise Ramirez RN  Pain Management Interventions:   repositioned   rest  Goal: Readiness for Transition of Care  Outcome: Not Progressing     Problem: Gas Exchange Impaired  Goal: Optimal Gas Exchange  Outcome: Not Progressing  Intervention: Optimize Oxygenation and Ventilation  Recent Flowsheet Documentation  Taken 4/17/2024 0840 by Elise Ramirez RN  Head of Bed (HOB)  Positioning: HOB at 20-30 degrees  Taken 4/17/2024 0839 by Elise Ramirez RN  Airway/Ventilation Management:   airway patency maintained   pulmonary hygiene promoted   calming measures promoted     Problem: Comorbidity Management  Goal: Maintenance of COPD Symptom Control  Outcome: Not Progressing  Intervention: Maintain COPD Symptom Control  Recent Flowsheet Documentation  Taken 4/17/2024 0839 by Elise Ramirez RN  Supportive Measures: verbalization of feelings encouraged  Medication Review/Management:   medications reviewed   high-risk medications identified     Problem: Pneumonia  Goal: Fluid Balance  Outcome: Not Progressing  Intervention: Monitor and Manage Fluid Balance  Recent Flowsheet Documentation  Taken 4/17/2024 0839 by Elise Ramirez RN  Fluid/Electrolyte Management: fluids provided  Goal: Resolution of Infection Signs and Symptoms  Outcome: Not Progressing  Intervention: Prevent Infection Progression  Recent Flowsheet Documentation  Taken 4/17/2024 0839 by Elise Ramirez RN  Infection Management: aseptic technique maintained  Isolation Precautions: droplet precautions maintained  Goal: Effective Oxygenation and Ventilation  Outcome: Not Progressing  Intervention: Promote Airway Secretion Clearance  Recent Flowsheet Documentation  Taken 4/17/2024 0839 by Elise Ramirez RN  Cough And Deep Breathing: done with encouragement  Intervention: Optimize Oxygenation and Ventilation  Recent Flowsheet Documentation  Taken 4/17/2024 0840 by Elise Ramirez RN  Head of Bed (HOB) Positioning: HOB at 20-30 degrees  Taken 4/17/2024 0839 by Elise Ramirez RN  Airway/Ventilation Management:   airway patency maintained   pulmonary hygiene promoted   calming measures promoted     Goal Outcome Evaluation:      Plan of Care Reviewed With: patient    Overall Patient Progress: no changeOverall Patient Progress: no change    Outcome Evaluation: Remains on 1.5L of O2 via NC, unable to wean patient off  of O2 without sats dropping to below 90%. Patient states he feels weak today. Continues scheduled abx, neb tx, encouraged use of IS, mobility and po intake. Continous pulse-oximeter in place.

## 2024-04-17 NOTE — PROGRESS NOTES
Medicine Progress Note - Hospitalist Service    Date of Admission:  4/14/2024  Date of Service: 04/17/2024    Assessment & Plan     Jaylen Lamas is a 73 year old male with Pmhx of COPD, tobacco use, HFpEF, bioprosthetic aortic valve replacement, bioprosthetic mitral valve replacement, paroxysmal atrial fibrillation s/p ablation and MAZE procedure, 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 difficile, hypertension, CKD, history of duodenal ulcer, depression/anxiety, testicular cancer s/p orchiectomy, who was admitted on 4/14/2024 after presenting for generalized weakness, persistent productive cough and was found to have right perihilar/lower lobe pneumonia.    Right Lower Lobe Opacities concerning for Pneumonia  Patient has been ill since early March, 2024. Presented to ED for ongoing productive cough, dyspnea on exertion, chills, global weakness. No respiratory distress or hypoxia on presentation.   He has now failed treatment with two separate courses of antibiotic (levquin & doxycycline), both with steroids. With lack of improvement and systemic symptoms, there is concern for failed outpatient treatment of bacterial pneumonia vs non bacterial etiology for pneumonia. CT Imaging shows new dense consolidative airspace opacity within the superior segment right lower lobe with some patchy consolidative and groundglass opacities throughout the remainder of the right lower lobe concerning for pneumonia, bilateral upper lobe paramediastinal subpleural opacity that is unchanged.  No lymphadenopathy. No history of tuberculosis, HIV. No travel history. Hx of COPD, non oxygen dependent.  COVID, influenza A/B, RSV negative.  Legionella, strep pneumo were negative.  Sputum culture is in process.  Fungitell is in process as well.  CRP elevated and Pro-Bhavin negative.  Negative Pro-Bhavin likely in the setting of recent 2 rounds of  antibiotic course  Plan:  -Follow WBC  -Follow-up sputum cultures  -Follow-up fungal etiology, added 1,3 Beta D Glucan   -Follow blood cultures  -monitor continous pulse oximetry  -Antibiotics: continue broadened coverage with Zosyn, zithromax for now for atypical coverage. He is on digoxin (Holding 4/14) which interacts with Azithromycin. Ideally will be able to discontinue azithromycin if not requiring. Would not replace azithromycin with Doxycyline as recently completed 14 day course.   -supportive cares, antitussives prn  -consider ID, Pulmonology input pending clinical course  -Check CXR today  -Continue PTA Duo neb treatments and PRN Albuterol. No wheezing or clinical signs currently of COPD exacerbation would not further steroids at this time.  -Follow up CT scan recommended in 8 weeks time to ensure resolution      Hypokalemia  Mild. Replaced in ED.  -Monitor     Weight Loss  At risk of malnutrition with ongoing low appetite and illness.  Albumin and protein are low.  Patient has lost 7 pounds since March.  -Added appointments as needed.  Consider nutrition consultation     History of C difficile   Positive 4/2019.  Has had loose stool, last episode 4/12. No abdominal pain, nausea, vomiting. No colitis on imaging.   -Continue prophylactic Vanocmycin course while being treated again with antibiotics given multiple courses of antibiotic therapy      Prediabetes   A1c checked in ED, 6.3.  -monitor blood sugar      COPD  At baseline not oxygen dependent.  -No wheezes or rhonchi on exam  -Continue Breo Ellipta, albuterol nebulizer and Flonase     HFpEF  Chronic right ventricular dysfunction  HT  Mitral and Aortic Bioprosthetic valve replacements   Nonobstructive coronary artery disease  Appears compensated, more hypovolemic. Did have leg swelling last week which improved after he took a previously prescribed dose of lasix.  Last echo in January 2023 shows normal left ventricular systolic function with EF of 61%,  right ventricle is mildly enlarged with moderately reduced global RV function.  Aortic and mitral valve appear to be well functioning.  -Continue PTA Toprol XL 50 mg, digoxin 0.125 mg daily Lipitor 20 mg  -IV Lasix 40mg x1 on 04/16 for some bibasilar crackles.   -limit use of IV fluids   -no diuresis needed at this time.     Paroxysmal atrial fibrillation s/p ablation and MAZE procedure  History of complete heart block status post pacemaker placement  Hypertension  Rate controlled.   -Continue PTA Toprol XL 50 mg, Lipitor 20 mg  -He is on digoxin (Holding 4/14) which interacts with Azithromycin. Ideally will be able to discontinue azithromycin if not requiring. Would not replace azithromycin at this time with Doxycyline as recently completed 14 day course.   -continue Apixaban for stroke prophylaxis.   -monitor on telemetry night of admission, if stable discontinue      CKD stage II.  -Near baseline of Creatinine 1-1.1   -bmp in AM     History of duodenal ulcer  -protonix while admitted for GI PPX, has been on steroids and systemically anticoagulated      Depression/anxiety  -Continue Lexapro     Testicular Cancer   S/p orchiectomy, chemotherapy and radiation      Incidental imaging findings   -Bilateral nonobstructing nephrolithiasis with at least one punctate right renal calculi and 5 left renal calculi measuring 4 mm or less. No ureteral calculus nor hydronephrosis.   -cholelithiasis, asymptomatic.        Diet: Combination Diet Regular Diet Adult  Snacks/Supplements Adult: Beneprotein; Between Meals  Snacks/Supplements Adult: Ensure Enlive; With Meals  Snacks/Supplements Adult: Magic Cup; With Meals    DVT Prophylaxis: DOAC  Krueger Catheter: Not present  Lines: None     Cardiac Monitoring: None  Code Status: Full Code      Clinically Significant Risk Factors              # Hypoalbuminemia: Lowest albumin = 2.7 g/dL at 4/14/2024 12:42 PM, will monitor as appropriate               # Moderate Malnutrition: based on  nutrition assessment, PRESENT ON ADMISSION          Disposition Plan     Medically Ready for Discharge: Anticipated in 2-4 Days             Lester Baum MD  Hospitalist Service  [facility]  Securely message with [employer] (more info)  Text page via [employer] Paging/Directory   ______________________________________________________________________    Interval History     No acute event overnight. Denied any chest pain. Still with significant coughing / productive. Overall reports he actually feels worse today.  O2 needs are stable.  No fevers  No nausea / vomiting  Endorses significant weakness  No new complaints today otherwiuse    Physical Exam   Vital Signs: Temp: 98.6  F (37  C) Temp src: Oral BP: 100/48 Pulse: 87   Resp: 22 SpO2: 94 % O2 Device: Nasal cannula Oxygen Delivery: 1.5 LPM  Weight: 131 lbs 1.6 oz    Constitutional: Awake, alert,  no apparent distress. Fatigued appearing.   Respiratory: No abnormal work of breathing. Diminished lung sounds throughout right. Rales present and overall very coarse breath sounds.   Cardiovascular: Regular rate and rhythm, normal S1 and S2, and murmur noted.  GI: Soft, non-distended, non-tender, bowel sounds present. No rebound tenderness or guarding.  Lymph/Hematologic: No anterior cervical or supraclavicular adenopathy.  Skin: No rashes, no cyanosis, no edema.  Musculoskeletal: No deformities noted.  No erythema or tenderness.   Neurologic: No focal deficits noted. Speech is clear. Coordination and strength grossly normal. Amarilis.  Psychiatric: Appropriate affect.    Medical Decision Making       55 MINUTES SPENT BY ME on the date of service doing chart review, history, exam, documentation & further activities per the note.      Data   ------------------------- PAST 24 HR DATA REVIEWED -----------------------------------------------

## 2024-04-17 NOTE — PROGRESS NOTES
"[facility]  Respiratory Care Note       Formerly [facility] RCAT     Date: 4/17/2024  Admission Dx:PNA  Pulmonary History COPD  Home Nebulizer/MDI Use: Albuterol MDI Q6 prn, Breo every day, Duoneb TID  Home Oxygen: None  Acuity Level (RCAT flow sheet): 3  Aerosol Therapy initiated: Duoneb QID, Xopenex Q4 prn, Breo QD  Pulmonary Hygiene initiated: Cough and deep breathing techniques TID  Volume Expansion initiated: IS TID  Current Oxygen Requirements: 1.5 LPM NC  Current SpO2:94%  Re-evaluation date: 4/20/2024  Patient Education: Education was performed with the patient in regards to indications/benefits and possible side effects of bronchodilators and the importance of rinsing his mouth out following the use of his inhaled steriod. Will continue to do education with patient.       See \"RT Assessments\" flow sheet for patient assessment scoring and Acuity Level Details.     Vital signs:  Temp: 98.6  F (37  C) Temp src: Oral BP: 102/49 Pulse: 87   Resp: 14 SpO2: 94 % O2 Device: Nasal cannula Oxygen Delivery: 1.5 LPM Height: 170.2 cm (5' 7\") Weight: 59.5 kg (131 lb 1.6 oz)  Estimated body mass index is 20.53 kg/m  as calculated from the following:    Height as of this encounter: 1.702 m (5' 7\").    Weight as of this encounter: 59.5 kg (131 lb 1.6 oz).      No past medical history on file.    No past surgical history on file.    No family history on file.    Social History     Tobacco Use    Smoking status: Not on file    Smokeless tobacco: Not on file   Substance Use Topics    Alcohol use: Not on file     Study Result    Narrative & Impression   XR CHEST PORT 1 VIEW   4/17/2024 11:43 AM      HISTORY: Shortness of breath     COMPARISON: CT and radiograph 4/14/2024                                                                      IMPRESSION: Stable size of cardiomediastinal silhouette status post  median sternotomy and valve replacements. Pacemaker leads again noted  overlying the right atrium and right " ventricle. Slight improvement in  the dense opacification in the superior aspect of the right lower  lobe. Large right lower lobe bleb is slightly increased in  conspicuity, may be due to different technique but recommend  short-term follow-up radiograph to exclude component of pneumothorax.  No evidence of tension. Increased interstitial and alveolar opacities  throughout both lungs could represent a component of edema. Likely  trace bilateral pleural effusions. Bones are unchanged.     OSMANY RUSSELL MD          PTA Med List   Medication Sig Last Dose    albuterol (PROAIR HFA/PROVENTIL HFA/VENTOLIN HFA) 108 (90 Base) MCG/ACT inhaler Inhale 2 puffs into the lungs every 6 hours as needed for shortness of breath or cough Unknown at prn    atorvastatin (LIPITOR) 40 MG tablet Take 40 mg by mouth every evening 4/13/2024 at PM    benzonatate (TESSALON) 200 MG capsule Take 200 mg by mouth 3 times daily as needed for cough Unknown at prn    BREO ELLIPTA 100-25 MCG/ACT inhaler Inhale 1 puff into the lungs daily 4/14/2024 at AM    digoxin (LANOXIN) 125 MCG tablet Take 125 mcg by mouth every evening 4/13/2024 at PM    ELIQUIS ANTICOAGULANT 5 MG tablet Take 5 mg by mouth 2 times daily 4/14/2024 at AM x1 dose    escitalopram (LEXAPRO) 20 MG tablet take 1 tablet by mouth once daily 4/13/2024 at AM    fluticasone (FLONASE) 50 MCG/ACT nasal spray Spray 2 sprays into both nostrils daily as needed for rhinitis or allergies Unknown at prn    ipratropium - albuterol 0.5 mg/2.5 mg/3 mL (DUONEB) 0.5-2.5 (3) MG/3ML neb solution Take 1 vial by nebulization 3 times daily 4/13/2024 at PM    metoprolol succinate ER (TOPROL XL) 50 MG 24 hr tablet Take 50 mg by mouth At Bedtime 4/13/2024 at Bedtime          Cosigned by: Severson, Kermit, RP at 4/14/2024  7:12 PM       [facility]  4/17/2024

## 2024-04-17 NOTE — PLAN OF CARE
"Goal Outcome Evaluation:  PRIMARY DIAGNOSIS: PNEUMONIA  Blood pressure 137/53, pulse 86, temperature 98  F (36.7  C), temperature source Oral, resp. rate 20, height 1.702 m (5' 7\"), weight 59.5 kg (131 lb 1.6 oz), SpO2 92%.   OUTPATIENT/OBSERVATION GOALS TO BE MET BEFORE DISCHARGE:  Dyspnea improved and O2 sats >88% on RA or back to baseline O2 levels: No   SpO2: 92 %, O2 Device: Nasal cannula 1 LPM.    Tolerating oral abx or appropriate plans made outpatient infusion: No    Vitals signs normal or return to baseline: Yes    Short term supplemental O2 needed with activity at home: No    Tolerate oral intake to maintain hydration: Yes    Return to near baseline physical activity: Yes      Discharge Planner Nurse   Safe discharge environment identified: Yes  Barriers to discharge: Yes    Patient is alert and oriented x4.SBA with no assistive devices .Droplet precuations for Pneumonia.  Pt is a Regular diet.denying pain.Per tele reading paced 70 HR .       Problem: Adult Inpatient Plan of Care  Goal: Absence of Hospital-Acquired Illness or Injury  Intervention: Identify and Manage Fall Risk  Recent Flowsheet Documentation  Taken 4/16/2024 1600 by Reynaldo Hummel RN  Safety Promotion/Fall Prevention: activity supervised  Intervention: Prevent Skin Injury  Recent Flowsheet Documentation  Taken 4/16/2024 1600 by Reynaldo Hummel RN  Body Position: position changed independently  Intervention: Prevent and Manage VTE (Venous Thromboembolism) Risk  Recent Flowsheet Documentation  Taken 4/16/2024 1600 by Reynaldo Hummel RN  VTE Prevention/Management: (Eliquis for A-fib PTA, continued) other (see comments)  Intervention: Prevent Infection  Recent Flowsheet Documentation  Taken 4/16/2024 1600 by Reynaldo Hummel RN  Infection Prevention: environmental surveillance performed        Please review provider order for any additional goals.   Nurse to notify provider when observation goals have been met and patient is ready for " discharge.

## 2024-04-17 NOTE — CONSULTS
73-year-old male, subacute systemic symptoms and respiratory symptoms of more than 2 months duration, never prior to this nor now any blood cultures off antibiotics (has 2 valve replacements), has definite infiltrates, story not really compatible with conventional bacterial pneumonia but covering is that for now.  Start non invasive workup for more unusual pneumonias and pulmonary to see

## 2024-04-17 NOTE — PLAN OF CARE
"  Patient is Alert and Oriented x4. SBA with Gait Belt and Walker.  Patient is on Droplet precuations for Pneumonia.  1L O2. Pt is a Regular diet. denying pain.  Patient is Saline locked.  Problem: Adult Inpatient Plan of Care  Goal: Plan of Care Review  Description: The Plan of Care Review/Shift note should be completed every shift.  The Outcome Evaluation is a brief statement about your assessment that the patient is improving, declining, or no change.  This information will be displayed automatically on your shift  note.  Outcome: Progressing  Flowsheets (Taken 4/17/2024 0155)  Plan of Care Reviewed With: patient  Overall Patient Progress: improving  Goal: Patient-Specific Goal (Individualized)  Description: You can add care plan individualizations to a care plan. Examples of Individualization might be:  \"Parent requests to be called daily at 9am for status\", \"I have a hard time hearing out of my right ear\", or \"Do not touch me to wake me up as it startles  me\".  Outcome: Progressing  Goal: Absence of Hospital-Acquired Illness or Injury  Outcome: Progressing  Intervention: Identify and Manage Fall Risk  Recent Flowsheet Documentation  Taken 4/17/2024 0007 by Radha Burger, RN  Safety Promotion/Fall Prevention: activity supervised  Intervention: Prevent Skin Injury  Recent Flowsheet Documentation  Taken 4/17/2024 0007 by Radha Burger, RN  Body Position: position changed independently  Goal: Optimal Comfort and Wellbeing  Outcome: Progressing  Goal: Readiness for Transition of Care  Outcome: Progressing     Problem: Gas Exchange Impaired  Goal: Optimal Gas Exchange  Outcome: Progressing  Intervention: Optimize Oxygenation and Ventilation  Recent Flowsheet Documentation  Taken 4/17/2024 0007 by Radha Burger, RN  Head of Bed (HOB) Positioning: HOB at 20-30 degrees     Problem: Comorbidity Management  Goal: Maintenance of COPD Symptom Control  Outcome: Progressing  Intervention: Maintain COPD Symptom " Control  Recent Flowsheet Documentation  Taken 4/17/2024 0007 by Radha Burger RN  Medication Review/Management: medications reviewed     Problem: Pneumonia  Goal: Fluid Balance  Outcome: Progressing  Goal: Resolution of Infection Signs and Symptoms  Outcome: Progressing  Intervention: Prevent Infection Progression  Recent Flowsheet Documentation  Taken 4/17/2024 0007 by Radha Burger RN  Isolation Precautions: droplet precautions maintained  Goal: Effective Oxygenation and Ventilation  Outcome: Progressing  Intervention: Optimize Oxygenation and Ventilation  Recent Flowsheet Documentation  Taken 4/17/2024 0007 by Radha Burger RN  Head of Bed (HOB) Positioning: HOB at 20-30 degrees     Goal Outcome Evaluation:      Plan of Care Reviewed With: patient    Overall Patient Progress: improvingOverall Patient Progress: improving

## 2024-04-18 NOTE — PROGRESS NOTES
[facility]  Infectious Disease Progress Note          Assessment and Plan:   Date of Admission:  4/14/2024  Date of Consult (When I saw the patient): 04/18/24        Assessment & Plan  Jaylen Lamas is a 73 year old who was admitted on 4/14/2024.      Impression: 1 73-year-old male with complicated past medical history,  now 2-month history of vague illness including significant respiratory symptoms, at admission has definite infiltrates, hypoxia probably this is pneumonia but the history is a larger differential diagnosis including concern for endocarditis/bacteremia.   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  5  complete heart block with pacemaker  6  chronic obstructive lung disease     REC 1 continue Zosyn and complete Z-Junior  2 negative blood cultures, however compromised by preceding antibiotics  3  low threshold for bronchoscopy or other interventions looking for more longer-term pathogens given the history that suggested something more than conventional bacterial infection pulmonary to see  4 multiple noninvasive studies ordered for fungal infection                 Interval History:     no new complaints about same overall, mild shortness of breath, no major fever, no positive microbiology so far              Medications:     Current Facility-Administered Medications   Medication Dose Route Frequency Provider Last Rate Last Admin    apixaban ANTICOAGULANT  "(ELIQUIS) tablet 5 mg  5 mg Oral BID Cinthia Lockwood PA-C   5 mg at 04/18/24 0908    atorvastatin (LIPITOR) tablet 40 mg  40 mg Oral QPM Cinthia Lockwood PA-C   40 mg at 04/17/24 2050    azithromycin (ZITHROMAX) 250 mg in  mL intermittent infusion  250 mg Intravenous Q24H Cinthia Lockwood PA-C   250 mg at 04/17/24 1700    [Held by provider] digoxin (LANOXIN) tablet 125 mcg  125 mcg Oral QPM Cinthia Lockwood PA-C        escitalopram (LEXAPRO) tablet 20 mg  20 mg Oral Daily Cinthia Lockwood PA-C   20 mg at 04/18/24 0908    fluticasone-vilanterol (BREO ELLIPTA) 100-25 MCG/ACT inhaler 1 puff  1 puff Inhalation Daily Cinthia Lockwood PA-C   1 puff at 04/18/24 0821    ipratropium - albuterol 0.5 mg/2.5 mg/3 mL (DUONEB) neb solution 3 mL  3 mL Nebulization 4x daily Cinthia Lockwood PA-C   3 mL at 04/18/24 0819    metoprolol succinate ER (TOPROL XL) 24 hr tablet 50 mg  50 mg Oral At Bedtime Cinthia Lockwood PA-C   50 mg at 04/16/24 2024    pantoprazole (PROTONIX) EC tablet 40 mg  40 mg Oral QAM AC Cinthia Lockwood PA-C   40 mg at 04/18/24 0908    piperacillin-tazobactam (ZOSYN) 4.5 g vial to attach to  mL bag  4.5 g Intravenous Q6H Cinthia Lockwood PA-C 200 mL/hr at 04/15/24 1524 4.5 g at 04/18/24 1016    sodium chloride (PF) 0.9% PF flush 3 mL  3 mL Intracatheter Q8H Cinthia Lockwood PA-C   3 mL at 04/18/24 0909    vancomycin (VANCOCIN) capsule 125 mg  125 mg Oral BID Cinthia Lockwood PA-C   125 mg at 04/18/24 0908                  Physical Exam:   Blood pressure 102/50, pulse 83, temperature 98.1  F (36.7  C), temperature source Oral, resp. rate 22, height 1.702 m (5' 7\"), weight 59.5 kg (131 lb 1.6 oz), SpO2 95%.  Wt Readings from Last 2 Encounters:   04/15/24 59.5 kg (131 lb 1.6 oz)   05/18/23 63.1 kg (139 lb 1.6 oz)     Vital Signs with Ranges  Temp:  [98  F (36.7  C)-99.7  F (37.6  C)] 98.1  F (36.7  C)  Pulse:  [81-87] 83  Resp:  [14-22] 22  BP: " "()/(42-55) 102/50  SpO2:  [90 %-95 %] 95 %    Constitutional: Awake, alert, cooperative, no apparent distress     Lungs: Congestion to auscultation bilaterally, no crackles or wheezing   Cardiovascular: Regular rate and rhythm, normal S1 and S2, and no murmur noted   Abdomen: Normal bowel sounds, soft, non-distended, non-tender   Skin: No rashes, no cyanosis, no edema   Other:           Data:   All microbiology laboratory data reviewed.  Recent Labs   Lab Test 04/17/24  0859 04/16/24  1027 04/15/24  0649   WBC 15.2* 15.5* 16.8*   HGB 10.1* 10.7* 11.2*   HCT 32.7* 36.2* 35.3*   MCV 84 87 83    236 279     Recent Labs   Lab Test 04/18/24  0635 04/17/24  0859 04/16/24  0552   CR 1.19* 1.24* 1.07     No lab results found.  No lab results found.    Invalid input(s): \"UC\"     "

## 2024-04-18 NOTE — PLAN OF CARE
"Vitals are Temp: 99.7  F (37.6  C) Temp src: Oral BP: 93/43 Pulse: 83   Resp: 20 SpO2: 94 %.  Patient is Alert and Oriented x4. They are SBA with no assistive devices .  Patient is on Droplet precuations for Pneumonia.  Pt is a Regular diet.  They are denying pain.  Patient is Saline locked. Denies nausea/dizziness. Dyspnea on exertion. 1.5 LPM NC. Continuous pulse ox on. Cough continues. IS at bedside, follow up instruction provided. IV and PO ABO. On potassium replacement protocol, to be rechecked this morning. Urine samples collected and sent to lab. ID following. Plan is for pulmonology consult.     Goal Outcome Evaluation:      Plan of Care Reviewed With: patient    Overall Patient Progress: no changeOverall Patient Progress: no change    Outcome Evaluation: Remains on 1.5 LPM NC. IV and PO ABO. Cough continues. Encourage IS use.      Problem: Adult Inpatient Plan of Care  Goal: Plan of Care Review  Description: The Plan of Care Review/Shift note should be completed every shift.  The Outcome Evaluation is a brief statement about your assessment that the patient is improving, declining, or no change.  This information will be displayed automatically on your shift  note.  Outcome: Not Progressing  Flowsheets (Taken 4/18/2024 0532)  Outcome Evaluation: Remains on 1.5 LPM NC. IV and PO ABO. Cough continues. Encourage IS use.  Plan of Care Reviewed With: patient  Overall Patient Progress: no change  Goal: Patient-Specific Goal (Individualized)  Description: You can add care plan individualizations to a care plan. Examples of Individualization might be:  \"Parent requests to be called daily at 9am for status\", \"I have a hard time hearing out of my right ear\", or \"Do not touch me to wake me up as it startles  me\".  Outcome: Not Progressing  Goal: Absence of Hospital-Acquired Illness or Injury  Outcome: Not Progressing  Intervention: Identify and Manage Fall Risk  Recent Flowsheet Documentation  Taken 4/17/2024 2051 by " [place], Viviana A, RN  Safety Promotion/Fall Prevention:   activity supervised   assistive device/personal items within reach   clutter free environment maintained   nonskid shoes/slippers when out of bed   room near nurse's station   safety round/check completed   supervised activity  Intervention: Prevent Skin Injury  Recent Flowsheet Documentation  Taken 4/17/2024 2051 by Viviana Rodrigez RN  Body Position: position changed independently  Intervention: Prevent and Manage VTE (Venous Thromboembolism) Risk  Recent Flowsheet Documentation  Taken 4/17/2024 2051 by Viviana Rodrigez RN  VTE Prevention/Management: SCDs (sequential compression devices) off  Goal: Optimal Comfort and Wellbeing  Outcome: Not Progressing  Goal: Readiness for Transition of Care  Outcome: Not Progressing     Problem: Gas Exchange Impaired  Goal: Optimal Gas Exchange  Outcome: Not Progressing  Intervention: Optimize Oxygenation and Ventilation  Recent Flowsheet Documentation  Taken 4/17/2024 2051 by Viviana Rodrigez RN  Head of Bed (HOB) Positioning: HOB at 20-30 degrees     Problem: Comorbidity Management  Goal: Maintenance of COPD Symptom Control  Outcome: Not Progressing  Intervention: Maintain COPD Symptom Control  Recent Flowsheet Documentation  Taken 4/17/2024 2051 by Viviana Rodrigez RN  Medication Review/Management: medications reviewed     Problem: Pneumonia  Goal: Fluid Balance  Outcome: Not Progressing  Goal: Resolution of Infection Signs and Symptoms  Outcome: Not Progressing  Intervention: Prevent Infection Progression  Recent Flowsheet Documentation  Taken 4/17/2024 2051 by Viviana Rodrigez RN  Isolation Precautions: droplet precautions maintained  Goal: Effective Oxygenation and Ventilation  Outcome: Not Progressing  Intervention: Promote Airway Secretion Clearance  Recent Flowsheet Documentation  Taken 4/17/2024 2051 by Viviana Rodrigez RN  Cough And Deep Breathing: done with encouragement  Intervention:  Optimize Oxygenation and Ventilation  Recent Flowsheet Documentation  Taken 4/17/2024 2051 by Viviana Rodrigez, RN  Head of Bed (HOB) Positioning: HOB at 20-30 degrees

## 2024-04-18 NOTE — PLAN OF CARE
"Goal Outcome Evaluation:    Admitting Diagnosis:  Generalized weakness   Pertinent History: COPD, tobacco use, A-fib  Living Situation: Home with wife   Pain plan:  Tylenol given    Mobility:  assistance, stand-by  Baseline activity: Independent   Alarms/Safety: Bed Alarm  LDA's:  Peripheral  Pertinent test results:    Consults:  No consults at this time    Abnormals/Pending:   Other Cares/Comments: Droplet precautions    Discharge Disposition:  Home with Self care  Discharge Time:  TBD     Pt A&OX4. Vss and on 2 L NC.  LS dim and some MANCUSO noted. Had some productive cough with tan output. Had prn robitussin and Tessalon. IS at bedside and encouraged to use. Replaced K and recheck was 3.5. Had some BLE pain and tylenol given with relief and ice applied.  ID and Pul consult          Plan of Care Reviewed With: patient    Overall Patient Progress: no changeOverall Patient Progress: no change    Outcome Evaluation: Remains on 1L NC had some pain on BLE and tylenol given with relief.    Problem: Adult Inpatient Plan of Care  Goal: Plan of Care Review  Description: The Plan of Care Review/Shift note should be completed every shift.  The Outcome Evaluation is a brief statement about your assessment that the patient is improving, declining, or no change.  This information will be displayed automatically on your shift  note.  Outcome: Met  Flowsheets (Taken 4/18/2024 1835)  Outcome Evaluation: Remains on 1L NC had some pain on BLE and tylenol given with relief.  Plan of Care Reviewed With: patient  Overall Patient Progress: no change  Goal: Patient-Specific Goal (Individualized)  Description: You can add care plan individualizations to a care plan. Examples of Individualization might be:  \"Parent requests to be called daily at 9am for status\", \"I have a hard time hearing out of my right ear\", or \"Do not touch me to wake me up as it startles  me\".  Outcome: Met  Goal: Absence of Hospital-Acquired Illness or Injury  Outcome: " Met  Intervention: Identify and Manage Fall Risk  Recent Flowsheet Documentation  Taken 4/18/2024 1607 by Romina Phan RN  Safety Promotion/Fall Prevention:   activity supervised   lighting adjusted   increased rounding and observation   clutter free environment maintained   nonskid shoes/slippers when out of bed  Taken 4/18/2024 0900 by Romina Phan RN  Safety Promotion/Fall Prevention:   activity supervised   lighting adjusted   increased rounding and observation   clutter free environment maintained   nonskid shoes/slippers when out of bed  Intervention: Prevent Skin Injury  Recent Flowsheet Documentation  Taken 4/18/2024 1607 by Romina Phan RN  Body Position: position changed independently  Taken 4/18/2024 0900 by Romina Phan RN  Body Position: position changed independently  Intervention: Prevent Infection  Recent Flowsheet Documentation  Taken 4/18/2024 1607 by Romina Phan RN  Infection Prevention: hand hygiene promoted  Taken 4/18/2024 0900 by Romina Phan RN  Infection Prevention: hand hygiene promoted  Goal: Optimal Comfort and Wellbeing  Outcome: Met  Intervention: Monitor Pain and Promote Comfort  Recent Flowsheet Documentation  Taken 4/18/2024 1534 by Romina Phan RN  Pain Management Interventions: cold applied  Taken 4/18/2024 0900 by Romina Phan RN  Pain Management Interventions: repositioned  Goal: Readiness for Transition of Care  Outcome: Met     Problem: Gas Exchange Impaired  Goal: Optimal Gas Exchange  Outcome: Met  Intervention: Optimize Oxygenation and Ventilation  Recent Flowsheet Documentation  Taken 4/18/2024 1607 by Romina Phan RN  Head of Bed (HOB) Positioning: HOB at 20-30 degrees  Taken 4/18/2024 0900 by Romina Phan RN  Head of Bed (HOB) Positioning: HOB at 20-30 degrees     Problem: Comorbidity Management  Goal: Maintenance of COPD Symptom Control  Outcome: Met  Intervention: Maintain COPD Symptom  Control  Recent Flowsheet Documentation  Taken 4/18/2024 1607 by Romina Phan RN  Medication Review/Management: medications reviewed  Taken 4/18/2024 0900 by Romina Phan RN  Medication Review/Management: medications reviewed     Problem: Pneumonia  Goal: Fluid Balance  Outcome: Met  Goal: Resolution of Infection Signs and Symptoms  Outcome: Met  Intervention: Prevent Infection Progression  Recent Flowsheet Documentation  Taken 4/18/2024 1607 by Romina Phan RN  Isolation Precautions: droplet precautions maintained  Taken 4/18/2024 0900 by Romina Phan RN  Isolation Precautions: droplet precautions maintained  Goal: Effective Oxygenation and Ventilation  Outcome: Met  Intervention: Promote Airway Secretion Clearance  Recent Flowsheet Documentation  Taken 4/18/2024 1607 by Romina Phan RN  Cough And Deep Breathing: done independently per patient  Taken 4/18/2024 0900 by Romina Phan RN  Cough And Deep Breathing: done independently per patient  Intervention: Optimize Oxygenation and Ventilation  Recent Flowsheet Documentation  Taken 4/18/2024 1607 by Romina Phan RN  Head of Bed (HOB) Positioning: HOB at 20-30 degrees  Taken 4/18/2024 0900 by Romina Phan RN  Head of Bed (HOB) Positioning: HOB at 20-30 degrees

## 2024-04-18 NOTE — CONSULTS
[facility]    Infectious Disease Consultation     Date of Admission:  4/14/2024  Date of Consult (When I saw the patient): 04/18/24    Assessment & Plan   Jaylen Lamas is a 73 year old who was admitted on 4/14/2024.     Impression: 1 73-year-old male with complicated past medical history,  now 2-month history of vague illness including significant respiratory symptoms, at admission has definite infiltrates, hypoxia probably this is pneumonia but the history is a larger differential diagnosis including concern for endocarditis/bacteremia.   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    Primary Care Physician   Alexys Bella    Chief Complaint    cough    History is obtained from the patient and medical records    History of Present Illness   Jaylen Lamas is a 73 year old male who presents with  history of vague systemic  symptoms malaise and fatigue no definite fever and infected been taking temperature not particularly elevated.  Respiratory symptoms early on and got a course of antibiotics then a second course of antibiotics.  Just trace that she had suggested infiltrates.  He now presents with worsening symptoms on antibiotics right up to the time of this admission and also some steroids.  He is noticing cough that is mostly nonproductive sense of shortness of breath and some hypoxia.  Procalcitonin is low but white count elevated (on steroids right up till admission).   Has a history of some chronic lung problems and some prior pneumonia.      Significant background medical history all in the [facility] system is that of methicillin sensitive Staph aureus endocarditis 2018 with valve replacements eventual pacemaker.  Has not had recurrent infection problems since that time and valve function has been okay.  With current illness no recent travel or exposures normal she has been around's been ill    Past Medical History   I have reviewed this patient's medical history and updated it with pertinent information if needed.   No past medical history on file.    Past Surgical History   I have reviewed this patient's surgical history and updated it with pertinent information if needed.  No past surgical history on file.    Prior to Admission Medications   Prior to Admission Medications   Prescriptions Last Dose Informant Patient Reported? Taking?   BREO ELLIPTA 100-25 MCG/ACT inhaler 4/14/2024 at AM  Yes Yes   Sig: Inhale 1 puff into the lungs daily   ELIQUIS ANTICOAGULANT 5 MG tablet 4/14/2024 at AM x1 dose  Yes Yes   Sig: Take 5 mg by mouth 2 times daily   albuterol (PROAIR HFA/PROVENTIL HFA/VENTOLIN HFA) 108 (90 Base) MCG/ACT inhaler Unknown at prn  Yes Yes   Sig: Inhale 2 puffs into the lungs every 6 hours as needed for shortness of breath or cough   atorvastatin (LIPITOR) 40 MG tablet 4/13/2024 at PM  Yes Yes   Sig: Take 40 mg by mouth  every evening   benzonatate (TESSALON) 200 MG capsule Unknown at prn  Yes Yes   Sig: Take 200 mg by mouth 3 times daily as needed for cough   digoxin (LANOXIN) 125 MCG tablet 4/13/2024 at PM  Yes Yes   Sig: Take 125 mcg by mouth every evening   escitalopram (LEXAPRO) 20 MG tablet 4/13/2024 at AM  Yes Yes   Sig: take 1 tablet by mouth once daily   fluticasone (FLONASE) 50 MCG/ACT nasal spray Unknown at prn  Yes Yes   Sig: Spray 2 sprays into both nostrils daily as needed for rhinitis or allergies   ipratropium - albuterol 0.5 mg/2.5 mg/3 mL (DUONEB) 0.5-2.5 (3) MG/3ML neb solution 4/13/2024 at PM  Yes Yes   Sig: Take 1 vial by nebulization 3 times daily   metoprolol succinate ER (TOPROL XL) 50 MG 24 hr tablet 4/13/2024 at Bedtime  Yes Yes   Sig: Take 50 mg by mouth At Bedtime      Facility-Administered Medications: None     Allergies   Allergies   Allergen Reactions    Azithromycin Other (See Comments)     Causes C-Diff-  Patients PCP states this is not true    Cefprozil GI Disturbance    Ciprofloxacin GI Disturbance    Erythromycin GI Disturbance    Metronidazole GI Disturbance    Spiriva Respimat [Tiotropium Bromide Monohydrate] Visual Disturbance    Venlafaxine Nausea and Vomiting       Immunization History   Immunization History   Administered Date(s) Administered    COVID-19 12+ (2023-24) (MODERNA) 10/25/2023    COVID-19 Bivalent 12+ (Pfizer) 10/11/2022    COVID-19 MONOVALENT 12+ (Pfizer) 03/12/2021, 04/02/2021, 10/30/2021    Influenza (H1N1) 12/10/2009    Influenza (High Dose) 3 valent vaccine 11/19/2016, 10/27/2017, 10/01/2018    Influenza (IIV3) PF 10/29/2007, 11/19/2008, 01/17/2013    Influenza (intradermal) 09/30/2019    Influenza Vaccine >6 months,quad, PF 09/29/2014, 11/03/2015, 10/05/2019    Pneumo Conj 13-V (2010&after) 11/19/2016    Pneumococcal 23 valent 10/29/2007, 01/17/2013, 01/25/2018    Tdap (Adult) Unspecified Formulation 04/05/2012    Zoster recombinant adjuvanted (SHINGRIX) 10/01/2018,  "10/05/2019    Zoster vaccine, live 10/19/2015       Social History   I have reviewed this patient's social history and updated it with pertinent information if needed. Jaylen [employer] C[employer]      Family History   I have reviewed this patient's family history and updated it with pertinent information if needed.   No family history on file.    Review of Systems   The 10 point Review of Systems is negative  for any other clear localizing symptoms no major rashes skin lesions joint symptoms neurologic symptoms.    Physical Exam   Temp: 99.7  F (37.6  C) Temp src: Oral BP: 93/43 Pulse: 83   Resp: 20 SpO2: 94 % O2 Device: Nasal cannula Oxygen Delivery: 1.5 LPM  Vital Signs with Ranges  Temp:  [98  F (36.7  C)-99.7  F (37.6  C)] 99.7  F (37.6  C)  Pulse:  [81-87] 83  Resp:  [14-22] 20  BP: ()/(42-55) 93/43  SpO2:  [90 %-96 %] 94 %  131 lbs 1.6 oz  Body mass index is 20.53 kg/m .    GENERAL APPEARANCE:  awake  EYES: Eyes grossly normal to inspection  NECK: no adenopathy  RESP: lungs  congestion bilateral  CV: regular rates and rhythm   Slight murmur but not overly impressive  LYMPHATICS: normal ant/post cervical and supraclavicular nodes  ABDOMEN: soft, nontender  MS: extremities normal  SKIN: no suspicious lesions or rashes  no embolic lesion        Data   All laboratory and imaging data in the past 24 hours reviewed  No results for input(s): \"CULT\" in the last 168 hours.  No lab results found.    Invalid input(s): \"UC\"       All cultures:  Recent Labs   Lab 04/14/24  2023 04/14/24  1421 04/14/24  1403 04/14/24  1250   CULTURE 1+ Normal keysha No growth after 3 days No growth after 3 days No Growth      Blood culture:  Results for orders placed or performed during the hospital encounter of 04/14/24   Blood Culture Arm, Left    Specimen: Arm, Left; Blood   Result Value Ref Range    Culture No growth after 3 days    Blood Culture Line, venous    Specimen: Line, venous; Blood   Result Value Ref Range    Culture No growth after 3 " days       Urine culture:  Results for orders placed or performed during the hospital encounter of 04/14/24   Urine Culture    Specimen: Urine, Clean Catch   Result Value Ref Range    Culture No Growth

## 2024-04-18 NOTE — PLAN OF CARE
"Goal Outcome Evaluation:    Admitting Diagnosis:  Generalized weakness   Pertinent History: COPD, tobacco use, A-fib  Living Situation: Home with wife   Pain plan:  Tylenol given    Mobility:  assistance, stand-by  Baseline activity: Independent   Alarms/Safety: Bed Alarm  LDA's:  Peripheral  Pertinent test results:    Consults:  No consults at this time    Abnormals/Pending:   Other Cares/Comments: Droplet precautions    Discharge Disposition:  Home with Self care  Discharge Time:  TBD     Pt A&OX4. Vss and on 1 L NC.  LS dim and some MANCUSO noted. Had some productive cough with tan output. Had prn robitussin and Tessalon. IS at bedside and encouraged to use. Replaced K and recheck was 3.5. Had some BLE pain and tylenol given with relief and ice applied.  ID and Pul consult          Plan of Care Reviewed With: patient    Overall Patient Progress: no changeOverall Patient Progress: no change    Outcome Evaluation: Remains on 1L NC had some pain on BLE and tylenol given with relief.    Problem: Adult Inpatient Plan of Care  Goal: Plan of Care Review  Description: The Plan of Care Review/Shift note should be completed every shift.  The Outcome Evaluation is a brief statement about your assessment that the patient is improving, declining, or no change.  This information will be displayed automatically on your shift  note.  Outcome: Met  Flowsheets (Taken 4/18/2024 1835)  Outcome Evaluation: Remains on 1L NC had some pain on BLE and tylenol given with relief.  Plan of Care Reviewed With: patient  Overall Patient Progress: no change  Goal: Patient-Specific Goal (Individualized)  Description: You can add care plan individualizations to a care plan. Examples of Individualization might be:  \"Parent requests to be called daily at 9am for status\", \"I have a hard time hearing out of my right ear\", or \"Do not touch me to wake me up as it startles  me\".  Outcome: Met  Goal: Absence of Hospital-Acquired Illness or Injury  Outcome: " Met  Intervention: Identify and Manage Fall Risk  Recent Flowsheet Documentation  Taken 4/18/2024 1607 by Romina Phan RN  Safety Promotion/Fall Prevention:   activity supervised   lighting adjusted   increased rounding and observation   clutter free environment maintained   nonskid shoes/slippers when out of bed  Taken 4/18/2024 0900 by Romina Phan RN  Safety Promotion/Fall Prevention:   activity supervised   lighting adjusted   increased rounding and observation   clutter free environment maintained   nonskid shoes/slippers when out of bed  Intervention: Prevent Skin Injury  Recent Flowsheet Documentation  Taken 4/18/2024 1607 by Romina Phan RN  Body Position: position changed independently  Taken 4/18/2024 0900 by Romina Phan RN  Body Position: position changed independently  Intervention: Prevent Infection  Recent Flowsheet Documentation  Taken 4/18/2024 1607 by Romina Phan RN  Infection Prevention: hand hygiene promoted  Taken 4/18/2024 0900 by Romina Phan RN  Infection Prevention: hand hygiene promoted  Goal: Optimal Comfort and Wellbeing  Outcome: Met  Intervention: Monitor Pain and Promote Comfort  Recent Flowsheet Documentation  Taken 4/18/2024 1534 by Romina Phan RN  Pain Management Interventions: cold applied  Taken 4/18/2024 0900 by Romina Phan RN  Pain Management Interventions: repositioned  Goal: Readiness for Transition of Care  Outcome: Met     Problem: Gas Exchange Impaired  Goal: Optimal Gas Exchange  Outcome: Met  Intervention: Optimize Oxygenation and Ventilation  Recent Flowsheet Documentation  Taken 4/18/2024 1607 by Romina Phan RN  Head of Bed (HOB) Positioning: HOB at 20-30 degrees  Taken 4/18/2024 0900 by Romina Phan RN  Head of Bed (HOB) Positioning: HOB at 20-30 degrees     Problem: Comorbidity Management  Goal: Maintenance of COPD Symptom Control  Outcome: Met  Intervention: Maintain COPD Symptom  Control  Recent Flowsheet Documentation  Taken 4/18/2024 1607 by Romina Phan RN  Medication Review/Management: medications reviewed  Taken 4/18/2024 0900 by Romina Phan RN  Medication Review/Management: medications reviewed     Problem: Pneumonia  Goal: Fluid Balance  Outcome: Met  Goal: Resolution of Infection Signs and Symptoms  Outcome: Met  Intervention: Prevent Infection Progression  Recent Flowsheet Documentation  Taken 4/18/2024 1607 by Romina Phan RN  Isolation Precautions: droplet precautions maintained  Taken 4/18/2024 0900 by Romina Phan RN  Isolation Precautions: droplet precautions maintained  Goal: Effective Oxygenation and Ventilation  Outcome: Met  Intervention: Promote Airway Secretion Clearance  Recent Flowsheet Documentation  Taken 4/18/2024 1607 by Romina Phan RN  Cough And Deep Breathing: done independently per patient  Taken 4/18/2024 0900 by Romina Phan RN  Cough And Deep Breathing: done independently per patient  Intervention: Optimize Oxygenation and Ventilation  Recent Flowsheet Documentation  Taken 4/18/2024 1607 by Romina Phan RN  Head of Bed (HOB) Positioning: HOB at 20-30 degrees  Taken 4/18/2024 0900 by Romina Phan RN  Head of Bed (HOB) Positioning: HOB at 20-30 degrees

## 2024-04-18 NOTE — PROGRESS NOTES
[facility]    Medicine Progress Note - Hospitalist Service    Date of Admission:  4/14/2024  Date of Service: 04/18/2024    Assessment & Plan     Jaylen Lamas is a 73 year old male with Pmhx of COPD, tobacco use, HFpEF, bioprosthetic aortic valve replacement, bioprosthetic mitral valve replacement, paroxysmal atrial fibrillation s/p ablation and MAZE procedure, 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 difficile, hypertension, CKD, history of duodenal ulcer, depression/anxiety, testicular cancer s/p orchiectomy, who was admitted on 4/14/2024 after presenting for generalized weakness, persistent productive cough and was found to have right perihilar/lower lobe pneumonia.    Right Lower Lobe Opacities concerning for Pneumonia  Patient has been ill since early March, 2024. Presented to ED for ongoing productive cough, dyspnea on exertion, chills, global weakness. No respiratory distress or hypoxia on presentation.   He has now failed treatment with two separate courses of antibiotic (levquin & doxycycline), both with steroids. With lack of improvement and systemic symptoms, there is concern for failed outpatient treatment of bacterial pneumonia vs non bacterial etiology for pneumonia. CT Imaging shows new dense consolidative airspace opacity within the superior segment right lower lobe with some patchy consolidative and groundglass opacities throughout the remainder of the right lower lobe concerning for pneumonia, bilateral upper lobe paramediastinal subpleural opacity that is unchanged.  No lymphadenopathy. No history of tuberculosis, HIV. No travel history. Hx of COPD, non oxygen dependent.  COVID, influenza A/B, RSV negative.  Legionella, strep pneumo were negative.  Sputum culture is in process.  Fungitell is in process as well.  CRP elevated and Pro-Bhavin negative.  Negative Pro-Bhavin likely in the setting of recent 2 rounds of  antibiotic course  Plan:  -Follow WBC  -monitor continous pulse oximetry  -Antibiotics: continue broadened coverage with Zosyn, zithromax for now for atypical coverage. He is on digoxin (Holding 4/14) which interacts with Azithromycin. Ideally will be able to discontinue azithromycin if not requiring. Would not replace azithromycin with Doxycyline as recently completed 14 day course.   -supportive cares, antitussives prn  -ID consulted -> multiple noninvasive studies ordered for fungal infection   -Pulmonology consulted -> bronchoscopy??  -Check CXR 04/17 -> light improvement in  the dense opacification in the superior aspect of the right lower lobe. Large right lower lobe bleb is slightly increased.  -Continue PTA Duo neb treatments and PRN Albuterol. No wheezing or clinical signs currently of COPD exacerbation would not further steroids at this time.  -Follow up CT scan recommended in 8 weeks time to ensure resolution      Hypokalemia  Mild. Replaced in ED.  -Monitor     Weight Loss  At risk of malnutrition with ongoing low appetite and illness.  Albumin and protein are low.  Patient has lost 7 pounds since March.  -Added appointments as needed.  Consider nutrition consultation     History of C difficile   Positive 4/2019.  Has had loose stool, last episode 4/12. No abdominal pain, nausea, vomiting. No colitis on imaging.   -Continue prophylactic Vanocmycin course while being treated again with antibiotics given multiple courses of antibiotic therapy      Prediabetes   A1c checked in ED, 6.3.  -monitor blood sugar      COPD  At baseline not oxygen dependent.  -No wheezes or rhonchi on exam  -Continue Breo Ellipta, albuterol nebulizer and Flonase     HFpEF  Chronic right ventricular dysfunction  HT  Mitral and Aortic Bioprosthetic valve replacements   Nonobstructive coronary artery disease  Appears compensated, more hypovolemic. Did have leg swelling last week which improved after he took a previously prescribed  dose of lasix.  Last echo in January 2023 shows normal left ventricular systolic function with EF of 61%, right ventricle is mildly enlarged with moderately reduced global RV function.  Aortic and mitral valve appear to be well functioning.  -Continue PTA Toprol XL 50 mg, digoxin 0.125 mg daily Lipitor 20 mg  -IV Lasix 40mg x1 on 04/16 for some bibasilar crackles.   -limit use of IV fluids   -no diuresis needed at this time.     Paroxysmal atrial fibrillation s/p ablation and MAZE procedure  History of complete heart block status post pacemaker placement  Hypertension  Rate controlled.   -Continue PTA Toprol XL 50 mg, Lipitor 20 mg  -He is on digoxin (Holding 4/14) which interacts with Azithromycin. Ideally will be able to discontinue azithromycin if not requiring. Would not replace azithromycin at this time with Doxycyline as recently completed 14 day course.   -continue Apixaban for stroke prophylaxis.   -monitor on telemetry night of admission, if stable discontinue      CKD stage II.  -Near baseline of Creatinine 1-1.1   -bmp in AM     History of duodenal ulcer  -protonix while admitted for GI PPX, has been on steroids and systemically anticoagulated      Depression/anxiety  -Continue Lexapro     Testicular Cancer   S/p orchiectomy, chemotherapy and radiation      Incidental imaging findings   -Bilateral nonobstructing nephrolithiasis with at least one punctate right renal calculi and 5 left renal calculi measuring 4 mm or less. No ureteral calculus nor hydronephrosis.   -cholelithiasis, asymptomatic.        Diet: Combination Diet Regular Diet Adult  Snacks/Supplements Adult: Beneprotein; Between Meals  Snacks/Supplements Adult: Ensure Enlive; With Meals  Snacks/Supplements Adult: Magic Cup; With Meals    DVT Prophylaxis: DOAC  Krueger Catheter: Not present  Lines: None     Cardiac Monitoring: None  Code Status: Full Code      Clinically Significant Risk Factors        # Hypokalemia: Lowest K = 3.3 mmol/L in last  2 days, will replace as needed       # Hypoalbuminemia: Lowest albumin = 2.7 g/dL at 4/14/2024 12:42 PM, will monitor as appropriate               # Moderate Malnutrition: based on nutrition assessment, PRESENT ON ADMISSION          Disposition Plan     Medically Ready for Discharge: Anticipated in 2-4 Days             Lester Baum MD  Hospitalist Service  [facility]  Securely message with [employer] (more info)  Text page via [employer] Paging/Directory   ______________________________________________________________________    Interval History     No acute event overnight. Denied any chest pain. Still with significant coughing / productive. Overall reports he feels slightly improved today.  O2 needs are stable.  No fevers  No nausea / vomiting  No new complaints today otherwise    Physical Exam   Vital Signs: Temp: 98.1  F (36.7  C) Temp src: Oral BP: 95/51 Pulse: 83   Resp: 22 SpO2: 95 % O2 Device: Nasal cannula Oxygen Delivery: 2 LPM  Weight: 131 lbs 1.6 oz    Constitutional: Awake, alert,  no apparent distress. Fatigued appearing.   Respiratory: No abnormal work of breathing. Diminished lung sounds throughout right. Rales present and overall very coarse breath sounds.   Cardiovascular: Regular rate and rhythm, normal S1 and S2, and murmur noted.  GI: Soft, non-distended, non-tender, bowel sounds present. No rebound tenderness or guarding.  Lymph/Hematologic: No anterior cervical or supraclavicular adenopathy.  Skin: No rashes, no cyanosis, no edema.  Musculoskeletal: No deformities noted.  No erythema or tenderness.   Neurologic: No focal deficits noted. Speech is clear. Coordination and strength grossly normal. Amarilis.  Psychiatric: Appropriate affect.    Medical Decision Making       50 MINUTES SPENT BY ME on the date of service doing chart review, history, exam, documentation & further activities per the note.      Data   ------------------------- PAST 24 HR DATA REVIEWED  -----------------------------------------------

## 2024-04-19 NOTE — PLAN OF CARE
Problem: Pneumonia  Goal: Fluid Balance  4/19/2024 0801 by Radha Burger RN  Outcome: Progressing  4/18/2024 2156 by Rdaha Burger RN  Outcome: Progressing  Goal: Resolution of Infection Signs and Symptoms  4/19/2024 0801 by Radha Burger RN  Outcome: Progressing  4/18/2024 2156 by Radha Burger RN  Outcome: Progressing  Goal: Effective Oxygenation and Ventilation  4/19/2024 0801 by Radha Burger RN  Outcome: Progressing  4/18/2024 2156 by Radha Burger RN  Outcome: Progressing  Intervention: Optimize Oxygenation and Ventilation  Recent Flowsheet Documentation  Taken 4/19/2024 0000 by Radha Burger RN  Head of Bed (HOB) Positioning: HOB at 30-45 degrees  Taken 4/18/2024 2156 by Radha Burger RN  Airway/Ventilation Management: airway patency maintained  Head of Bed (HOB) Positioning: HOB at 30-45 degrees  Taken 4/18/2024 2100 by Radha Burger RN  Head of Bed (HOB) Positioning: HOB at 30-45 degrees     Problem: Chest Pain  Goal: Resolution of Chest Pain Symptoms  4/19/2024 0801 by Radha Burger RN  Outcome: Progressing  4/18/2024 2156 by Radha Burger RN  Outcome: Progressing     Problem: Gas Exchange Impaired  Goal: Optimal Gas Exchange  4/19/2024 0801 by Radha Burger RN  Outcome: Progressing  4/18/2024 2156 by Radha Burger RN  Outcome: Progressing  Intervention: Optimize Oxygenation and Ventilation  Recent Flowsheet Documentation  Taken 4/19/2024 0000 by Radha Burger RN  Head of Bed (HOB) Positioning: HOB at 30-45 degrees  4/18/2024 2156 by Radha Burger RN  Flowsheets  Taken 4/18/2024 2156  Airway/Ventilation Management: airway patency maintained  Head of Bed (HOB) Positioning: HOB at 30-45 degrees  Taken 4/18/2024 2100  Head of Bed (HOB) Positioning: HOB at 30-45 degrees   Goal Outcome Evaluation:

## 2024-04-19 NOTE — PROGRESS NOTES
[facility]  Infectious Disease Progress Note          Assessment and Plan:   Date of Admission:  4/14/2024  Date of Consult (When I saw the patient): 04/18/24        Assessment & Plan  Jaylen Lamas is a 73 year old who was admitted on 4/14/2024.      Impression: 1 73-year-old male with complicated past medical history,  now 2-month history of vague illness including significant respiratory symptoms, at admission has definite infiltrates, hypoxia probably this is pneumonia but the history is a larger differential diagnosis including concern for endocarditis/bacteremia.   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  5  complete heart block with pacemaker  6  chronic obstructive lung disease     REC 1 continue Zosyn and complete Z-Junior  2 negative blood cultures, however compromised by preceding antibiotics  3 pulmonary proceeding to bronchoscopy await those results and readjust accordingly  4 multiple noninvasive studies ordered for fungal infection                 Interval History:     no new complaints about same overall, mild shortness of breath, no major fever, no positive microbiology so far              Medications:     Current Facility-Administered Medications   Medication Dose Route Frequency Provider Last Rate Last Admin    apixaban ANTICOAGULANT (ELIQUIS) tablet 5 mg  5 mg Oral BID Cinthia Lockwood PA-C   5 mg at 04/19/24 0831    atorvastatin (LIPITOR)  "tablet 40 mg  40 mg Oral QPM Cinthia Lockwood PA-C   40 mg at 04/18/24 2142    [Held by provider] digoxin (LANOXIN) tablet 125 mcg  125 mcg Oral QPM Cinthia Lockwood PA-C        escitalopram (LEXAPRO) tablet 20 mg  20 mg Oral Daily Cinthia Lockwood PA-C   20 mg at 04/19/24 0831    fluticasone-vilanterol (BREO ELLIPTA) 100-25 MCG/ACT inhaler 1 puff  1 puff Inhalation Daily Cinthia Lockwood PA-C   1 puff at 04/19/24 0747    ipratropium - albuterol 0.5 mg/2.5 mg/3 mL (DUONEB) neb solution 3 mL  3 mL Nebulization 4x daily Cinthia Lockwood PA-C   3 mL at 04/19/24 0747    metoprolol succinate ER (TOPROL XL) 24 hr tablet 50 mg  50 mg Oral At Bedtime Cinthia Lockwood PA-C   50 mg at 04/16/24 2024    pantoprazole (PROTONIX) EC tablet 40 mg  40 mg Oral QAM AC Cinthia Lockwood PA-C   40 mg at 04/19/24 0831    piperacillin-tazobactam (ZOSYN) 4.5 g vial to attach to  mL bag  4.5 g Intravenous Q6H Cinthia Lockwood PA-C 200 mL/hr at 04/15/24 1524 4.5 g at 04/19/24 0545    sodium chloride (PF) 0.9% PF flush 3 mL  3 mL Intracatheter Q8H Cinthia Lockwood PA-C   3 mL at 04/19/24 0846    vancomycin (VANCOCIN) capsule 125 mg  125 mg Oral BID Cinthia Lockwood PA-C   125 mg at 04/19/24 0831                  Physical Exam:   Blood pressure 121/49, pulse 87, temperature 98.3  F (36.8  C), temperature source Oral, resp. rate 20, height 1.702 m (5' 7\"), weight 59.5 kg (131 lb 1.6 oz), SpO2 (!) 88%.  Wt Readings from Last 2 Encounters:   04/15/24 59.5 kg (131 lb 1.6 oz)   05/18/23 63.1 kg (139 lb 1.6 oz)     Vital Signs with Ranges  Temp:  [98  F (36.7  C)-98.4  F (36.9  C)] 98.3  F (36.8  C)  Pulse:  [83-87] 87  Resp:  [18-22] 20  BP: ()/(49-51) 121/49  SpO2:  [88 %-96 %] 88 %    Constitutional: Awake, alert, cooperative, no apparent distress     Lungs: Congestion to auscultation bilaterally, no crackles or wheezing   Cardiovascular: Regular rate and rhythm, normal S1 and S2, and " "no murmur noted   Abdomen: Normal bowel sounds, soft, non-distended, non-tender   Skin: No rashes, no cyanosis, no edema   Other:           Data:   All microbiology laboratory data reviewed.  Recent Labs   Lab Test 04/17/24  0859 04/16/24  1027 04/15/24  0649   WBC 15.2* 15.5* 16.8*   HGB 10.1* 10.7* 11.2*   HCT 32.7* 36.2* 35.3*   MCV 84 87 83    236 279     Recent Labs   Lab Test 04/18/24  0635 04/17/24  0859 04/16/24  0552   CR 1.19* 1.24* 1.07     No lab results found.  No lab results found.    Invalid input(s): \"UC\"     "

## 2024-04-19 NOTE — PROGRESS NOTES
[facility]    Medicine Progress Note - Hospitalist Service    Date of Admission:  4/14/2024  Date of Service: 04/19/2024    Assessment & Plan     Jaylen Lamas is a 73 year old male with Pmhx of COPD, tobacco use, HFpEF, bioprosthetic aortic valve replacement, bioprosthetic mitral valve replacement, paroxysmal atrial fibrillation s/p ablation and MAZE procedure, 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 difficile, hypertension, CKD, history of duodenal ulcer, depression/anxiety, testicular cancer s/p orchiectomy, who was admitted on 4/14/2024 after presenting for generalized weakness, persistent productive cough and was found to have right perihilar/lower lobe pneumonia.    Right Lower Lobe Opacities concerning for Pneumonia  Patient has been ill since early March, 2024. Presented to ED for ongoing productive cough, dyspnea on exertion, chills, global weakness. No respiratory distress or hypoxia on presentation.   He has now failed treatment with two separate courses of antibiotic (levquin & doxycycline), both with steroids. With lack of improvement and systemic symptoms, there is concern for failed outpatient treatment of bacterial pneumonia vs non bacterial etiology for pneumonia. CT Imaging shows new dense consolidative airspace opacity within the superior segment right lower lobe with some patchy consolidative and groundglass opacities throughout the remainder of the right lower lobe concerning for pneumonia, bilateral upper lobe paramediastinal subpleural opacity that is unchanged.  No lymphadenopathy. No history of tuberculosis, HIV. No travel history. Hx of COPD, non oxygen dependent.  COVID, influenza A/B, RSV negative.  Legionella, strep pneumo were negative.  Sputum culture is in process.  Fungitell is in process as well.  CRP elevated and Pro-Bhavin negative.  Negative Pro-Bhavin likely in the setting of recent 2 rounds  of antibiotic course  Plan:  -Follow WBC  -monitor continous pulse oximetry  -Antibiotics: continue broadened coverage with Zosyn, zithromax for now for atypical coverage. He is on digoxin (Holding 4/14) which interacts with Azithromycin. Ideally will be able to discontinue azithromycin if not requiring. Would not replace azithromycin with Doxycyline as recently completed 14 day course.   -supportive cares, antitussives prn  -ID following -> multiple noninvasive studies ordered for fungal infection   -Pulmonology consulted -> bronchoscopy 04/19 -> Pertinent findings include increased  mucosal swelling of the bronchus intermedius with functional reduction in the size of the lumen.   -Follow BAL results  -Check CXR 04/17 -> light improvement in  the dense opacification in the superior aspect of the right lower lobe. Large right lower lobe bleb is slightly increased.  -Continue PTA Duo neb treatments and PRN Albuterol. No wheezing or clinical signs currently of COPD exacerbation would not further steroids at this time.  -Follow up CT scan recommended in 8 weeks time to ensure resolution      Hypokalemia  Mild. Replaced in ED.  -Monitor     Weight Loss  At risk of malnutrition with ongoing low appetite and illness.  Albumin and protein are low.  Patient has lost 7 pounds since March.  -Added appointments as needed.  Consider nutrition consultation     History of C difficile   Positive 4/2019.  Has had loose stool, last episode 4/12. No abdominal pain, nausea, vomiting. No colitis on imaging.   -Continue prophylactic Vanocmycin course while being treated again with antibiotics given multiple courses of antibiotic therapy      Prediabetes   A1c checked in ED, 6.3.  -monitor blood sugar      COPD  At baseline not oxygen dependent.  -No wheezes or rhonchi on exam  -Continue Breo Ellipta, albuterol nebulizer and Flonase     HFpEF  Chronic right ventricular dysfunction  HT  Mitral and Aortic Bioprosthetic valve replacements    Nonobstructive coronary artery disease  Appears compensated, more hypovolemic. Did have leg swelling last week which improved after he took a previously prescribed dose of lasix.  Last echo in January 2023 shows normal left ventricular systolic function with EF of 61%, right ventricle is mildly enlarged with moderately reduced global RV function.  Aortic and mitral valve appear to be well functioning.  -Continue PTA Toprol XL 50 mg, digoxin 0.125 mg daily Lipitor 20 mg  -IV Lasix 40mg x1 on 04/16 for some bibasilar crackles.   -limit use of IV fluids   -no diuresis needed at this time.     Paroxysmal atrial fibrillation s/p ablation and MAZE procedure  History of complete heart block status post pacemaker placement  Hypertension  Rate controlled.   -Continue PTA Toprol XL 50 mg, Lipitor 20 mg  -He is on digoxin (Holding 4/14) which interacts with Azithromycin. Ideally will be able to discontinue azithromycin if not requiring. Would not replace azithromycin at this time with Doxycyline as recently completed 14 day course.   Plan:  -continue Apixaban for stroke prophylaxis.      CKD stage II.  -Near baseline of Creatinine 1-1.1   -bmp in AM     History of duodenal ulcer  -protonix while admitted for GI PPX, has been on steroids and systemically anticoagulated      Depression/anxiety  -Continue Lexapro     Testicular Cancer   S/p orchiectomy, chemotherapy and radiation      Incidental imaging findings   -Bilateral nonobstructing nephrolithiasis with at least one punctate right renal calculi and 5 left renal calculi measuring 4 mm or less. No ureteral calculus nor hydronephrosis.   -cholelithiasis, asymptomatic.        Diet: Combination Diet Regular Diet Adult  Snacks/Supplements Adult: Beneprotein; Between Meals  Snacks/Supplements Adult: Ensure Enlive; With Meals  Snacks/Supplements Adult: Magic Cup; With Meals    DVT Prophylaxis: DOAC  Krueger Catheter: Not present  Lines: None     Cardiac Monitoring: None  Code  Status: Full Code      Clinically Significant Risk Factors        # Hypokalemia: Lowest K = 3.3 mmol/L in last 2 days, will replace as needed       # Hypoalbuminemia: Lowest albumin = 2.7 g/dL at 4/14/2024 12:42 PM, will monitor as appropriate               # Moderate Malnutrition: based on nutrition assessment           Disposition Plan     Medically Ready for Discharge: Anticipated in 2-4 Days           Lester Baum MD  Hospitalist Service  [facility]  Securely message with [employer] (more info)  Text page via [employer] Paging/Directory   ______________________________________________________________________    Interval History     No acute event overnight. Denied any chest pain. Still with significant coughing / productive. Overall reports he feels unchanged today  Doing well post bronch  O2 needs are stable.  No fevers  No nausea / vomiting  No new complaints today otherwise    Physical Exam   Vital Signs: Temp: 98.3  F (36.8  C) Temp src: Axillary BP: 114/54 Pulse: 78   Resp: 27 SpO2: 93 % O2 Device: Nasal cannula Oxygen Delivery: 2 LPM  Weight: 131 lbs 1.6 oz    Constitutional: Awake, alert,  no apparent distress. Fatigued appearing.   Respiratory: No abnormal work of breathing. Diminished lung sounds throughout right. Rales present and overall very coarse breath sounds.   Cardiovascular: Regular rate and rhythm, normal S1 and S2, and murmur noted.  GI: Soft, non-distended, non-tender, bowel sounds present. No rebound tenderness or guarding.  Lymph/Hematologic: No anterior cervical or supraclavicular adenopathy.  Skin: No rashes, no cyanosis, no edema.  Musculoskeletal: No deformities noted.  No erythema or tenderness.   Neurologic: No focal deficits noted. Speech is clear. Coordination and strength grossly normal. Amarilis.  Psychiatric: Appropriate affect.    Medical Decision Making       55 MINUTES SPENT BY ME on the date of service doing chart review, history, exam, documentation & further  activities per the note.      Data   ------------------------- PAST 24 HR DATA REVIEWED -----------------------------------------------

## 2024-04-19 NOTE — PLAN OF CARE
Goal Outcome Evaluation:  PO - >50% of meal trays and/or supplements TID   2.   Wt - Maintain      - Trial of cherry Gel+  - Replaced 1 Ensure Enlive with an apple Ensure Clear

## 2024-04-19 NOTE — PLAN OF CARE
PRIMARY DIAGNOSIS: PNEUMONIA    OUTPATIENT/OBSERVATION GOALS TO BE MET BEFORE DISCHARGE:  1. Pain Status: Pain free.    2. Return to near baseline physical activity: No    3. Cleared for discharge by consultants (if involved): No    Discharge Planner Nurse   Safe discharge environment identified: No  Barriers to discharge: Yes       Entered by: Pauline Deleon RN 04/19/2024 5:26 PM     Please review provider order for any additional goals.   Nurse to notify provider when observation goals have been met and patient is ready for discharge.Goal Outcome Evaluation:    VSS, Afeb today.On droplet precautions for pneumonia.Pulmonary in to see pt. mid morning and wants pt.to have bronch sometime today.Lungs dim.with fine crax mid to Ll's judy. Went down for bronch appx. 1100 or so.VSS, afeb upon return to floor. Tolerated procedure well.Somewhat sleepy post procedure.Up to BR and had 1 diarrhea stool and voided into toilet.RN assessed gag reflex prior to allowing pt. To order. Positive gag reflex noted and pt. ordered meal. Wife in room and was assisting pt. In eating.Pt. resting in bed, offers no c/o. Will con't to monitor.

## 2024-04-19 NOTE — PROCEDURES
INTERVENTIONAL PULMONOLOGY       Procedure(s):    A flexible bronchoscopy  BAL  Therapeutic suctioning (1 sites)    Indication:    Right lower lobe pneumonia    Attending of Record:  Jai Sullivan    Medications:    12 ml 1% lidocaine  1 spray(s) hurricaine spray  3 mg versed  100 mcg fentanyl    Sedation Time:   Total sedation time was Choose Duration: 20 minutes of continuous bedside 1:1 monitoring.    Time Out:  Performed    The patient's medical record has been reviewed.  The indication for the procedure was reviewed.  The necessary history and physical examination was performed and reviewed.  The risks, benefits and alternatives of the procedure were discussed with the the patient in detail and he had the opportunity to ask questions.  I discussed in particular the potential complications including risks of minor or life-threatening bleeding and/or infection, respiratory failure, vocal cord trauma / paralysis, pneumothorax, and discomfort. Sedation risks were also discussed including abnormal heart rhythms, low blood pressure, and respiratory failure. All questions were answered to the best of my ability.  Verbal and written informed consent was obtained.  The proposed procedure and the patient's identification were verified prior to the procedure by the physician and the nurse.    The patient was assessed for the adequacy for the procedure and to receive medications.   Mental Status:  Alert and oriented x 3  Airway examination:  Class I (Complete visualization of soft palate)  Pulmonary: Diminished breath sounds at the right base with rhonchi heard  CV:  RRR, no murmurs or gallops    After clinical evaluation and reviewing the indication, risks, alternatives and benefits of the procedure the patient was deemed to be in satisfactory condition to undergo the procedure.      Immediately before administration of medications the patient was re-assessed for adequacy to receive sedatives including the heart rate,  respiratory rate, mental status, oxygen saturation, blood pressure and adequacy of pulmonary ventilation. These same parameters were continuously monitored throughout the procedure.    A Tuberculosis risk assessment was performed:  The patient has no known RISK of Tuberculosis    Maneuvers / Procedure:      Airway Examination: A complete airway examination was performed from the distal trachea to the subsegmental level in each lobe of both lungs.  Pertinent findings include increased  mucosal swelling of the bronchus intermedius with functional reduction in the size of the lumen.  I was easily able to pass the scope through it with no endobronchial lesion seen.  The mucosa was edematous and easily friable.         BAL: The bronchoscope was wedged into the right lower lobe bronchus. A total of 150cc of saline was instilled and a total of 35 mL was aspirated. This was sent for analysis.     Therapeutic suctioning: 15-20min of operative time was spent clearing out the airway of debris, blood and mucous prior to the intervention.     At the end of the bronchoscopy, 2 mL of 1 is to 10,000 epinephrine was given due to bleeding from scope trauma.    Relevant Pictures      Recommendations:     --> Follow-up final culture and cytology results from the BAL.    DAKOTA Alvarez   of Medicine  [facility]  Pulmonary, Allergy, Critical Care and Sleep Medicine  Pager - [phone]

## 2024-04-19 NOTE — CONSULTS
[facility]    Pulmonology Consultation     Date of Admission:  4/14/2024  Date of Consult (When I saw the patient): 04/19/24    Assessment & Plan   Jaylen Lamas is a 73 year old male who was admitted on 4/14/2024. I was asked to see the patient for pneumonia not improving with antibiotics.    #Acute hypoxic respiratory failure  This appears likely secondary to the underlying pneumonia.  #Right lower lobe pneumonia  Failed outpatient courses of 2 different antibiotics, with levofloxacin and doxycycline.  #Moderately severe COPD, not in exacerbation currently.  From PFTs in 2018.  Sees an outside pulmonologist.  On Breo and Americo as an outpatient.  # HFpEF  # S/p mitral and aortic bioprosthetic valve replacement and history of MSSA endocarditis  # CAD  # History of C. difficile  # CKD  # History of testicular cancer s/p orchiectomy    -Continue current antibiotics with Zosyn.  ID following  -Fungitell negative.  Fungal antibodies in process.  - Will proceed with bronchoscopy with BAL of the right lower lobe today.  Details of the procedure, alternatives, benefits and associated risks which include but not limited to bleeding, infection, hypoxia, pneumothorax and even death were discussed with the patient.  He asked appropriate questions and agreed to proceed.  - Will send BAL sample for cultures and cytology.  - Check an echocardiogram.  - Agree that patient is not in COPD exacerbation currently.  Continue current inhalers.  - Continue pulmonary hygiene measures including incentive spirometry and flutter valve.  - Rest of the plan as per the primary team.    DAKOTA Alvarez   of Medicine  [facility]  Pulmonary, Allergy, Critical Care and Sleep Medicine  Pager - [phone]        Jai Sullivan MD, MD    Text page  Securely message with the Vocera Web Console (learn more here)    Code Status    Full Code    Reason for Consult   Reason for consult:  Pneumonia not improving with antibiotics    Primary Care Physician   Alexys Bella    Chief Complaint   Shortness of breath    History is obtained from the patient    History of Present Illness   Jaylen Lamas is a 73 year old male who presents with worsening shortness of breath, failed outpatient treatment for pneumonia.  His past medical history includes history of aortic and mitral valve replacements in 2009, paroxysmal atrial fibrillation, COPD with bullous emphysema, MSSA endocarditis, CAD, history of PE and testicular cancer s/p orchiectomy, mild ROHIT.  He initially presented with worsening shortness of breath and cough which has been going on for a few weeks.  He initially presented to his PCP with these symptoms on 3/9/2024.  He was given a prednisone taper.  CXR was done at that time which showed emphysema, but no significant consolidation.  Due to his symptoms not improving, he was given another course of prednisone 40 mg and levofloxacin.  He returned to the office 2 weeks later with his symptoms not improving and was given a Medrol pack with doxycycline this time.  Eventually, he returned to the hospital on 4/14.  CXR done on admission showed a new moderate-sized area of consolidation in the right perihilar region concerning for pneumonia.  A CT chest was done at that time which showed a dense consolidation within the superior segment of the right lower lobe.  Admitted and started on antibiotics with azithromycin, Zosyn.  Fungal serologies were added.  ID was consulted.  His microbiological data so far has been largely negative.  He has been continued on Zosyn and azithromycin.    As for his pulmonary history, he has a history of COPD with previous PFTs in 2018 showing moderately severe obstruction FEV1 3.02 L, 58% predicted.  Sees Dr. Esquivel from pulmonary.    Currently, he is on 1 LPM with O2 sats of 92%.  His lab workup so far is significant for a BNP level of 3602, procalcitonin of 0.14, leukocytosis  with WBC count of 15.2.  He has not had any febrile episodes since admission.  CRP on admission 130.    Past Medical History   I have reviewed this patient's medical history and updated it with pertinent information if needed.   No past medical history on file.    Past Surgical History   I have reviewed this patient's surgical history and updated it with pertinent information if needed.  No past surgical history on file.    Prior to Admission Medications   Prior to Admission Medications   Prescriptions Last Dose Informant Patient Reported? Taking?   BREO ELLIPTA 100-25 MCG/ACT inhaler 4/14/2024 at AM  Yes Yes   Sig: Inhale 1 puff into the lungs daily   ELIQUIS ANTICOAGULANT 5 MG tablet 4/14/2024 at AM x1 dose  Yes Yes   Sig: Take 5 mg by mouth 2 times daily   albuterol (PROAIR HFA/PROVENTIL HFA/VENTOLIN HFA) 108 (90 Base) MCG/ACT inhaler Unknown at prn  Yes Yes   Sig: Inhale 2 puffs into the lungs every 6 hours as needed for shortness of breath or cough   atorvastatin (LIPITOR) 40 MG tablet 4/13/2024 at PM  Yes Yes   Sig: Take 40 mg by mouth every evening   benzonatate (TESSALON) 200 MG capsule Unknown at prn  Yes Yes   Sig: Take 200 mg by mouth 3 times daily as needed for cough   digoxin (LANOXIN) 125 MCG tablet 4/13/2024 at PM  Yes Yes   Sig: Take 125 mcg by mouth every evening   escitalopram (LEXAPRO) 20 MG tablet 4/13/2024 at AM  Yes Yes   Sig: take 1 tablet by mouth once daily   fluticasone (FLONASE) 50 MCG/ACT nasal spray Unknown at prn  Yes Yes   Sig: Spray 2 sprays into both nostrils daily as needed for rhinitis or allergies   ipratropium - albuterol 0.5 mg/2.5 mg/3 mL (DUONEB) 0.5-2.5 (3) MG/3ML neb solution 4/13/2024 at PM  Yes Yes   Sig: Take 1 vial by nebulization 3 times daily   metoprolol succinate ER (TOPROL XL) 50 MG 24 hr tablet 4/13/2024 at Bedtime  Yes Yes   Sig: Take 50 mg by mouth At Bedtime      Facility-Administered Medications: None     Allergies   Allergies   Allergen Reactions     Azithromycin Other (See Comments)     Causes C-Diff-  Patients PCP states this is not true    Cefprozil GI Disturbance    Ciprofloxacin GI Disturbance    Erythromycin GI Disturbance    Metronidazole GI Disturbance    Spiriva Respimat [Tiotropium Bromide Monohydrate] Visual Disturbance    Venlafaxine Nausea and Vomiting       Social History   I have reviewed this patient's social history and updated it with pertinent information if needed. Jaylen Lamas      Family History   I have reviewed this patient's family history and updated it with pertinent information if needed.   No family history on file.    Review of Systems   The 10 point Review of Systems is negative other than noted in the HPI or here.     Physical Exam   Temp: 98  F (36.7  C) Temp src: Oral BP: 118/51 Pulse: 87   Resp: 22 SpO2: 92 % O2 Device: Nasal cannula Oxygen Delivery: 1 LPM  Vital Signs with Ranges  Temp:  [98  F (36.7  C)-98.4  F (36.9  C)] 98  F (36.7  C)  Pulse:  [83-87] 87  Resp:  [18-22] 22  BP: ()/(50-51) 118/51  SpO2:  [88 %-96 %] 92 %  131 lbs 1.6 oz    Constitutional: Awake, alert, cooperative, no apparent distress, and appears stated age.  Eyes: Lids and lashes normal, pupils equal, round and reactive to light, extra ocular muscles intact, sclera clear, conjunctiva normal.  ENT: Normocephalic, without obvious abnormality, atraumatic, sinuses nontender on palpation, external ears without lesions, oral pharynx with moist mucus membranes, tonsils without erythema or exudates, gums normal and good dentition.  Respiratory: No increased work of breathing, good air exchange, clear to auscultation bilaterally, no crackles or wheezing.  Cardiovascular: Normal apical impulse, regular rate and rhythm, normal S1 and S2, no S3 or S4, and no murmur noted.  GI: No scars, normal bowel sounds, soft, non-distended, non-tender, no masses palpated, no hepatosplenomegaly.  Lymph/Hematologic: No cervical lymphadenopathy and no supraclavicular  "lymphadenopathy.  Skin: No bruising or bleeding, normal skin color, texture, turgor, no redness, warmth, or swelling, no rashes, no lesions, no abnormal moles, nails normal without discoloration or clubbing and no jaundice.  Musculoskeletal: There is no redness, warmth, or swelling of the joints.  Full range of motion noted.  Motor strength is 5 out of 5 all extremities bilaterally.  Tone is normal.  Neurologic: Awake, alert, oriented to name, place and time.  Cranial nerves II-XII are grossly intact.  Motor is 5 out of 5 bilaterally.  Cerebellar finger to nose, heel to shin intact.  Sensory is intact.  Babinski down going, Romberg negative, and gait is normal.   Neuropsychiatric: Calm, normal eye contact, alert, normal affect, oriented to self, place, time and situation, memory for past and recent events intact and thought process normal.       No data to display                All cultures:  No results for input(s): \"CULT\" in the last 168 hours.    Imaging:        Data   Results for orders placed or performed during the hospital encounter of 04/14/24 (from the past 24 hour(s))   Potassium   Result Value Ref Range    Potassium 3.5 3.4 - 5.3 mmol/L   Potassium   Result Value Ref Range    Potassium 3.6 3.4 - 5.3 mmol/L       I personally spent 65 minutes on the date of the encounter doing chart review, history and exam, documentation and further activities per the note.       Pulmonary will continue to follow.       DAKOTA Alvarez   of Medicine  [facility]  Pulmonary, Allergy, Critical Care and Sleep Medicine  Pager - [phone]   "

## 2024-04-19 NOTE — PLAN OF CARE
PRIMARY DIAGNOSIS: PNEUMONIA  OUTPATIENT/OBSERVATION GOALS TO BE MET BEFORE DISCHARGE:  Dyspnea improved and O2 sats >88% on RA or back to baseline O2 levels: No   SpO2: 93 %, O2 Device: Nasal cannula    Tolerating oral abx or appropriate plans made outpatient infusion: Yes    Vitals signs normal or return to baseline: Yes    Short term supplemental O2 needed with activity at home: Yes    Tolerate oral intake to maintain hydration: Yes    Return to near baseline physical activity: No    Discharge Planner Nurse   Safe discharge environment identified: Yes  Barriers to discharge: Yes       Entered by: Radha Burger RN 04/18/2024 9:58 PM     Please review provider order for any additional goals.   Nurse to notify provider when observation goals have been met and patient is ready for discharge.  Problem: Pneumonia  Goal: Fluid Balance  Outcome: Progressing  Goal: Resolution of Infection Signs and Symptoms  Outcome: Progressing  Goal: Effective Oxygenation and Ventilation  Outcome: Progressing  Intervention: Optimize Oxygenation and Ventilation  Recent Flowsheet Documentation  Taken 4/18/2024 2156 by Radha Burger RN  Airway/Ventilation Management: airway patency maintained  Head of Bed (HOB) Positioning: HOB at 30-45 degrees  Taken 4/18/2024 2100 by Radha Burger RN  Head of Bed (HOB) Positioning: HOB at 30-45 degrees     Problem: Chest Pain  Goal: Resolution of Chest Pain Symptoms  Outcome: Progressing     Problem: Gas Exchange Impaired  Goal: Optimal Gas Exchange  Outcome: Progressing  Intervention: Optimize Oxygenation and Ventilation  Flowsheets  Taken 4/18/2024 2156  Airway/Ventilation Management: airway patency maintained  Head of Bed (HOB) Positioning: HOB at 30-45 degrees  Taken 4/18/2024 2100  Head of Bed (HOB) Positioning: HOB at 30-45 degrees   Goal Outcome Evaluation:

## 2024-04-19 NOTE — PROGRESS NOTES
CLINICAL NUTRITION SERVICES - REASSESSMENT NOTE    RECOMMENDATIONS FOR MD/PROVIDER TO ORDER: Pt with minimal po intake. Can consider an appetite stimulant, if appropriate. If poor po persists, can consider nutrition support    Recommendations Ordered by Registered Dietitian (RD):   Trial of cherry Gel+  Replaced 1 Ensure Enlive with an apple Ensure Clear    Future/Additional Recommendations: monitor supplement tolerance and po vs wt trends    Malnutrition: 4/15   % Weight Loss:  5% in 1 month (moderate malnutrition) - 5% wt loss in 1 month   % Intake:  Decreased intake does not meet criteria for malnutrition - will meet if poor po persists (4/19)  Subcutaneous Fat Loss:  None observed - suspect low baseline stores  Muscle Loss:  Clavicle bone region moderate depletion and Upper Arm region moderate depletion  Fluid Retention: trace both ankles and feet (4/19)     Malnutrition Diagnosis: Moderate malnutrition  In Context of: Chronic illness or disease     EVALUATION OF PROGRESS TOWARD GOALS   Diet:  Regular, Beneprotein, Ensure Enlive breakfast and dinner, and Magic Cups with lunch and dinner    Intake/Tolerance:  Jaylen was down at a procedure and his spouse was in the room to endorse the poor intakes. She she encourages him to drink the Ensure's but wasn't sure if he's overwhelmed and only wanted one. While discussing other options she said he loves juice and might like replace that strawberry ensure with an apple Ensure clear. She thought it was also worth trying a cherry Gel+  - Flowsheets show a fair appetite and x1-2 intakes/day of 50%.   - Health Touch shows pt receiving meals BID and supplements above.     ASSESSED NUTRITION NEEDS:  Dosing Weight 59.5 kg (actual)  Estimated Energy Needs: 4941-0482 kcals (30-35 Kcal/Kg)  Justification: COPD and underweight   Estimated Protein Needs: 60-89 grams protein (1-1.5 g pro/Kg)  Justification:  CKD II and Repletion  Estimated Fluid Needs: 9094-7540  mL (1  mL/Kcal)  Justification: maintenance    NEW FINDINGS:   General/Plan:      Weight:   04/15/24 0616 59.5 kg (131 lb 1.6 oz) Standing scale   04/14/24 1707 59.3 kg (130 lb 11.7 oz) Bed scale     GI: LBM x1 on yesterday    Skin: Edema - trace both ankles and feet    Meds: Reviewed    Labs: Reviewed -     Previous Goals:   PO intake - >75% of meal trays, or the equivalent in supplements, TID   Weight - Maintain  Evaluation: Not Met and met    Previous Nutrition Diagnosis:   Inadequate protein-energy intake related to Increased nutrient needs 2/2 multiple co-morbidities as evidenced by 5% wt loss in 1 month and moderate muscle wasting   Evaluation: No change    CURRENT NUTRITION DIAGNOSIS  Inadequate protein-energy intake related to Increased nutrient needs 2/2 multiple co-morbidities as evidenced by 5% wt loss in 1 month and moderate muscle wasting     INTERVENTIONS  Recommendations / Nutrition Prescription  Trial of cherry Gel+  Replaced 1 Ensure Enlive with an apple Ensure Clear     Implementation  Medical food supplement therapy    Goals  PO - >50% of meal trays and/or supplements TID   2.   Wt - Maintain    MONITORING AND EVALUATION:  Progress towards goals will be monitored and evaluated per protocol and Practice Guideline    Damon Briceño RD, LD

## 2024-04-19 NOTE — OP NOTE
Report called to MARY Sharma on observation unit.  Informed lavage obtained and sent to lab, results pending. Pt tolerated procedure, VSS, pt on 2L O2.  Transported via cart back to observation unit.

## 2024-04-20 NOTE — PLAN OF CARE
PRIMARY DIAGNOSIS: PNEUMONIA  OUTPATIENT/OBSERVATION GOALS TO BE MET BEFORE DISCHARGE:  Dyspnea improved and O2 sats >88% on RA or back to baseline O2 levels: No   SpO2: 96 %, O2 Device: Nasal cannula    Tolerating oral abx or appropriate plans made outpatient infusion: Yes    Vitals signs normal or return to baseline: Yes    Short term supplemental O2 needed with activity at home: Yes    Tolerate oral intake to maintain hydration: Yes    Return to near baseline physical activity: No    Discharge Planner Nurse   Safe discharge environment identified: No  Barriers to discharge: Yes       Entered by: Pauline Deleon RN 04/20/2024 1:14 PM     Please review provider order for any additional goals.   Nurse to notify provider when observation goals have been met and patient is ready for discharge.Goal Outcome Evaluation:    VSS, afeb today.Wife in room with pt. for most of morning.Wife informed RN that pt. has been confused, hallucinating at times, and is irritable today.Pt. hasn't had any narcotics, I wearing 2 L NC and satting mid 90's.Pt. is forgetting all sorts of things today, is unable to answer what city he is currently in, who the  is, and what/where he ordered for lunch.Wife states that he kept thinking that he ordered his lunch from [employer].Attempts at reorientation unsuccessful.Wife understandibly concerned.RN informed provider of confusion and hallucination issues.Will con't to monitor closely.

## 2024-04-20 NOTE — PROGRESS NOTES
[facility]    Medicine Progress Note - Hospitalist Service    Date of Admission:  4/14/2024  Date of Service: 04/20/2024    Assessment & Plan     Jaylen Lamas is a 73 year old male with Pmhx of COPD, tobacco use, HFpEF, bioprosthetic aortic valve replacement, bioprosthetic mitral valve replacement, paroxysmal atrial fibrillation s/p ablation and MAZE procedure, 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 difficile, hypertension, CKD, history of duodenal ulcer, depression/anxiety, testicular cancer s/p orchiectomy, who was admitted on 4/14/2024 after presenting for generalized weakness, persistent productive cough and was found to have right perihilar/lower lobe pneumonia.    Right Lower Lobe Opacities concerning for Pneumonia  Patient has been ill since early March, 2024. Presented to ED for ongoing productive cough, dyspnea on exertion, chills, global weakness. No respiratory distress or hypoxia on presentation.   He has now failed treatment with two separate courses of antibiotic (levquin & doxycycline), both with steroids. With lack of improvement and systemic symptoms, there is concern for failed outpatient treatment of bacterial pneumonia vs non bacterial etiology for pneumonia. CT Imaging shows new dense consolidative airspace opacity within the superior segment right lower lobe with some patchy consolidative and groundglass opacities throughout the remainder of the right lower lobe concerning for pneumonia, bilateral upper lobe paramediastinal subpleural opacity that is unchanged.  No lymphadenopathy. No history of tuberculosis, HIV. No travel history. Hx of COPD, non oxygen dependent.  COVID, influenza A/B, RSV negative.  Legionella, strep pneumo were negative.  Sputum culture is in process.  Fungitell is in process as well.  CRP elevated and Pro-Bhavin negative.  Negative Pro-Bhavin likely in the setting of recent 2 rounds  of antibiotic course  Plan:  -Follow WBC  -monitor continous pulse oximetry  -Antibiotics: continue broadened coverage with Zosyn, zithromax for now for atypical coverage. He is on digoxin (Holding 4/14) which interacts with Azithromycin. Ideally will be able to discontinue azithromycin if not requiring. Would not replace azithromycin with Doxycyline as recently completed 14 day course.   -supportive cares, antitussives prn  -ID following -> multiple noninvasive studies ordered for fungal infection   -Pulmonology consulted -> bronchoscopy 04/19 -> Pertinent findings include increased  mucosal swelling of the bronchus intermedius with functional reduction in the size of the lumen.   -Follow BAL results  -Check CXR 04/17 -> light improvement in  the dense opacification in the superior aspect of the right lower lobe. Large right lower lobe bleb is slightly increased.  -Continue PTA Duo neb treatments and PRN Albuterol. No wheezing or clinical signs currently of COPD exacerbation would not further steroids at this time.  -Follow up CT scan recommended in 8 weeks time to ensure resolution   Feeling much better since admission.  Still needing 1 L of oxygen by nasal cannula.  Wean as tolerated.   multiple cultures from the [facility] procedure are currently pending  Viral panel returned negative    Completed course of azithromycin.  Digoxin restarted     Hypokalemia  Mild. Replaced in ED.  -Monitor     Weight Loss  At risk of malnutrition with ongoing low appetite and illness.  Albumin and protein are low.  Patient has lost 7 pounds since March.  -Added supplements ensure  .   Consider nutrition consultation     History of C difficile   Positive 4/2019.  Has had loose stool, last episode 4/12. No abdominal pain, nausea, vomiting. No colitis on imaging.   -Continue prophylactic Vanocmycin course while being treated again with antibiotics given multiple courses of antibiotic therapy      Prediabetes   A1c checked in ED,  6.3.  -monitor blood sugar      COPD  At baseline not oxygen dependent.  -No wheezes or rhonchi on exam  -Continue Breo Ellipta, albuterol nebulizer and Flonase     HFpEF  Chronic right ventricular dysfunction  HT  Mitral and Aortic Bioprosthetic valve replacements   Nonobstructive coronary artery disease  Appears compensated, more hypovolemic. Did have leg swelling last week which improved after he took a previously prescribed dose of lasix.  Last echo in January 2023 shows normal left ventricular systolic function with EF of 61%, right ventricle is mildly enlarged with moderately reduced global RV function.  Aortic and mitral valve appear to be well functioning.  -Continue PTA Toprol XL 50 mg, digoxin 0.125 mg daily Lipitor 20 mg  -IV Lasix 40mg x1 on 04/16 for some bibasilar crackles.   -limit use of IV fluids   -no diuresis needed at this time.     Paroxysmal atrial fibrillation s/p ablation and MAZE procedure  History of complete heart block status post pacemaker placement  Hypertension  Rate controlled.   -Continue PTA Toprol XL 50 mg, Lipitor 20 mg  -He is on digoxin (Holding 4/14) which interacts with Azithromycin. Ideally will be able to discontinue azithromycin if not requiring. Would not replace azithromycin at this time with Doxycyline as recently completed 14 day course.   Plan:  -continue Apixaban for stroke prophylaxis.      CKD stage II.  -Near baseline of Creatinine 1-1.1   -bmp in AM     History of duodenal ulcer  -protonix while admitted for GI PPX, has been on steroids and systemically anticoagulated      Depression/anxiety  -Continue Lexapro     Testicular Cancer   S/p orchiectomy, chemotherapy and radiation      Incidental imaging findings   -Bilateral nonobstructing nephrolithiasis with at least one punctate right renal calculi and 5 left renal calculi measuring 4 mm or less. No ureteral calculus nor hydronephrosis.   -cholelithiasis, asymptomatic.        Diet: Combination Diet Regular Diet  Adult  Snacks/Supplements Adult: Beneprotein; Between Meals  Snacks/Supplements Adult: Magic Cup; With Meals  Snacks/Supplements Adult: Ensure Enlive; With Meals    DVT Prophylaxis: DOAC  Krueger Catheter: Not present  Lines: None     Cardiac Monitoring: None  Code Status: Full Code      Clinically Significant Risk Factors        # Hypokalemia: Lowest K = 3.1 mmol/L in last 2 days, will replace as needed       # Hypoalbuminemia: Lowest albumin = 2.7 g/dL at 4/14/2024 12:42 PM, will monitor as appropriate               # Moderate Malnutrition: based on nutrition assessment           Disposition Plan     Medically Ready for Discharge: Anticipated in 2-4 Days           Elisabeth Esqueda MD  Hospitalist Service  [facility]  Securely message with [employer] (more info)  Text page via [employer] Paging/Directory   ______________________________________________________________________    Interval History     No acute event overnight. Denied any chest pain. Still with significant coughing / productive. Overall reports he feels improved since admission  Doing well post bronch  O2 needs are stable.  No fevers  No nausea / vomiting  No new complaints     Physical Exam   Vital Signs: Temp: 98.2  F (36.8  C) Temp src: Oral BP: 117/64 Pulse: 87   Resp: 18 SpO2: 96 % O2 Device: Nasal cannula Oxygen Delivery: 2 LPM (found on 2 Lpm)  Weight: 133 lbs 9.6 oz    Constitutional: Awake, alert,  no apparent distress. Fatigued appearing.   Respiratory: No abnormal work of breathing. Diminished lung sounds throughout right. Rales present and overall very coarse breath sounds.   Cardiovascular: Regular rate and rhythm, normal S1 and S2, and murmur noted.  GI: Soft, non-distended, non-tender, bowel sounds present. No rebound tenderness or guarding.  Lymph/Hematologic: No anterior cervical or supraclavicular adenopathy.  Skin: No rashes, no cyanosis, no edema.  Musculoskeletal: No deformities noted.  No erythema or tenderness.    Neurologic: No focal deficits noted. Speech is clear. Coordination and strength grossly normal. Amarilis.  Psychiatric: Appropriate affect.    Medical Decision Making       55 MINUTES SPENT BY ME on the date of service doing chart review, history, exam, documentation & further activities per the note.      Data   ------------------------- PAST 24 HR DATA REVIEWED -----------------------------------------------

## 2024-04-20 NOTE — PLAN OF CARE
Shift from [id number]     Inpatient Progress Note:  For complete assessment see flow sheet documentation.     Orientation: Pt AO x4.   Pain status: Denies pain  Activity: Up with A1  Resp: Pt on 1L O2, 90's  Cardiac: WDL  GI: Pt has loose stool  : WDL  LDA: PIV in R AC.   Infusions: SL    Diet: Regular diet  Pertinent Labs: K  Safety: Bed alarm on, call light within reach, bed in low position  Consults: Pulmonology  Discharge Plan: TBD      Problem: Pneumonia  Goal: Fluid Balance  Outcome: Progressing  Goal: Resolution of Infection Signs and Symptoms  Outcome: Progressing  Intervention: Prevent Infection Progression  Recent Flowsheet Documentation  Taken 4/19/2024 2016 by Irais Gómez, RN  Isolation Precautions: droplet precautions maintained  Goal: Effective Oxygenation and Ventilation  Outcome: Progressing  Intervention: Optimize Oxygenation and Ventilation  Recent Flowsheet Documentation  Taken 4/19/2024 2016 by Irais Gómez, RN  Head of Bed (HOB) Positioning: HOB at 30-45 degrees     Problem: Chest Pain  Goal: Resolution of Chest Pain Symptoms  Outcome: Progressing     Problem: Gas Exchange Impaired  Goal: Optimal Gas Exchange  Outcome: Progressing  Intervention: Optimize Oxygenation and Ventilation  Recent Flowsheet Documentation  Taken 4/19/2024 2016 by Irais Gómez RN  Head of Bed (HOB) Positioning: HOB at 30-45 degrees

## 2024-04-21 NOTE — PROGRESS NOTES
"Clinical Swallow Evaluation  [facility]     04/21/24 1100   Appointment Info   Signing Clinician's Name / Credentials (SLP) Natasha Schumacher MS, CCC-SLP   General Information   Onset of Illness/Injury or Date of Surgery 04/14/24   Referring Physician Elisabeth Esqueda MD   Patient/Family Therapy Goal Statement (SLP) to go home   Pertinent History of Current Problem Per provider note: \"Jaylen Lamas is a 73 year old male with Pmhx of COPD, tobacco use, HFpEF, bioprosthetic aortic valve replacement, bioprosthetic mitral valve replacement, paroxysmal atrial fibrillation s/p ablation and MAZE procedure, 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 difficile, hypertension, CKD, history of duodenal ulcer, depression/anxiety, testicular cancer s/p orchiectomy, who was admitted on 4/14/2024 after presenting for generalized weakness, persistent productive cough and was found to have right perihilar/lower lobe pneumonia.\" Per RN, no concerns with swallowing. Pt has been tolerating current diet with no issues. MD requesting swallow evaluation d/t ongoing issues with pneumonia and increased O2 needs.   General Observations Pt alert and sitting upright in bed. Wife arrived during evaluation   Type of Evaluation   Type of Evaluation Swallow Evaluation   Oral Motor   Oral Musculature generally intact   Structural Abnormalities none present   Dentition (Oral Motor)   Dentition (Oral Motor) natural dentition;other (see comments)  (some dental implants)   Facial Symmetry (Oral Motor)   Facial Symmetry (Oral Motor) WNL   Lip Function (Oral Motor)   Lip Range of Motion (Oral Motor) WNL   Lip Strength (Oral Motor) WFL   Tongue Function (Oral Motor)   Tongue Strength (Oral Motor) WFL   Tongue ROM (Oral Motor) WNL   Cough/Swallow/Gag Reflex (Oral Motor)   Volitional Throat Clear/Cough (Oral Motor) WNL   Volitional Swallow (Oral Motor) WNL   Vocal " Quality/Secretion Management (Oral Motor)   Vocal Quality (Oral Motor) WFL   Secretion Management (Oral Motor) WNL   General Swallowing Observations   Past History of Dysphagia Seen by SLP at [facility] 6/24 with no concerns with swallowing, speech/language, or cognitive-linguistic   Respiratory Support nasal cannula  (2 lpm)   Current Diet/Method of Nutritional Intake (General Swallowing Observations, NIS) regular diet;thin liquids (level 0)   Swallowing Evaluation Clinical swallow evaluation   Clinical Swallow Evaluation   Feeding Assistance no assistance needed   Clinical Swallow Evaluation Textures Trialed thin liquids;solid foods   Clinical Swallow Eval: Thin Liquid Texture Trial   Mode of Presentation, Thin Liquids straw;self-fed   Volume of Liquid or Food Presented 3 oz   Oral Phase of Swallow WFL   Pharyngeal Phase of Swallow intact   Diagnostic Statement No overt s/sx of aspiration with all trials of thin liquids   Clinical Swallow Evaluation: Solid Food Texture Trial   Mode of Presentation self-fed   Volume Presented one cookie   Oral Phase WFL   Pharyngeal Phase intact   Diagnostic Statement No overt s/sx of aspiration with all trials of regular solids   Esophageal Phase of Swallow   Patient reports or presents with symptoms of esophageal dysphagia No   Swallowing Recommendations   Diet Consistency Recommendations regular diet;thin liquids (level 0)   Supervision Level for Intake patient independent   Mode of Delivery Recommendations bolus size, small;slow rate of intake   Swallowing Maneuver Recommendations alternate food and liquid intake   Monitoring/Assistance Required (Eating/Swallowing) monitor for cough or change in vocal quality with intake   Recommended Feeding/Eating Techniques (Swallow Eval) patient is independent, no specific recommendations   Medication Administration Recommendations, Swallowing (SLP) per pt preference   Instrumental Assessment Recommendations VFSS (videofluoroscopic swallowing  study)   General Therapy Interventions   Planned Therapy Interventions Dysphagia Treatment   Clinical Impression   Criteria for Skilled Therapeutic Interventions Met (SLP Eval) Yes, treatment indicated   SLP Diagnosis No apparent appreciable dysphagia, however VFSS may be warranted to r/o silent aspiration as patient's pnuemonia has been recurrent   Risks & Benefits of therapy have been explained evaluation/treatment results reviewed;care plan/treatment goals reviewed;risks/benefits reviewed;current/potential barriers reviewed;participants voiced agreement with care plan;participants included;patient;spouse/significant other   Clinical Impression Comments Clinical swallow evaluation completed per MD order. Pt continues to require O2 via nasal cannula. No apparent appreciable dysphagia. No overt s/sx of aspiration with all textures trialed. Patient may benefit from VFSS to further assess swallow and r/o silent aspiration as pneumonia has been recurrent over the past few months, per patient and spouse.   SLP Total Evaluation Time   Eval: oral/pharyngeal swallow function, clinical swallow Minutes (50124) 8   SLP Goals   Therapy Frequency (SLP Eval) 4 times/week   SLP Predicted Duration/Target Date for Goal Attainment 04/28/24   SLP Goals Swallow;SLP Goal 1   SLP: Safely tolerate diet without signs/symptoms of aspiration Regular diet;Thin liquids;Independently   SLP: Goal 1 Complete VFSS   Interventions   Interventions Quick Adds Swallowing Dysfunction   Swallowing Dysfunction &/or Oral Function for Feeding   Treatment of Swallowing Dysfunction &/or Oral Function for Feeding Minutes (17631) 8   Symptoms Noted During/After Treatment None   Treatment Detail/Skilled Intervention Education for role/purpose of SLP, aspiration/pneumonia, VFSS, safe swallow strategies with pt and spouse verbalizing understanding and agreement   SLP Discharge Planning   SLP Plan diet tolerance; VFSS?   SLP Discharge Recommendation home with  assist   SLP Rationale for DC Rec swallow appears to be at baseline   SLP Brief overview of current status  Regular solids and thin liquids; VFSS could be beneficial even at OP level to r/o silent aspiration   Total Session Time   Total Session Time (sum of timed and untimed services) 16                                                                                [facility]      OUTPATIENT SPEECH LANGUAGE PATHOLOGY EVALUATION  PLAN OF TREATMENT FOR OUTPATIENT REHABILITATION  (COMPLETE FOR INITIAL CLAIMS ONLY)  Patient's Last Name, First Name, M.I.  YOB: 1950  Jaylen Lamas                        Provider's Name  [facility] Medical Record No.  [id number]                               Onset Date:  04/14/24  Start of Care Date: 04/21/24    Type:     ___PT   ___OT   _X_SLP Medical Diagnosis: Pneumonia of right middle lobe due to infectious organism          SLP Diagnosis:  No apparent appreciable dysphagia, however VFSS may be warranted to r/o silent aspiration as patient's pnuemonia has been recurrent  Visits from SOC:  1   ________________________________________________________________  Plan of Treatment/Functional Goals    Planned Interventions:   Dysphagia Treatment                Goals: See Speech Language Pathology Goals on Care Plan in [facility] electronic health record.    Therapy Frequency:4 times/week   Predicted Duration of Therapy Intervention: 04/28/24  ________________________________________________________________________________    I CERTIFY THE NEED FOR THESE SERVICES FURNISHED UNDER        THIS PLAN OF TREATMENT AND WHILE UNDER MY CARE .             Physician Signature               Date    X_____________________________________________________                  Certification date from: 04/21/24 Certification date to: 05/17/24    Referring Physician: Elisabeth Esqueda MD           Initial Assessment        See Speech Language  Pathology documentation in Epic electronic health record, evaluation dated  04/21/24

## 2024-04-21 NOTE — PLAN OF CARE
PRIMARY DIAGNOSIS: ACUTE PAIN  OUTPATIENT/OBSERVATION GOALS TO BE MET BEFORE DISCHARGE:  1. Pain Status: Pain free.    2. Return to near baseline physical activity: No    3. Cleared for discharge by consultants (if involved): Yes    Discharge Planner Nurse   Safe discharge environment identified: No  Barriers to discharge: Yes  Problem: Adult Inpatient Plan of Care  Goal: Absence of Hospital-Acquired Illness or Injury  Intervention: Identify and Manage Fall Risk  Recent Flowsheet Documentation  Taken 4/21/2024 0900 by Pauline Deleon RN  Safety Promotion/Fall Prevention: activity supervised  Intervention: Prevent Skin Injury  Recent Flowsheet Documentation  Taken 4/21/2024 0900 by Pauline Deleon RN  Body Position: position changed independently  Intervention: Prevent and Manage VTE (Venous Thromboembolism) Risk  Recent Flowsheet Documentation  Taken 4/21/2024 0900 by Pauline Deleon RN  VTE Prevention/Management: SCDs (sequential compression devices) off  Intervention: Prevent Infection  Recent Flowsheet Documentation  Taken 4/21/2024 0900 by Pauline Deleon RN  Infection Prevention: rest/sleep promoted     Problem: Gas Exchange Impaired  Goal: Optimal Gas Exchange  Intervention: Optimize Oxygenation and Ventilation  Recent Flowsheet Documentation  Taken 4/21/2024 0900 by Pauline Deleon RN  Head of Bed (HOB) Positioning: HOB at 30-45 degrees     Problem: Gas Exchange Impaired  Goal: Optimal Gas Exchange  Outcome: Not Progressing  Intervention: Optimize Oxygenation and Ventilation  Recent Flowsheet Documentation  Taken 4/21/2024 0900 by Pauline Deleon RN  Head of Bed (HOB) Positioning: HOB at 30-45 degrees     Problem: Pneumonia  Goal: Resolution of Infection Signs and Symptoms  Intervention: Prevent Infection Progression  Recent Flowsheet Documentation  Taken 4/21/2024 0900 by Pauline Deleon RN  Isolation Precautions: droplet precautions maintained  Goal: Effective Oxygenation and  Ventilation  Intervention: Promote Airway Secretion Clearance  Recent Flowsheet Documentation  Taken 4/21/2024 0900 by Pauline Deleon RN  Cough And Deep Breathing: done independently per patient  Intervention: Optimize Oxygenation and Ventilation  Recent Flowsheet Documentation  Taken 4/21/2024 0900 by Pauline Deleon RN  Head of Bed ([facility]) Positioning: HOB at 30-45 degrees     Problem: Pneumonia  Goal: Fluid Balance  Outcome: Progressing  Goal: Resolution of Infection Signs and Symptoms  Outcome: Progressing  Intervention: Prevent Infection Progression  Recent Flowsheet Documentation  Taken 4/21/2024 0900 by Pauline Deleon RN  Isolation Precautions: droplet precautions maintained  Goal: Effective Oxygenation and Ventilation  Outcome: Not Progressing  Intervention: Promote Airway Secretion Clearance  Recent Flowsheet Documentation  Taken 4/21/2024 0900 by Pauline Deleon RN  Cough And Deep Breathing: done independently per patient  Intervention: Optimize Oxygenation and Ventilation  Recent Flowsheet Documentation  Taken 4/21/2024 0900 by Pauline Deleon RN  Head of Bed (HOB) Positioning: HOB at 30-45 degrees     Problem: Chest Pain  Goal: Resolution of Chest Pain Symptoms  Outcome: Progressing          Entered by: Pauline Deleon RN 04/21/2024 2:04 PM     Please review provider order for any additional goals.   Nurse to notify provider when observation goals have been met and patient is ready for discharge.Goal Outcome Evaluation:    VSS, afeb, remains confused this AM.Lungs quite coarse and has 02 on @ 2 L.Up with stand by assist of 1 to BR.Appetite fair.MD in this AM to assess. Roadtest completed as per MD wishes.(See pt. Data).Dropped O2 sats to 84% when out in hallway walking.Tolerance was also very poor.Pt. unable to ambulate very far,became fatigued and was taken back to room. 3 L O2 as per NC was applied as pt. Was slow to recover on 2 L NC, so RN upped O2 to 3,with pt. Recovering better from drop in  sats.Pt. was put back to bed, speech path here to eval swallowing.Pt. to DC sometime tomorrow with home O2.RN to call home care facility to arrange o2 delivery.wife in room currently with pt.Will con't to monitor for any s/s infection and con't to monitor pulmonary situation.

## 2024-04-21 NOTE — PLAN OF CARE
Shift from [id number]     Inpatient Progress Note:  For complete assessment see flow sheet documentation.     Orientation: Pt AO x4.   Pain status: Denies pain  Activity: Up with A1  Resp: Pt on 2L O2, Sats in the upper 90's  Cardiac: WDL  GI: Pt has had 2 semi formed bowel movements  : WDL  LDA: PIV in R AC.   Infusions: SL    Diet: Regular diet  Pertinent Labs: K  Safety: Bed alarm on, call light within reach, bed in low position  Consults: Pulmonology, ID  Discharge Plan: TBD    Problem: Pneumonia  Goal: Fluid Balance  Outcome: Progressing  Goal: Resolution of Infection Signs and Symptoms  Outcome: Progressing  Intervention: Prevent Infection Progression  Recent Flowsheet Documentation  Taken 4/20/2024 2051 by Irais Gómez RN  Isolation Precautions: droplet precautions maintained  Goal: Effective Oxygenation and Ventilation  Outcome: Progressing  Intervention: Promote Airway Secretion Clearance  Recent Flowsheet Documentation  Taken 4/20/2024 2051 by Irais Gómez, RN  Cough And Deep Breathing: done independently per patient  Intervention: Optimize Oxygenation and Ventilation  Recent Flowsheet Documentation  Taken 4/20/2024 2051 by Irais Gómez RN  Head of Bed ([facility]) Positioning: HOB at 30-45 degrees     Problem: Chest Pain  Goal: Resolution of Chest Pain Symptoms  Outcome: Progressing     Problem: Gas Exchange Impaired  Goal: Optimal Gas Exchange  Outcome: Progressing  Intervention: Optimize Oxygenation and Ventilation  Recent Flowsheet Documentation  Taken 4/20/2024 2051 by Irais Gómez RN  Head of Bed ([facility]) Positioning: HOB at 30-45 degrees

## 2024-04-21 NOTE — CONSULTS
Care Management Follow Up    Length of Stay (days): 7    Expected Discharge Date: 04/21/2024     Concerns to be Addressed:     discharge planning       Additional Information:  SW consulted for discharge planning and home care. Patient is confused. Patient's wife was not in the room when checking. SW called and LVM for patient's wife requesting a call back.     SW will continue to follow.     ANA Walsh, [facility]  Emergency Room   Please contact the SW on the floor in which the patient is staying for any questions or concerns

## 2024-04-21 NOTE — PROGRESS NOTES
[facility]    Medicine Progress Note - Hospitalist Service    Date of Admission:  4/14/2024  Date of Service: 04/21/2024    Assessment & Plan     Jaylen Lamas is a 73 year old male with Pmhx of COPD, tobacco use, HFpEF, bioprosthetic aortic valve replacement, bioprosthetic mitral valve replacement, paroxysmal atrial fibrillation s/p ablation and MAZE procedure, 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 difficile, hypertension, CKD, history of duodenal ulcer, depression/anxiety, testicular cancer s/p orchiectomy, who was admitted on 4/14/2024 after presenting for generalized weakness, persistent productive cough and was found to have right perihilar/lower lobe pneumonia.    Right Lower Lobe Opacities concerning for Pneumonia  Patient has been ill since early March, 2024. Presented to ED for ongoing productive cough, dyspnea on exertion, chills, global weakness. No respiratory distress or hypoxia on presentation.   He has now failed treatment with two separate courses of antibiotic (levquin & doxycycline), both with steroids. With lack of improvement and systemic symptoms, there is concern for failed outpatient treatment of bacterial pneumonia vs non bacterial etiology for pneumonia. CT Imaging shows new dense consolidative airspace opacity within the superior segment right lower lobe with some patchy consolidative and groundglass opacities throughout the remainder of the right lower lobe concerning for pneumonia, bilateral upper lobe paramediastinal subpleural opacity that is unchanged.  No lymphadenopathy. No history of tuberculosis, HIV. No travel history. Hx of COPD, non oxygen dependent.  COVID, influenza A/B, RSV negative.  Legionella, strep pneumo were negative.  Sputum culture is in process.  Fungitell is in process as well.  CRP elevated and Pro-Bhavin negative.  Negative Pro-Bhavin likely in the setting of recent 2 rounds  of antibiotic course  Plan:  -Follow WBC  -monitor continous pulse oximetry  -Antibiotics: continue broadened coverage with Zosyn, zithromax for now for atypical coverage. He is on digoxin (Holding 4/14) which interacts with Azithromycin. Ideally will be able to discontinue azithromycin if not requiring. Would not replace azithromycin with Doxycyline as recently completed 14 day course.   -supportive cares, antitussives prn  -ID following -> multiple noninvasive studies ordered for fungal infection   -Pulmonology consulted -> bronchoscopy 04/19 -> Pertinent findings include increased  mucosal swelling of the bronchus intermedius with functional reduction in the size of the lumen.   -Follow BAL results  -Check CXR 04/17 -> light improvement in  the dense opacification in the superior aspect of the right lower lobe. Large right lower lobe bleb is slightly increased.  -Continue PTA Duo neb treatments and PRN Albuterol. No wheezing or clinical signs currently of COPD exacerbation would not further steroids at this time.  -Follow up CT scan recommended in 8 weeks time to ensure resolution   Feeling much better since admission.  Still needing 1 L of oxygen by nasal cannula.  Wean as tolerated.   multiple cultures from the bron procedure are currently pending  Viral panel returned negative.  Bowel culture of fluid negative so far.  Legionella  Negative so far.  Nocardia negative so far.  No actinomyces isolated.  No fungal elements seen on KOH testing.  Histoplasma antigen quantitative returned negative.  Fungal antibodies testing returned negative.    Speech path consulted as patient has been having recurrent pneumonia to rule out aspiration.  They are planning on video swallow study tomorrow as per the patient's wife at bedside after evaluation on 4/21/2024    Completed course of azithromycin.  Digoxin restarted    7-day course of antibiotic completed so far.  IV Zosyn switched to oral Augmentin for 3 more days for  completion of 10-day course of antibiotics    Patient is still needing 2 L oxygen at rest and 3 L of oxygen with activity.  Home oxygen assessment done.  Home O2 orders placed     Hypokalemia  Mild. Replaced in ED.  -Monitor     Weight Loss  At risk of malnutrition with ongoing low appetite and illness.  Albumin and protein are low.  Patient has lost 7 pounds since March.  -Added supplements ensure  .   Consider nutrition consultation     History of C difficile   Positive 4/2019.  Has had loose stool, last episode 4/12. No abdominal pain, nausea, vomiting. No colitis on imaging.   -Continue prophylactic Vanocmycin course 1 week beyond the completion of antibiotic course     Prediabetes   A1c checked in ED, 6.3.  -monitor blood sugar      COPD  At baseline not oxygen dependent.  -No wheezes or rhonchi on exam  -Continue Breo Ellipta, albuterol nebulizer and Flonase     HFpEF  Chronic right ventricular dysfunction  HT  Mitral and Aortic Bioprosthetic valve replacements   Nonobstructive coronary artery disease  Appears compensated, more hypovolemic. Did have leg swelling last week which improved after he took a previously prescribed dose of lasix.  Last echo in January 2023 shows normal left ventricular systolic function with EF of 61%, right ventricle is mildly enlarged with moderately reduced global RV function.  Aortic and mitral valve appear to be well functioning.  -Continue PTA Toprol XL 50 mg, digoxin 0.125 mg daily Lipitor 20 mg  -IV Lasix 40mg x1 on 04/16 for some bibasilar crackles.   -limit use of IV fluids   -no diuresis needed at this time.     Paroxysmal atrial fibrillation s/p ablation and MAZE procedure  History of complete heart block status post pacemaker placement  Hypertension  Rate controlled.   -Continue PTA Toprol XL 50 mg, Lipitor 20 mg, Eliquis  -He is on digoxin (Holding 4/14) which interacts with Azithromycin. Ideally will be able to discontinue azithromycin if not requiring. Would not  replace azithromycin at this time with Doxycyline as recently completed 14 day course.   Digoxin restarted now As patient completed course of azithromycin  Plan:  -continue Apixaban for stroke prophylaxis.      CKD stage II.  -Near baseline of Creatinine 1-1.1   -bmp in AM     History of duodenal ulcer  -protonix while admitted for GI PPX, has been on steroids and systemically anticoagulated      Depression/anxiety  -Continue Lexapro     Testicular Cancer   S/p orchiectomy, chemotherapy and radiation      Incidental imaging findings   -Bilateral nonobstructing nephrolithiasis with at least one punctate right renal calculi and 5 left renal calculi measuring 4 mm or less. No ureteral calculus nor hydronephrosis.   -cholelithiasis, asymptomatic.        Diet: Combination Diet Regular Diet Adult  Snacks/Supplements Adult: Beneprotein; Between Meals  Snacks/Supplements Adult: Magic Cup; With Meals  Snacks/Supplements Adult: Ensure Enlive; With Meals  Diet    DVT Prophylaxis: DOAC  Krueger Catheter: Not present  Lines: None     Cardiac Monitoring: None  Code Status: Full Code      Clinically Significant Risk Factors        # Hypokalemia: Lowest K = 3.1 mmol/L in last 2 days, will replace as needed       # Hypoalbuminemia: Lowest albumin = 2.7 g/dL at 4/14/2024 12:42 PM, will monitor as appropriate               # Moderate Malnutrition: based on nutrition assessment           Disposition Plan     Medically Ready for Discharge: Most likely tomorrow after video swallow study    Discussed with bedside RN patient and his wife at bedside    Plan discharged home with home health care services home RN PT OT on discharge           Elisabeth Esqueda MD  Hospitalist Service  [facility]  Securely message with [employer] (more info)  Text page via [employer] Paging/Directory   ______________________________________________________________________    Interval History     No acute event overnight. Denied any chest pain. Overall  reports he feels improved since admission  Needs 2 L of oxygen by nasal cannula at rest and 3 L with activity  No fevers  No nausea / vomiting  No new complaints     Physical Exam   Vital Signs: Temp: 97.1  F (36.2  C) Temp src: Oral BP: 128/66 Pulse: 74   Resp: 16 SpO2: 97 % O2 Device: Nasal cannula Oxygen Delivery: 2 LPM  Weight: 133 lbs 9.6 oz    Constitutional: Awake, alert,  no apparent distress. Fatigued appearing.   Respiratory: No abnormal work of breathing. Diminished lung sounds throughout right. Rales present and overall very coarse breath sounds.   Cardiovascular: Regular rate and rhythm, normal S1 and S2, and murmur noted.  GI: Soft, non-distended, non-tender, bowel sounds present. No rebound tenderness or guarding.  Lymph/Hematologic: No anterior cervical or supraclavicular adenopathy.  Skin: No rashes, no cyanosis, no edema.  Musculoskeletal: No deformities noted.  No erythema or tenderness.   Neurologic: No focal deficits noted. Speech is clear. Coordination and strength grossly normal. Amarilis.  Psychiatric: Appropriate affect.    Medical Decision Making       55 MINUTES SPENT BY ME on the date of service doing chart review, history, exam, documentation & further activities per the note.      Data   ------------------------- PAST 24 HR DATA REVIEWED -----------------------------------------------

## 2024-04-21 NOTE — PROGRESS NOTES
Oxygen Documentation  I certify that this patient, Jaylen Lamas has been under my care (or a nurse practitioner or physican's assistant working with me). This is the face-to-face encounter for oxygen medical necessity.      At the time of this encounter, I have reviewed the qualifying testing and have determined that supplemental oxygen is reasonable and necessary and is expected to improve the patient's condition in a home setting.         Patient has continued oxygen desaturation due to Emphysema J43.9  Pneumonia J18.9.    If portability is ordered, is the patient mobile within the home? yes      Elisabeth Esqueda MD   Hospitalist   Page [phone](7AM-6PM)

## 2024-04-22 NOTE — PROGRESS NOTES
[facility]  Infectious Disease Progress Note          Assessment and Plan:   Date of Admission:  4/14/2024  Date of Consult (When I saw the patient): 04/18/24        Assessment & Plan  Jaylen Lamas is a 73 year old who was admitted on 4/14/2024.      Impression: 1 73-year-old male with complicated past medical history,  now 2-month history of vague illness including significant respiratory symptoms, at admission has definite infiltrates, hypoxia probably this is pneumonia but the history is a larger differential diagnosis including concern for endocarditis/bacteremia.   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  5  complete heart block with pacemaker  6  chronic obstructive lung disease     REC 1 completed Z-Junior in 7 days IV Zosyn, now Augmentin, will do another 5 days of Augmentin and stop antibiotic  2 negative blood cultures, however compromised by preceding antibiotics  3 pulmonary noted and bronchoscopy results coming back with essentially negative studies agree with pulmonary EBV and CMV positive of no significance here neither is Candida in the cultures  4 multiple negative noninvasive studies ordered for fungal infection  5 unclear pulmonary diagnosis, follow-up imaging and follow-up with pulmonary, question noninfectious pulmonary process.  6 1 week after completing the oral Augmentin course I would get blood cultures x 2 which is because of lingering  slight concern about an alternative diagnosis such as endocarditis                 Interval History:     no new complaints about same overall, mild shortness of breath, no major fever, no positive microbiology except as noted above, Candida, EBV and CMV unlikely significant all other studies negative              Medications:     Current Facility-Administered Medications   Medication Dose Route Frequency Provider Last Rate Last Admin    amoxicillin-clavulanate (AUGMENTIN) 875-125 MG per tablet 1 tablet  1 tablet Oral Q12H [facility] (08/20) Elisabeth Esqueda MD   1 tablet at 04/22/24 0943    apixaban ANTICOAGULANT (ELIQUIS) tablet 5 mg  5 mg Oral BID Cinthia Lockwood PA-C   5 mg at 04/22/24 0943    atorvastatin (LIPITOR) tablet 40 mg  40 mg Oral QPM Cinthia Lockwood PA-C   40 mg at 04/21/24 2145    digoxin (LANOXIN) tablet 125 mcg  125 mcg Oral QPM Elisabeth Esqueda MD   125 mcg at 04/21/24 2145    escitalopram (LEXAPRO) tablet 20 mg  20 mg Oral Daily Cinthia Lockwood PA-C   20 mg at 04/22/24 0943    fluticasone-vilanterol (BREO ELLIPTA) 100-25 MCG/ACT inhaler 1 puff  1 puff Inhalation Daily Cinthia Lockwood PA-C   1 puff at 04/22/24 0750    ipratropium - albuterol 0.5 mg/2.5 mg/3 mL (DUONEB) neb solution 3 mL  3 mL Nebulization 4x daily Cinthia Lockwood PA-C   3 mL at 04/22/24 1131    lactobacillus rhamnosus (GG) (CULTURELL) capsule 1 capsule  1 capsule Oral BID Elisabeth Esqueda MD   1 capsule at 04/22/24 0944    metoprolol succinate ER (TOPROL XL) 24 hr tablet 50 mg  50 mg Oral At Bedtime Cinthia Lockwood PA-C   50 mg at 04/21/24 2145    pantoprazole (PROTONIX) EC tablet 40 mg  40 mg Oral QAM AC Cinthia Lockwood PA-C   40 mg at 04/22/24 0944    sodium chloride (PF) 0.9% PF flush 3 mL  3 mL Intracatheter Q8H Cinthia Lockwood PA-C   3 mL at 04/22/24 0944                  Physical Exam:   Blood pressure 118/56, pulse 79, temperature 97.9  F (36.6  C), temperature source Oral, resp. rate 26,  "height 1.702 m (5' 7\"), weight 66.1 kg (145 lb 11.6 oz), SpO2 92%.  Wt Readings from Last 2 Encounters:   04/22/24 66.1 kg (145 lb 11.6 oz)   05/18/23 63.1 kg (139 lb 1.6 oz)     Vital Signs with Ranges  Temp:  [97.9  F (36.6  C)-98.3  F (36.8  C)] 97.9  F (36.6  C)  Pulse:  [79-88] 79  Resp:  [18-28] 26  BP: (105-143)/(56-63) 118/56  SpO2:  [90 %-99 %] 92 %    Constitutional: Awake, alert, cooperative, no apparent distress     Lungs: Congestion to auscultation bilaterally, no crackles or wheezing   Cardiovascular: Regular rate and rhythm, normal S1 and S2, and no murmur noted   Abdomen: Normal bowel sounds, soft, non-distended, non-tender   Skin: No rashes, no cyanosis, no edema   Other:           Data:   All microbiology laboratory data reviewed.  Recent Labs   Lab Test 04/20/24  0704 04/17/24  0859 04/16/24  1027   WBC 12.8* 15.2* 15.5*   HGB 9.7* 10.1* 10.7*   HCT 31.5* 32.7* 36.2*   MCV 84 84 87    272 236     Recent Labs   Lab Test 04/20/24  0704 04/19/24  0709 04/18/24  0635   CR 1.11 1.18* 1.19*     No lab results found.  No lab results found.    Invalid input(s): \"UC\"     "

## 2024-04-22 NOTE — PROGRESS NOTES
Patient has been assessed for Home Oxygen needs. Oxygen readings:    *Pulse oximetry (SpO2) = 87% on room air at rest while awake.    *SpO2 improved to 90% on 2 liters/minute at rest.    *SpO2 = 85% on room air during activity/with exercise.    *SpO2 improved to 93% on 3 liters/minute during activity/with exercise.

## 2024-04-22 NOTE — PROGRESS NOTES
[facility]   Inpatient VFSS   04/22/24 0055   Appointment Info   Signing Clinician's Name / Credentials (SLP) Vivi Garrido MS CCC SLP   General Information   Onset of Illness/Injury or Date of Surgery 04/14/24   Referring Physician Elisabeth Esqueda MD   Patient/Family Therapy Goal Statement (SLP) None stated   Pertinent History of Current Problem Per provider documentation - Jaylen Lamas is a 73 year old male with Pmhx of COPD, tobacco use, HFpEF, bioprosthetic aortic valve replacement, bioprosthetic mitral valve replacement, paroxysmal atrial fibrillation s/p ablation and MAZE procedure, 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 difficile, hypertension, CKD, history of duodenal ulcer, depression/anxiety, testicular cancer s/p orchiectomy, who was admitted on 4/14/2024 after presenting for generalized weakness, persistent productive cough and was found to have right perihilar/lower lobe pneumonia.   General Observations Pt is agreeable to evaluation.   Type of Evaluation   Type of Evaluation Swallow Evaluation   General Swallowing Observations   Swallowing Evaluation Videofluoroscopic swallow study (VFSS)   VFSS Evaluation   Radiologist Dr. Crain   Views Taken left lateral   Physical Location of Procedure [facility] (inpatient)   VFSS Textures Trialed thin liquids;mildly thick liquids;pureed;solid foods   VFSS Eval: Thin Liquid Texture Trial   Mode of Presentation, Thin Liquid spoon;straw;self-fed   Order of Presentation 1 2 4 6, 7-8 w/ chin tuck   Preparatory Phase WFL   Oral Phase, Thin Liquid premature pharyngeal entry   Bolus Location When Swallow Triggered pyriforms   Pharyngeal Phase, Thin Liquid impaired tongue base retraction;pharyngeal wall coating   Rosenbek's Penetration Aspiration Scale: Thin Liquid Trial Results 3 - contrast remains above the vocal cords, visible residue remains (penetration)   Strategies and  Compensations chin tuck   Diagnostic Statement Intermittent penetration with thin liquids secondary to premature spillage. No aspiration, varible depths of penetration. Single/small sips was a successful strategy. Chin tuck manuever also reduced penetration   VFSS Eval: Mildly Thick Liquids   Mode of Presentation cup;self-fed   Order of Presentation 9   Preparatory Phase WFL   Oral Phase WFL   Pharyngeal Phase WFL   Rosenbek's Penetration Aspiration Scale 1 - no aspiration, contrast does not enter airway   Diagnostic Statement No penetration or aspiration   VFSS Evaluation: Puree Solid Texture Trial   Mode of Presentation, Puree spoon;self-fed   Order of Presentation 3   Preparatory Phase WFL   Oral Phase, Puree WFL   Bolus Location When Swallow Triggered valleculae   Pharyngeal Phase, Puree impaired tongue base retraction;pharyngeal wall coating;residue in vallecula   Rosenbek's Penetration Aspiration Scale: Puree Food Trial Results 1 - no aspiration, contrast does not enter airway   Diagnostic Statement No penetration or aspiration   VFSS Evaluation: Solid Food Texture Trial   Mode of Presentation, Solid self-fed   Order of Presentation 5   Preparatory Phase WFL   Oral Phase, Solid WFL   Bolus Location When Swallow Triggered valleculae   Pharyngeal Phase, Solid impaired tongue base retraction;pharyngeal wall coating;residue in vallecula   Rosenbek's Penetration Aspiration Scale: Solid Food Trial Results 1 - no aspiration, contrast does not enter airway   Diagnostic Statement No penetration, aspiration, or significant pharyngeal residue   Esophageal Phase of Swallow   Patient reports or presents with symptoms of esophageal dysphagia No   Swallowing Recommendations   Diet Consistency Recommendations regular diet;thin liquids (level 0)   Supervision Level for Intake patient independent   Mode of Delivery Recommendations bolus size, small;slow rate of intake   Swallowing Maneuver Recommendations alternate food and  liquid intake;extra swallow   Monitoring/Assistance Required (Eating/Swallowing) monitor for cough or change in vocal quality with intake   Recommended Feeding/Eating Techniques (Swallow Eval) minimize distractions during oral intake;maintain upright sitting position for eating   Medication Administration Recommendations, Swallowing (SLP) As tolerated, per pt preference   General Therapy Interventions   Planned Therapy Interventions Dysphagia Treatment   Clinical Impression   Criteria for Skilled Therapeutic Interventions Met (SLP Eval) Yes, treatment indicated   SLP Diagnosis Oropharyngeal dysphagia   Risks & Benefits of therapy have been explained evaluation/treatment results reviewed;care plan/treatment goals reviewed;participants voiced agreement with care plan;participants included;patient   Clinical Impression Comments Pt seen for VFSS evaluation to further assess aspiration risk. He consumed all PO trials without aspiration. Intermittent penetration with thin liquids or variable depth. This was improved using the strategy of small/single sips. If pt continues to present with intermittent signs of aspiration a chin tuck maneuver reduced the penetration in 2/2 trials. Mildly thick liquids and solids were unremarkable. Deficits noted to be primarily related to premature spillage of liquids, reaching the pyriforms at the start of the swallow. Mildly reduced tongue base retraction and pharyngeal stripping wave observed.       Oropharyngeal dysphagia. Continue a regular diet and thin liquids - utilize an upright position and consistently small/single sips. If overt s/sx of aspiration occur, a chin tuck maneuver was helpful in reducing the amount of penetration. SLP will follow for tolerance and education.   SLP Total Evaluation Time   Evaluation, videofluoroscopic eval of swallow function Minutes ([id number]) 45

## 2024-04-22 NOTE — PROGRESS NOTES
Care Management Discharge Note    Discharge Date: 04/22/2024     Discharge Disposition:  Home    Discharge Services:  HC - RN/PT/OT    Discharge DME:  O2    Discharge Transportation:  wife    Private pay costs discussed: Not applicable    Handoff Referral Completed: No    Additional Information:  [facility] following for discharge planning. Noted pt has discharge orders for today with home care RN/PT/OT.     Home care referral sent to [facility].     ADDENDUM:     Pt has been accepted by [facility] for RN/PT/OT. Updated pt and pt's wife. Wife will transport home.     Social work will continue to follow and assist with discharge planning as needed.    TERESITA Bishop, LSW  Care Coordination  [phone]    TERESITA Russell

## 2024-04-22 NOTE — PLAN OF CARE
PRIMARY DIAGNOSIS: PNEUMONIA  OUTPATIENT/OBSERVATION GOALS TO BE MET BEFORE DISCHARGE:  Dyspnea improved and O2 sats >88% on RA or back to baseline O2 levels: No   SpO2: 93 %, O2 Device: Nasal cannula    Tolerating oral abx or appropriate plans made outpatient infusion: Yes    Vitals signs normal or return to baseline: Yes    Short term supplemental O2 needed with activity at home: Yes    Tolerate oral intake to maintain hydration: No    Return to near baseline physical activity: Yes    Discharge Planner Nurse   Safe discharge environment identified: Yes  Barriers to discharge: Yes       Entered by: TORSTEN SABILLON RN 04/22/2024    Vital signs:  Temp: 97.9  F (36.6  C) Temp src: Oral BP: 118/56 Pulse: 88   Resp: 23 SpO2: 93 % O2 Device: Nasal cannula Oxygen Delivery: 2 LPM Alert and oriented x4.    Please review provider order for any additional goals.   Nurse to notify provider when observation goals have been met and patient is ready for discharge.    Problem: Pneumonia  Goal: Fluid Balance  Outcome: Progressing  Goal: Resolution of Infection Signs and Symptoms  Outcome: Progressing  Goal: Effective Oxygenation and Ventilation  Outcome: Progressing     Problem: Chest Pain  Goal: Resolution of Chest Pain Symptoms  Outcome: Progressing     Problem: Gas Exchange Impaired  Goal: Optimal Gas Exchange  Outcome: Progressing

## 2024-04-22 NOTE — PLAN OF CARE
"Care from [id number]  Inpatient Progress Note    /56 (BP Location: Left arm)   Pulse 87   Temp 98.3  F (36.8  C) (Oral)   Resp 28   Ht 1.702 m (5' 7\")   Wt 60.6 kg (133 lb 9.6 oz)   SpO2 97%   BMI 20.92 kg/m      A&O to self and place. Intermittent confusion/forgetfulness. VSS, tolerating 1L via NC. Up w/ A1 gbw-- can be unsteady. SOB and lightheadedness w/ activity. LS coarse. Frequent congested cough. Given robitussin x2. PRN neb x1. Regular diet, poor appetite. PRN tylenol given for leg pain/tenderness. R AC PIV SL. K replaced overnight, recheck in process this AM. Sleeping b/t cares. Bed alarm on. Safety checks implemented.     Problem: Adult Inpatient Plan of Care  Goal: Absence of Hospital-Acquired Illness or Injury  Intervention: Identify and Manage Fall Risk  Recent Flowsheet Documentation  Taken 4/21/2024 2154 by Madison Banks, RN  Safety Promotion/Fall Prevention:   activity supervised   clutter free environment maintained   nonskid shoes/slippers when out of bed   patient and family education   increase visualization of patient   safety round/check completed   treat reversible contributory factors   treat underlying cause  Intervention: Prevent Infection  Recent Flowsheet Documentation  Taken 4/21/2024 2154 by Madison Banks, RN  Infection Prevention: rest/sleep promoted   Goal Outcome Evaluation:                        "

## 2024-04-22 NOTE — PROGRESS NOTES
Brief Pulmonary Progress Note    Reviewed BAL studies. Thus far, only positives include CMV and EBV testing. I do not feel either of these are the underlying etiology for his presentation. CMV pneumonitis is typically seen in immunocompromised patients. EBV rarely causes significant disease. I agree with ABx per primary/ID. Agree with home O2. He may follow up in pulmonary clinic as an outpatient with repeat CT chest.    Guy Vega MD  Pulmonary Disease and Critical Care Medicine  [facility]

## 2024-04-22 NOTE — DISCHARGE SUMMARY
[facility]  Hospitalist Discharge Summary      Date of Admission:  4/14/2024  Date of Discharge:  4/22/2024  6:38 PM  Discharging Provider: Stacy Herzog DO  Discharge Service: Hospitalist Service    Discharge Diagnoses   Right lower lobe opacities concerning for pneumonia  Hypokalemia, resolved  Weight loss  History of C. difficile  Prediabetes  COPD  HFpEF  Chronic RV dysfunction  Hypertension  Mitral and aortic bioprosthetic valve replacements  Nonobstructive CAD  Paroxysmal atrial fibrillation s/p ablation and MAZE procedure  History of complete heart block status post pacemaker placement  Hypertension  CKD stage II.  History of duodenal ulcer  Depression/anxiety  Testicular Cancer     Clinically Significant Risk Factors     # Moderate Malnutrition: based on nutrition assessment      Follow-ups Needed After Discharge   Follow-up Appointments     Follow-up and recommended labs and tests       F/u with PCP in 1week. Recheck CBC, BMP in 1week            Discharge Disposition   Discharged to home  Condition at discharge: Stable    Hospital Course   Jaylen Lamas is a 73 year old male with Pmhx of COPD, tobacco use, HFpEF, bioprosthetic aortic valve replacement, bioprosthetic mitral valve replacement, paroxysmal atrial fibrillation s/p ablation and MAZE procedure, 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 difficile, hypertension, CKD, history of duodenal ulcer, depression/anxiety, testicular cancer s/p orchiectomy, who was admitted on 4/14/2024 after presenting for generalized weakness, persistent productive cough and was found to have right perihilar/lower lobe pneumonia.     Right Lower Lobe Opacities concerning for Pneumonia  Patient has been ill since early March 2024. Presented to ED for ongoing productive cough, dyspnea on exertion, chills, global weakness. No respiratory distress or hypoxia on presentation.   He has  now failed treatment with two separate courses of antibiotic (levaquin & doxycycline), both with steroids. With lack of improvement and systemic symptoms, there is concern for failed outpatient treatment of bacterial pneumonia vs non bacterial etiology for pneumonia. CT Imaging shows new dense consolidative airspace opacity within the superior segment right lower lobe with some patchy consolidative and groundglass opacities throughout the remainder of the right lower lobe concerning for pneumonia, bilateral upper lobe paramediastinal subpleural opacity that is unchanged.  No lymphadenopathy. No history of tuberculosis, HIV. No travel history. Hx of COPD, non oxygen dependent.  COVID, influenza A/B, RSV negative.  Legionella, strep pneumo, nocardia, actinomyces negative. Fungitell and histoplasma negative. CRP elevated and Pro-Bhavin negative.   - Required 1L O2 despite symptomatic improvement. Home oxygen ordered.   - Pulm consulted   - Underwent bronchoscopy 4/19, which showed increased  mucosal swelling of the bronchus intermedius with functional reduction in the size of the lumen  - BAL results unrevealing. Only positives were CMV and EBV, which are unlikely to be cause of presentation.   - Agree with home O2.   - Follow up in pulm clinic with repeat CT chest.  - ID consulted  - Completed 7 days of Zosyn and 5 days of zithromax.    - Recommend another 5 days of Augmentin    - 1 week after completing antibiotics, recommend repeat blood cultures x2 as there is lingering slight concern this could be endocarditis  - SLP consulted to rule out aspiration. Swallow study done 4/22. Recommend regular diet with thin liquids.     Hypokalemia, resolved  Mild. Replaced in ED.     Weight Loss  At risk of malnutrition with ongoing low appetite and illness.  Albumin and protein are low.  Patient has lost 7 pounds since March.  -Added supplements ensure     History of C difficile   Positive 4/2019.  Has had loose stool, last episode  4/12. No abdominal pain, nausea, vomiting. No colitis on imaging.   -Continue prophylactic Vanocmycin course 1 week beyond the completion of antibiotic course     Prediabetes   A1c checked in ED, 6.3.     COPD  At baseline not oxygen dependent.  -No wheezes or rhonchi on exam  -Continue Breo Ellipta, albuterol nebulizer and Flonase     HFpEF  Chronic right ventricular dysfunction  HT  Mitral and Aortic Bioprosthetic valve replacements   Nonobstructive coronary artery disease  Appears compensated, more hypovolemic. Did have leg swelling last week which improved after he took a previously prescribed dose of lasix.  Last echo in January 2023 shows normal left ventricular systolic function with EF of 61%, right ventricle is mildly enlarged with moderately reduced global RV function.  Aortic and mitral valve appear to be well functioning.  -Continue PTA Toprol XL 50 mg, digoxin 0.125 mg daily Lipitor 20 mg  -IV Lasix 40mg x1 on 04/16 for some bibasilar crackles.      Paroxysmal atrial fibrillation s/p ablation and MAZE procedure  History of complete heart block status post pacemaker placement  Hypertension  Rate controlled.   -Continue PTA Toprol XL 50 mg, Lipitor 20 mg, Eliquis, digoxin     CKD stage II.  -Near baseline of Creatinine 1-1.1      History of duodenal ulcer  -protonix while admitted for GI PPX, has been on steroids and systemically anticoagulated      Depression/anxiety  -Continue Lexapro     Testicular Cancer   S/p orchiectomy, chemotherapy and radiation      Incidental imaging findings   -Bilateral nonobstructing nephrolithiasis with at least one punctate right renal calculi and 5 left renal calculi measuring 4 mm or less. No ureteral calculus nor hydronephrosis.   -cholelithiasis, asymptomatic.        Consultations This Hospital Stay   PULMONARY IP CONSULT  INFECTIOUS DISEASES IP CONSULT  SPEECH LANGUAGE PATH ADULT IP CONSULT  CARE MANAGEMENT / SOCIAL WORK IP CONSULT    Code Status   Prior    Time Spent  on this Encounter   I, Stacy Herzog DO, personally saw the patient today and spent greater than 30 minutes discharging this patient.       Stacy Herzog DO  [facility] OBSERVATION DEPT  [address]  [facility] [address]  Phone: [phone]  ______________________________________________________________________    Physical Exam   Vital Signs:                    Weight: 145 lbs 11.58 oz  Constitutional: Awake, alert, no distress, and cooperative  Cardiovascular: Regular rate and rhythm, normal S1 and S2, no S3 or S4, and no murmur noted  Respiratory: No increased work of breathing, good air exchange, clear to auscultation bilaterally, no crackles or wheezing  Gastrointestinal: Abdomen soft, non-tender, non-distended. BS normal. No masses, organomegaly        Primary Care Physician   Alexys Bella    Discharge Orders      Home Care Referral      Reason for your hospital stay    Pneumonia needing oxygen by NC     Follow-up and recommended labs and tests     F/u with PCP in 1week. Recheck CBC, BMP in 1week     Activity    Your activity upon discharge: activity as tolerated     Oxygen Adult/Peds    Oxygen Documentation  I certify that this patient, Jaylen Lamas has been under my care (or a nurse practitioner or physican's assistant working with me). This is the face-to-face encounter for oxygen medical necessity.      At the time of this encounter, I have reviewed the qualifying testing and have determined that supplemental oxygen is reasonable and necessary and is expected to improve the patient's condition in a home setting.         Patient has continued oxygen desaturation due to Emphysema J43.9  Pneumonia J18.9.    If portability is ordered, is the patient mobile within the home? yes    Was this visit performed as a telehealth visit: No     Diet    Follow this diet upon discharge: Orders Placed This Encounter      Snacks/Supplements Adult: Beneprotein; Between Meals       Snacks/Supplements Adult: Ensure Enlive; With Meals      Combination Diet Regular Diet Adult       Significant Results and Procedures   Results for orders placed or performed during the hospital encounter of 04/14/24   Chest XR,  PA & LAT    Narrative    EXAM: XR CHEST 2 VIEWS  LOCATION: [facility]  DATE: 04/14/2024    INDICATION: Generalized weakness, cough  x1 month. Check for pneumonia or CHF.  COMPARISON: Chest film 03/15/2024.      Impression    IMPRESSION: There is a new moderate-sized area of consolidative change involving the right perihilar region concerning for pneumonia. Hyperinflation of the lungs suggesting underlying COPD. Scarring of the mid left lung. No pleural fluid. Normal heart   size and pulmonary vascularity. Aortic calcification. Median sternotomy with previous aortic and mitral valve repair. Dual-lead cardiac pacemaker device is unchanged.     CT Chest/Abdomen/Pelvis w Contrast    Narrative    EXAM: CT CHEST/ABDOMEN/PELVIS W CONTRAST  LOCATION: [facility]  DATE: 4/14/2024    INDICATION: 1 month of cough and weight loss. Abnormal chest x-ray with right parahilar opacities.  COMPARISON: Neck CTA 12/4/2023 and 7/12/2023, Chest CTA 05/16/2023, CT abdomen and pelvis 10/30/2023  TECHNIQUE: CT scan of the chest, abdomen, and pelvis was performed following injection of IV contrast. Multiplanar reformats were obtained. Dose reduction techniques were used.   CONTRAST: isovue 370 mL 70    FINDINGS:   LUNGS AND PLEURA: Moderate upper lung predominant emphysema redemonstrated. Right posterior apical subpleural scarring and pleural thickening similar to 12/04/2023, improved compared with neck CTA 07/12/2023 and chest CTA 05/16/2023. Improvement in   previous left basilar patchy airspace opacities with some residual subpleural parenchymal scarring. Bilateral upper lobe paramediastinal subpleural opacity unchanged. New dense consolidative airspace opacity within  the superior segment right lower lobe   with some patchy consolidative and groundglass opacities throughout the remainder of the right lower lobe.    MEDIASTINUM/AXILLAE: Previous sternotomy and aortic/mitral valve replacement. Left subclavian dual-chamber pacemaker. No mass/adenopathy nor pericardial effusion. The heart is normal in size.    CORONARY ARTERY CALCIFICATION: Moderate.    HEPATOBILIARY: Cholelithiasis with no cholecystitis nor bile duct dilatation. No focal hepatic mass.    PANCREAS: Atrophic pancreas with nondilated pancreatic duct.    SPLEEN: Normal.    ADRENAL GLANDS: Normal.    KIDNEYS/BLADDER: Bilateral nonobstructing nephrolithiasis with at least one punctate right renal calculi and 5 left renal calculi measuring 4 mm or less. No ureteral calculus nor hydronephrosis. Simple cortical cysts do not require imaging follow-up.   Nondistended bladder.    BOWEL: Diverticulosis of the colon. No acute inflammatory change. No obstruction. No formed stool within the colon    LYMPH NODES: No lymphadenopathy.    VASCULATURE: Moderate aortoiliac atherosclerosis with no aneurysm    PELVIC ORGANS: Normal.    MUSCULOSKELETAL: No suspicious osseous lesions. Degenerative changes are present within the lumbar spine. Stable mild T12 compression deformity.      Impression    IMPRESSION:  1.  Dense airspace consolidation within the superior segment right lower lobe new since previous study favoring pneumonia. Consider short interval follow-up CT in 2 months to confirm resolution.  2.  Right posterior apical subpleural scarring and pleural thickening unchanged compared with neck CTA 12/04/2023 and the overall improved compared to older imaging.  3.  Cholelithiasis with no cholecystitis nor bile duct dilatation.  4.  Bilateral nonobstructing nephrolithiasis.   XR Chest Port 1 View    Narrative    XR CHEST PORT 1 VIEW   4/17/2024 11:43 AM     HISTORY: Shortness of breath    COMPARISON: CT and radiograph 4/14/2024       Impression    IMPRESSION: Stable size of cardiomediastinal silhouette status post  median sternotomy and valve replacements. Pacemaker leads again noted  overlying the right atrium and right ventricle. Slight improvement in  the dense opacification in the superior aspect of the right lower  lobe. Large right lower lobe bleb is slightly increased in  conspicuity, may be due to different technique but recommend  short-term follow-up radiograph to exclude component of pneumothorax.  No evidence of tension. Increased interstitial and alveolar opacities  throughout both lungs could represent a component of edema. Likely  trace bilateral pleural effusions. Bones are unchanged.    OSMANY RUSSELL MD         SYSTEM ID:  [id number]   XR Video Swallow with SLP or OT    Narrative    VIDEO SPEECH EVALUATION WITH OR WITHOUT ESOPHAGRAM  2024  3:14 PM     HISTORY: Generalized weakness, pneumonia.    COMPARISON: None.    FLUOROSCOPY TIME: 1 minutes.    SPOT FILMS: Nine.    TECHNIQUE: A modified barium swallow is performed with speech  pathology. Note that only the cervical esophagus was evaluated in  lateral view.      Impression    IMPRESSION: Penetration with thin liquids.    Please see the speech pathology report for further details.    SHANE HANDY MD         SYSTEM ID:  [id number]   Echocardiogram Complete     Value    LVEF  65-70%    Narrative    [id number]  [id number]  [id number]  [id number]^LE^GONZALO     [facility]  Echocardiography Laboratory  [address] [place] [address]     Name: SHANE ISSA  MRN: [id number]  : 1950  Study Date: 2024 01:34 PM  Age: 73 yrs  Gender: Male  Patient Location: [facility]  Reason For Study: Endocarditis  Ordering Physician: GONZALO LUJAN  Referring Physician: Roque Bella  Performed By: Qunag Nix RDCS     BSA: 1.7 m2  Height: 67 in  Weight: 131 lb  HR: 87  BP: 118/51  mmHg  ______________________________________________________________________________  Procedure  Complete Portable Echo Adult. Optison (NDC #9811-1027) given intravenously.  ______________________________________________________________________________  Interpretation Summary     Technically diffiuclt imaging  No vegetations identified  The rhythm was atrail paced with RBBB  There is a bioprosthetic mitral valve.(27 mm Velasquez Thermafix) MDG 9.9 mmHG @  HR 87  There is a bioprosthetic aortic valve.23 mm Magna Ease)  ( EOA 2.8cm2/ MSG 3 mmhg/  The gradient is normal for this prosthetic aortic valve.  The visual ejection fraction is 65-70%.  Left ventricular systolic function is normal.  The left ventricle is normal in size.  The right ventricle is normal in structure, function and size.  There is a pacemaker lead in the right ventricle.  There is mild-moderate biatrial enlargement.  There is no mitral regurgitation noted.  Right ventricular systolic pressure is elevated, consistent with moderate  pulmonary hypertension.     There are no old studies for comparison . The bioprosthetic mitral valve  gradient is higher than that reported on hte last outside study 1/2023 ( was  3.3 mmHg @ HR 83, now 9.9 mmHg @ HR 87) and a degree of bioprostehtic mitral  valve stenosis cannot entirely be excluded.     If there is ongoing concern about endocarditis, CHRISTIN would be a more sensitive  imaigng modality.     ______________________________________________________________________________  Left Ventricle  The left ventricle is normal in size. There is normal left ventricular wall  thickness. The visual ejection fraction is 65-70%. Left ventricular systolic  function is normal. Septal motion is consistent with post-operative state.  Septal wall motion abnormality may reflect pacemaker activation.     Right Ventricle  The right ventricle is normal in structure, function and size. There is a  pacemaker lead in the right ventricle.      Atria  There is mild-moderate biatrial enlargement. Pacer wire in right atrium. There  is no atrial shunt seen. The left atrial appendage is not well visualized.     Mitral Valve  There is no mitral regurgitation noted. The mean mitral valve gradient is 9.9  mmHg. There is a bioprosthetic mitral valve.     Tricuspid Valve  Normal tricuspid valve. There is moderate (2+) tricuspid regurgitation. The  right ventricular systolic pressure is elevated at 42.1 mmHg. Right  ventricular systolic pressure is elevated, consistent with moderate pulmonary  hypertension. There is no tricuspid stenosis.     Aortic Valve  There is a bioprosthetic aortic valve. The gradient is normal for this  prosthetic aortic valve.     Pulmonic Valve  Normal pulmonic valve. There is no pulmonic valvular regurgitation. There is  no pulmonic valvular stenosis.     Vessels  The aortic root is normal size. Normal size ascending aorta. The inferior vena  cava was normal in size with preserved respiratory variability. The pulmonary  artery is normal size.     Pericardium  The pericardium appears normal. There is no pleural effusion.     Rhythm  The rhythm was sinus with paced rhythm.  ______________________________________________________________________________  MMode/2D Measurements & Calculations  IVSd: 1.1 cm     LVIDd: 3.5 cm  LVIDs: 1.9 cm  LVPWd: 0.94 cm  IVC diam: 1.5 cm  FS: 46.0 %  LV mass(C)d: 108.7 grams  LV mass(C)dI: 64.4 grams/m2  Ao root diam: 3.4 cm  asc Aorta Diam: 3.3 cm  LVOT diam: 1.9 cm  LVOT area: 2.9 cm2  Ao root diam index Ht(cm/m): 2.0  Ao root diam index BSA (cm/m2): 2.0  Asc Ao diam index BSA (cm/m2): 2.0  Asc Ao diam index Ht(cm/m): 2.0  RWT: 0.54  TAPSE: 1.1 cm     Time Measurements  Aortic HR: 85.0 BPM     Doppler Measurements & Calculations  MV E max lencho: 219.1 cm/sec  MV max P.7 mmHg  MV mean P.9 mmHg  MV V2 VTI: 54.8 cm  MVA(VTI): 0.86 cm2  Ao V2 max: 84.2 cm/sec  Ao max P.8 mmHg  Ao V2 mean: 66.7  cm/sec  Ao mean P.9 mmHg  Ao V2 VTI: 16.2 cm  LORENZO(I,D): 2.9 cm2  LORENZO(V,D): 3.9 cm2  LV V1 max P.1 mmHg  LV V1 max: 113.0 cm/sec  LV V1 VTI: 16.0 cm  CO(LVOT): 4.0 l/min  CI(LVOT): 2.4 l/min/m2  SV(LVOT): 46.9 ml  SI(LVOT): 27.7 ml/m2  TR max : 323.6 cm/sec  TR max P.1 mmHg  AV  Ratio (DI): 1.3  LORENZO Index (cm2/m2): 1.7  RV S : 9.4 cm/sec     ______________________________________________________________________________  Report approved by: Dr. Guy Guzman 2024 03:19 PM             Discharge Medications   Discharge Medication List as of 2024  5:54 PM        START taking these medications    Details   amoxicillin-clavulanate (AUGMENTIN) 875-125 MG tablet Take 1 tablet by mouth every 12 hours for 3 days, Disp-6 tablet, R-0, E-Prescribe      vancomycin (VANCOCIN) 125 MG capsule Take 1 capsule (125 mg) by mouth 2 times daily for 10 days, Disp-20 capsule, R-0, E-Prescribe           CONTINUE these medications which have CHANGED    Details   lactobacillus rhamnosus, GG, (CULTURELL) capsule Take 1 capsule by mouth 2 times daily, Disp-20 capsule, R-0, Local Print           CONTINUE these medications which have NOT CHANGED    Details   albuterol (PROAIR HFA/PROVENTIL HFA/VENTOLIN HFA) 108 (90 Base) MCG/ACT inhaler Inhale 2 puffs into the lungs every 6 hours as needed for shortness of breath or cough, Historical      atorvastatin (LIPITOR) 40 MG tablet Take 40 mg by mouth every evening, Historical      benzonatate (TESSALON) 200 MG capsule Take 200 mg by mouth 3 times daily as needed for cough, Historical      BREO ELLIPTA 100-25 MCG/ACT inhaler Inhale 1 puff into the lungs daily, NEREYDA, Historical      digoxin (LANOXIN) 125 MCG tablet Take 125 mcg by mouth every evening, Historical      ELIQUIS ANTICOAGULANT 5 MG tablet Take 5 mg by mouth 2 times daily, NEREYDA, Historical      escitalopram (LEXAPRO) 20 MG tablet take 1 tablet by mouth once daily, Historical      fluticasone (FLONASE) 50 MCG/ACT  nasal spray Spray 2 sprays into both nostrils daily as needed for rhinitis or allergies, Historical      ipratropium - albuterol 0.5 mg/2.5 mg/3 mL (DUONEB) 0.5-2.5 (3) MG/3ML neb solution Take 1 vial by nebulization 3 times daily, Historical      metoprolol succinate ER (TOPROL XL) 50 MG 24 hr tablet Take 50 mg by mouth At Bedtime, Historical           Allergies   Allergies   Allergen Reactions    Azithromycin Other (See Comments)     Causes C-Diff-  Patients PCP states this is not true    Cefprozil GI Disturbance    Ciprofloxacin GI Disturbance    Erythromycin GI Disturbance    Metronidazole GI Disturbance    Spiriva Respimat [Tiotropium Bromide Monohydrate] Visual Disturbance    Venlafaxine Nausea and Vomiting

## 2024-04-22 NOTE — PLAN OF CARE
"Vitals: /47 (BP Location: Right arm)   Pulse 80   Temp 97.7  F (36.5  C) (Oral)   Resp 18   Ht 1.702 m (5' 7\")   Wt 66.1 kg (145 lb 11.6 oz)   SpO2 96%   BMI 22.82 kg/m       Admitted Diagnosis: Pneumonia     Neuro: alert and oriented x4. Able to make needs known.  Cardiac: wdl  Lungs: wdl ex for dyspnea on exertion. On 2L continuous O2 to keep sat >90%. Infrequent cough.     GI: wdl  : wdl  Pain: bilateral chronic leg pain. Tylenol and Ice pack in use.   IV: saline locked  Labs/Imaging: video swallow test completed today. Result pending.  Diet: regular diet. Poor appetite. Fluid intake encouraged.  Activity: SBA with a walker and gait belt.  Consult: Pulmonology, RT, SW, speech  Plan: discharge home today after video swallow test with home O2, RN, OT, PT and oral antibiotic. Wife to transport.        Problem: Pneumonia  Goal: Fluid Balance  4/22/2024 1548 by Silvano Sanford RN  Outcome: Progressing  4/22/2024 1043 by Silvano Sanford RN  Outcome: Progressing  Goal: Resolution of Infection Signs and Symptoms  4/22/2024 1548 by Silvano Sanford RN  Outcome: Progressing  4/22/2024 1043 by Silvano Sanford RN  Outcome: Progressing  Goal: Effective Oxygenation and Ventilation  4/22/2024 1548 by Silvano Sanford RN  Outcome: Progressing  4/22/2024 1043 by Silvano Sanford RN  Outcome: Progressing     Problem: Chest Pain  Goal: Resolution of Chest Pain Symptoms  4/22/2024 1548 by Silvano Sanford RN  Outcome: Progressing  4/22/2024 1043 by Silvano Sanford RN  Outcome: Progressing     Problem: Gas Exchange Impaired  Goal: Optimal Gas Exchange  4/22/2024 1548 by Silvano Sanford RN  Outcome: Progressing  4/22/2024 1043 by Silvano Sanford RN  Outcome: Progressing                  "

## 2024-04-23 NOTE — PROGRESS NOTES
[facility]    Background: Transitional Care Management program identified per system criteria and reviewed by [facility] team for possible outreach.    Assessment: Upon chart review, [facility] Team member will not proceed with patient outreach related to this episode of Transitional Care Management program due to reason below:    Patient declined to answer all post hospital discharge questions with CCRC team member and disconnected call.    Plan: Transitional Care Management episode addressed appropriately per reason noted above.      SUMA Sandoval  [facility]    *Connected Care Resource Team does NOT follow patient ongoing. Referrals are identified based on internal discharge reports and the outreach is to ensure patient has an understanding of their discharge instructions.

## 2024-04-25 PROBLEM — J18.9 PNEUMONIA OF RIGHT LOWER LOBE DUE TO INFECTIOUS ORGANISM: Status: ACTIVE | Noted: 2024-01-01

## 2024-04-25 PROBLEM — A09 DIARRHEA OF INFECTIOUS ORIGIN: Status: ACTIVE | Noted: 2024-01-01

## 2024-04-25 PROBLEM — R06.03 RESPIRATORY DISTRESS: Status: ACTIVE | Noted: 2024-01-01

## 2024-04-25 NOTE — PROGRESS NOTES
See H&P from my colleague Dr. Tovar from this morning.  Patient readmitted today for recurrent shortness of breath, cough and fever.  Found to have acute hypoxemic respiratory failure and concern for recurrent sepsis.  He was hospitalized here for over a week and discharged on 4/22 for respiratory failure.  He underwent bronchoscopy with BAL positive for CMV, EBV and Candida but otherwise negative.  He was treated with antibiotics and discharged on Augmentin.    Patient now Senia presents with febrile episode, leukocytosis and worsening shortness of breath.  He was discharged on 1 L of oxygen via nasal cannula but now requiring 4 L in the ER.  ID and pulmonary both re-consulted who were following him during his recent admission.    Vitals:    04/25/24 1126 04/25/24 1245 04/25/24 1300 04/25/24 1315   BP:  100/56  99/53   BP Location:       Pulse: 80 80 80 80   Resp:       Temp:       TempSrc:       SpO2: 98% 100% 99% 99%   Weight:       Height:         On exam, pt is laying at 30 degrees and eating/drinking when I enter the room. Comfortable. Periodic cough that is congested. Heart is regular. Crackles and diminished at right base. Ext with mild bilateral edema. He answers appropriately. No tremor.     CBC with improved leukocytosis this morning.  BMP unremarkable.  CT of the chest did show dense right lower lobe consolidative opacities that were worsened compared to 4/14/2024.  Upper lobe predominant emphysematous changes also noted with large right basilar bullae.    Plan:  -Continuing on IV Zosyn.  Vancomycin discontinued by infectious disease given negative bronchoscopy recently and no history of MRSA.  I do wonder if patient is having aspiration events.  When I went in room he was at 30 degrees and drinking/eating.   -Continuing Eliquis, atorvastatin, digoxin.  Holding metoprolol given softer pressures.  -Appreciate ID and pulmonary assistance.    Fito Brenner MD

## 2024-04-25 NOTE — PROGRESS NOTES
[facility]    Patient is currently receiving services with [facility]. The patient is currently receiving RN services.  Patient's  and home health team have been notified that patient is under inpatient status [facility] Liaison will continue to follow patient during stay. Please provide orders to resume home care at time of discharge if appropriate.

## 2024-04-25 NOTE — PROGRESS NOTES
Brief Pulmonary Note    73M COPD, tobacco abuse, HFpEF, AVR, MVR, pAfib s/p ablation, CHB s/p PPM, CAD, HTN, CKD admitted 4/25 w/ worsening dyspnea and fever. Pt had recent admission for pneumonia and was discharged 4/22. He was been started on pip-tazo and vanc. Seen by ID. Additionally, he is receiving ICS-LABA (Breo). Last hospitalization he was seen by my colleague Dr. Sullivan. BAL performed and unremarkable. Fungal serologies negative. CT chest this admission w/ worsening of dense RLL consolidation. He is afebrile, w/o leukocytosis, and normal procalcitonin. His NT-BNP is elevated, however, would not explain his CT findings. At this time, would continue ABx per ID. I doubt repeat bronchoscopy will be helpful given that this was just done and unremarkable.     Formal consult to follow tomorrow.     Guy Vega MD  Pulmonary Disease and Critical Care Medicine  [facility]

## 2024-04-25 NOTE — ED NOTES
[facility]  ED Nurse Handoff Report    ED Chief complaint: Shortness of Breath, Generalized Weakness, and Fever  . ED Diagnosis:   Final diagnoses:   Pneumonia of right lower lobe due to infectious organism   Respiratory distress   Diarrhea of infectious origin       Allergies:   Allergies   Allergen Reactions    Azithromycin Other (See Comments)     Causes C-Diff-  Patients PCP states this is not true    Cefprozil GI Disturbance    Ciprofloxacin GI Disturbance    Erythromycin GI Disturbance    Metronidazole GI Disturbance    Spiriva Respimat [Tiotropium Bromide Monohydrate] Visual Disturbance    Venlafaxine Nausea and Vomiting       Code Status: Full Code    Activity level - Baseline/Home:  standby.  Activity Level - Current:   standby and assist of 1.   Lift room needed: No.   Bariatric: No   Needed: No   Isolation: Yes.   Infection: Not Applicable  C-Diff Pending.     Respiratory status: Oximask    Vital Signs (within 30 minutes):   Vitals:    04/25/24 0300 04/25/24 0345 04/25/24 0400 04/25/24 0415   BP: 97/50 91/48 90/52 98/52   Pulse: 80 80 80 80   Resp:       Temp:       TempSrc:       SpO2: 98% 98% 98% 98%   Weight:       Height:           Cardiac Rhythm:  ,      Pain level:    Patient confused: No.   Patient Falls Risk: nonskid shoes/slippers when out of bed, arm band in place, assistive device/personal items within reach, activity supervised, and toileting schedule implemented.   Elimination Status: Has voided     Patient Report - Initial Complaint: shortness of breath, body weakness, fever  Focused Assessment: Arrived via EMS from home for SOB, body weakness, fever. Two days ago, patient was in ED. On antibiotics. Upon arrival, pt is tachypneic, with ongoing nebulization.   2 duonebs and Solu medrol 125 mg given by EMS. SBP at 80's mmHg upon arrival. Pt has history of pneumonia. Known COPD on 2LPM oxygen at home.   Patient also states having diarrhea.    Abnormal Results:    Labs Ordered and Resulted from Time of ED Arrival to Time of ED Departure   INR - Abnormal       Result Value    INR 1.85 (*)    COMPREHENSIVE METABOLIC PANEL - Abnormal    Sodium 132 (*)     Potassium 3.3 (*)     Carbon Dioxide (CO2) 26      Anion Gap 8      Urea Nitrogen 14.1      Creatinine 0.96      GFR Estimate 84      Calcium 9.1      Chloride 98      Glucose 138 (*)     Alkaline Phosphatase 114      AST 19      ALT 24      Protein Total 5.2 (*)     Albumin 2.4 (*)     Bilirubin Total 0.3     TROPONIN T, HIGH SENSITIVITY - Abnormal    Troponin T, High Sensitivity 50 (*)    NT PROBNP INPATIENT - Abnormal    N terminal Pro BNP Inpatient 7,193 (*)    ISTAT GASES LACTATE VENOUS POCT - Abnormal    Lactic Acid POCT 1.2      Bicarbonate Venous POCT 27      O2 Sat, Venous POCT 40 (*)     pCO2 Venous POCT 44      pH Venous POCT 7.40      pO2 Venous POCT 23 (*)    CBC WITH PLATELETS AND DIFFERENTIAL - Abnormal    WBC Count 12.2 (*)     RBC Count 3.74 (*)     Hemoglobin 9.8 (*)     Hematocrit 31.6 (*)     MCV 85      MCH 26.2 (*)     MCHC 31.0 (*)     RDW 14.2      Platelet Count 327      % Neutrophils 80      % Lymphocytes 10      % Monocytes 9      % Eosinophils 0      % Basophils 0      % Immature Granulocytes 1      NRBCs per 100 WBC 0      Absolute Neutrophils 9.7 (*)     Absolute Lymphocytes 1.2      Absolute Monocytes 1.1      Absolute Eosinophils 0.0      Absolute Basophils 0.0      Absolute Immature Granulocytes 0.1      Absolute NRBCs 0.0     INFLUENZA A/B, RSV, & SARS-COV2 PCR - Normal    Influenza A PCR Negative      Influenza B PCR Negative      RSV PCR Negative      SARS CoV2 PCR Negative     ROUTINE UA WITH MICROSCOPIC REFLEX TO CULTURE   PROCALCITONIN   BLOOD CULTURE   BLOOD CULTURE   C. DIFFICILE TOXIN B PCR WITH REFLEX TO C. DIFFICILE ANTIGEN AND TOXINS A/B EIA   ENTERIC BACTERIA AND VIRUS PANEL BY PCR        Chest CT w/o contrast   Final Result   IMPRESSION:    1.  Dense right lower lobe  consolidative pulmonary opacities, significantly worsened as compared to 04/14/2024 exam, likely infectious.   2.  Trace bilateral pleural effusions.   3.  Upper lobes predominant emphysematous changes with large right basilar bulla.   4.  Cholelithiasis without CT evidence of acute cholecystitis.         XR Chest Port 1 View   Final Result   IMPRESSION: Enlarging right upper lobe pneumonia.          Treatments provided: see MAR  Family Comments: N/A  OBS brochure/video discussed/provided to patient:  N/A  ED Medications:   Medications   sodium chloride 0.9% BOLUS 1,000 mL (0 mLs Intravenous Stopped 4/25/24 0150)   piperacillin-tazobactam (ZOSYN) 4.5 g vial to attach to  mL bag (0 g Intravenous Stopped 4/25/24 0400)       Drips infusing:  No  For the majority of the shift this patient was Green.   Interventions performed were N/A.    Sepsis treatment initiated: No    Cares/treatment/interventions/medications to be completed following ED care: see inpatient orders    ED Nurse Name: Ling Pepe RN  4:36 AM   RECEIVING UNIT ED HANDOFF REVIEW    Above ED Nurse Handoff Report was reviewed: Yes  Reviewed by: Lizet Patino RN on April 25, 2024 at 2:44 PM   DELLA Huber called the ED to inform them the note was read: Yes

## 2024-04-25 NOTE — PHARMACY-ADMISSION MEDICATION HISTORY
Pharmacist Admission Medication History    Admission medication history is complete. The information provided in this note is only as accurate as the sources available at the time of the update.    Information Source(s): Family member via phone    Pertinent Information:  Pt had 1 dose of augmentin left. He took 2/10 days of oral vanco.    Changes made to PTA medication list:  Added: None  Deleted: None  Changed: None    Allergies reviewed with patient and updates made in EHR: yes    Medication History Completed By: Veronica Patterson [facility] 4/25/2024 9:16 AM    PTA Med List   Medication Sig Last Dose    albuterol (PROAIR HFA/PROVENTIL HFA/VENTOLIN HFA) 108 (90 Base) MCG/ACT inhaler Inhale 2 puffs into the lungs every 6 hours as needed for shortness of breath or cough 4/25/2024 at am    atorvastatin (LIPITOR) 40 MG tablet Take 40 mg by mouth every evening 4/24/2024 at pm    benzonatate (TESSALON) 200 MG capsule Take 200 mg by mouth 3 times daily as needed for cough     BREO ELLIPTA 100-25 MCG/ACT inhaler Inhale 1 puff into the lungs daily 4/24/2024 at am    digoxin (LANOXIN) 125 MCG tablet Take 125 mcg by mouth every evening 4/24/2024 at pm    ELIQUIS ANTICOAGULANT 5 MG tablet Take 5 mg by mouth 2 times daily 4/24/2024 at pm    escitalopram (LEXAPRO) 20 MG tablet take 1 tablet by mouth once daily 4/24/2024 at am    fluticasone (FLONASE) 50 MCG/ACT nasal spray Spray 2 sprays into both nostrils daily as needed for rhinitis or allergies     ipratropium - albuterol 0.5 mg/2.5 mg/3 mL (DUONEB) 0.5-2.5 (3) MG/3ML neb solution Take 1 vial by nebulization 3 times daily Past Week    lactobacillus rhamnosus, GG, (CULTURELL) capsule Take 1 capsule by mouth 2 times daily 4/24/2024 at pm    metoprolol succinate ER (TOPROL XL) 50 MG 24 hr tablet Take 50 mg by mouth At Bedtime 4/23/2024 at hs    vancomycin (VANCOCIN) 125 MG capsule Take 1 capsule (125 mg) by mouth 2 times daily for 10 days 4/24/2024 at pm

## 2024-04-25 NOTE — ED PROVIDER NOTES
History     Chief Complaint:  Shortness of Breath, Generalized Weakness, and Fever       HPI   Jaylen Lamas is a 73 year old male anticoagulated on Eliquis with history of COPD, pneumonia, CAD, A-Fib, pulmonary emphysema, obstructive sleep apnea, pericardial effusion, and sepsis who presents to the ED via EMS alone for evaluation of shortness of breath, generalized weakness, and fever. Per nurse present, 125 solumedrol and 2 duonebs administered by EMS en route and blood pressure of 87/49. Patient reports breathing worsened last night. He uses nebs at home and has been eating and drinking normally. Reports onset of fever yesterday afternoon and constant diarrhea since his previous hospitalization (4/14/24 - 4/22/24). Denies hematochezia. He has not finished his Augmentin prescribed for pneumonia, though last day is tomorrow.       Independent Historian:   Patient and Nurse - They report above history     Review of External Notes:   I reviewed Dr. Herzog's 4/22/24 Discharge Summary regarding pneumonia of the right lower lobe. Patient was discharged with 5 day course of Augmentin.      Medications:    Albuterol  Atorvastatin  Benzonatate  Breo ellipta  Digoxin  Eliquis   Escitalopram   Fluticasone   Duoneb   Metoprolol   Vancomycin   Torsemide   Methylprednisolone     Past Medical History:    COPD  Pneumonia  Hypokalemia  Prediabetes  C. Diff  Chronic RV dysfunction  HFpEF  Hypertension  CAD  A-Fib   Complete heart block  Stage 3 CKD  Duodenal ulcer   Depression  Anxiety   Testicular cancer   Acute respiratory failure with hypoxida   COVID-19  LINDA  Bacterial endocarditis   Pulmonary emphysema   Dyslipidemia   Hypervolemia   Hypocalcemia   Hypomagnesemia   Metabolic acidosis  Obstructive sleep apnea   PE   Septic shock  Sepsis   Pericardial effusion  PFO  Cardiogenic shock  Acute encephalopathy   Atrial flutter  Hyperplastic colon polyp   Calculus of kidney   Chronic bronchitis   Diverticulitis   Upper GI  "bleed  Acute cystitis     Past Surgical History:    Bronchoscopy    Mitral valve replacement   Aortic valve replacement   Maze operation for A-Fib   Tonsillectomy   Orchiectomy   Colectomy   Guided drain placement RLQ abdomen   Ablation   Colonoscopy   Microdiscectomy x2   CHRISTIN  Pacemaker placement   Tunneled dialysis line placement     Physical Exam   Patient Vitals for the past 24 hrs:   BP Temp Temp src Pulse Resp SpO2 Height Weight   04/25/24 0415 98/52 -- -- 80 -- 98 % -- --   04/25/24 0400 90/52 -- -- 80 -- 98 % -- --   04/25/24 0345 91/48 -- -- 80 -- 98 % -- --   04/25/24 0300 97/50 -- -- 80 -- 98 % -- --   04/25/24 0249 97/50 -- -- 80 -- 99 % -- --   04/25/24 0234 94/50 -- -- 80 -- 99 % -- --   04/25/24 0219 96/51 -- -- 80 -- 98 % -- --   04/25/24 0215 97/49 -- -- 80 -- 98 % -- --   04/25/24 0204 -- -- -- 80 -- 98 % -- --   04/25/24 0200 92/51 -- -- 80 -- 98 % -- --   04/25/24 0149 -- -- -- 80 -- 98 % -- --   04/25/24 0145 95/46 -- -- 80 -- 98 % -- --   04/25/24 0134 -- -- -- 80 -- 99 % -- --   04/25/24 0130 107/51 -- -- 80 -- 99 % -- --   04/25/24 0119 -- -- -- 80 -- 100 % -- --   04/25/24 0115 96/46 -- -- 80 -- 99 % -- --   04/25/24 0110 92/46 -- -- 80 -- 99 % -- --   04/25/24 0104 -- -- -- 80 -- 100 % -- --   04/25/24 0100 (!) 88/41 -- -- 80 -- 100 % -- --   04/25/24 0049 -- -- -- 80 -- 100 % -- --   04/25/24 0045 (!) 82/46 -- -- 80 -- 98 % -- --   04/25/24 0034 (!) 83/45 -- -- 80 -- 98 % -- --   04/25/24 0030 (!) 83/32 -- -- 80 -- 98 % -- --   04/25/24 0023 -- -- -- -- -- -- 1.702 m (5' 7\") 63.1 kg (139 lb 1.8 oz)   04/25/24 0019 (!) 87/52 -- -- 82 -- (!) 84 % -- --   04/25/24 0010 (!) 87/52 97.9  F (36.6  C) Oral 80 28 (!) 85 % -- --        Physical Exam  Constitutional:       Appearance: He is well-developed.   HENT:      Right Ear: External ear normal.      Left Ear: External ear normal.      Mouth/Throat:      Mouth: Mucous membranes are dry.      Pharynx: Oropharynx is clear. No oropharyngeal exudate " or posterior oropharyngeal erythema.   Eyes:      General: No scleral icterus.     Extraocular Movements: Extraocular movements intact.      Conjunctiva/sclera: Conjunctivae normal.      Pupils: Pupils are equal, round, and reactive to light.   Cardiovascular:      Rate and Rhythm: Normal rate and regular rhythm.      Heart sounds: Normal heart sounds. No murmur heard.     No friction rub. No gallop.   Pulmonary:      Effort: Respiratory distress present.      Breath sounds: No stridor. No wheezing, rhonchi or rales.      Comments: Mild tachypnea noted, mild respiratory distress. Coarse BS thruout  Abdominal:      General: Bowel sounds are normal. There is no distension.      Palpations: Abdomen is soft. There is no mass.      Tenderness: There is no abdominal tenderness.   Musculoskeletal:         General: Normal range of motion.      Cervical back: Normal range of motion and neck supple.      Right lower leg: Edema present.      Left lower leg: Edema present.   Skin:     General: Skin is warm and dry.      Capillary Refill: Capillary refill takes less than 2 seconds.      Findings: No rash.   Neurological:      General: No focal deficit present.      Mental Status: He is alert.             Emergency Department Course   ECG    ECG results from 04/25/24   EKG 12-lead, tracing only     Value    Systolic Blood Pressure     Diastolic Blood Pressure     Ventricular Rate 81    Atrial Rate 81    MS Interval 176    QRS Duration 152        QTc 457    P Axis 80    R AXIS 102    T Axis 68    Interpretation ECG      Atrial-paced rhythm  Right bundle branch block  Abnormal ECG  When compared with ECG of 14-APR-2024 12:10,  No significant change was found  Taken at 0011. Read at 0012 by Dorie Martinez MD      Imaging:  Chest CT w/o contrast   Final Result   IMPRESSION:    1.  Dense right lower lobe consolidative pulmonary opacities, significantly worsened as compared to 04/14/2024 exam, likely infectious.   2.  Trace  bilateral pleural effusions.   3.  Upper lobes predominant emphysematous changes with large right basilar bulla.   4.  Cholelithiasis without CT evidence of acute cholecystitis.         XR Chest Port 1 View   Final Result   IMPRESSION: Enlarging right upper lobe pneumonia.             Laboratory:  Labs Ordered and Resulted from Time of ED Arrival to Time of ED Departure   INR - Abnormal       Result Value    INR 1.85 (*)    COMPREHENSIVE METABOLIC PANEL - Abnormal    Sodium 132 (*)     Potassium 3.3 (*)     Carbon Dioxide (CO2) 26      Anion Gap 8      Urea Nitrogen 14.1      Creatinine 0.96      GFR Estimate 84      Calcium 9.1      Chloride 98      Glucose 138 (*)     Alkaline Phosphatase 114      AST 19      ALT 24      Protein Total 5.2 (*)     Albumin 2.4 (*)     Bilirubin Total 0.3     TROPONIN T, HIGH SENSITIVITY - Abnormal    Troponin T, High Sensitivity 50 (*)    NT PROBNP INPATIENT - Abnormal    N terminal Pro BNP Inpatient 7,193 (*)    ISTAT GASES LACTATE VENOUS POCT - Abnormal    Lactic Acid POCT 1.2      Bicarbonate Venous POCT 27      O2 Sat, Venous POCT 40 (*)     pCO2 Venous POCT 44      pH Venous POCT 7.40      pO2 Venous POCT 23 (*)    CBC WITH PLATELETS AND DIFFERENTIAL - Abnormal    WBC Count 12.2 (*)     RBC Count 3.74 (*)     Hemoglobin 9.8 (*)     Hematocrit 31.6 (*)     MCV 85      MCH 26.2 (*)     MCHC 31.0 (*)     RDW 14.2      Platelet Count 327      % Neutrophils 80      % Lymphocytes 10      % Monocytes 9      % Eosinophils 0      % Basophils 0      % Immature Granulocytes 1      NRBCs per 100 WBC 0      Absolute Neutrophils 9.7 (*)     Absolute Lymphocytes 1.2      Absolute Monocytes 1.1      Absolute Eosinophils 0.0      Absolute Basophils 0.0      Absolute Immature Granulocytes 0.1      Absolute NRBCs 0.0     INFLUENZA A/B, RSV, & SARS-COV2 PCR - Normal    Influenza A PCR Negative      Influenza B PCR Negative      RSV PCR Negative      SARS CoV2 PCR Negative     ROUTINE UA WITH  MICROSCOPIC REFLEX TO CULTURE   PROCALCITONIN   BASIC METABOLIC PANEL   CBC WITH PLATELETS   BLOOD CULTURE   BLOOD CULTURE   C. DIFFICILE TOXIN B PCR WITH REFLEX TO C. DIFFICILE ANTIGEN AND TOXINS A/B EIA   ENTERIC BACTERIA AND VIRUS PANEL BY PCR        Procedures   None     Emergency Department Course & Assessments:    Interventions:  Medications   sodium chloride 0.9% BOLUS 1,000 mL (0 mLs Intravenous Stopped 4/25/24 0150)   piperacillin-tazobactam (ZOSYN) 4.5 g vial to attach to  mL bag (0 g Intravenous Stopped 4/25/24 0400)        Assessments:  0011 I obtained patient history and performed a physical exam.     Independent Interpretation (X-rays, CTs, rhythm strip):  CXR- R pneumonia    Consultations/Discussion of Management or Tests:  Hospitalist      ED Course as of 04/25/24 0435   Thu Apr 25, 2024   0011 I obtained patient history and performed a physical exam.    0407 I rechecked the patient and explained findings.    0427 I spoke with Dr. Tovar, hospitalist, regarding the patient's history and presentation in the emergency department today.         Social Determinants of Health affecting care:   None    Disposition:  The patient was admitted to the hospital under the care of Dr. Tovar.     Impression & Plan         Medical Decision Making:  Patient presents today for evaluation of shortness of breath and fever.  Patient is on oxygen at home and was recently discharged with a 5-day course of Augmentin.  He sounds coarse bilaterally.  He is requiring higher oxygen at about 5 L right now.  He did receive DuoNeb and Solu-Medrol.  I do not hear any wheezing currently.  Chest x-ray showing worsening enlargement of his right pneumonia.  Given the worsening symptoms despite being on multiple antibiotics recently, I did do a CT scan to rule out abscess versus empyema versus other etiology of right-sided pneumonia.  There is no evidence of any empyema or abscess on the CT scan.  Patient stabilized  after initial treatment.  Initially he was somewhat hypotensive in the 80s but then after liter of fluid he is now normotensive.  He does appear somewhat dry to me on his exam as well.  Notably he voiced about a week of diarrhea.  This is concerning for C. difficile.  Patient will be isolated and stool culture will be obtained.  Patient is admitted to Dr. Tovar      Diagnosis:    ICD-10-CM    1. Pneumonia of right lower lobe due to infectious organism  J18.9       2. Respiratory distress  R06.03       3. Diarrhea of infectious origin  A09              Scribe Disclosure:  Stacy HULL, am serving as a scribe at 1:42 AM on 4/25/2024 to document services personally performed by Dorie Martinez MD based on my observations and the provider's statements to me.   4/25/2024   Dorie Martinez MD Cheng, Wenlan, MD  04/25/24 0509

## 2024-04-25 NOTE — CONSULTS
[facility]    Infectious Disease Consultation     Date of Admission:  4/25/2024  Date of Consult (When I saw the patient): 04/25/24    Assessment & Plan   Jaylen Lamas is a 73 year old who was admitted on 4/25/2024.     Impression: 1 73-year-old male, well-known to me, just discharged from the hospital now readmitted, unclear whether this is still all part of #2 below or some new infection, quite unlikely this is new bacterial infection, no positive microbiology, bronchoscopy cultures all unremarkable, recent and prior courses of antibiotics without particular benefit suspect this is not bacterial infection currently  2 2 and half month illness with intermittent fever, weight loss, malaise and fatigue and respiratory symptoms, prior antibiotic courses without resolution or benefit no positive microbiology, recent bronchoscopy without so far obvious cause  3 CMV, EBV and Candida in bronchoscopy very unlikely clinically relevant in this clinical scenario  4 intermittent diarrhea again now rule out new C. difficile, is prior C. 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 difficile, if positive treat, prophylaxis not indicated for remote C. Difficile  3 not clear neck step in the workup, await pulmonary input, very large workup during last admission essentially completely  negative        Fuentes Lemon MD    Reason for Consult   Reason for consult: I was asked to evaluate this patient for hypoxia, weight loss and fever.    Primary Care Physician   Alexys Bella    Chief Complaint   Ongoing shortness of breath and weakness    History is obtained from the patient and medical records    History of Present Illness   Jaylen Lamas is a 73 year old male who presents with just discharged to the hospital now readmitted.  Very well-known to me.  Patient's history is illness going back to late January and early February that included some respiratory symptoms, weight loss, malaise fatigue and intermittent fever.  Gotten 2 courses of prior antibiotics without particular benefit.  Had no blood cultures done prior to those antibiotics and was still on antibiotics at the time of the recent admission and currently thus no blood cultures done off antibiotics.  Has a bioprosthetic aortic and mitral valve and a pacemaker.  During the prior course of antibiotics in the recent hospitalization minimal improvement in overall clinical syndrome.  Did have some improvement in hypoxia but still intermittent significant hypoxia.  Was placed on Augmentin after course of Zosyn and at home apparently 1 fever spike although temp is down currently slightly increased diarrhea with C. difficile pending.  Ongoing hypoxia and respiratory symptoms.  He had a very large noninvasive infection workup that was completely negative during the recent admission and bronchoscopy.  Had positive EBV and CMV on the bronchoscopy which do not fit anything clinically occurring with him, rest of the workup completely negative    Past Medical History   I have reviewed this patient's medical history and updated it with pertinent information if needed.   No past medical history on file.    Past Surgical History   I have reviewed this patient's surgical history and updated it with pertinent information if needed.  Past Surgical History:    Procedure Laterality Date    BRONCHOSCOPY (RIGID OR FLEXIBLE), DIAGNOSTIC N/A 4/19/2024    Procedure: Bronchoscopy flexible and rigid with bronchoalveolar lavage;  Surgeon: Jai Sullivan MD;  Location:  GI       Prior to Admission Medications   Prior to Admission Medications   Prescriptions Last Dose Informant Patient Reported? Taking?   BREO ELLIPTA 100-25 MCG/ACT inhaler 4/24/2024 at am  Yes Yes   Sig: Inhale 1 puff into the lungs daily   ELIQUIS ANTICOAGULANT 5 MG tablet 4/24/2024 at pm  Yes Yes   Sig: Take 5 mg by mouth 2 times daily   albuterol (PROAIR HFA/PROVENTIL HFA/VENTOLIN HFA) 108 (90 Base) MCG/ACT inhaler 4/25/2024 at am  Yes Yes   Sig: Inhale 2 puffs into the lungs every 6 hours as needed for shortness of breath or cough   atorvastatin (LIPITOR) 40 MG tablet 4/24/2024 at pm  Yes Yes   Sig: Take 40 mg by mouth every evening   benzonatate (TESSALON) 200 MG capsule   Yes Yes   Sig: Take 200 mg by mouth 3 times daily as needed for cough   digoxin (LANOXIN) 125 MCG tablet 4/24/2024 at pm  Yes Yes   Sig: Take 125 mcg by mouth every evening   escitalopram (LEXAPRO) 20 MG tablet 4/24/2024 at am  Yes Yes   Sig: take 1 tablet by mouth once daily   fluticasone (FLONASE) 50 MCG/ACT nasal spray   Yes Yes   Sig: Spray 2 sprays into both nostrils daily as needed for rhinitis or allergies   ipratropium - albuterol 0.5 mg/2.5 mg/3 mL (DUONEB) 0.5-2.5 (3) MG/3ML neb solution Past Week  Yes Yes   Sig: Take 1 vial by nebulization 3 times daily   lactobacillus rhamnosus, GG, (CULTURELL) capsule 4/24/2024 at pm  No Yes   Sig: Take 1 capsule by mouth 2 times daily   metoprolol succinate ER (TOPROL XL) 50 MG 24 hr tablet 4/23/2024 at hs  Yes Yes   Sig: Take 50 mg by mouth At Bedtime   vancomycin (VANCOCIN) 125 MG capsule 4/24/2024 at pm  No Yes   Sig: Take 1 capsule (125 mg) by mouth 2 times daily for 10 days      Facility-Administered Medications: None     Allergies   Allergies   Allergen Reactions    Azithromycin  Other (See Comments)     Causes C-Diff-  Patients PCP states this is not true    Cefprozil GI Disturbance    Ciprofloxacin GI Disturbance    Erythromycin GI Disturbance    Metronidazole GI Disturbance    Spiriva Respimat [Tiotropium Bromide Monohydrate] Visual Disturbance    Venlafaxine Nausea and Vomiting       Immunization History   Immunization History   Administered Date(s) Administered    COVID-19 12+ (2023-24) (MODERNA) 10/25/2023    COVID-19 Bivalent 12+ (Pfizer) 10/11/2022    COVID-19 MONOVALENT 12+ (Pfizer) 03/12/2021, 04/02/2021, 10/30/2021    Influenza (H1N1) 12/10/2009    Influenza (High Dose) 3 valent vaccine 11/19/2016, 10/27/2017, 10/01/2018    Influenza (IIV3) PF 10/29/2007, 11/19/2008, 01/17/2013    Influenza (intradermal) 09/30/2019    Influenza Vaccine >6 months,quad, PF 09/29/2014, 11/03/2015, 10/05/2019    Pneumo Conj 13-V (2010&after) 11/19/2016    Pneumococcal 23 valent 10/29/2007, 01/17/2013, 01/25/2018    Tdap (Adult) Unspecified Formulation 04/05/2012    Zoster recombinant adjuvanted (SHINGRIX) 10/01/2018, 10/05/2019    Zoster vaccine, live 10/19/2015       Social History   I have reviewed this patient's social history and updated it with pertinent information if needed. Jaylen Lamas      Family History   I have reviewed this patient's family history and updated it with pertinent information if needed.   No family history on file.    Review of Systems   The 10 point Review of Systems is negative    Physical Exam   Temp: 98.2  F (36.8  C) Temp src: Oral BP: 104/54 Pulse: 80   Resp: 28 SpO2: 99 % O2 Device: Oxymask Oxygen Delivery: (S) 4 LPM  Vital Signs with Ranges  Temp:  [97.8  F (36.6  C)-98.2  F (36.8  C)] 98.2  F (36.8  C)  Pulse:  [80-82] 80  Resp:  [28] 28  BP: ()/(32-61) 104/54  SpO2:  [84 %-100 %] 99 %  139 lbs 1.76 oz  Body mass index is 21.79 kg/m .    GENERAL APPEARANCE:  awake looks chronically more than acutely ill, hypoxia on 4 L nasal cannula oxygen  EYES: Eyes grossly  "normal to inspection  NECK: no adenopathy  RESP: lungs crackles bilateral  CV: regular rates and rhythm  LYMPHATICS: normal ant/post cervical and supraclavicular nodes  ABDOMEN: soft, nontender  MS: extremities normal  SKIN: no suspicious lesions or rashes        Data   All laboratory and imaging data in the past 24 hours reviewed  No results for input(s): \"CULT\" in the last 168 hours.  No lab results found.    Invalid input(s): \"UC\"       All cultures:  Recent Labs   Lab 04/19/24  1102   CULTURE No Actinomyces like species isolated after 5 days  No growth after 5 days  Candida albicans*  No Growth  Culture negative, monitoring continues      Blood culture:  Results for orders placed or performed during the hospital encounter of 04/14/24   Blood Culture Arm, Left    Specimen: Arm, Left; Blood   Result Value Ref Range    Culture No Growth    Blood Culture Line, venous    Specimen: Line, venous; Blood   Result Value Ref Range    Culture No Growth       Urine culture:  Results for orders placed or performed during the hospital encounter of 04/14/24   Urine Culture    Specimen: Urine, Clean Catch   Result Value Ref Range    Culture No Growth              "

## 2024-04-25 NOTE — PROGRESS NOTES
"Pharmacy Vancomycin Initial Note  Date of Service 2024  Patient's  1950  73 year old, male    Indication: Sepsis    Current estimated CrCl = Estimated Creatinine Clearance: 64.8 mL/min (based on SCr of 0.92 mg/dL).    Creatinine for last 3 days  2024: 12:20 AM Creatinine 0.96 mg/dL;  7:13 AM Creatinine 0.92 mg/dL    Recent Vancomycin Level(s) for last 3 days  No results found for requested labs within last 3 days.      Vancomycin IV Administrations (past 72 hours)                     vancomycin (VANCOCIN) 1,250 mg in 0.9% NaCl 250 mL intermittent infusion (mg) 1,250 mg New Bag 24 0542                    Nephrotoxins and other renal medications (From now, onward)      Start     Dose/Rate Route Frequency Ordered Stop    24 0000  vancomycin (VANCOCIN) 1,250 mg in 0.9% NaCl 250 mL intermittent infusion         1,250 mg  over 90 Minutes Intravenous EVERY 18 HOURS 24 0941      24 0800  piperacillin-tazobactam (ZOSYN) 4.5 g vial to attach to  mL bag        Note to Pharmacy: For [facility], [facility] and [facility]: For Zosyn-naive patients, use the \"Zosyn initial dose + extended infusion\" order panel.    4.5 g  over 30 Minutes Intravenous EVERY 6 HOURS 24 0528              Contrast Orders - past 72 hours (72h ago, onward)      None            [employer] Prediction of Planned Initial Vancomycin Regimen  Regimen: 1250 mg IV every 18 hours.  Start time: 23:42 on 2024  Exposure target: AUC24 (range)400-600 mg/L.hr   AUC24,ss: 588 mg/L.hr  Probability of AUC24 > 400: 87 %  Ctrough,ss: 17.7 mg/L  Probability of Ctrough,ss > 20: 39 %  Probability of nephrotoxicity (Lodise KENNY ): 14 %        Plan:  Start vancomycin  1250 mg (19.81 mg/kg) IV q18h.   Vancomycin monitoring method: AUC  Vancomycin therapeutic monitoring goal: 400-600 mg*h/L  Pharmacy will check vancomycin levels as appropriate in 3-5 Days.    Serum creatinine levels will be ordered a minimum of twice weekly.      Ajit " Violette

## 2024-04-25 NOTE — ED TRIAGE NOTES
BIBA from home for SOB, body weakness, fever.  Been in ED 2 days ago. Last day of antibiotics tomorrow.  2 tylenol given by wife 1.5 hrs ago PTA.  2 Duoneb, 125 mg Solu Medrol given by EMS  BP 87/49   G 20 IV at left hand, on blood thinners  H/o pneumonia, COPD on 2LPM oxygen   mg/ dL  A&O x4

## 2024-04-25 NOTE — H&P
[facility]    Hospitalist History and Physical    Name: Jaylen Lamas    MRN: [id number]  YOB: 1950    Age: 73 year old  Date of Admission:  4/25/2024  Date of Service (when I saw the patient): 04/25/24    Assessment & Plan   Jaylen Lamas is a 73 year old male with recent hospitalization for pneumonia discharged on 4/22/2024 on Augmentin presents with worsening shortness of breath cough and fever.  His Pmhx is significant of COPD, tobacco use, HFpEF, bioprosthetic aortic valve replacement, bioprosthetic mitral valve replacement, paroxysmal atrial fibrillation s/p ablation and MAZE procedure, 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 difficile, hypertension, CKD, history of duodenal ulcer, depression/anxiety, testicular cancer s/p orchiectomy.    During recent hospitalization 4- to 4/22/2024 patient underwent bronchoscopy with BAL positive for CMV, EBV and Candida.  Bacterial cultures were negative.  Patient was treated with Zosyn and azithromycin while in hospital and discharged on Augmentin.  Underwent workup for possible aspiration.    Today in the emergency room patient was hypotensive febrile with leukocytosis.  Repeat imaging shows worsening right lower lobe infiltrates.  Patient was hypoxic and is now requiring 4 L supplemental O2, patient was started on vancomycin and Zosyn after blood cultures and admitted for further evaluation and treatment.    Acute hypoxic respiratory failure  Right lower lobe pneumonia  Poss Sepsis (Hypotension, Hypoxia, leukocytosis, fever at home)  -Recent hospitalization with elaborate workup negative  -BAL was positive for EBV CMV and Candida  -Patient discharged on Augmentin  -In the emergency room was started on vancomycin and Zosyn  -Treated with IV fluids  -Blood cultures were obtained  -Will check CRP and procalcitonin  -Reconsult infectious disease  -Reconsult  pulmonary  -Wean supplemental O2 as able  -Scheduled and as needed nebs  -Supportive care    Hypokalemia  Mild. Replaced in ED.  -BMP with AM labs     Weight Loss  At risk of malnutrition with ongoing low appetite and illness.  Albumin and protein are low.  -Continue nutritional consult     History of C difficile   Positive 4/2019.  -Stool sent for C. difficile given looser stools  -started prophylactic Vanocmycin course while being treated again with antibiotics given multiple courses of antibiotic therapy      Prediabetes   A1c checked in ED, 6.3.  -monitor blood sugar      COPD  At baseline not oxygen dependent.  -No wheezes or rhonchi on exam  -Continue Breo Ellipta, albuterol nebulizer and Flonase        HFpEF  Chronic right ventricular dysfunction  HT  Mitral and Aortic Bioprosthetic valve replacements   Nonobstructive coronary artery disease  Appears compensated, more hypovolemic.  No change in leg edema   -Echocardiogram 4/19/2024 shows pulmonary hypertension normal LV function paced rhythm and no evidence of vegetation  -Continue PTA Toprol XL 50 mg, digoxin 0.125 mg daily Lipitor 20 mg  -limit use of IV fluids   -no diuresis needed at this time.     Paroxysmal atrial fibrillation s/p ablation and MAZE procedure  History of complete heart block status post pacemaker placement  Hypertension  Rate controlled.   -Holding diuretics and beta-blocker due to lower blood pressure patient is receiving IV fluids  -Continue Lipitor 20 mg  -He is on digoxin   -continue Apixaban for stroke prophylaxis.   -monitor on telemetry night of admission, if stable discontinue      CKD stage II.  -Near baseline of Creatinin  -bmp in AM     History of duodenal ulcer  -protonix while admitted for GI PPX, has been on steroids and systemically anticoagulated      Depression/anxiety  -Continue Lexapro     Testicular Cancer   S/p orchiectomy, chemotherapy and radiation        Clinically Significant Risk Factors Present on Admission         # Hypokalemia: Lowest K = 3.3 mmol/L in last 2 days, will replace as needed    # Hypercalcemia: corrected calcium is >10.1, will monitor as appropriate    # Hypoalbuminemia: Lowest albumin = 2.4 g/dL at 4/25/2024 12:20 AM, will monitor as appropriate  # Drug Induced Coagulation Defect: home medication list includes an anticoagulant medication                      DVT Prophylaxis: DOAC  Code Status: Full Code    Disposition: Admitted as inpatient    Primary Care Physician   Alexys Bella    Chief Complaint   Worsening shortness of breath    History is obtained from the patient    History of Present Illness   Jaylen Lamas is a 73 year old male with recent hospitalization for pneumonia discharged on 4/22/2024 on Augmentin presents with worsening shortness of breath cough and fever.  His Pmhx is significant of COPD, tobacco use, HFpEF, bioprosthetic aortic valve replacement, bioprosthetic mitral valve replacement, paroxysmal atrial fibrillation s/p ablation and MAZE procedure, 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 difficile, hypertension, CKD, history of duodenal ulcer, depression/anxiety, testicular cancer s/p orchiectomy.    Presented to the emergency room with worsening shortness of breath and fever.  Patient has been antibiotics completed 4 days of Augmentin had only 1 more day left.  Complaint of worsening shortness of breath fever malaise productive cough producing more phlegm.  Also noted having loose stools about 2/day.  No chest pain pressure heaviness or tightness.  Also c/o bilateral lower extremity swelling.    During recent hospitalization 4- to 4/22/2024 patient underwent bronchoscopy with BAL positive for CMV, EBV and Candida.  Bacterial cultures were negative.  Patient was treated with Zosyn and azithromycin while in hospital and discharged on Augmentin.  Underwent workup for possible aspiration.    Today in the emergency  room patient was hypotensive febrile with leukocytosis.  Repeat imaging shows worsening right lower lobe infiltrates.  Patient was hypoxic and is now requiring 4 L supplemental O2, patient was started on vancomycin and Zosyn after blood cultures and admitted for further evaluation and treatment.    More than 10 point review of system was carried out was otherwise negative.      Past Medical History    No past medical history on file.  Right lower lobe opacities concerning for pneumonia  Hypokalemia, resolved  Weight loss  History of C. difficile  Prediabetes  COPD  HFpEF  Chronic RV dysfunction  Hypertension  Mitral and aortic bioprosthetic valve replacements  Nonobstructive CAD  Paroxysmal atrial fibrillation s/p ablation and MAZE procedure  History of complete heart block status post pacemaker placement  Hypertension  CKD stage II.  History of duodenal ulcer  Depression/anxiety  Testicular Cancer     Past Surgical History   Past Surgical History:   Procedure Laterality Date    BRONCHOSCOPY (RIGID OR FLEXIBLE), DIAGNOSTIC N/A 4/19/2024    Procedure: Bronchoscopy flexible and rigid with bronchoalveolar lavage;  Surgeon: Jai Sullivan MD;  Location:  GI       Prior to Admission Medications   Prior to Admission Medications   Prescriptions Last Dose Informant Patient Reported? Taking?   BREO ELLIPTA 100-25 MCG/ACT inhaler   Yes No   Sig: Inhale 1 puff into the lungs daily   ELIQUIS ANTICOAGULANT 5 MG tablet   Yes No   Sig: Take 5 mg by mouth 2 times daily   albuterol (PROAIR HFA/PROVENTIL HFA/VENTOLIN HFA) 108 (90 Base) MCG/ACT inhaler   Yes No   Sig: Inhale 2 puffs into the lungs every 6 hours as needed for shortness of breath or cough   amoxicillin-clavulanate (AUGMENTIN) 875-125 MG tablet   No No   Sig: Take 1 tablet by mouth every 12 hours for 3 days   atorvastatin (LIPITOR) 40 MG tablet   Yes No   Sig: Take 40 mg by mouth every evening   benzonatate (TESSALON) 200 MG capsule   Yes No   Sig: Take 200 mg by  mouth 3 times daily as needed for cough   digoxin (LANOXIN) 125 MCG tablet   Yes No   Sig: Take 125 mcg by mouth every evening   escitalopram (LEXAPRO) 20 MG tablet   Yes No   Sig: take 1 tablet by mouth once daily   fluticasone (FLONASE) 50 MCG/ACT nasal spray   Yes No   Sig: Spray 2 sprays into both nostrils daily as needed for rhinitis or allergies   ipratropium - albuterol 0.5 mg/2.5 mg/3 mL (DUONEB) 0.5-2.5 (3) MG/3ML neb solution   Yes No   Sig: Take 1 vial by nebulization 3 times daily   lactobacillus rhamnosus, GG, (CULTURELL) capsule   No No   Sig: Take 1 capsule by mouth 2 times daily   metoprolol succinate ER (TOPROL XL) 50 MG 24 hr tablet   Yes No   Sig: Take 50 mg by mouth At Bedtime   vancomycin (VANCOCIN) 125 MG capsule   No No   Sig: Take 1 capsule (125 mg) by mouth 2 times daily for 10 days      Facility-Administered Medications: None     Allergies   Allergies   Allergen Reactions    Azithromycin Other (See Comments)     Causes C-Diff-  Patients PCP states this is not true    Cefprozil GI Disturbance    Ciprofloxacin GI Disturbance    Erythromycin GI Disturbance    Metronidazole GI Disturbance    Spiriva Respimat [Tiotropium Bromide Monohydrate] Visual Disturbance    Venlafaxine Nausea and Vomiting       Social History   Social History     Tobacco Use    Smoking status: Not on file    Smokeless tobacco: Not on file   Substance Use Topics    Alcohol use: Not on file     Social History     Social History Narrative    Not on file         Family History   I have reviewed this patient's family history and updated it with pertinent information if needed.   No family history on file.      Review of Systems   A Comprehensive greater than 10 system review of systems was carried out.  Pertinent positives and negatives are noted above.  Otherwise negative for contributory information.    Physical Exam   Temp: 97.9  F (36.6  C) Temp src: Oral BP: 98/52 Pulse: 80   Resp: 28 SpO2: 98 % O2 Device: Oxymask Oxygen  Delivery: 5 LPM  Vital Signs with Ranges  Temp:  [97.9  F (36.6  C)] 97.9  F (36.6  C)  Pulse:  [80-82] 80  Resp:  [28] 28  BP: ()/(32-52) 98/52  SpO2:  [84 %-100 %] 98 %  139 lbs 1.76 oz    GEN:  Alert, oriented x 3, thin frail weak and cachectic   HEENT:  Normocephalic/atraumatic, no scleral icterus, no nasal discharge, mouth dry  CV:  Regular rate and rhythm, no murmur or JVD.  S1 + S2 noted, no S3 or S4.  LUNGS: Bibasilar crackles no wheezing symmetric chest rise on inhalation noted.  ABD:  Active bowel sounds, soft, non-tender/non-distended.  No rebound/guarding/rigidity.  EXT:  +1  edema bilaterally.  No cyanosis.  No joint synovitis noted.  SKIN:  Dry to touch, no exanthems noted in the visualized areas.  NEURO:  Symmetric muscle strength, No new focal deficits appreciated.    Data   Data reviewed today:  I personally reviewed the EKG tracing showing paced rhythm .    Recent Labs   Lab 04/25/24  0011   PHV 7.40   PO2V 23*   PCO2V 44   HCO3V 27     Recent Labs   Lab 04/25/24  0020 04/20/24  0704   WBC 12.2* 12.8*   HGB 9.8* 9.7*   HCT 31.6* 31.5*   MCV 85 84    303     Recent Labs   Lab 04/25/24  0020 04/22/24  0609 04/21/24 2015 04/20/24  0704 04/19/24  0709   *  --   --  135 137   POTASSIUM 3.3* 3.6 2.8* 3.1* 3.6  3.6   CHLORIDE 98  --   --  101 104   CO2 26  --   --  26 22   ANIONGAP 8  --   --  8 11   *  --   --  128* 116*   BUN 14.1  --   --  15.1 14.0   CR 0.96  --   --  1.11 1.18*   GFRESTIMATED 84  --   --  71 66   MELO 9.1  --   --  9.9 9.8     7-Day Micro Results       Collected Updated Procedure Result Status      04/25/2024 0031 04/25/2024 0035 Blood Culture Hand, Left [[id number]]   Blood from Hand, Left    In process Component Value   No component results               04/25/2024 0023 04/25/2024 0122 Symptomatic Influenza A/B, RSV, & SARS-CoV2 PCR (COVID-19) Nasopharyngeal [[id number]]    Swab from Nasopharyngeal    Final result Component Value   Influenza A PCR  Negative   Influenza B PCR Negative   RSV PCR Negative   SARS CoV2 PCR Negative   NEGATIVE: SARS-CoV-2 (COVID-19) RNA not detected, presumed negative.            04/25/2024 0020 04/25/2024 0034 Blood Culture Arm, Right [[id number]]   Blood from Arm, Right    In process Component Value   No component results               04/19/2024 1102 04/23/2024 1025 Cytology non gyn - BAL Sites [[id number]]   Bronchial Alveolar Lavage from Lung, Lower Lobe, Right    Final result Component Value   Final Diagnosis This result contains rich text formatting which cannot be displayed here.   Clinical Information This result contains rich text formatting which cannot be displayed here.   Gross Description This result contains rich text formatting which cannot be displayed here.   Microscopic Description This result contains rich text formatting which cannot be displayed here.   Performing Labs This result contains rich text formatting which cannot be displayed here.            04/19/2024 1102 04/19/2024 1437 Cell count with differential fluid - BAL Site 1 [[id number]]    (Abnormal)   Bronchial Alveolar Lavage from Lung, Right    Final result Component Value   No component results            04/19/2024 1102 04/19/2024 1648 Gram stain - BAL Site 1 [[id number]]   Bronchial Alveolar Lavage from Lung, Lower Lobe, Right    Final result Component Value   Gram Stain Result <25 PMNs/low power field   Gram Stain Result No organisms seen            04/19/2024 1102 04/19/2024 1115 Acid-Fast Bacilli Culture and Stain [[id number]]    Bronchial Alveolar Lavage from Lung, Lower Lobe, Right    In process Component Value   No component results            04/19/2024 1102 04/23/2024 1227 Fungus Culture, non-blood - BAL Site 1 [[id number]]   (Abnormal)   Bronchial Alveolar Lavage from Lung, Lower Lobe, Right    Preliminary result Component Value   Culture Candida albicans  [P]                04/19/2024 1102 04/19/2024 1649 Koh prep - BAL Site 1  [[id number]]   Bronchial Alveolar Lavage from Lung, Lower Lobe, Right    Final result Component Value   KOH Preparation No fungal elements seen   KOH Preparation Reference Range: No fungal elements seen.            04/19/2024 1102 04/21/2024 0814 Bronchial Alveolar Lavage Aerobic Bacterial Culture Routine, Quantitative [[id number]]   Bronchial Alveolar Lavage from Lung, Lower Lobe, Right    Final result Component Value   Culture No Growth               04/19/2024 1102 04/19/2024 1932 Respiratory Panel PCR - BAL Site 1 [[id number]]    Bronchial Alveolar Lavage from Bronchus    Final result Component Value   No component results            04/19/2024 1102 04/20/2024 0857 Legionella culture - BAL Site 1 [[id number]]   Bronchial Alveolar Lavage from Lung, Lower Lobe, Right    Preliminary result Component Value   Culture Culture negative, monitoring continues  [P]                04/19/2024 1102 04/24/2024 1616 Nocardia culture - BAL Site 1 [[id number]]   Bronchial Alveolar Lavage from Lung, Lower Lobe, Right    Preliminary result Component Value   Culture No growth after 5 days  [P]                04/19/2024 1102 04/24/2024 1731 Actinomyces rule out - BAL Site 1 [[id number]]   Bronchial Alveolar Lavage from Lung, Lower Lobe, Right    Preliminary result Component Value   Culture No Actinomyces like species isolated after 5 days  [P]                04/19/2024 1102 04/22/2024 1127 Herpes Simplex Virus 1&2 by PCR - BAL Site 1 [[id number]]    Bronchial Alveolar Lavage from Lung, Lower Lobe, Right    Final result Component Value   Herpes Simplex Virus 1 DNA Negative   Herpes Simplex Virus 2 DNA Negative            04/19/2024 1102 04/23/2024 0930 Histoplasma Capsulatum Agn Non Blood [[id number]]   Bronchial Alveolar Lavage from Bronchus, Right    Final result Component Value   See Scanned Result HISTOPLASMA CAPSULATUM AGN NON BLOOD-Scanned            04/19/2024 1102 04/19/2024 1437 Cell Count Body Fluid  [[id number]]    (Abnormal)   Bronchial Alveolar Lavage from Lung, Right    Final result Component Value   Color Red   Clarity Bloody   Cell Count Fluid Source Lung, Lower Lobe, Right   Total Nucleated Cells --   Specimen clotted. Cell count not performed            04/19/2024 1102 04/20/2024 2331 Acid-Fast Bacilli Culture and Stain [[id number]]    Bronchial Alveolar Lavage from Lung, Lower Lobe, Right    Preliminary result Component Value   Acid Fast Stain No acid fast bacilli seen  [P]    Less than 5 mL of specimen received. A minimum of 5 mL of sputum or fluid is recommended for recovery of acid fast bacilli (AFB). Volumes less than 5 mL are suboptimal and may compromise recovery of AFB from culture.   Acid Fast Stain No acid fast bacilli seen  [P]    Less than 5 mL of specimen received. A minimum of 5 mL of sputum or fluid is recommended for recovery of acid fast bacilli (AFB). Volumes less than 5 mL are suboptimal and may compromise recovery of AFB from culture.  This is an appended report. These results have been appended to a previously preliminary verified report.            04/19/2024 1102 04/19/2024 1932 Respiratory Panel PCR, Bronch [[id number]]    Bronchial Alveolar Lavage from Bronchus    Final result Component Value   Adenovirus Not Detected   Coronavirus Not Detected   This test detects Coronavirus 229E, HKU1, NL63 and OC43 but does not distinguish between them. It does not detect MERS ( Respiratory Syndrome), SARS (Severe Acute Respiratory Syndrome) or 2019-nCoV (Novel 2019) Coronavirus.   Human Metapneumovirus Not Detected   Human Rhin/Enterovirus Not Detected   Influenza A Not Detected   Influenza A, H1 Not Detected   Influenza A 2009 H1N1 Not Detected   Influenza A, H3 Not Detected   Influenza B Not Detected   Parainfluenza Virus 1 Not Detected   Parainfluenza Virus 2 Not Detected   Parainfluenza Virus 3 Not Detected   Parainfluenza Virus 4 Not Detected   Respiratory Syncytial  "Virus A Not Detected   Respiratory Syncytial Virus B Not Detected   Chlamydia Pneumoniae Not Detected   Mycoplasma Pneumoniae Not Detected            04/19/2024 1102 04/19/2024 1437 Differential Body Fluid [[id number]]    Bronchial Alveolar Lavage from Lung, Right    Final result Component Value   No component results                  Recent Labs   Lab 04/25/24  0020 04/22/24  0609 04/21/24 2015 04/20/24  0704 04/19/24  0709   *  --   --  135 137   POTASSIUM 3.3* 3.6 2.8* 3.1* 3.6  3.6   CHLORIDE 98  --   --  101 104   CO2 26  --   --  26 22   ANIONGAP 8  --   --  8 11   *  --   --  128* 116*   BUN 14.1  --   --  15.1 14.0   CR 0.96  --   --  1.11 1.18*   GFRESTIMATED 84  --   --  71 66   MELO 9.1  --   --  9.9 9.8   PROTTOTAL 5.2*  --   --   --   --    ALBUMIN 2.4*  --   --   --   --    BILITOTAL 0.3  --   --   --   --    ALKPHOS 114  --   --   --   --    AST 19  --   --   --   --    ALT 24  --   --   --   --      Recent Labs   Lab 04/25/24  0020   NTBNPI 7,193*     No results for input(s): \"SED\", \"CRP\" in the last 168 hours.  GFR Estimate   Date Value Ref Range Status   04/25/2024 84 >60 mL/min/1.73m2 Final   04/20/2024 71 >60 mL/min/1.73m2 Final   04/19/2024 66 >60 mL/min/1.73m2 Final   06/12/2020 >60 >60 ml/min/1.73m2 Final     GFR Estimate If Black   Date Value Ref Range Status   06/12/2020 >60 >60 ml/min/1.73m2 Final     Recent Labs   Lab 04/25/24 0020 04/20/24  0704 04/19/24  0709 04/18/24  0635   * 128* 116* 123*     Recent Labs   Lab 04/25/24 0020 04/20/24  0704   HGB 9.8* 9.7*     Recent Labs   Lab 04/25/24  0020   INR 1.85*     Recent Labs   Lab 04/25/24  0011   LACT 1.2     No results for input(s): \"LIPASE\" in the last 168 hours.  Recent Labs   Lab 04/25/24  0020 04/20/24  0704 04/19/24  0709   BUN 14.1 15.1 14.0   CR 0.96 1.11 1.18*     No results for input(s): \"TSH\" in the last 168 hours.  No results for input(s): \"TROPONIN\", \"TROPI\", \"TROPR\", \"TROPONINIS\" in the last 168 " "hours.    Invalid input(s): \"TROPT\", \"TROP\", \"TROPONINIES\", \"TNIH\"  No results for input(s): \"COLOR\", \"APPEARANCE\", \"URINEGLC\", \"URINEBILI\", \"URINEKETONE\", \"SG\", \"UBLD\", \"URINEPH\", \"PROTEIN\", \"UROBILINOGEN\", \"NITRITE\", \"LEUKEST\", \"RBCU\", \"WBCU\" in the last 168 hours.    Recent Results (from the past 24 hour(s))   XR Chest Port 1 View    Narrative    EXAM: XR CHEST PORT 1 VIEW  LOCATION: [facility]  DATE: 4/25/2024    INDICATION: cough, fever  COMPARISON: 04/17/2024.    FINDINGS: Left subclavian cardiac device. Heart valve prostheses. Sternal wires. No pneumothorax. The heart size is normal. There is increasing right upper lobe infiltrate. Persistent bibasilar infiltrates. Large bulla at the right lung base.      Impression    IMPRESSION: Enlarging right upper lobe pneumonia.   Chest CT w/o contrast    Narrative    EXAM: CT CHEST W/O CONTRAST  LOCATION: [facility]  DATE: 4/25/2024    INDICATION: Worsening pneumonia.  COMPARISON: Chest x-ray on 04/25/2024 and CT chest, abdomen and pelvis on 04/14/2024.  TECHNIQUE: CT chest without IV contrast. Multiplanar reformats were obtained. Dose reduction techniques were used.  CONTRAST: None.    FINDINGS:   LUNGS AND PLEURA: Trace bilateral pleural effusions. Upper lobes predominant emphysematous changes. Large emphysematous bulla in the anterior aspect of the right lung base. Right lower lobe dense consolidative pulmonary opacities, significantly worsened   as compared to 04/14/2024 exam.    MEDIASTINUM/AXILLAE: Left chest wall dual-lead AICD and leads are in stable position. Postsurgical changes of cardiac surgery with median sternotomy wires and prosthetic aortic and mitral valves. No cardiomegaly or significant pericardial effusion. The   main pulmonary artery is mildly enlarged measuring 3 cm in diameter. There is calcification of the mitral annulus.    CORONARY ARTERY CALCIFICATION: Moderate.    UPPER ABDOMEN: Limited " evaluation of the upper abdomen due to lack of coverage and contrast. Cholelithiasis without CT evidence of acute cholecystitis.    MUSCULOSKELETAL: Degenerative changes of the spine. No suspicious osseous lesion.      Impression    IMPRESSION:   1.  Dense right lower lobe consolidative pulmonary opacities, significantly worsened as compared to 04/14/2024 exam, likely infectious.  2.  Trace bilateral pleural effusions.  3.  Upper lobes predominant emphysematous changes with large right basilar bulla.  4.  Cholelithiasis without CT evidence of acute cholecystitis.

## 2024-04-26 NOTE — CONSULTS
CLINICAL NUTRITION SERVICES  -  ASSESSMENT NOTE      Recommendations:   - Liberalized diet to regular.  Discussed w/ Jaylen maximizing high calorie/high protein self-selection as able.  We discussed protein sources from food and brainstormed ideas to a degree.    - Offered patient oral supplements but he politely declined.  He tried Ensure, Ensure Clear and Gelatein without success last admission.  He also politely declined scheduled snacks.  We discussed the option of small/frequent meals or snacks via room service prn.  - Added MVI/M.  - Consider appetite stimulant vs continue encouraging PO intakes as able while trending PO intakes again this admit?  Discussed w/ MD.  Not clear to me if appetite is an acute issue compounded by infections and pulmonary issues vs becoming more chronic.       MALNUTRITION:  % Weight Loss: Unable to determine, limited trends overall and suspect currently masked by fluid  % Intake:  <75% for > 7 days (moderate malnutrition)  Subcutaneous Fat Loss:  Upper arm region mild to moderate depletion   Muscle Loss:  Temporal region mild depletion, Clavicle bone region mild to moderate depletion, and Acromion bone region moderate depletion  Fluid Retention: Mild, LEs --> masking true wt trends    Malnutrition Diagnosis: Moderate malnutrition  In Context of:  Acute illness or injury  Chronic illness or disease          REASON FOR ASSESSMENT  Jaylen Lamas is a 73 year old male seen by Registered Dietitian for Malnutrition Screening Tool (MST).     PMH of: Recent admit for PNA w/ discharge on 4/22, COPD, CHF, CAD, C. Diff, CKD stage 3, duodenal ulcer, testicular CA s/p orchiectomy.    Admit 2/2: Acute respiratory failure, PNA, sepsis, hypokalemia, C. Diff, documented wt loss by MD.    NUTRITION HISTORY  - Information obtained from patient and chart.  - Reviewed RD notes from recent admission where patient reported low appetite, wt loss, met malnutrition criteria, and received oral supplements  "(didn't care for regular Ensure, trialed Ensure clear and Gelatein instead).  RD at that time noted PO intakes were very poor (50% of meals BID) and made recs for possible appetite stimulant vs nutrition support.  - Barriers to PO intakes: Jaylen tells me he continues to struggle with appetite, early satiety, and overall eating.  He feels nothing has changed or gotten better in terms of PO intakes for a period of weeks up to 1 month.    - Use of oral supplements: He tried many oral supplement options last admission and didn't like any of the tastes/flavors.  - Chewing/swallowing issues: Denies.  - Allergies: NKFA.      CURRENT NUTRITION ORDERS  Diet Order:     Cardiac    Current Intake/Tolerance:  Limited timeframe of admit.  Documented to be requiring 3 L NC.    See recs above, discussed high calorie/high protein diet and oral supplements.  Encouraged PO intakes as able and he is not fond of scheduled snacks at this time.      ANTHROPOMETRICS  Height: 5' 7\"  Weight: 139 lbs 1.76 oz  Body mass index is 21.79 kg/m .  Weight Status:  Normal BMI  Weight History:  Wt Readings from Last 10 Encounters:   04/25/24 63.1 kg (139 lb 1.8 oz)   04/22/24 66.1 kg (145 lb 11.6 oz)   05/18/23 63.1 kg (139 lb 1.6 oz)   11/01/20 68.5 kg (151 lb)     - Has mild, LE edema documented.  - Last admission patient was weighing 131# on 4/15 and 133# on 4/20 via standing scale wts.  The wt of 145# above was taken via bed scale and would not use for wt trending at this time.  - Likely that wt is masked by fluid currently.    LABS: Reviewed.    MEDICATIONS: Reviewed:  - Zosyn  - Culturell     GI: Documented BMs 4/25 and 4/26.    SKIN: No current documentation of PI.       ASSESSED NUTRITION NEEDS PER APPROVED PRACTICE GUIDELINES:    Dosing Weight 59 kg - dry wt from last admit?  Estimated Energy Needs: 30-35 Kcal/Kg  Justification: repletion  Estimated Protein Needs: >/=1.5 g pro/Kg  Justification: Repletion and preservation of lean body " mass  Estimated Fluid Needs: per MD      NUTRITION DIAGNOSIS:  Malnutrition related to decreased oral intake with low appetite and early satiety, component of acute on chronic pulmonary vs acute infection? as evidenced by meeting <75% of nutrition needs or less for a period of weeks w/ wt loss masked by fluid, fat/muscle loss.    NUTRITION INTERVENTIONS  Recommendations / Nutrition Prescription  See above.    Implementation  Nutrition education: Provided education on above.    Multivitamin/Mineral: As above.     Collaboration and Referral of Nutrition care: Discussed recs w/ MD via [facility] messaging.    Nutrition goals:  PO intakes of at least 50-75% of meals or snacks TID vs possible need to consider additional nutrition interventions.     MONITORING AND EVALUATION:  Progress towards goals will be monitored and evaluated per protocol and Practice Guidelines          Iris Quarles RDN, LD  Clinical Dietitian  3rd floor/ICU: [phone]  All other floors: [phone]  Weekend/holiday: [phone]  Office: [phone]

## 2024-04-26 NOTE — PLAN OF CARE
"A&Ox4, 2.0L O2, productive cough. Multiple incontinent stools, c.diff negative. Transfers Ax1 with cane and GB. Edema and pain to BLEs. Voltaren gel and elevation helpful. Voiding. Poor appetite. Daughter at bedside today. IV Zosyn. ID and pulmonology following. Plan TBD.    Problem: Adult Inpatient Plan of Care  Goal: Plan of Care Review  Description: The Plan of Care Review/Shift note should be completed every shift.  The Outcome Evaluation is a brief statement about your assessment that the patient is improving, declining, or no change.  This information will be displayed automatically on your shift  note.  Outcome: Progressing  Flowsheets (Taken 4/26/2024 1406)  Outcome Evaluation: 2-3.0L O2, productive cough. LS coarse. IV Zosyn. C.diff negative, plan TBD  Plan of Care Reviewed With:   patient   child  Overall Patient Progress: improving  Goal: Patient-Specific Goal (Individualized)  Description: You can add care plan individualizations to a care plan. Examples of Individualization might be:  \"Parent requests to be called daily at 9am for status\", \"I have a hard time hearing out of my right ear\", or \"Do not touch me to wake me up as it startles  me\".  Outcome: Progressing  Goal: Absence of Hospital-Acquired Illness or Injury  Outcome: Progressing  Intervention: Identify and Manage Fall Risk  Recent Flowsheet Documentation  Taken 4/26/2024 0800 by Jessica Beckford RN  Safety Promotion/Fall Prevention:   room near nurse's station   safety round/check completed  Intervention: Prevent Skin Injury  Recent Flowsheet Documentation  Taken 4/26/2024 1400 by Jessica Beckford RN  Device Skin Pressure Protection: absorbent pad utilized/changed  Taken 4/26/2024 1359 by Jessica Beckford RN  Body Position:   supine, head elevated   supine, legs elevated  Taken 4/26/2024 0800 by Jessica Beckford RN  Body Position: supine, head elevated  Device Skin Pressure Protection:   adhesive use limited   tubing/devices free from skin " contact  Taken 4/26/2024 0738 by Jessica Beckford RN  Body Position: supine, head elevated  Intervention: Prevent and Manage VTE (Venous Thromboembolism) Risk  Recent Flowsheet Documentation  Taken 4/26/2024 0800 by Jessica Beckford RN  VTE Prevention/Management: SCDs (sequential compression devices) off  Intervention: Prevent Infection  Recent Flowsheet Documentation  Taken 4/26/2024 0800 by Jessica Beckford RN  Infection Prevention:   hand hygiene promoted   personal protective equipment utilized   rest/sleep promoted  Goal: Optimal Comfort and Wellbeing  Outcome: Progressing  Goal: Readiness for Transition of Care  Outcome: Progressing     Problem: Pneumonia  Goal: Fluid Balance  Outcome: Progressing  Goal: Resolution of Infection Signs and Symptoms  Outcome: Progressing  Intervention: Prevent Infection Progression  Recent Flowsheet Documentation  Taken 4/26/2024 0800 by Jessica Beckford RN  Fever Reduction/Comfort Measures: ice pack(s) applied  Isolation Precautions: enteric precautions maintained  Goal: Effective Oxygenation and Ventilation  Outcome: Progressing  Intervention: Promote Airway Secretion Clearance  Recent Flowsheet Documentation  Taken 4/26/2024 0800 by Jessica Beckford RN  Cough And Deep Breathing: done independently per patient  Intervention: Optimize Oxygenation and Ventilation  Recent Flowsheet Documentation  Taken 4/26/2024 0800 by Jessica Beckford RN  Airway/Ventilation Management:   airway patency maintained   pulmonary hygiene promoted  Head of Bed (HOB) Positioning: HOB at 30-45 degrees  Taken 4/26/2024 0738 by Jessica Beckford RN  Head of Bed (HOB) Positioning: HOB at 30-45 degrees     Problem: Gas Exchange Impaired  Goal: Optimal Gas Exchange  Outcome: Progressing  Intervention: Optimize Oxygenation and Ventilation  Recent Flowsheet Documentation  Taken 4/26/2024 0800 by Jessica Beckford RN  Airway/Ventilation Management:   airway patency maintained   pulmonary hygiene promoted  Head of  Bed (HOB) Positioning: HOB at 30-45 degrees  Taken 4/26/2024 0738 by Jessica Beckford, RN  Head of Bed (HOB) Positioning: HOB at 30-45 degrees     Problem: Malnutrition  Goal: Improved Nutritional Intake  Outcome: Progressing   Goal Outcome Evaluation:

## 2024-04-26 NOTE — CONSULTS
[facility] Pulmonary Consult Note    Date of Service: 04/26/24    Assessment and Recommendations:  73M COPD, tobacco abuse, HFpEF, AVR, MVR, pAfib s/p ablation, CHB s/p PPM, CAD, HTN, CKD admitted 4/25 w/ worsening dyspnea and fever. CT w/ worsening RLL consolidation. Last hospitalization, he had a bronchoscopy that was unremarkable. There have been no positive microbiologic data. His swallow study showed aspiration w/ thin liquids, but otherwise OK. Despite this his presentation does appear consistent with some element of aspiration given the enlarging RLL consolidation which you would otherwise not expect. This consolidation could be consistent with other atypical pneumonias, such as Blastomycosis, but studies have been negative. His CT findings are not consistent with organizing pneumonias and again BAL did not support this. I do not feel his COPD is current in exacerbation.     - ABx per ID  - continue ICS-LABA (Breo)  - no indication for steroids, at this time  - will trial more aggressive airway clearance with duonebs, acetylcysteine nebs, and Volera  - I do not feel repeat bronchoscopy is indicated, at this time, but this may be further considered next week    Pulmonary will continue to follow.     Chief Complaint   Patient presents with    Shortness of Breath    Generalized Weakness    Fever       Summary:  73M COPD, tobacco abuse, HFpEF, AVR, MVR, pAfib s/p ablation, CHB s/p PPM, CAD, HTN, CKD admitted 4/25 w/ worsening dyspnea and fever. Pt had recent admission for pneumonia and was discharged 4/22. He was been started on pip-tazo and vanc (now stopped). Seen by ID. CT chest this admission w/ worsening of dense RLL consolidation. Tm this hospitalization 100.8. No leukocytosis. Normal procalcitonin. He reports no improvement since discharge. He had dyspnea at all times. No chest tightness. Hears wheezing. Cough w/ yellow sputum. Cough wakes him from sleep. No orthopnea or PND. Does have LE  "edema. He reports reduced appetite. He feels he does not aspirate.     Last hospitalization he was seen by my colleague Dr. Sullivan. BAL performed and unremarkable. Fungal serologies negative. Prior to that hospitalization he failed outpatient treatment w/ levofloxacin and doxycycline. He was given pip-tazo and azithromycin in the hospital and discharge w/ amoxicillin-clavulanate. He was seen by SLP that admission, swallow study showed oropharyngeal dysphagia.     10 point review of systems negative, aside from that mentioned above    BP (!) 140/55 (BP Location: Left arm)   Pulse 86   Temp 100  F (37.8  C)   Resp 20   Ht 1.702 m (5' 7\")   Wt 63.1 kg (139 lb 1.8 oz)   SpO2 93%   BMI 21.79 kg/m    Gen: NAD  HEENT: anicteric, OP clear  Card: RRR  Pulm: clear bilaterally   Abd: soft, NTND  MSK: 2+ b/l LE edema, no acute joint abnormalities  Skin: no obvious rash  Psych: normal affect  Neuro: answering questions appropriately     Labs: personally reviewed    Imaging: personally reviewed    PMH: as above, COPD    Past Surgical History:   Procedure Laterality Date    BRONCHOSCOPY (RIGID OR FLEXIBLE), DIAGNOSTIC N/A 4/19/2024    Procedure: Bronchoscopy flexible and rigid with bronchoalveolar lavage;  Surgeon: Jai Sullivan MD;  Location:  GI     FHx: reviewed and non-contributory     Social History     Socioeconomic History    Marital status:      Spouse name: Not on file    Number of children: Not on file    Years of education: Not on file    Highest education level: Not on file   Occupational History    Not on file   Tobacco Use    Smoking status: Not on file    Smokeless tobacco: Not on file   Substance and Sexual Activity    Alcohol use: Not on file    Drug use: Not on file    Sexual activity: Not on file   Other Topics Concern    Not on file   Social History Narrative    Not on file     Social Determinants of Health     Financial Resource Strain: Low Risk  (9/24/2023)    Received from [facility], [facility] [place]    Financial Resource Strain     Difficulty of Paying Living Expenses: 3     Difficulty of Paying Living Expenses: Not on file   Food Insecurity: No Food Insecurity (9/24/2023)    Received from [facility], [facility]    Food Insecurity     Worried About Running Out of Food in the Last Year: 1   Transportation Needs: No Transportation Needs (9/24/2023)    Received from [facility], [facility]    Transportation Needs     Lack of Transportation (Medical): 1   Physical Activity: Not on file   Stress: Not on file   Social Connections: Socially Integrated (9/24/2023)    Received from [facility] [place], [facility]    Social Connections     Frequency of Communication with Friends and Family: 0   Interpersonal Safety: Not on file   Housing Stability: Low Risk  (9/24/2023)    Received from [facility], [facility]    Housing Stability     Unable to Pay for Housing in the Last Year: 1       Guy Vega MD  Pulmonary and Critical Care Medicine  [facility]

## 2024-04-26 NOTE — PROGRESS NOTES
[facility]  Infectious Disease Progress Note          Assessment and Plan:   Date of Admission:  4/25/2024  Date of Consult (When I saw the patient): 04/25/24        Assessment & Plan  Jaylen Lamas is a 73 year old who was admitted on 4/25/2024.      Impression: 1 73-year-old male, well-known to me, just discharged from the hospital now readmitted, unclear whether this is still all part of #2 below or some new infection, quite unlikely this is new bacterial infection, no positive microbiology, bronchoscopy cultures all unremarkable, recent and prior courses of antibiotics without particular benefit suspect this is not bacterial infection currently  2 2 and half month illness with intermittent fever, weight loss, malaise and fatigue and respiratory symptoms, prior antibiotic courses without resolution or benefit no positive microbiology, recent bronchoscopy without so far obvious cause  3 CMV, EBV and Candida in bronchoscopy very unlikely clinically relevant in this clinical scenario  4 intermittent diarrhea again now rule out new C. difficile, is prior C. 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 difficile, if positive treat, prophylaxis not indicated for remote C.  "Difficile  3 not clear next step in the workup, , very large workup during last admission essentially completely negative in theory if new nosocomial infection probably changed from Zosyn but I am not convinced that is the diagnosis so Zosyn for now                 Interval History:     no new complaints slept poorly, minimal cough, ongoing diarrhea stool studies still pending, no fever overnight, oxygen levels excellent on only 2 L currently, definitely new increased infiltrate in the right lower lobe but conceivably just progression from the prior admission              Medications:     Current Facility-Administered Medications   Medication Dose Route Frequency Provider Last Rate Last Admin    apixaban ANTICOAGULANT (ELIQUIS) tablet 5 mg  5 mg Oral BID Priti Tovar MD   5 mg at 04/26/24 0756    atorvastatin (LIPITOR) tablet 40 mg  40 mg Oral QPM Priti Tovar MD   40 mg at 04/25/24 2039    digoxin (LANOXIN) tablet 125 mcg  125 mcg Oral QPM Priti Tovar MD   125 mcg at 04/25/24 2039    escitalopram (LEXAPRO) tablet 20 mg  20 mg Oral Daily Priti Tovar MD   20 mg at 04/26/24 0756    fluticasone-vilanterol (BREO ELLIPTA) 100-25 MCG/ACT inhaler 1 puff [patient supplied medication]  1 puff Inhalation Daily Priti Tovar MD   1 puff at 04/26/24 0758    lactobacillus rhamnosus (GG) (CULTURELL) capsule 1 capsule  1 capsule Oral BID Priti Tovar MD   1 capsule at 04/26/24 0756    piperacillin-tazobactam (ZOSYN) 4.5 g vial to attach to  mL bag  4.5 g Intravenous Q6H Priti Tovar MD   4.5 g at 04/26/24 0758    sodium chloride (PF) 0.9% PF flush 3 mL  3 mL Intracatheter Q8H Priti Tovar MD   3 mL at 04/25/24 2054                  Physical Exam:   Blood pressure (!) 140/55, pulse 86, temperature 100  F (37.8  C), resp. rate 20, height 1.702 m (5' 7\"), weight 63.1 kg (139 lb 1.8 oz), SpO2 93%.  Wt Readings from Last 2 Encounters:   04/25/24 63.1 kg (139 " "lb 1.8 oz)   04/22/24 66.1 kg (145 lb 11.6 oz)     Vital Signs with Ranges  Temp:  [98.5  F (36.9  C)-100.8  F (38.2  C)] 100  F (37.8  C)  Pulse:  [77-86] 86  Resp:  [20] 20  BP: ()/(41-57) 140/55  SpO2:  [90 %-100 %] 93 %    Constitutional: Awake, alert, cooperative, no apparent distress     Lungs: Clear to auscultation bilaterally, no crackles or wheezing   Cardiovascular: Regular rate and rhythm, normal S1 and S2, and no murmur noted   Abdomen: Normal bowel sounds, soft, non-distended, non-tender   Skin: No rashes, no cyanosis, no edema   Other:           Data:   All microbiology laboratory data reviewed.  Recent Labs   Lab Test 04/25/24  0713 04/25/24  0020 04/20/24  0704   WBC 9.8 12.2* 12.8*   HGB 9.6* 9.8* 9.7*   HCT 31.3* 31.6* 31.5*   MCV 85 85 84    327 303     Recent Labs   Lab Test 04/25/24  0713 04/25/24  0020 04/20/24  0704   CR 0.92 0.96 1.11     No lab results found.  No lab results found.    Invalid input(s): \"UC\"     "

## 2024-04-26 NOTE — PLAN OF CARE
"Goal Outcome Evaluation:      Plan of Care Reviewed With: patient    Overall Patient Progress: no changeOverall Patient Progress: no change    Outcome Evaluation: Loose, incontinent stools. Frequent, productive cough. Requiring 3L O2 via NC overnight to maintain sats.    A&O x 4. Low fat, low sodium diet. SL. On tele- A paced. Up with 1A, GB. BLE edema. Urine and stool sample collected. Pulmonology consult pending.       Problem: Adult Inpatient Plan of Care  Goal: Plan of Care Review  Description: The Plan of Care Review/Shift note should be completed every shift.  The Outcome Evaluation is a brief statement about your assessment that the patient is improving, declining, or no change.  This information will be displayed automatically on your shift  note.  Outcome: Not Progressing  Flowsheets (Taken 4/26/2024 0620)  Outcome Evaluation: Loose, incontinent stools. Frequent, productive cough. Requiring 3L O2 via NC overnight to maintain sats.  Plan of Care Reviewed With: patient  Overall Patient Progress: no change  Goal: Patient-Specific Goal (Individualized)  Description: You can add care plan individualizations to a care plan. Examples of Individualization might be:  \"Parent requests to be called daily at 9am for status\", \"I have a hard time hearing out of my right ear\", or \"Do not touch me to wake me up as it startles  me\".  Outcome: Not Progressing  Goal: Absence of Hospital-Acquired Illness or Injury  Outcome: Not Progressing  Goal: Optimal Comfort and Wellbeing  Outcome: Not Progressing  Goal: Readiness for Transition of Care  Outcome: Not Progressing     Problem: Pneumonia  Goal: Fluid Balance  Outcome: Not Progressing  Goal: Resolution of Infection Signs and Symptoms  Outcome: Not Progressing  Goal: Effective Oxygenation and Ventilation  Outcome: Not Progressing     Problem: Gas Exchange Impaired  Goal: Optimal Gas Exchange  Outcome: Not Progressing     "

## 2024-04-26 NOTE — PLAN OF CARE
Problem: Adult Inpatient Plan of Care  Goal: Plan of Care Review  Description: Aox4. Able to communicate needs well. On 3L NC. Lung sounds coarse. Reported SOB, MANCUSO. Denies pain, N/V or chest pain. Reported one BM during shift, is voiding without any issues. UA and stool sample needs to be collected. Calm and pleasant with cares. Will continue to provide supportive care.   Outcome: Progressing  Flowsheets (Taken 4/25/2024 1918)  Plan of Care Reviewed With: patient  Overall Patient Progress: improving  Goal: Patient-Specific Goal (Individualized)  Description:   Outcome: Progressing  Goal: Absence of Hospital-Acquired Illness or Injury  Outcome: Progressing  Intervention: Prevent Skin Injury  Recent Flowsheet Documentation  Taken 4/25/2024 1649 by Lizet Patino RN  Body Position: supine, head elevated  Device Skin Pressure Protection:   adhesive use limited   tubing/devices free from skin contact  Goal: Optimal Comfort and Wellbeing  Outcome: Progressing  Goal: Readiness for Transition of Care  Outcome: Progressing     Problem: Pneumonia  Goal: Fluid Balance  Outcome: Progressing  Goal: Resolution of Infection Signs and Symptoms  Outcome: Progressing  Goal: Effective Oxygenation and Ventilation  Outcome: Progressing  Intervention: Promote Airway Secretion Clearance  Recent Flowsheet Documentation  Taken 4/25/2024 1649 by Lizet Patino RN  Cough And Deep Breathing: done independently per patient  Intervention: Optimize Oxygenation and Ventilation  Recent Flowsheet Documentation  Taken 4/25/2024 1649 by Lizet Patino RN  Head of Bed (HOB) Positioning: HOB at 30-45 degrees     Problem: Gas Exchange Impaired  Goal: Optimal Gas Exchange  Outcome: Progressing  Intervention: Optimize Oxygenation and Ventilation  Recent Flowsheet Documentation  Taken 4/25/2024 1649 by Lizet Patino RN  Head of Bed (HOB) Positioning: HOB at 30-45 degrees   Goal Outcome Evaluation:      Plan of Care Reviewed With: patient    Overall Patient  Progress: improvingOverall Patient Progress: improving

## 2024-04-27 NOTE — PLAN OF CARE
Goal Outcome Evaluation:      Plan of Care Reviewed With: patient    Overall Patient Progress: improvingOverall Patient Progress: improving    Outcome Evaluation: Pt A&O. Denies pain. congested cough - on 3 L O2. Tolerating reg diet. IV zosyn, IV iron given.   Temp: 98.3  F (36.8  C) Temp src: Temporal BP: 107/46 Pulse: 81   Resp: 20 SpO2: 94 % O2 Device: Nasal cannula Oxygen Delivery: 3 LPM  Tolerating diet. Transfers with Ax1. Tele reads demand A paced per tele tech      Problem: Adult Inpatient Plan of Care  Goal: Plan of Care Review  Description: The Plan of Care Review/Shift note should be completed every shift.  The Outcome Evaluation is a brief statement about your assessment that the patient is improving, declining, or no change.  This information will be displayed automatically on your shift  note.  Outcome: Progressing  Flowsheets (Taken 4/27/2024 5987)  Outcome Evaluation: Pt A&O. Denies pain. congested cough - on 3 L O2. Tolerating reg diet. IV zosyn, IV iron given.  Plan of Care Reviewed With: patient  Overall Patient Progress: improving    Goal: Absence of Hospital-Acquired Illness or Injury  Outcome: Progressing  Goal: Optimal Comfort and Wellbeing  Outcome: Progressing  Goal: Readiness for Transition of Care  Outcome: Progressing     Problem: Pneumonia  Goal: Fluid Balance  Outcome: Progressing  Goal: Resolution of Infection Signs and Symptoms  Outcome: Progressing  Goal: Effective Oxygenation and Ventilation  Outcome: Progressing     Problem: Gas Exchange Impaired  Goal: Optimal Gas Exchange  Outcome: Progressing     Problem: Malnutrition  Goal: Improved Nutritional Intake  Outcome: Progressing

## 2024-04-27 NOTE — PROGRESS NOTES
[facility]    Hospitalist Progress Note      Assessment & Plan   Jaylen Lamas is a 73 year old man who was admitted on 4/25/2024. PMH significant for COPD, tobacco use, HFpEF, bioprosthetic aortic valve replacement, bioprosthetic mitral valve replacement, paroxysmal atrial fibrillation s/p ablation and MAZE procedure, 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 difficile, hypertension, CKD, history of duodenal ulcer, depression/anxiety, testicular cancer s/p orchiectomy. He was recently hospitalized for pneumonia and discharged 4/22/2024 on Augmentin. He returned 4/25 with worsening shortness of breath, cough, and fever. During recent hospitalization 4- to 4/22/2024 patient underwent bronchoscopy with BAL positive for CMV, EBV and Candida.  Bacterial cultures were negative.  Patient was treated with Zosyn and azithromycin while in hospital and discharged on Augmentin.  Underwent workup for possible aspiration.  Patient is being followed by pulmonology and infectious disease. Continues on IV Zosyn at this time.       Acute hypoxic respiratory failure  Right lower lobe pneumonia  Poss Sepsis (Hypotension, Hypoxia, leukocytosis, fever at home)  COPD  -Recent hospitalization with elaborate workup negative  -BAL was positive for EBV CMV and Candida  -Patient discharged on Augmentin  -In the emergency room was started on vancomycin and Zosyn  -Treated with IV fluids  -Blood cultures were obtained  - Appreciate pulmonology and ID following.   - Continue O2 as needed.  - Continue Breo Ellipta, PRN duonebs.    - Acetylcysteine nebs per pulmonology.      Hypokalemia  Mild. Replaced in ED.  -BMP with AM labs     Weight Loss  At risk of malnutrition with ongoing low appetite and illness.  Albumin and protein are low.    - Appreciate nutrition consult  - Will not plan an appetite stimulant at this time, but can consider.     Suspected  "restless leg syndrome  Periodic limb movements of sleep, moderate  Patient had 2020 sleep study which noted moderate periodic limb movements of sleep and recommended questioning and treatment about restless legs.  Patient describes what sounds like restless leg syndrome on exam and reports that this feeling of needing to move his legs and \"cramping\" and \"pulling\" feeling. Denies being treated for this in the past.  Ferritin is extremely low at 4. Patient has iron deficiency anemia and uncertain what workup has previously been done for this. Giving IV iron 200 mg daily x 5 days. Will need further follow up with PCP, as well.     Iron deficiency anemia  Iron studies low. Ferritin 4.   Patient had a colonoscopy in 2009 per EMR; uncertain of results. Unclear if he has had screening since that time.   Giving IV iron as above, but patient will warrant further workup for etiology of iron deficiency and anemia.   Recommend further follow up with PCP.      History of C difficile   Positive 4/2019.  C diff negative.  No indication for prophylaxis at this time. Follow.     Prediabetes   A1c checked in ED, 6.3.  -monitor blood sugar       HFpEF  Chronic right ventricular dysfunction  HT  Mitral and Aortic Bioprosthetic valve replacements   Nonobstructive coronary artery disease  Appears compensated, more hypovolemic.     -Echocardiogram 4/19/2024 shows pulmonary hypertension normal LV function paced rhythm and no evidence of vegetation  -Continue PTA Toprol XL 50 mg, digoxin 0.125 mg daily Lipitor 20 mg  -limit use of IV fluids   -no diuresis needed at this time, though will check BNP.     Paroxysmal atrial fibrillation s/p ablation and MAZE procedure  History of complete heart block status post pacemaker placement  Hypertension  Rate controlled.   -Holding diuretic initially.   - Have resumed BB.   -Continue Lipitor 20 mg  -He is on digoxin   -continue Apixaban for stroke prophylaxis.   -monitor on telemetry initially, if " stable discontinue      CKD stage II.  -Near baseline of Creatinine  -bmp in AM     History of duodenal ulcer  -protonix while admitted for GI PPX, has been on steroids and systemically anticoagulated      Depression/anxiety  -Continue Lexapro     Testicular Cancer   S/p orchiectomy, chemotherapy and radiation     DVT Prophylaxis: DOAC  Code Status: Full Code  Expected discharge: Anticipate that patient will remain hospitalized for at least 3-4 more days pending further clinical improvement.     Poornima Carrillo MD FACP  Hospitalist Service  [facility]      Interval History   Patient reports improvement in leg cramping with Voltaren gel. Ferritin returned low at 4 and starting patient on IV iron. Continues on IV Zosyn. Wife updated by phone.     -Data reviewed today: I reviewed all new labs and imaging results over the last 24 hours.     Physical Exam   Temp: 98.3  F (36.8  C) Temp src: Temporal BP: 115/54 Pulse: 81   Resp: 18 SpO2: 93 % O2 Device: Nasal cannula Oxygen Delivery: 3 LPM  Vitals:    04/25/24 0023   Weight: 63.1 kg (139 lb 1.8 oz)     Vital Signs with Ranges  Temp:  [98.3  F (36.8  C)-99  F (37.2  C)] 98.3  F (36.8  C)  Pulse:  [80-84] 81  Resp:  [16-28] 18  BP: ()/(45-54) 115/54  SpO2:  [88 %-96 %] 93 %  I/O last 3 completed shifts:  In: -   Out: 750 [Urine:750]    Constitutional: Pleasant older gentleman resting in bed. Appears chronically ill and with increased work of breathing.   HEENT: NCAT. EOMI. Moist oral mucosa.  Respiratory: Coarse lung sounds on exam. No wheezing. Increased work of breathing noted. On 3L O2.   Cardiovascular: Regular paced rhythm, regular rate. No murmur.  GI: Soft, nontender, nondistended. Normoactive bowel sounds.   Musculoskeletal: No gross deformities. 2+ edema to bilateral lower extremities.   Neurologic: Alert and oriented x3. No focal neurologic deficits. Did not assess gait.      Medications   Current Facility-Administered Medications    Medication Dose Route Frequency Provider Last Rate Last Admin     Current Facility-Administered Medications   Medication Dose Route Frequency Provider Last Rate Last Admin    acetylcysteine (MUCOMYST) 10 % nebulizer solution 4 mL  4 mL Nebulization Q6H Guy Vega MD   4 mL at 04/27/24 0851    albuterol (PROVENTIL) neb solution 2.5 mg  2.5 mg Nebulization Q6H Guy Vega MD   2.5 mg at 04/27/24 0851    apixaban ANTICOAGULANT (ELIQUIS) tablet 5 mg  5 mg Oral BID Priti Tovar MD   5 mg at 04/27/24 0844    atorvastatin (LIPITOR) tablet 40 mg  40 mg Oral QPM Priti Tovar MD   40 mg at 04/26/24 2012    diclofenac (VOLTAREN) 1 % topical gel 4 g  4 g Topical 4x Daily Poornima Velez MD   4 g at 04/27/24 0924    digoxin (LANOXIN) tablet 125 mcg  125 mcg Oral QPM Priti Tovar MD   125 mcg at 04/26/24 2027    escitalopram (LEXAPRO) tablet 20 mg  20 mg Oral Daily Priti Tovar MD   20 mg at 04/27/24 0844    fluticasone-vilanterol (BREO ELLIPTA) 100-25 MCG/ACT inhaler 1 puff [patient supplied medication]  1 puff Inhalation Daily Priti Tovar MD   1 puff at 04/27/24 0851    iron sucrose (VENOFER) 200 mg in sodium chloride 0.9 % 120 mL intermittent infusion  200 mg Intravenous Q24H Poornima Velez  mL/hr at 04/27/24 1434 200 mg at 04/27/24 1434    lactobacillus rhamnosus (GG) (CULTURELL) capsule 1 capsule  1 capsule Oral BID Priti Tovar MD   1 capsule at 04/27/24 0844    metoprolol succinate ER (TOPROL XL) 24 hr tablet 50 mg  50 mg Oral At Bedtime Poornima Velez MD   50 mg at 04/26/24 2120    multivitamin w/minerals (THERA-VIT-M) tablet 1 tablet  1 tablet Oral Daily Poornima Velez MD   1 tablet at 04/27/24 0844    piperacillin-tazobactam (ZOSYN) 4.5 g vial to attach to  mL bag  4.5 g Intravenous Q6H Priti Tovar MD   4.5 g at 04/27/24 1405    sodium chloride (PF) 0.9% PF flush 3 mL  3 mL Intracatheter Q8H  Priti Tovar MD   3 mL at 04/27/24 1406       Data   Recent Labs   Lab 04/27/24  1426 04/27/24  0604 04/26/24  0601 04/25/24  0713 04/25/24  0020   WBC  --  11.5*  --  9.8 12.2*   HGB  --  9.1*  --  9.6* 9.8*   MCV  --  84  --  85 85   PLT  --  268  --  273 327   INR  --   --   --   --  1.85*   NA  --  134*  --  133* 132*   POTASSIUM 3.7 3.3* 3.6 3.7 3.3*   CHLORIDE  --  103  --  102 98   CO2  --  22  --  23 26   BUN  --  17.1  --  15.2 14.1   CR  --  1.03  --  0.92 0.96   ANIONGAP  --  9  --  8 8   MELO  --  9.0  --  8.9 9.1   GLC  --  94  --  151* 138*   ALBUMIN  --  2.4*  --   --  2.4*   PROTTOTAL  --  5.2*  --   --  5.2*   BILITOTAL  --  0.4  --   --  0.3   ALKPHOS  --  107  --   --  114   ALT  --  25  --   --  24   AST  --  27  --   --  19       No results found for this or any previous visit (from the past 24 hour(s)).

## 2024-04-27 NOTE — PLAN OF CARE
"  Problem: Adult Inpatient Plan of Care  Goal: Plan of Care Review  Description: The Plan of Care Review/Shift note should be completed every shift.  The Outcome Evaluation is a brief statement about your assessment that the patient is improving, declining, or no change.  This information will be displayed automatically on your shift  note.  Outcome: Progressing  Flowsheets (Taken 4/26/2024 2203)  Outcome Evaluation: pt is A&OX3. no chills, no fever for this shift. LS coarse, congested productive cough present.no needed sucction. IS used. pain contorolled with voltaren , pt cont. IV Zosyn. pt is on Tele monitor.  Plan of Care Reviewed With: patient  Overall Patient Progress: improving  Goal: Patient-Specific Goal (Individualized)  Description: You can add care plan individualizations to a care plan. Examples of Individualization might be:  \"Parent requests to be called daily at 9am for status\", \"I have a hard time hearing out of my right ear\", or \"Do not touch me to wake me up as it startles  me\".  Outcome: Progressing  Goal: Absence of Hospital-Acquired Illness or Injury  Outcome: Progressing  Intervention: Identify and Manage Fall Risk  Recent Flowsheet Documentation  Taken 4/26/2024 1700 by Aylin Carlson RN  Safety Promotion/Fall Prevention:   clutter free environment maintained   safety round/check completed   room near nurse's station  Intervention: Prevent and Manage VTE (Venous Thromboembolism) Risk  Recent Flowsheet Documentation  Taken 4/26/2024 1700 by Aylin Carlson RN  VTE Prevention/Management: SCDs (sequential compression devices) off  Intervention: Prevent Infection  Recent Flowsheet Documentation  Taken 4/26/2024 1700 by Aylin Carlson RN  Infection Prevention:   hand hygiene promoted   personal protective equipment utilized   rest/sleep promoted  Goal: Optimal Comfort and Wellbeing  Outcome: Progressing  Intervention: Monitor Pain and Promote Comfort  Recent Flowsheet Documentation  Taken " 4/26/2024 1700 by Aylin Carlson RN  Pain Management Interventions:   pillow support provided   repositioned  Goal: Readiness for Transition of Care  Outcome: Progressing     Problem: Pneumonia  Goal: Fluid Balance  Outcome: Progressing  Goal: Resolution of Infection Signs and Symptoms  Outcome: Progressing  Intervention: Prevent Infection Progression  Recent Flowsheet Documentation  Taken 4/26/2024 1700 by Aylin Carlson RN  Infection Management: aseptic technique maintained  Isolation Precautions: enteric precautions maintained  Goal: Effective Oxygenation and Ventilation  Outcome: Progressing  Intervention: Promote Airway Secretion Clearance  Recent Flowsheet Documentation  Taken 4/26/2024 1700 by Aylin Carlson RN  Cough And Deep Breathing: done independently per patient  Intervention: Optimize Oxygenation and Ventilation  Recent Flowsheet Documentation  Taken 4/26/2024 1700 by Aylin Carlson RN  Airway/Ventilation Management:   airway patency maintained   pulmonary hygiene promoted     Problem: Gas Exchange Impaired  Goal: Optimal Gas Exchange  Outcome: Progressing  Intervention: Optimize Oxygenation and Ventilation  Recent Flowsheet Documentation  Taken 4/26/2024 1700 by Aylin Carlson RN  Airway/Ventilation Management:   airway patency maintained   pulmonary hygiene promoted     Problem: Malnutrition  Goal: Improved Nutritional Intake  Outcome: Progressing   Goal Outcome Evaluation:      Plan of Care Reviewed With: patient    Overall Patient Progress: improvingOverall Patient Progress: improving    Outcome Evaluation: pt is A&OX3. no chills, no fever for this shift. LS coarse, congested productive cough present.no needed sucction. IS used. pain contorolled with voltaren , pt cont. IV Zosyn. pt is on Tele monitor.

## 2024-04-27 NOTE — PLAN OF CARE
"Goal Outcome Evaluation:      Plan of Care Reviewed With: patient    Overall Patient Progress: improvingOverall Patient Progress: improving    Outcome Evaluation: Pt is alert and oriented x4, No fever on this shift, LS coarse , congested productive cough present. IS used .IV zoysyn . Pt  is on Tele.      Problem: Adult Inpatient Plan of Care  Goal: Plan of Care Review  Description: The Plan of Care Review/Shift note should be completed every shift.  The Outcome Evaluation is a brief statement about your assessment that the patient is improving, declining, or no change.  This information will be displayed automatically on your shift  note.  4/27/2024 0737 by Ronna Pritchett, RN  Outcome: Progressing  Flowsheets (Taken 4/27/2024 0240)  Outcome Evaluation: Pt is alert and oriented x4, No fever on this shift, LS coarse , congested productive cough present. IS used .IV zoysyn . Pt  is on Tele.  Plan of Care Reviewed With: patient  Overall Patient Progress: improving  4/27/2024 0727 by Ronna Pritchett RN  Outcome: Progressing  Flowsheets (Taken 4/27/2024 0240)  Outcome Evaluation: Pt is alert and oriented x4, No fever on this shift, LS coarse , congested productive cough present. IS used .IV zoysyn . Pt  is on Tele.  Plan of Care Reviewed With: patient  Overall Patient Progress: improving  Goal: Patient-Specific Goal (Individualized)  Description: You can add care plan individualizations to a care plan. Examples of Individualization might be:  \"Parent requests to be called daily at 9am for status\", \"I have a hard time hearing out of my right ear\", or \"Do not touch me to wake me up as it startles  me\".  4/27/2024 0737 by Ronna Pritchett, RN  Outcome: Progressing  4/27/2024 0727 by Ronna Pritchett, RN  Outcome: Progressing  Goal: Absence of Hospital-Acquired Illness or Injury  4/27/2024 0737 by Ronna Pritchett, RN  Outcome: Progressing  4/27/2024 0727 by Ronna Pritchett, " RN  Outcome: Progressing  Intervention: Identify and Manage Fall Risk  Recent Flowsheet Documentation  Taken 4/27/2024 0230 by Ronna Pritchett RN  Safety Promotion/Fall Prevention:   clutter free environment maintained   safety round/check completed   room near nurse's station  Intervention: Prevent Skin Injury  Recent Flowsheet Documentation  Taken 4/27/2024 0230 by Ronna Pritchett RN  Body Position:   supine, head elevated   supine, legs elevated  Intervention: Prevent and Manage VTE (Venous Thromboembolism) Risk  Recent Flowsheet Documentation  Taken 4/27/2024 0230 by Ronna Pritchett RN  VTE Prevention/Management: SCDs (sequential compression devices) off  Intervention: Prevent Infection  Recent Flowsheet Documentation  Taken 4/27/2024 0230 by Ronna Pritchett RN  Infection Prevention:   hand hygiene promoted   personal protective equipment utilized   rest/sleep promoted  Goal: Optimal Comfort and Wellbeing  4/27/2024 0737 by Ronna Pritchett RN  Outcome: Progressing  4/27/2024 0727 by Ronna Pritchett RN  Outcome: Progressing  Goal: Readiness for Transition of Care  4/27/2024 0737 by Ronna Pritchett RN  Outcome: Progressing  4/27/2024 0727 by Ronna Pritchett RN  Outcome: Progressing     Problem: Pneumonia  Goal: Fluid Balance  4/27/2024 0737 by Ronna Pritchett RN  Outcome: Progressing  4/27/2024 0727 by Ronna Pritchett RN  Outcome: Progressing  Goal: Resolution of Infection Signs and Symptoms  4/27/2024 0737 by Ronna Pritchett RN  Outcome: Progressing  4/27/2024 0727 by Ronna Pritchett RN  Outcome: Progressing  Intervention: Prevent Infection Progression  Recent Flowsheet Documentation  Taken 4/27/2024 0230 by Ronna Pritchett RN  Infection Management: aseptic technique maintained  Isolation Precautions: enteric precautions maintained  Goal: Effective Oxygenation and Ventilation  4/27/2024 0737 by Kraig Cormier  Ronna CHANG RN  Outcome: Progressing  4/27/2024 0727 by Ronna Pritchett, RN  Outcome: Progressing  Intervention: Promote Airway Secretion Clearance  Recent Flowsheet Documentation  Taken 4/27/2024 0230 by Ronna Pritchett RN  Cough And Deep Breathing: done independently per patient  Intervention: Optimize Oxygenation and Ventilation  Recent Flowsheet Documentation  Taken 4/27/2024 0230 by Ronna Pritchett, RN  Airway/Ventilation Management:   airway patency maintained   pulmonary hygiene promoted  Head of Bed (HOB) Positioning: HOB at 30-45 degrees     Problem: Gas Exchange Impaired  Goal: Optimal Gas Exchange  4/27/2024 0737 by Ronna Pritchett RN  Outcome: Progressing  4/27/2024 0727 by Ronna Pritchett RN  Outcome: Progressing  Intervention: Optimize Oxygenation and Ventilation  Recent Flowsheet Documentation  Taken 4/27/2024 0230 by Ronna Pritchett, MARY  Airway/Ventilation Management:   airway patency maintained   pulmonary hygiene promoted  Head of Bed (HOB) Positioning: HOB at 30-45 degrees     Problem: Malnutrition  Goal: Improved Nutritional Intake  4/27/2024 0737 by Ronna Pritchett, MARY  Outcome: Progressing  4/27/2024 0727 by Ronna Pritchett, MARY  Outcome: Progressing

## 2024-04-27 NOTE — PROGRESS NOTES
[facility]    Hospitalist Progress Note      Assessment & Plan   Jaylen Lamas is a 73 year old man who was admitted on 4/25/2024. PMH significant for COPD, tobacco use, HFpEF, bioprosthetic aortic valve replacement, bioprosthetic mitral valve replacement, paroxysmal atrial fibrillation s/p ablation and MAZE procedure, 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 difficile, hypertension, CKD, history of duodenal ulcer, depression/anxiety, testicular cancer s/p orchiectomy. He was recently hospitalized for pneumonia and discharged 4/22/2024 on Augmentin. He returned 4/25 with worsening shortness of breath, cough, and fever. During recent hospitalization 4- to 4/22/2024 patient underwent bronchoscopy with BAL positive for CMV, EBV and Candida.  Bacterial cultures were negative.  Patient was treated with Zosyn and azithromycin while in hospital and discharged on Augmentin.  Underwent workup for possible aspiration.  Patient is being followed by pulmonology and infectious disease. Continues on IV Zosyn at this time.       Acute hypoxic respiratory failure  Right lower lobe pneumonia  Poss Sepsis (Hypotension, Hypoxia, leukocytosis, fever at home)  COPD  -Recent hospitalization with elaborate workup negative  -BAL was positive for EBV CMV and Candida  -Patient discharged on Augmentin  -In the emergency room was started on vancomycin and Zosyn  -Treated with IV fluids  -Blood cultures were obtained  - Appreciate pulmonology and ID following.   - Continue O2 as needed.  - Continue Breo Ellipta, PRN duonebs.    - Acetylcysteine nebs per pulmonology.      Hypokalemia  Mild. Replaced in ED.  -BMP with AM labs     Weight Loss  At risk of malnutrition with ongoing low appetite and illness.  Albumin and protein are low.    - Appreciate nutrition consult  - Will not plan an appetite stimulant at this time, but can consider.     Suspected  "restless leg syndrome  Periodic limb movements of sleep, moderate  Patient had 2020 sleep study which noted moderate periodic limb movements of sleep and recommended questioning and treatment about restless legs.  Patient describes what sounds like restless leg syndrome on exam and reports that this feeling of needing to move his legs and \"cramping\" and \"pulling\" feeling. Denies being treated for this in the past.  Will check ferritin and follow.      History of C difficile   Positive 4/2019.  C diff negative.  No indication for prophylaxis at this time. Follow.     Prediabetes   A1c checked in ED, 6.3.  -monitor blood sugar       HFpEF  Chronic right ventricular dysfunction  HT  Mitral and Aortic Bioprosthetic valve replacements   Nonobstructive coronary artery disease  Appears compensated, more hypovolemic.     -Echocardiogram 4/19/2024 shows pulmonary hypertension normal LV function paced rhythm and no evidence of vegetation  -Continue PTA Toprol XL 50 mg, digoxin 0.125 mg daily Lipitor 20 mg  -limit use of IV fluids   -no diuresis needed at this time, though will check BNP.     Paroxysmal atrial fibrillation s/p ablation and MAZE procedure  History of complete heart block status post pacemaker placement  Hypertension  Rate controlled.   -Holding diuretic initially.   - Have resumed BB.   -Continue Lipitor 20 mg  -He is on digoxin   -continue Apixaban for stroke prophylaxis.   -monitor on telemetry initially, if stable discontinue      CKD stage II.  -Near baseline of Creatinine  -bmp in AM     History of duodenal ulcer  -protonix while admitted for GI PPX, has been on steroids and systemically anticoagulated      Depression/anxiety  -Continue Lexapro     Testicular Cancer   S/p orchiectomy, chemotherapy and radiation     DVT Prophylaxis: DOAC  Code Status: Full Code  Expected discharge: Anticipate that patient will remain hospitalized for at least 3-4 more days pending further clinical improvement.     Poornima SUTHERLAND" MD Gerardo FACP  Hospitalist Service  [facility]      Interval History   Patient reports difficulty sleeping last night (and many nights) due to cramping in his legs. Describes a tingling, cramping sensation that keeps him moving his legs to try and be comfortable. Asks for ibuprofen for this, but agrees to try Voltaren gel. Also checking ferritin and plan to discuss restless leg syndrome further with him. Patient seen by nutrition, pulmonary, and ID. Appreciate recommendations.     -Data reviewed today: I reviewed all new labs and imaging results over the last 24 hours.     Physical Exam   Temp: 98.9  F (37.2  C) Temp src: Temporal BP: 99/48 Pulse: 84   Resp: 16 SpO2: 96 % O2 Device: Nasal cannula Oxygen Delivery: 2 LPM  Vitals:    04/25/24 0023   Weight: 63.1 kg (139 lb 1.8 oz)     Vital Signs with Ranges  Temp:  [98.8  F (37.1  C)-100.8  F (38.2  C)] 98.9  F (37.2  C)  Pulse:  [77-86] 84  Resp:  [16-20] 16  BP: ()/(48-55) 99/48  SpO2:  [90 %-96 %] 96 %  I/O last 3 completed shifts:  In: 240 [P.O.:240]  Out: -     Constitutional: Pleasant older gentleman sitting up in bed. Wife and daughter-in-law present. Appears chronically ill and with increased work of breathing.   HEENT: NCAT. EOMI. Moist oral mucosa.  Respiratory: Coarse lung sounds on exam. No wheezing at time of my exam.  Cardiovascular: Regular paced rhythm, regular rate. Did not appreciate murmur at time of exam.   GI: Soft, nontender, nondistended. Normoactive bowel sounds.   Musculoskeletal: No gross deformities. 1+ edema to bilateral lower extremities.   Neurologic: Alert and oriented x3. No focal neurologic deficits. Did not assess gait.      Medications   Current Facility-Administered Medications   Medication Dose Route Frequency Provider Last Rate Last Admin     Current Facility-Administered Medications   Medication Dose Route Frequency Provider Last Rate Last Admin    acetylcysteine (MUCOMYST) 10 % nebulizer solution 4 mL  4  mL Nebulization Q6H Guy Vega MD        albuterol (PROVENTIL) neb solution 2.5 mg  2.5 mg Nebulization Q6H Guy Vega MD   2.5 mg at 04/26/24 1601    apixaban ANTICOAGULANT (ELIQUIS) tablet 5 mg  5 mg Oral BID Priti Tovar MD   5 mg at 04/26/24 0756    atorvastatin (LIPITOR) tablet 40 mg  40 mg Oral QPM Priti Tovar MD   40 mg at 04/25/24 2039    diclofenac (VOLTAREN) 1 % topical gel 4 g  4 g Topical 4x Daily Poornima Velez MD   4 g at 04/26/24 1741    digoxin (LANOXIN) tablet 125 mcg  125 mcg Oral QPM Priti Tovar MD   125 mcg at 04/25/24 2039    escitalopram (LEXAPRO) tablet 20 mg  20 mg Oral Daily Priti Tovar MD   20 mg at 04/26/24 0756    fluticasone-vilanterol (BREO ELLIPTA) 100-25 MCG/ACT inhaler 1 puff [patient supplied medication]  1 puff Inhalation Daily Priti Tovar MD   1 puff at 04/26/24 0758    lactobacillus rhamnosus (GG) (CULTURELL) capsule 1 capsule  1 capsule Oral BID Priti Tovar MD   1 capsule at 04/26/24 0756    metoprolol succinate ER (TOPROL XL) 24 hr tablet 50 mg  50 mg Oral At Bedtime Poornima Velez MD        [START ON 4/27/2024] multivitamin w/minerals (THERA-VIT-M) tablet 1 tablet  1 tablet Oral Daily Poornima Velez MD        piperacillin-tazobactam (ZOSYN) 4.5 g vial to attach to  mL bag  4.5 g Intravenous Q6H Priti Tovar MD   4.5 g at 04/26/24 1340    sodium chloride (PF) 0.9% PF flush 3 mL  3 mL Intracatheter Q8H Priti Tovar MD   3 mL at 04/25/24 2054       Data   Recent Labs   Lab 04/26/24  0601 04/25/24  0713 04/25/24  0020 04/21/24 2015 04/20/24  0704   WBC  --  9.8 12.2*  --  12.8*   HGB  --  9.6* 9.8*  --  9.7*   MCV  --  85 85  --  84   PLT  --  273 327  --  303   INR  --   --  1.85*  --   --    NA  --  133* 132*  --  135   POTASSIUM 3.6 3.7 3.3*   < > 3.1*   CHLORIDE  --  102 98  --  101   CO2  --  23 26  --  26   BUN  --  15.2 14.1  --  15.1   CR  --   0.92 0.96  --  1.11   ANIONGAP  --  8 8  --  8   MELO  --  8.9 9.1  --  9.9   GLC  --  151* 138*  --  128*   ALBUMIN  --   --  2.4*  --   --    PROTTOTAL  --   --  5.2*  --   --    BILITOTAL  --   --  0.3  --   --    ALKPHOS  --   --  114  --   --    ALT  --   --  24  --   --    AST  --   --  19  --   --     < > = values in this interval not displayed.       No results found for this or any previous visit (from the past 24 hour(s)).

## 2024-04-27 NOTE — CONSULTS
Care Management Initial Consult    General Information  Assessment completed with: Jaylen Moreno  Type of CM/SW Visit: Initial Assessment    Primary Care Provider verified and updated as needed: Yes   Readmission within the last 30 days: current reason for admission unrelated to previous admission      Reason for Consult: discharge planning  Advance Care Planning:            Communication Assessment  Patient's communication style: spoken language (English or Bilingual)    Hearing Difficulty or Deaf: yes   Wear Glasses or Blind: yes    Cognitive  Cognitive/Neuro/Behavioral: WDL                      Living Environment:   People in home: spouse     Current living Arrangements: house      Able to return to prior arrangements: other (see comments) (TBD)       Family/Social Support:  Care provided by: self, spouse/significant other  Provides care for: no one, unable/limited ability to care for self  Marital Status:   Wife, Children          Description of Support System: Involved, Supportive    Support Assessment: Adequate social supports    Current Resources:   Patient receiving home care services: Yes  Skilled Home Care Services: Skilled Nursing, Physical Therapy, Occupational Therapy  Community Resources: None  Equipment currently used at home: cane, quad, grab bar, tub/shower, grab bar, toilet, shower chair, walker, standard  Supplies currently used at home: Nutritional Supplements           Does the patient's insurance plan have a 3 day qualifying hospital stay waiver?  No    Lifestyle & Psychosocial Needs:  Social Determinants of Health     Food Insecurity: No Food Insecurity (9/24/2023)    [facility], [email or web address], [facility]    Food Insecurity     Worried About Running Out of Food in the Last Year: 1   Depression: Not at risk (2/28/2024)    [facility]    PHQ-2     PHQ-2 TOTAL SCORE: 2    Housing Stability: Low Risk  (9/24/2023)    Received from [facility], [facility]    Housing Stability     Unable to Pay for Housing in the Last Year: 1   Tobacco Use: Medium Risk (4/4/2024)    Received from [facility]    Patient History     Smoking Tobacco Use: Former     Smokeless Tobacco Use: Never     Passive Exposure: Not on file   Financial Resource Strain: Low Risk  (9/24/2023)    Received from [facility], [facility]    Financial Resource Strain     Difficulty of Paying Living Expenses: 3     Difficulty of Paying Living Expenses: Not on file   Alcohol Use: Not on file   Transportation Needs: No Transportation Needs (9/24/2023)    Received from [facility], [facility]    Transportation Needs     Lack of Transportation (Medical): 1   Physical Activity: Not on file   Interpersonal Safety: Not on file   Stress: Not on file   Social Connections: Socially Integrated (9/24/2023)    Received from [facility], [facility]    Social Connections     Frequency of Communication with Friends and Family: 0   Health Literacy: Not on file       Functional Status:  Prior to admission patient needed assistance:   Dependent ADLs:: Ambulation-cane, Bathing, Grooming  Dependent IADLs:: Cleaning, Cooking, Laundry, Shopping, Meal Preparation, Medication Management, Transportation, Incontinence  Assesssment of Functional Status: Not at  functional baseline                Values/Beliefs:  Spiritual, Cultural Beliefs, Worship Practices, Values that affect care:    Description of Beliefs that Will Affect Care: [place]            Additional Information:  Patient admitted for worsening SOB and fever, and was found to have worsening RLL  infiltrates.He was started on antibiotics and an enteric panel was ordered. He is followed by ID, Pulmonary and the Hospitalist.  CM met with patient at the bedside. He lives with his wife in a split entry home. There are 8 steps up to bedrooms. He ambulates with a quad cane and also has a walker. His wife assists him with med management,showering, grooming, cooking, cleaning , laundry and shopping. He is open to [facility] for RN. Transportation via his wife.  CM will monitor for discharge planning.    Zaida Ponce, RN, BSN, CM  Inpatient Care Coordination  [facility]  [phone]

## 2024-04-28 NOTE — PROVIDER NOTIFICATION
Discussed with MD delay of oral medication administration - and delay to occur with new bipap.    Bipap NPO order: Until stable off BIPAP/AVAPS/AVAPS AE, Oral meds should not be given until patient able to tolerate 2 hours off BIPAP/AVAPS/AVAPS AE.  No lozenges or gum should be given while patient on BIPAP/AVAPS/AVAPS AE.     MD recommendation: To give Eliquis when able - ok to miss dose of lexapro, mvi, and culterell.    Pt currently on bipap - pending Eliquis until bipap can be paused.      ----  Followed up with MD - requested change in anticoagulation route as a pause on bipap seems unlikely for this afternoon. MD aware.

## 2024-04-28 NOTE — PROGRESS NOTES
RT note: 1410 pt taken off Bipap for a break and placed on 4 lpm NC. Pt breathing much improved from this morning RR low 20s. Coarse crackles with ex wheeze. Receiving albuterol and mucomyst Q6.

## 2024-04-28 NOTE — PLAN OF CARE
"Goal Outcome Evaluation:      Plan of Care Reviewed With: patient    Overall Patient Progress: no changeOverall Patient Progress: no change    Outcome Evaluation: pt cont. 3 L oxygen , PRN cough medication given. cont. on scheduled nebs.on telemonitoring.voltaren applied to LEs for pain .      Problem: Adult Inpatient Plan of Care  Goal: Plan of Care Review  Description: The Plan of Care Review/Shift note should be completed every shift.  The Outcome Evaluation is a brief statement about your assessment that the patient is improving, declining, or no change.  This information will be displayed automatically on your shift  note.  Outcome: Progressing  Flowsheets (Taken 4/27/2024 2232)  Outcome Evaluation: pt cont. 3 L oxygen , PRN cough medication given. cont. on scheduled nebs.on telemonitoring.voltaren applied to LEs for pain .  Plan of Care Reviewed With: patient  Overall Patient Progress: no change  Goal: Patient-Specific Goal (Individualized)  Description: You can add care plan individualizations to a care plan. Examples of Individualization might be:  \"Parent requests to be called daily at 9am for status\", \"I have a hard time hearing out of my right ear\", or \"Do not touch me to wake me up as it startles  me\".  Outcome: Progressing  Goal: Absence of Hospital-Acquired Illness or Injury  Outcome: Progressing  Intervention: Identify and Manage Fall Risk  Recent Flowsheet Documentation  Taken 4/27/2024 1600 by Aylin Carlson RN  Safety Promotion/Fall Prevention:   activity supervised   assistive device/personal items within reach   clutter free environment maintained   nonskid shoes/slippers when out of bed   room organization consistent   safety round/check completed  Intervention: Prevent Skin Injury  Recent Flowsheet Documentation  Taken 4/27/2024 1600 by Aylin Carlson, RN  Body Position:   position maintained   supine, head elevated  Intervention: Prevent and Manage VTE (Venous Thromboembolism) Risk  Recent " Flowsheet Documentation  Taken 4/27/2024 1600 by Aylin Carlson RN  VTE Prevention/Management: SCDs (sequential compression devices) off  Intervention: Prevent Infection  Recent Flowsheet Documentation  Taken 4/27/2024 1600 by Aylin Carlson RN  Infection Prevention:   hand hygiene promoted   personal protective equipment utilized   rest/sleep promoted  Goal: Optimal Comfort and Wellbeing  Outcome: Progressing  Intervention: Monitor Pain and Promote Comfort  Recent Flowsheet Documentation  Taken 4/27/2024 1607 by Aylin Carlson RN  Pain Management Interventions:   pillow support provided   repositioned  Goal: Readiness for Transition of Care  Outcome: Progressing     Problem: Pneumonia  Goal: Fluid Balance  Outcome: Progressing  Goal: Resolution of Infection Signs and Symptoms  Outcome: Progressing  Intervention: Prevent Infection Progression  Recent Flowsheet Documentation  Taken 4/27/2024 1600 by Aylin Carlson RN  Isolation Precautions: enteric precautions maintained  Goal: Effective Oxygenation and Ventilation  Outcome: Progressing  Intervention: Promote Airway Secretion Clearance  Recent Flowsheet Documentation  Taken 4/27/2024 1600 by Aylin Cralson RN  Cough And Deep Breathing: done independently per patient  Intervention: Optimize Oxygenation and Ventilation  Recent Flowsheet Documentation  Taken 4/27/2024 1600 by Aylin Carlson RN  Airway/Ventilation Management:   airway patency maintained   pulmonary hygiene promoted     Problem: Gas Exchange Impaired  Goal: Optimal Gas Exchange  Outcome: Progressing  Intervention: Optimize Oxygenation and Ventilation  Recent Flowsheet Documentation  Taken 4/27/2024 1600 by Aylin Carlson RN  Airway/Ventilation Management:   airway patency maintained   pulmonary hygiene promoted     Problem: Malnutrition  Goal: Improved Nutritional Intake  Outcome: Progressing   Pt is A&Ox4. Pt is on tele monitoring.Still has congested productive cough with yellow thick sputum. LS  coarse, wheezing noted.No c/o chills.no N/V. Abdomen soft. BS active X4. Last BM today.edema to elbows and LEs. IV SL . Pt continues on IV Zosyn.K+ was replaced today @ AM. Pt  was encouraged to use IS.consumed 40% of food.A-1 with walker and gate belt. Refused to getup, but agreed to reposition in bed.

## 2024-04-28 NOTE — PROGRESS NOTES
Infectious disease brief note  Chart checked  Cultures reviewed  No positive micro so far  Currently on Zosyn to continue  Will continue to follow     Marcello Patino MD  Infectious Disease

## 2024-04-28 NOTE — PROGRESS NOTES
[facility]    Hospitalist Progress Note      Assessment & Plan   Jaylen Lamas is a 73 year old man who was admitted on 4/25/2024. PMH significant for COPD, tobacco use, HFpEF, bioprosthetic aortic valve replacement, bioprosthetic mitral valve replacement, paroxysmal atrial fibrillation s/p ablation and MAZE procedure, 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 difficile, hypertension, CKD, history of duodenal ulcer, depression/anxiety, testicular cancer s/p orchiectomy. He was recently hospitalized for pneumonia and discharged 4/22/2024 on Augmentin. He returned 4/25 with worsening shortness of breath, cough, and fever. During recent hospitalization 4- to 4/22/2024 patient underwent bronchoscopy with BAL positive for CMV, EBV and Candida.  Bacterial cultures were negative.  Patient was treated with Zosyn and azithromycin while in hospital and discharged on Augmentin.  Underwent workup for possible aspiration.  Patient is being followed by pulmonology and infectious disease. Continues on IV Zosyn at this time.       Acute hypoxic respiratory failure  Right lower lobe pneumonia  Poss Sepsis (Hypotension, Hypoxia, leukocytosis, fever at home)  COPD  -Recent hospitalization with elaborate workup negative  -BAL was positive for EBV CMV and Candida  -Patient discharged on Augmentin  -In the emergency room was started on vancomycin and Zosyn  -Treated with IV fluids  -Blood cultures were obtained 4/25 and again 4/28. Negative to date.   - Appreciate pulmonology and ID following.   - Continue Breo Ellipta, PRN duonebs.    - Acetylcysteine nebs per pulmonology.   - Continue O2 as needed. Patient with increased work of breathing 4/28 morning and placed on BiPAP. Patient with improvement in work of breathing and will tentatively continue on BiPAP overnight, as well.     Hypokalemia  Mild. Replaced in ED.  -BMP with AM labs     Weight  "Loss  At risk of malnutrition with ongoing low appetite and illness.  Albumin and protein are low.    - Appreciate nutrition consult  - Will not plan an appetite stimulant at this time, but can consider.     Suspected restless leg syndrome  Periodic limb movements of sleep, moderate  Patient had 2020 sleep study which noted moderate periodic limb movements of sleep and recommended questioning and treatment about restless legs.  Patient describes what sounds like restless leg syndrome on exam and reports that this feeling of needing to move his legs and \"cramping\" and \"pulling\" feeling. Denies being treated for this in the past.  Ferritin is extremely low at 4. Patient has iron deficiency anemia and uncertain what workup has previously been done for this. Giving IV iron 200 mg daily x 5 days. Will need further follow up with PCP, as well.     Iron deficiency anemia  Iron studies low. Ferritin 4.   Patient had a colonoscopy in 2009 per EMR; uncertain of results. Unclear if he has had screening since that time.   Giving IV iron as above, but patient will warrant further workup for etiology of iron deficiency and anemia.   Recommend further follow up with PCP.      History of C difficile   Positive 4/2019.  C diff negative.  No indication for prophylaxis at this time. Follow.     Prediabetes   A1c checked in ED, 6.3.  -monitor blood sugar       HFpEF  Chronic right ventricular dysfunction  HT  Mitral and Aortic Bioprosthetic valve replacements   Nonobstructive coronary artery disease  Appears compensated, more hypovolemic.     -Echocardiogram 4/19/2024 shows pulmonary hypertension normal LV function paced rhythm and no evidence of vegetation  -Continue PTA Toprol XL 50 mg, digoxin 0.125 mg daily Lipitor 20 mg  -limit use of IV fluids   -no diuresis needed at this time, though will check BNP.     Paroxysmal atrial fibrillation s/p ablation and MAZE procedure  History of complete heart block status post pacemaker " placement  Hypertension  Rate controlled.   - Holding diuretic initially. Did given 1x dose Lasix 40 mg IV on 4/28 morning.   - Have resumed BB.   -Continue Lipitor 20 mg  -He is on digoxin   -continue Apixaban for stroke prophylaxis.   - Continue to monitor on telemetry.      CKD stage II.  -Near baseline of Creatinine  -bmp in AM     History of duodenal ulcer  -protonix while admitted for GI PPX, has been on steroids and systemically anticoagulated      Depression/anxiety  -Continue Lexapro     Testicular Cancer   S/p orchiectomy, chemotherapy and radiation     DVT Prophylaxis: DOAC  Code Status: Full Code  Expected discharge: Anticipate that patient will remain hospitalized for at least 3-4 more days pending further clinical improvement.     Poornima Carrillo MD FACP  Hospitalist Service  [facility]      Interval History   Patient with increased work of breathing this morning. CXR noted. Lasix 40 mg IV given and patient placed on BiPAP with good result. Moved to [facility] status. Wife updated by phone.     -Data reviewed today: I reviewed all new labs and imaging results over the last 24 hours.     Physical Exam   Temp: 97.8  F (36.6  C) Temp src: Oral BP: 109/45 Pulse: 80   Resp: 21 SpO2: 94 % O2 Device: Nasal cannula Oxygen Delivery: 4 LPM  Vitals:    04/25/24 0023   Weight: 63.1 kg (139 lb 1.8 oz)     Vital Signs with Ranges  Temp:  [97.5  F (36.4  C)-99.2  F (37.3  C)] 97.8  F (36.6  C)  Pulse:  [71-83] 80  Resp:  [16-26] 21  BP: ()/(41-77) 109/45  FiO2 (%):  [40 %-50 %] 40 %  SpO2:  [85 %-97 %] 94 %  I/O last 3 completed shifts:  In: -   Out: 1125 [Urine:1125]    Constitutional: Pleasant older gentleman resting in bed. Appears chronically ill. When seen this morning, increased O2 requirements and continued increased work of breathing.   HEENT: NCAT. EOMI. Placed on BiPAP.   Respiratory: Coarse lung sounds on exam. No wheezing. Increased work of breathing noted. On oxymask initially and then  placed on BiPAP with improved work of breathing.   Cardiovascular: Regular paced rhythm, regular rate. No murmur.  GI: Soft, nontender, nondistended. Normoactive bowel sounds.   Musculoskeletal: No gross deformities. 2+ edema to bilateral lower extremities.   Neurologic: Alert and oriented x3. No focal neurologic deficits. Did not assess gait.      Medications   Current Facility-Administered Medications   Medication Dose Route Frequency Provider Last Rate Last Admin    No lozenges or gum should be given while patient on BIPAP/AVAPS/AVAPS AE   Does not apply Continuous PRN Poornima Velez MD        Patient may continue current oral medications   Does not apply Continuous PRN Poornima Velez MD         Current Facility-Administered Medications   Medication Dose Route Frequency Provider Last Rate Last Admin    acetylcysteine (MUCOMYST) 10 % nebulizer solution 4 mL  4 mL Nebulization Q6H Guy Vega MD   4 mL at 04/28/24 1357    albuterol (PROVENTIL) neb solution 2.5 mg  2.5 mg Nebulization Q6H Guy Vega MD   2.5 mg at 04/28/24 1357    apixaban ANTICOAGULANT (ELIQUIS) tablet 5 mg  5 mg Oral BID Priti Tovar MD   5 mg at 04/27/24 2047    atorvastatin (LIPITOR) tablet 40 mg  40 mg Oral QPM Priti Tovar MD   40 mg at 04/27/24 2047    diclofenac (VOLTAREN) 1 % topical gel 4 g  4 g Topical 4x Daily Poornima Velez MD   4 g at 04/28/24 1211    digoxin (LANOXIN) tablet 125 mcg  125 mcg Oral QPM Priti Tovar MD   125 mcg at 04/27/24 2050    escitalopram (LEXAPRO) tablet 20 mg  20 mg Oral Daily Priti Tovar MD   20 mg at 04/27/24 0844    fluticasone-vilanterol (BREO ELLIPTA) 100-25 MCG/ACT inhaler 1 puff [patient supplied medication]  1 puff Inhalation Daily Priti Tovar MD   1 puff at 04/28/24 0835    iron sucrose (VENOFER) 200 mg in sodium chloride 0.9 % 120 mL intermittent infusion  200 mg Intravenous Q24H Poornima Velez   mL/hr at 04/28/24 1420 200 mg at 04/28/24 1420    [Held by provider] lactobacillus rhamnosus (GG) (CULTURELL) capsule 1 capsule  1 capsule Oral BID Priti Tovar MD   1 capsule at 04/27/24 2047    metoprolol succinate ER (TOPROL XL) 24 hr tablet 50 mg  50 mg Oral At Bedtime Poornima Velez MD   50 mg at 04/27/24 2158    [Held by provider] multivitamin w/minerals (THERA-VIT-M) tablet 1 tablet  1 tablet Oral Daily Poornima Velez MD   1 tablet at 04/27/24 0844    piperacillin-tazobactam (ZOSYN) 4.5 g vial to attach to  mL bag  4.5 g Intravenous Q6H Priti Tovar MD   4.5 g at 04/28/24 1440    sodium chloride (PF) 0.9% PF flush 3 mL  3 mL Intracatheter Q8H Poornima Velez MD        sodium chloride (PF) 0.9% PF flush 3 mL  3 mL Intracatheter Q8H Priti Tovar MD   3 mL at 04/28/24 1439       Data   Recent Labs   Lab 04/28/24  0625 04/27/24  1426 04/27/24  0604 04/26/24  0601 04/25/24  0713 04/25/24  0020   WBC 10.2  --  11.5*  --  9.8 12.2*   HGB 9.4*  --  9.1*  --  9.6* 9.8*   MCV 84  --  84  --  85 85     --  268  --  273 327   INR  --   --   --   --   --  1.85*     --  134*  --  133* 132*   POTASSIUM 3.8 3.7 3.3*   < > 3.7 3.3*   CHLORIDE 107  --  103  --  102 98   CO2 22  --  22  --  23 26   BUN 15.4  --  17.1  --  15.2 14.1   CR 1.03  --  1.03  --  0.92 0.96   ANIONGAP 11  --  9  --  8 8   MELO 9.0  --  9.0  --  8.9 9.1   *  --  94  --  151* 138*   ALBUMIN  --   --  2.4*  --   --  2.4*   PROTTOTAL  --   --  5.2*  --   --  5.2*   BILITOTAL  --   --  0.4  --   --  0.3   ALKPHOS  --   --  107  --   --  114   ALT  --   --  25  --   --  24   AST  --   --  27  --   --  19    < > = values in this interval not displayed.       Recent Results (from the past 24 hour(s))   XR Chest Port 1 View    Narrative    EXAM: XR CHEST PORT 1 VIEW  LOCATION: [facility]  DATE: 4/28/2024    INDICATION: Increased work of breathing.  COMPARISON: CT  chest 04/25/2024, chest radiograph 04/25/2024.      Impression    IMPRESSION: Given differences in technique, mild increased density of the right lower lobe consolidative opacity. Similar upper lung patchy opacity. Similar right lower lung large bulla. Trace bilateral pleural effusions.    Similar cardiomediastinal silhouette, left chest wall cardiac device, median sternotomy, and cardiac valve prostheses.

## 2024-04-28 NOTE — PROVIDER NOTIFICATION
MD notified: Pt with increased labor of breathing this morning - I increased O2 to 5L oxymask to sat 90%+. Very course breathing with frequent productive cough. I asked about pink colored expectorant - and pt said only yellow. Pt with 3+ dependent edema of feet. Afebrile. Nebs are effective for wheeze - but no change to breath labor.    MD - aware and arrived to unit. Pt increased to 6L Oxymask to keep above 90%. Pt pale but not cyanotic.     VBGs drawn, 40mg of lasix given, pt started on bipap --- VBGs resulted normal. RT to adjust bipap as indicated by these results. To stay on BIPAP for work of breathing even with these results. Continue aspiration precautions.

## 2024-04-28 NOTE — PROGRESS NOTES
A BiPAP of  10/5 @ 50% was applied to the pt via the mask for an increase in WOB and SOB. The bridge of the nose looks good and remains intact. Pt is tolerating it well. Will continue to monitor and assess the pt's current respiratory status and needs.          Mejia Yusuf, RT

## 2024-04-28 NOTE — PROVIDER NOTIFICATION
Paged Dr. Velez with message below.  Given orders to resume patient diet and updated BiPaP orders accordingly.  Plan to be on continuous with breaks for meals/PO meds.    RT took patient off of Continuous BiPaP at 1410 for a break after Nebulizer treatments. Patient has been stable on 4LPM NC since. Patient asking about food, would it be ok to resume their diet? Also, would you like me to give their Eliquis that was held this AM? Thanks.

## 2024-04-28 NOTE — PROGRESS NOTES
An ABG was completed on the pt's right radial site @ 10AM on an FIO2 of 50%. Pressure was held until bleeding stopped. No complications noted during the procedure.      Mejia Yusuf, RT

## 2024-04-28 NOTE — PLAN OF CARE
"Goal Outcome Evaluation:      Plan of Care Reviewed With: patient    Overall Patient Progress: no changeOverall Patient Progress: no change    Outcome Evaluation: Pt cont on 3 Litres of oxygen, Scheduled nebs , LS coarse and wheezing , on telemetry 100 percentage Apaced 80s , on Zoysn      Problem: Adult Inpatient Plan of Care  Goal: Plan of Care Review  Description: The Plan of Care Review/Shift note should be completed every shift.  The Outcome Evaluation is a brief statement about your assessment that the patient is improving, declining, or no change.  This information will be displayed automatically on your shift  note.  Outcome: Progressing  Flowsheets (Taken 4/28/2024 0422)  Outcome Evaluation: Pt cont on 3 Litres of oxygen, Scheduled nebs , LS coarse and wheezing , on telemetry 100 percentage Apaced 80s , on Zoysn  Plan of Care Reviewed With: patient  Overall Patient Progress: no change  Goal: Patient-Specific Goal (Individualized)  Description: You can add care plan individualizations to a care plan. Examples of Individualization might be:  \"Parent requests to be called daily at 9am for status\", \"I have a hard time hearing out of my right ear\", or \"Do not touch me to wake me up as it startles  me\".  Outcome: Progressing  Goal: Absence of Hospital-Acquired Illness or Injury  Outcome: Progressing  Intervention: Identify and Manage Fall Risk  Recent Flowsheet Documentation  Taken 4/28/2024 0237 by Ronna Pritchett, RN  Safety Promotion/Fall Prevention:   activity supervised   assistive device/personal items within reach   clutter free environment maintained   nonskid shoes/slippers when out of bed   room organization consistent   safety round/check completed  Intervention: Prevent Skin Injury  Recent Flowsheet Documentation  Taken 4/28/2024 0237 by Ronna Pritchett, RN  Body Position:   position maintained   supine, head elevated  Intervention: Prevent and Manage VTE (Venous Thromboembolism) " Risk  Recent Flowsheet Documentation  Taken 4/28/2024 0237 by Ronna Pritchett RN  VTE Prevention/Management: SCDs (sequential compression devices) off  Intervention: Prevent Infection  Recent Flowsheet Documentation  Taken 4/28/2024 0237 by Ronna Pritchett RN  Infection Prevention:   hand hygiene promoted   personal protective equipment utilized   rest/sleep promoted  Goal: Optimal Comfort and Wellbeing  Outcome: Progressing  Goal: Readiness for Transition of Care  Outcome: Progressing     Problem: Pneumonia  Goal: Fluid Balance  Outcome: Progressing  Goal: Resolution of Infection Signs and Symptoms  Outcome: Progressing  Intervention: Prevent Infection Progression  Recent Flowsheet Documentation  Taken 4/28/2024 0237 by Ronna Pritchett RN  Isolation Precautions: enteric precautions maintained  Goal: Effective Oxygenation and Ventilation  Outcome: Progressing  Intervention: Promote Airway Secretion Clearance  Recent Flowsheet Documentation  Taken 4/28/2024 0237 by Ronna Pritchett RN  Cough And Deep Breathing: done independently per patient  Intervention: Optimize Oxygenation and Ventilation  Recent Flowsheet Documentation  Taken 4/28/2024 0237 by Ronna Pritchett RN  Airway/Ventilation Management:   airway patency maintained   pulmonary hygiene promoted  Head of Bed (HOB) Positioning: HOB at 15 degrees     Problem: Gas Exchange Impaired  Goal: Optimal Gas Exchange  Outcome: Progressing  Intervention: Optimize Oxygenation and Ventilation  Recent Flowsheet Documentation  Taken 4/28/2024 0237 by Ronna Pritchett RN  Airway/Ventilation Management:   airway patency maintained   pulmonary hygiene promoted  Head of Bed (HOB) Positioning: HOB at 15 degrees     Problem: Malnutrition  Goal: Improved Nutritional Intake  Outcome: Progressing

## 2024-04-29 NOTE — PLAN OF CARE
"  Problem: Adult Inpatient Plan of Care  Goal: Plan of Care Review  Description: The Plan of Care Review/Shift note should be completed every shift.  The Outcome Evaluation is a brief statement about your assessment that the patient is improving, declining, or no change.  This information will be displayed automatically on your shift  note.  Outcome: Progressing  Flowsheets (Taken 4/29/2024 0544)  Outcome Evaluation: On continuous BiPAP. Remained at 40% FiO2. On 4 L NC for meds and snacks. Coarse lung sounds. 100% a-paced on tele. IV zosyn q6h.  Plan of Care Reviewed With: patient  Overall Patient Progress: improving  Goal: Patient-Specific Goal (Individualized)  Description: You can add care plan individualizations to a care plan. Examples of Individualization might be:  \"Parent requests to be called daily at 9am for status\", \"I have a hard time hearing out of my right ear\", or \"Do not touch me to wake me up as it startles  me\".  Outcome: Progressing  Goal: Absence of Hospital-Acquired Illness or Injury  Outcome: Progressing  Intervention: Prevent and Manage VTE (Venous Thromboembolism) Risk  Recent Flowsheet Documentation  Taken 4/28/2024 2009 by Alis Reyez RN  VTE Prevention/Management: SCDs (sequential compression devices) off  Goal: Optimal Comfort and Wellbeing  Outcome: Progressing  Goal: Readiness for Transition of Care  Outcome: Progressing     Problem: Pneumonia  Goal: Fluid Balance  Outcome: Progressing  Goal: Resolution of Infection Signs and Symptoms  Outcome: Progressing  Goal: Effective Oxygenation and Ventilation  Outcome: Progressing  Intervention: Promote Airway Secretion Clearance  Recent Flowsheet Documentation  Taken 4/28/2024 2031 by Alis Reyez RN  Cough And Deep Breathing: done with encouragement  Taken 4/28/2024 2009 by Alis Reyez RN  Cough And Deep Breathing: done with encouragement     Problem: Gas Exchange Impaired  Goal: Optimal Gas Exchange  Outcome: Progressing   "   Problem: Malnutrition  Goal: Improved Nutritional Intake  Outcome: Progressing   Goal Outcome Evaluation:      Plan of Care Reviewed With: patient    Overall Patient Progress: improvingOverall Patient Progress: improving    Outcome Evaluation: On continuous BiPAP. Remained at 40% FiO2. On 4 L NC for meds and snacks. Coarse lung sounds. 100% a-paced on tele. IV zosyn q6h.

## 2024-04-29 NOTE — PLAN OF CARE
"Goal Outcome Evaluation:      Plan of Care Reviewed With: patient    Overall Patient Progress: improvingOverall Patient Progress: improving    Outcome Evaluation: Maintaining O2 sats >92% on 3L NC. C/o intermittent SOB but improves with deep breaths. Lung sounds are coarse. Incentive spirometry started.    Patient calm and cooperative. A/O x4. BP's soft, see flowsheets.MD aware of lower BP's. Maintaining O2 >92% on 3L NC. C/o of intermittent SOB, but patient states \"it gets better if he takes deep breaths\". Lung sounds are coarse. Started incentive spirometer. Right PIV CDI and Left PIV CDI, painful with initial flush but then WDL. IV Zosyn administered. Voltaren gel administered to BLE as patient states there is tenderness. Potassium 3.0 replaced and recheck for 1425. Mg 1.6, redraw for tomorrow AM. Plan to continue to monitor and follow POC.       Problem: Adult Inpatient Plan of Care  Goal: Plan of Care Review  Description: The Plan of Care Review/Shift note should be completed every shift.  The Outcome Evaluation is a brief statement about your assessment that the patient is improving, declining, or no change.  This information will be displayed automatically on your shift  note.  Outcome: Progressing  Flowsheets (Taken 4/29/2024 1126)  Outcome Evaluation: Maintaining O2 sats >92% on 3L NC. C/o intermittent SOB but improves with deep breaths. Lung sounds are coarse. Incentive spirometry started.  Plan of Care Reviewed With: patient  Overall Patient Progress: improving  Goal: Patient-Specific Goal (Individualized)  Description: You can add care plan individualizations to a care plan. Examples of Individualization might be:  \"Parent requests to be called daily at 9am for status\", \"I have a hard time hearing out of my right ear\", or \"Do not touch me to wake me up as it startles  me\".  Outcome: Progressing  Goal: Absence of Hospital-Acquired Illness or Injury  Outcome: Progressing  Intervention: Identify and Manage " Fall Risk  Recent Flowsheet Documentation  Taken 4/29/2024 0806 by Yessenia Marinelli RN  Safety Promotion/Fall Prevention:   activity supervised   assistive device/personal items within reach   clutter free environment maintained   lighting adjusted   nonskid shoes/slippers when out of bed   patient and family education   patient video monitoring   room door open   room near nurse's station   room organization consistent   safety round/check completed  Intervention: Prevent Skin Injury  Recent Flowsheet Documentation  Taken 4/29/2024 0806 by Yessenia Marinelli RN  Skin Protection:   incontinence pads utilized   transparent dressing maintained  Device Skin Pressure Protection: tubing/devices free from skin contact  Intervention: Prevent and Manage VTE (Venous Thromboembolism) Risk  Recent Flowsheet Documentation  Taken 4/29/2024 0806 by Yessenia Marinelli RN  VTE Prevention/Management: compression stockings off  Intervention: Prevent Infection  Recent Flowsheet Documentation  Taken 4/29/2024 0806 by Yessenia Marinelli RN  Infection Prevention:   equipment surfaces disinfected   hand hygiene promoted   rest/sleep promoted   single patient room provided  Goal: Optimal Comfort and Wellbeing  Outcome: Progressing  Goal: Readiness for Transition of Care  Outcome: Progressing     Problem: Pneumonia  Goal: Fluid Balance  Outcome: Progressing  Intervention: Monitor and Manage Fluid Balance  Recent Flowsheet Documentation  Taken 4/29/2024 0806 by Yessenia Marinelli RN  Fluid/Electrolyte Management: fluids provided  Goal: Resolution of Infection Signs and Symptoms  Outcome: Progressing  Goal: Effective Oxygenation and Ventilation  Outcome: Progressing  Intervention: Promote Airway Secretion Clearance  Recent Flowsheet Documentation  Taken 4/29/2024 0806 by Yessenia Marinelli RN  Cough And Deep Breathing: done independently per patient  Intervention: Optimize Oxygenation and Ventilation  Recent Flowsheet  Documentation  Taken 4/29/2024 0806 by Yessenia Marinelli, RN  Airway/Ventilation Management: airway patency maintained  Head of Bed (HOB) Positioning: HOB at 20-30 degrees     Problem: Gas Exchange Impaired  Goal: Optimal Gas Exchange  Outcome: Progressing  Intervention: Optimize Oxygenation and Ventilation  Recent Flowsheet Documentation  Taken 4/29/2024 0806 by Yessenia Marinelli, RN  Airway/Ventilation Management: airway patency maintained  Head of Bed (HOB) Positioning: HOB at 20-30 degrees     Problem: Malnutrition  Goal: Improved Nutritional Intake  Outcome: Progressing

## 2024-04-29 NOTE — PROGRESS NOTES
"[facility] [place]    Hospitalist Progress Note      Assessment & Plan   Jaylen Lamas is a 73 year old man who was admitted on 4/25/2024.     PMH significant for COPD, tobacco use, HFpEF, bioprosthetic aortic valve replacement, bioprosthetic mitral valve replacement, paroxysmal atrial fibrillation s/p ablation and MAZE procedure, 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 difficile, hypertension, CKD, history of duodenal ulcer, depression/anxiety, testicular cancer (s/p orchiectomy and XRT in 1982).     Recent history is most significant for pneumonia for which he was hospitalized from 4/19-4/22/2024 and discharged on Augmentin. During that admission, he had reported that his sx had been going on for about 10 weeks prior to the worsening.  During recent hospitalization 4- to 4/22/2024 patient underwent bronchoscopy with BAL positive for CMV, EBV and Candida.  Bacterial cultures were negative.  Patient was treated with Zosyn and azithromycin while in hospital and discharged on Augmentin.  Underwent workup for possible aspiration.    Mr. Reeder returned on 4/25 with worsening shortness of breath, cough, and fever for which he was again admitted. Patient is being followed by pulmonology and infectious disease. Continues on IV Zosyn at this time.       DX:  Acute hypoxic respiratory failure thought due to RLL pneumonia possibly related to aspiration. Felt to not be compatible with \"conventional CAP\".  Baseline COPD, thought to NOT be in exacerbation.   S/p BAL 4/19 negative for bacterial growth, with candida isolated but other fungal pathogens negative. EBV and CMV pcrs are positive, but felt to be non-significant at this time.   Initiated on BiPAP on 4/28 due to increased WOB, though has required it only intermittently.  Notably, did not have demonstrably worsened resp failure at that time.   Continues empiric bronchodilators.  Also " on pulmonary toilet strategy with Mucomyst, Duonebs and percussion therapy.  2.   Poss Sepsis (Hypotension, Hypoxia, leukocytosis, fever at home).  3.   HFpEF with RV volume overload/pulm HTN by echo on 4/19/24.  Mitral and Aortic bioprosthetic valve replacements. A fib, s/p MAZE procedure and now with pacemaker.  Non-obstructive CAD.   4.  Prediabetes. HbA1c 6.3.  5.  Anemia. Iron deficiency confirmed with ferritin 4 on 4/26/24. RLS may also be the result of low iron.   6.  Moderate malnutrition in the setting of acute illness superimposed on chronic illness. Weight loss. Assoc hypokalemia.   7.   Hx duodenal ulcer.   8.   Remote hx C diff.    PLAN:  1.   ID and pulmonary medicine guiding care of suspected infectious pneumonia/pneumonitis.   2.   Cont with pulmonary toilet.  3.   Usual home meds resumed including Toprol, Digoxin and Apixaban.   4.   Continues Zosyn (started on 4/25).    DVT Prophylaxis: DOAC  Code Status: Full Code  Expected discharge: TBD.  Pt was moved to [facility] on 4/28. Several more days, at least, awaiting improvement in function/stability..     Ronak Bañuelos MD FACP  Hospitalist Service  [facility]      Interval History   I assumed care today.   Pt off BiPAP today. Feeling OK.  Reports he was up to BR earlier today.   I encouraged nutritional intake and activity as much as is tolerable.     Pt required BiPAP only briefly overnight and then was able to transition to O2 by NC.    -Data reviewed today: I reviewed all new labs and imaging results over the last 24 hours.     Physical Exam   Temp: 97.5  F (36.4  C) Temp src: Axillary BP: 128/62 Pulse: 80   Resp: 24 SpO2: 98 % O2 Device: BiPAP/CPAP Oxygen Delivery: 4 LPM  Vitals:    04/25/24 0023   Weight: 63.1 kg (139 lb 1.8 oz)     Vital Signs with Ranges  Temp:  [96.8  F (36  C)-99.1  F (37.3  C)] 97.5  F (36.4  C)  Pulse:  [75-83] 80  Resp:  [16-26] 24  BP: (102-128)/(45-62) 128/62  FiO2 (%):  [40 %-50 %] 40 %  SpO2:  [85 %-98 %] 98  %  I/O last 3 completed shifts:  In: 500 [P.O.:500]  Out: 1945 [Urine:1945]    Constitutional: Pleasant older gentleman resting in bed. Appears chronically ill. When seen this morning, increased O2 requirements and continued increased work of breathing.   HEENT: NCAT. EOMI. Placed on BiPAP.   Respiratory: Coarse lung sounds on exam. No wheezing. Possibly mildly increased WOB. Poor airentry at the right base posteriorly.   Cardiovascular: Regular rate and rhythm. No murmur.  GI: Soft, nontender, nondistended. Normoactive bowel sounds.   Musculoskeletal: No gross deformities. 2+ edema to bilateral lower extremities.   Neurologic: Alert and oriented x3. No focal neurologic deficits. Did not assess gait.      Medications   Current Facility-Administered Medications   Medication Dose Route Frequency Provider Last Rate Last Admin    No lozenges or gum should be given while patient on BIPAP/AVAPS/AVAPS AE   Does not apply Continuous PRN Poornima Velez MD        Patient may continue current oral medications   Does not apply Continuous PRN Poornima Velez MD         Current Facility-Administered Medications   Medication Dose Route Frequency Provider Last Rate Last Admin    acetylcysteine (MUCOMYST) 10 % nebulizer solution 4 mL  4 mL Nebulization Q6H Guy Vega MD   4 mL at 04/29/24 0300    albuterol (PROVENTIL) neb solution 2.5 mg  2.5 mg Nebulization Q6H Guy Vega MD   2.5 mg at 04/29/24 0300    apixaban ANTICOAGULANT (ELIQUIS) tablet 5 mg  5 mg Oral BID Priti Tovar MD   5 mg at 04/29/24 0212    atorvastatin (LIPITOR) tablet 40 mg  40 mg Oral QPM Priti Tovar MD   40 mg at 04/28/24 2009    diclofenac (VOLTAREN) 1 % topical gel 4 g  4 g Topical 4x Daily Poornima Velez MD   4 g at 04/28/24 2153    digoxin (LANOXIN) tablet 125 mcg  125 mcg Oral QPM Priti Tovar MD   125 mcg at 04/28/24 2009    escitalopram (LEXAPRO) tablet 20 mg  20 mg Oral Daily  Priti Tovar MD   20 mg at 04/27/24 0844    fluticasone-vilanterol (BREO ELLIPTA) 100-25 MCG/ACT inhaler 1 puff [patient supplied medication]  1 puff Inhalation Daily Priti Tovar MD   1 puff at 04/28/24 0835    iron sucrose (VENOFER) 200 mg in sodium chloride 0.9 % 120 mL intermittent infusion  200 mg Intravenous Q24H Poornima Velez  mL/hr at 04/28/24 1420 200 mg at 04/28/24 1420    [Held by provider] lactobacillus rhamnosus (GG) (CULTURELL) capsule 1 capsule  1 capsule Oral BID Priti Tovar MD   1 capsule at 04/27/24 2047    metoprolol succinate ER (TOPROL XL) 24 hr tablet 50 mg  50 mg Oral At Bedtime Poornima Velez MD   50 mg at 04/28/24 2150    [Held by provider] multivitamin w/minerals (THERA-VIT-M) tablet 1 tablet  1 tablet Oral Daily Poornima Velez MD   1 tablet at 04/27/24 0844    piperacillin-tazobactam (ZOSYN) 4.5 g vial to attach to  mL bag  4.5 g Intravenous Q6H Priti Tovar MD   4.5 g at 04/29/24 0212    potassium chloride ashley ER (KLOR-CON M20) CR tablet 20 mEq  20 mEq Oral Once Ronak Bañuelos MD        potassium chloride ashley ER (KLOR-CON M20) CR tablet 40 mEq  40 mEq Oral Once Ronak Bañuelos MD        sodium chloride (PF) 0.9% PF flush 3 mL  3 mL Intracatheter Q8H Poornima Velez MD   3 mL at 04/29/24 0211    sodium chloride (PF) 0.9% PF flush 3 mL  3 mL Intracatheter Q8H Priti Tovar MD   3 mL at 04/28/24 1439       Data   Recent Labs   Lab 04/29/24  0633 04/28/24  0625 04/27/24  1426 04/27/24  0604 04/26/24  0601 04/25/24  0713 04/25/24  0020   WBC  --  10.2  --  11.5*  --  9.8 12.2*   HGB  --  9.4*  --  9.1*  --  9.6* 9.8*   MCV  --  84  --  84  --  85 85   PLT  --  280  --  268  --  273 327   INR  --   --   --   --   --   --  1.85*   NA  --  140  --  134*  --  133* 132*   POTASSIUM 3.0* 3.8 3.7 3.3*   < > 3.7 3.3*   CHLORIDE  --  107  --  103  --  102 98   CO2  --  22  --  22  --  23 26   BUN  --  15.4  --  17.1   --  15.2 14.1   CR  --  1.03  --  1.03  --  0.92 0.96   ANIONGAP  --  11  --  9  --  8 8   MELO  --  9.0  --  9.0  --  8.9 9.1   GLC  --  100*  --  94  --  151* 138*   ALBUMIN  --   --   --  2.4*  --   --  2.4*   PROTTOTAL  --   --   --  5.2*  --   --  5.2*   BILITOTAL  --   --   --  0.4  --   --  0.3   ALKPHOS  --   --   --  107  --   --  114   ALT  --   --   --  25  --   --  24   AST  --   --   --  27  --   --  19    < > = values in this interval not displayed.       Recent Results (from the past 24 hour(s))   XR Chest Port 1 View    Narrative    EXAM: XR CHEST PORT 1 VIEW  LOCATION: [facility]  DATE: 4/28/2024    INDICATION: Increased work of breathing.  COMPARISON: CT chest 04/25/2024, chest radiograph 04/25/2024.      Impression    IMPRESSION: Given differences in technique, mild increased density of the right lower lobe consolidative opacity. Similar upper lung patchy opacity. Similar right lower lung large bulla. Trace bilateral pleural effusions.    Similar cardiomediastinal silhouette, left chest wall cardiac device, median sternotomy, and cardiac valve prostheses.

## 2024-04-29 NOTE — PROGRESS NOTES
[facility]    Pulmonary, Allergy, Critical Care and Sleep Medicine    Pulmonary Consult Progress Note    Jaylen Lamas MRN# [id number]   Age: 73 year old YOB: 1950     Date of Admission: 4/25/2024  Date of Service: 04/29/2024     ==================================================  INTERVAL EVENTS:  -Currently alternating between BiPAP overnight and 3 L during the day  -Low-grade fever early in the admission, afebrile since then  -Negative procalcitonin, no leukocytosis  -Repeat CT 4/28 with possible slight increase in right lower lobe consolidation    He feels that he is not making much progress.  Reports lots of phlegm that is difficult to cough up but the volara does help.  Also complains of fatigue.    CHANGES FOR TODAY:  -Remain upright for 60 minutes after meals      ==================================================    73M COPD, tobacco abuse, HFpEF, AVR, MVR, pAfib s/p ablation, CHB s/p PPM, CAD, HTN, CKD admitted 4/25 w/ worsening dyspnea and fever. CT w/ worsening RLL consolidation.    ASSESSMENT AND RECOMMENDATIONS:    ## COPD  ## Acute hypoxic respiratory failure  ## Heart failure with preserved ejection fraction  ## Pulm hypertension    Current versus persistent right lower lobe consolidation.  Last hospitalization, he had a bronchoscopy that was unremarkable. There have been no positive microbiologic data. His swallow study showed aspiration w/ thin liquids, but otherwise OK. Despite this his presentation does appear consistent with some element of aspiration given the enlarging RLL consolidation which you would otherwise not expect.  Admission CT with large right lower lobe consolidation and patulous esophagus with liquid in the lumen.  This consolidation could be consistent with other atypical pneumonias, such as Blastomycosis, but studies have been negative. His CT findings are not consistent with organizing pneumonias and again BAL did not support this. I do  "not feel his COPD is current in exacerbation.    Persistent dyspnea without chest tightness.  Mild wheezing.  Blastomycosis serology negative.  ABG this admission with normal CO2      -Remain upright for 60 minutes after meals (patient care order placed)  - ABx per ID  - continue ICS-LABA (Breo)  - no indication for steroids, at this time  -Continue aggressive airway clearance with duonebs, acetylcysteine nebs, and Volera  - I do not feel repeat bronchoscopy is indicated, at this time, but this may be considered if he does not improve          Fuentes Neil M.D.  Pulmonary & Critical Care  Pager: Click Here to page    I spent 48 minutes dedicated to this care so far today excluding procedures, including review of medical records, review of imaging (results & images), time with patient and time in documentation.    ==================================================      PHYSICAL EXAM  /48 (BP Location: Right arm, Patient Position: Supine, Cuff Size: Adult Regular)   Pulse 80   Temp 98.7  F (37.1  C) (Oral)   Resp 20   Ht 1.702 m (5' 7\")   Wt 63.1 kg (139 lb 1.8 oz)   SpO2 97%   BMI 21.79 kg/m        Intake/Output Summary (Last 24 hours) at 4/29/2024 1347  Last data filed at 4/29/2024 0918  Gross per 24 hour   Intake 746 ml   Output 1420 ml   Net -674 ml       Vitals:    04/25/24 0023   Weight: 63.1 kg (139 lb 1.8 oz)             General: Alert, interactive, NAD  Resp: Coarse rhonchi throughout, slightly worse on the right.  Cardiac: RRR, NS1,S2, No m/r/g  Abdomen: Soft, nontender, nondistended. +BS.  No HSM or masses, no rebound or guarding.  Extremities: Trace lower extremity edema  Skin: Warm and dry, no jaundice or rash  Neuro: Alert & oriented x 3, moves all extremities equally      Recent Labs   Lab Test 04/28/24  0625 04/27/24  0604 04/25/24  0713   WBC 10.2 11.5* 9.8   RBC 3.69* 3.55* 3.68*   HGB 9.4* 9.1* 9.6*    268 273       Recent Labs   Lab Test 04/29/24  0633 04/28/24  0625 " "04/27/24  1426 04/27/24  0604    140  --  134*   POTASSIUM 3.3*  3.0* 3.8 3.7 3.3*   CHLORIDE 102 107  --  103   CO2 28 22  --  22   BUN 13.0 15.4  --  17.1   CR 0.98 1.03  --  1.03   * 100*  --  94   MELO 9.1 9.0  --  9.0   MAG 1.6* 1.8  --  1.7           No results for input(s): \"CULT\" in the last 168 hours.      Recent Results (from the past 48 hour(s))   XR Chest Port 1 View    Narrative    EXAM: XR CHEST PORT 1 VIEW  LOCATION: [facility]  DATE: 4/28/2024    INDICATION: Increased work of breathing.  COMPARISON: CT chest 04/25/2024, chest radiograph 04/25/2024.      Impression    IMPRESSION: Given differences in technique, mild increased density of the right lower lobe consolidative opacity. Similar upper lung patchy opacity. Similar right lower lung large bulla. Trace bilateral pleural effusions.    Similar cardiomediastinal silhouette, left chest wall cardiac device, median sternotomy, and cardiac valve prostheses.         "

## 2024-04-29 NOTE — PLAN OF CARE
"Goal Outcome Evaluation:      Plan of Care Reviewed With: patient    Overall Patient Progress: no changeOverall Patient Progress: no change    Outcome Evaluation: Stable on 3L NC. Productive cough.  A/O. Lethargic. No SOB. Denies pain. K, Mag AM redraws. 2 PIV SL. IV abx. Using IS as tolerated. Bed alarm on.     Problem: Adult Inpatient Plan of Care  Goal: Plan of Care Review  Description: The Plan of Care Review/Shift note should be completed every shift.  The Outcome Evaluation is a brief statement about your assessment that the patient is improving, declining, or no change.  This information will be displayed automatically on your shift  note.  Outcome: Not Progressing  Flowsheets (Taken 4/29/2024 1750)  Outcome Evaluation: Stable on 3L NC. Productive cough.  Plan of Care Reviewed With: patient  Overall Patient Progress: no change  Goal: Patient-Specific Goal (Individualized)  Description: You can add care plan individualizations to a care plan. Examples of Individualization might be:  \"Parent requests to be called daily at 9am for status\", \"I have a hard time hearing out of my right ear\", or \"Do not touch me to wake me up as it startles  me\".  Outcome: Not Progressing  Goal: Absence of Hospital-Acquired Illness or Injury  Outcome: Not Progressing  Intervention: Identify and Manage Fall Risk  Recent Flowsheet Documentation  Taken 4/29/2024 1628 by Acacia Prather, RN  Safety Promotion/Fall Prevention: safety round/check completed  Intervention: Prevent Skin Injury  Recent Flowsheet Documentation  Taken 4/29/2024 1628 by Acacia Prather RN  Body Position:   position changed independently   supine, head elevated  Intervention: Prevent Infection  Recent Flowsheet Documentation  Taken 4/29/2024 1628 by Acacia Prather, RN  Infection Prevention:   equipment surfaces disinfected   hand hygiene promoted   rest/sleep promoted   single patient room provided  Goal: Optimal Comfort and Wellbeing  Outcome: Not " Progressing  Goal: Readiness for Transition of Care  Outcome: Not Progressing

## 2024-04-29 NOTE — PROGRESS NOTES
[facility]  Infectious Disease Progress Note          Assessment and Plan:   Date of Admission:  4/25/2024  Date of Consult (When I saw the patient): 04/25/24        Assessment & Plan  Jaylen Lamas is a 73 year old who was admitted on 4/25/2024.      Impression: 1 73-year-old male, well-known to me, just discharged from the hospital now readmitted, unclear whether this is still all part of #2 below or some new infection, quite unlikely this is new bacterial infection, no positive microbiology, bronchoscopy cultures all unremarkable, recent and prior courses of antibiotics without particular benefit suspect this is not bacterial infection currently  2 2 and half month illness with intermittent fever, weight loss, malaise and fatigue and respiratory symptoms, prior antibiotic courses without resolution or benefit no positive microbiology, recent bronchoscopy without so far obvious cause  3 CMV, EBV and Candida in bronchoscopy very unlikely clinically relevant in this clinical scenario  4 intermittent diarrhea again now rule out new C. difficile, is prior C. 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  Discussed with Dr. Neil the theory of possible aspiration here if it is much of what is been occurring including the new infiltrates.  As best aspiration precautions we can continue the Zosyn for now seems improved  2 has had a very large FUO and pulmonary workup including noninvasive studies and bronchoscopy that is not yielding any more unusual infection because, this is aspiration obviously will be problematic  long-term                 Interval History:     no new complaints slept poorly, minimal cough, ongoing diarrhea stool studies still pending, no fever overnight, oxygen levels excellent on only 2 L currently, definitely new increased infiltrate in the right lower lobe but conceivably just progression from the prior admission aspiration now thought as a possible explanation for this overall illness              Medications:     Current Facility-Administered Medications   Medication Dose Route Frequency Provider Last Rate Last Admin    acetylcysteine (MUCOMYST) 10 % nebulizer solution 4 mL  4 mL Nebulization Q6H Guy Vega MD   4 mL at 04/29/24 1300    albuterol (PROVENTIL) neb solution 2.5 mg  2.5 mg Nebulization Q6H Guy Vega MD   2.5 mg at 04/29/24 1300    apixaban ANTICOAGULANT (ELIQUIS) tablet 5 mg  5 mg Oral BID Priti Tovar MD   5 mg at 04/29/24 1407    atorvastatin (LIPITOR) tablet 40 mg  40 mg Oral QPM Priti Tovar MD   40 mg at 04/28/24 2009    diclofenac (VOLTAREN) 1 % topical gel 4 g  4 g Topical 4x Daily Poornima Velez MD   4 g at 04/29/24 1625    digoxin (LANOXIN) tablet 125 mcg  125 mcg Oral QPM Priti Tovar MD   125 mcg at 04/28/24 2009    escitalopram (LEXAPRO) tablet 20 mg  20 mg Oral Daily Priti Tovar MD   20 mg at 04/29/24 0813    fluticasone-vilanterol (BREO ELLIPTA) 100-25 MCG/ACT inhaler 1 puff [patient supplied medication]  1 puff Inhalation Daily Priti Tovar MD   1 puff at 04/29/24 0726    metoprolol succinate ER (TOPROL XL) 24 hr tablet 50 mg  50 mg Oral At Bedtime Poornima Velez MD   50 mg at 04/28/24 2150    piperacillin-tazobactam (ZOSYN) 4.5 g vial to attach to  mL bag  4.5 g Intravenous Q6H Priti Tovar  mL/hr at 04/29/24 1407 4.5 g at 04/29/24 1407    sodium chloride (PF) 0.9% PF flush 3 mL  3 mL Intracatheter Q8H Poornima Velez MD   3 mL at 04/29/24 0812    sodium chloride  "(PF) 0.9% PF flush 3 mL  3 mL Intracatheter Q8H Priti Tovar MD   3 mL at 04/28/24 1439                  Physical Exam:   Blood pressure 109/50, pulse 83, temperature 98  F (36.7  C), temperature source Oral, resp. rate 24, height 1.702 m (5' 7\"), weight 63.1 kg (139 lb 1.8 oz), SpO2 94%.  Wt Readings from Last 2 Encounters:   04/25/24 63.1 kg (139 lb 1.8 oz)   04/22/24 66.1 kg (145 lb 11.6 oz)     Vital Signs with Ranges  Temp:  [96.8  F (36  C)-98.7  F (37.1  C)] 98  F (36.7  C)  Pulse:  [75-83] 83  Resp:  [16-28] 24  BP: ()/(33-62) 109/50  FiO2 (%):  [40 %] 40 %  SpO2:  [93 %-98 %] 94 %    Constitutional: Awake, alert, cooperative, no apparent distress looks similarly chronically ill not particularly acutely ill on low-flow oxygen     Lungs: Clear to auscultation bilaterally, no crackles or wheezing   Cardiovascular: Regular rate and rhythm, normal S1 and S2, and no murmur noted   Abdomen: Normal bowel sounds, soft, non-distended, non-tender   Skin: No rashes, no cyanosis, no edema   Other:           Data:   All microbiology laboratory data reviewed.  Recent Labs   Lab Test 04/28/24  0625 04/27/24  0604 04/25/24  0713   WBC 10.2 11.5* 9.8   HGB 9.4* 9.1* 9.6*   HCT 31.1* 29.7* 31.3*   MCV 84 84 85    268 273     Recent Labs   Lab Test 04/29/24  0633 04/28/24  0625 04/27/24  0604   CR 0.98 1.03 1.03     No lab results found.  No lab results found.    Invalid input(s): \"UC\"     "

## 2024-04-29 NOTE — PLAN OF CARE
"Alert & Oriented. VSS on BiPaP 40% FiO2.  Stable on 4L NC for PO meds and food.  A1. Upper and lower extremity edema.     Goal Outcome Evaluation:      Plan of Care Reviewed With: patient    Overall Patient Progress: improvingOverall Patient Progress: improving    Outcome Evaluation: Pt on BiPaP 40% FiO2.  Ok to take off for PO meds/meals.  Lungs coarse with expiratory wheezes.  Productive cough.      Problem: Pneumonia  Goal: Fluid Balance  Outcome: Not Progressing  Goal: Effective Oxygenation and Ventilation  Outcome: Not Progressing  Intervention: Promote Airway Secretion Clearance  Recent Flowsheet Documentation  Taken 4/28/2024 1205 by Joseph Braun RN  Cough And Deep Breathing: done with encouragement     Problem: Gas Exchange Impaired  Goal: Optimal Gas Exchange  Outcome: Not Progressing     Problem: Adult Inpatient Plan of Care  Goal: Plan of Care Review  Description: The Plan of Care Review/Shift note should be completed every shift.  The Outcome Evaluation is a brief statement about your assessment that the patient is improving, declining, or no change.  This information will be displayed automatically on your shift  note.  Outcome: Progressing  Flowsheets (Taken 4/28/2024 1900)  Outcome Evaluation: Pt on BiPaP 40% FiO2.  Ok to take off for PO meds/meals.  Lungs coarse with expiratory wheezes.  Productive cough.  Plan of Care Reviewed With: patient  Overall Patient Progress: improving  Goal: Patient-Specific Goal (Individualized)  Description: You can add care plan individualizations to a care plan. Examples of Individualization might be:  \"Parent requests to be called daily at 9am for status\", \"I have a hard time hearing out of my right ear\", or \"Do not touch me to wake me up as it startles  me\".  Outcome: Progressing  Goal: Absence of Hospital-Acquired Illness or Injury  Outcome: Progressing  Intervention: Identify and Manage Fall Risk  Recent Flowsheet Documentation  Taken 4/28/2024 1205 by " "Kade, Joseph, RN  Safety Promotion/Fall Prevention:   safety round/check completed   room door open  Intervention: Prevent and Manage VTE (Venous Thromboembolism) Risk  Recent Flowsheet Documentation  Taken 4/28/2024 1205 by Joseph Braun RN  VTE Prevention/Management: SCDs (sequential compression devices) off  Goal: Optimal Comfort and Wellbeing  Outcome: Progressing  Goal: Readiness for Transition of Care  Outcome: Progressing     Problem: Pneumonia  Goal: Resolution of Infection Signs and Symptoms  Outcome: Progressing     Problem: Malnutrition  Goal: Improved Nutritional Intake  Outcome: Progressing     Problem: Adult Inpatient Plan of Care  Goal: Plan of Care Review  Description: The Plan of Care Review/Shift note should be completed every shift.  The Outcome Evaluation is a brief statement about your assessment that the patient is improving, declining, or no change.  This information will be displayed automatically on your shift  note.  Outcome: Progressing  Flowsheets (Taken 4/28/2024 1900)  Outcome Evaluation: Pt on BiPaP 40% FiO2.  Ok to take off for PO meds/meals.  Lungs coarse with expiratory wheezes.  Productive cough.  Plan of Care Reviewed With: patient  Overall Patient Progress: improving  Goal: Patient-Specific Goal (Individualized)  Description: You can add care plan individualizations to a care plan. Examples of Individualization might be:  \"Parent requests to be called daily at 9am for status\", \"I have a hard time hearing out of my right ear\", or \"Do not touch me to wake me up as it startles  me\".  Outcome: Progressing  Goal: Absence of Hospital-Acquired Illness or Injury  Outcome: Progressing  Intervention: Identify and Manage Fall Risk  Recent Flowsheet Documentation  Taken 4/28/2024 1205 by Joseph Braun, RN  Safety Promotion/Fall Prevention:   safety round/check completed   room door open  Intervention: Prevent and Manage VTE (Venous Thromboembolism) Risk  Recent Flowsheet " Documentation  Taken 4/28/2024 1205 by Joseph Braun, RN  VTE Prevention/Management: SCDs (sequential compression devices) off  Goal: Optimal Comfort and Wellbeing  Outcome: Progressing  Goal: Readiness for Transition of Care  Outcome: Progressing     Problem: Pneumonia  Goal: Resolution of Infection Signs and Symptoms  Outcome: Progressing     Problem: Malnutrition  Goal: Improved Nutritional Intake  Outcome: Progressing

## 2024-04-29 NOTE — PROVIDER NOTIFICATION
1000: Paged MD notifying that patient is currently on 3L NC and has been tolerating well, asked if MD wants VBG's drawn and clarifying if he should still be IMC. MD aware and ordered incentive spirometer.     1417: Paged MD notifying that patient's BP is 97/42 on right arm and 83/33 on left arm. MD aware.

## 2024-04-30 NOTE — PROGRESS NOTES
"[facility]    Hospitalist Progress Note      Assessment & Plan   Jaylen Lamas is a 73 year old man who was admitted on 4/25/2024.     PMH significant for COPD, tobacco use, HFpEF, bioprosthetic aortic valve replacement, bioprosthetic mitral valve replacement, paroxysmal atrial fibrillation s/p ablation and MAZE procedure, 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 difficile, hypertension, CKD, history of duodenal ulcer, depression/anxiety, testicular cancer (s/p orchiectomy and XRT in 1982).     Recent history is most significant for pneumonia for which he was hospitalized from 4/19-4/22/2024 and discharged on Augmentin. During that admission, he had reported that his sx had been going on for about 10 weeks prior to the worsening.  During recent hospitalization 4- to 4/22/2024 patient underwent bronchoscopy with BAL positive for CMV, EBV and Candida.  Bacterial cultures were negative.  Patient was treated with Zosyn and azithromycin while in hospital and discharged on Augmentin.  Underwent workup for possible aspiration.    Mr. Reeder returned on 4/25 with worsening shortness of breath, cough, and fever for which he was again admitted. Patient is being followed by pulmonology and infectious disease. Continues on IV Zosyn at this time under direction of ID. Recommendations to start back on Augmentin tomorrow.       DX:  Acute hypoxic respiratory failure thought due to RLL pneumonia possibly related to aspiration. Felt to not be compatible with \"conventional CAP\".  Baseline COPD, thought to NOT be in exacerbation.   S/p BAL 4/19 negative for bacterial growth, with candida isolated but other fungal pathogens negative. EBV and CMV pcrs are positive, but felt to be non-significant at this time.   Initiated on BiPAP on 4/28 due to increased WOB, though has required it only intermittently.  Notably, did not have demonstrably " worsened resp failure at that time.   Continues empiric bronchodilators.  Also on pulmonary toilet strategy with Mucomyst, Duonebs and percussion therapy.  2.   Poss Sepsis (Hypotension, Hypoxia, leukocytosis, fever at home).  3.   HFpEF with RV volume overload/pulm HTN by echo on 4/19/24.  Mitral and Aortic bioprosthetic valve replacements. A fib, s/p MAZE procedure and now with pacemaker.  Non-obstructive CAD.   4.  Prediabetes. HbA1c 6.3.  5.  Anemia. Iron deficiency confirmed with ferritin 4 on 4/26/24. RLS may also be the result of low iron.   6.  Moderate malnutrition in the setting of acute illness superimposed on chronic illness. Weight loss. Assoc hypokalemia.   7.   Hx duodenal ulcer.   8.   Remote hx C diff.    PLAN:  1.   ID and pulmonary medicine guiding care of suspected chronic/recurrent aspiration pneumonitis. Question underlying right lung mass?  2.   Cont with pulmonary toilet.  3.   Usual home meds resumed including Toprol, Digoxin and Apixaban.   4.   Continues Zosyn (started on 4/25) but plan to give last doses tonight and start Augmentin to complete ?7-10 days tomorrow.  5.   Already received 2/5 doses of Iron sucrose. Resume tonight with plan to complete as long as he is here.  6.   Start lomotil for recurrent diarrhea.  7.   Consider re-imaging, but will discuss with Pulmonary medicine tomorrow.     DVT Prophylaxis: DOAC  Code Status: Full Code  Expected discharge: TBD.  Pt was moved to [facility] on 4/28. Several more days before discharge, at least, awaiting improvement in function/stability..     Ronak Bañuelos MD FACP  Hospitalist Service  [facility]      Interval History   Pt required BiPAP again only briefly overnight and then was able to transition back to O2 by NC.    Appeared more comfortable and was able to walk short distances today.,  Reports he briefly walked in the halls.  Awaiting PT eval.    -Data reviewed today: I reviewed all new labs and imaging results over the  last 24 hours.     Physical Exam   Temp: 97.8  F (36.6  C) Temp src: Oral BP: 120/57 Pulse: 80   Resp: 24 SpO2: 97 % O2 Device: Nasal cannula Oxygen Delivery: 3 LPM  Vitals:    04/25/24 0023   Weight: 63.1 kg (139 lb 1.8 oz)     Vital Signs with Ranges  Temp:  [97.6  F (36.4  C)-98.7  F (37.1  C)] 97.8  F (36.6  C)  Pulse:  [80-91] 80  Resp:  [20-28] 24  BP: ()/(33-57) 120/57  FiO2 (%):  [30 %] 30 %  SpO2:  [87 %-98 %] 97 %  I/O last 3 completed shifts:  In: 888 [P.O.:870; I.V.:18]  Out: 150 [Urine:150]    Constitutional:alert and comforttable today. I helped him up to the toilet and he stood without assistance ambulated without difficulty.  Appears very weak.  HEENT: NCAT. EOMI.   Respiratory: Coarse lung sounds on exam. No wheezing. Possibly mildly increased WOB. Poor air entry at the right base posteriorly.   Cardiovascular: Regular rate and rhythm. No murmur.  GI: Soft, nontender, nondistended. Normoactive bowel sounds.   Musculoskeletal: No gross deformities. 2+ edema to bilateral lower extremities.   Neurologic: Alert and oriented x3. No focal neurologic deficits.       Medications   Current Facility-Administered Medications   Medication Dose Route Frequency Provider Last Rate Last Admin    No lozenges or gum should be given while patient on BIPAP/AVAPS/AVAPS AE   Does not apply Continuous PRN Poornima Velez MD        Patient may continue current oral medications   Does not apply Continuous PRN Poornima Velez MD         Current Facility-Administered Medications   Medication Dose Route Frequency Provider Last Rate Last Admin    acetylcysteine (MUCOMYST) 10 % nebulizer solution 4 mL  4 mL Nebulization Q6H Guy Vega MD   4 mL at 04/30/24 0310    albuterol (PROVENTIL) neb solution 2.5 mg  2.5 mg Nebulization Q6H Gyu Vega MD   2.5 mg at 04/30/24 0310    apixaban ANTICOAGULANT (ELIQUIS) tablet 5 mg  5 mg Oral BID Priti Tovar MD   5 mg at 04/29/24 4138     atorvastatin (LIPITOR) tablet 40 mg  40 mg Oral QPM Priti Tovar MD   40 mg at 04/29/24 2055    diclofenac (VOLTAREN) 1 % topical gel 4 g  4 g Topical 4x Daily Poornima Velez MD   4 g at 04/29/24 1625    digoxin (LANOXIN) tablet 125 mcg  125 mcg Oral QPM Priti Tovar MD   125 mcg at 04/29/24 2055    escitalopram (LEXAPRO) tablet 20 mg  20 mg Oral Daily Priti Tovar MD   20 mg at 04/29/24 0813    fluticasone-vilanterol (BREO ELLIPTA) 100-25 MCG/ACT inhaler 1 puff [patient supplied medication]  1 puff Inhalation Daily Priti Tovar MD   1 puff at 04/29/24 0726    metoprolol succinate ER (TOPROL XL) 24 hr tablet 50 mg  50 mg Oral At Bedtime Poornima Velez MD   50 mg at 04/29/24 2139    piperacillin-tazobactam (ZOSYN) 4.5 g vial to attach to  mL bag  4.5 g Intravenous Q6H Priti Tovar  mL/hr at 04/29/24 1407 4.5 g at 04/30/24 0156    sodium chloride (PF) 0.9% PF flush 3 mL  3 mL Intracatheter Q8H Poornima Velez MD   3 mL at 04/30/24 0156    sodium chloride (PF) 0.9% PF flush 3 mL  3 mL Intracatheter Q8H Priti Tovar MD   3 mL at 04/29/24 2052       Data   Recent Labs   Lab 04/30/24  0645 04/29/24  1428 04/29/24  0633 04/28/24  0625 04/27/24  1426 04/27/24  0604 04/26/24  0601 04/25/24  0713 04/25/24  0020   WBC  --   --   --  10.2  --  11.5*  --  9.8 12.2*   HGB  --   --   --  9.4*  --  9.1*  --  9.6* 9.8*   MCV  --   --   --  84  --  84  --  85 85   PLT  --   --   --  280  --  268  --  273 327   INR  --   --   --   --   --   --   --   --  1.85*     --  138 140  --  134*  --  133* 132*   POTASSIUM 3.7 4.3 3.3*  3.0* 3.8   < > 3.3*   < > 3.7 3.3*   CHLORIDE 103  --  102 107  --  103  --  102 98   CO2 27  --  28 22  --  22  --  23 26   BUN 11.2  --  13.0 15.4  --  17.1  --  15.2 14.1   CR 0.95  --  0.98 1.03  --  1.03  --  0.92 0.96   ANIONGAP 7  --  8 11  --  9  --  8 8   MELO 9.1  --  9.1 9.0  --  9.0  --  8.9 9.1   *  --   109* 100*  --  94  --  151* 138*   ALBUMIN  --   --   --   --   --  2.4*  --   --  2.4*   PROTTOTAL  --   --   --   --   --  5.2*  --   --  5.2*   BILITOTAL  --   --   --   --   --  0.4  --   --  0.3   ALKPHOS  --   --   --   --   --  107  --   --  114   ALT  --   --   --   --   --  25  --   --  24   AST  --   --   --   --   --  27  --   --  19    < > = values in this interval not displayed.       No results found for this or any previous visit (from the past 24 hour(s)).

## 2024-04-30 NOTE — PLAN OF CARE
"Goal Outcome Evaluation:      Plan of Care Reviewed With: patient, spouse    Overall Patient Progress: improving  Outcome Evaluation: O2 2L NC, productive cough    Pt stated he feels slightly better than yesterday as far as resp. Status. O2 weaned back to 2L NC. Flutter valve encouraged. RT following for treatments. Productive yellow sputum  Phos replaced via IV - recheck tonight 2230  Skin: buttock/coccyx pink, rectal area very sore. Sindy and mepilex applied.  GI: 2 loose stools. Lomotil ordered, given. Continue prn. Refused noon meal. Upright at least 60 minutes post meals  : external catheter/incontinence. 200ml output in ext. Cath plus incontinence.   Activity: encouraged to turn off bottom due to redness. Up to chair x2.   Plan: continue IVAB, follow cultures, increase activity, RT treatments.     Problem: Adult Inpatient Plan of Care  Goal: Plan of Care Review  Description: The Plan of Care Review/Shift note should be completed every shift.  The Outcome Evaluation is a brief statement about your assessment that the patient is improving, declining, or no change.  This information will be displayed automatically on your shift  note.  Outcome: Progressing  Flowsheets (Taken 4/30/2024 1556)  Outcome Evaluation: O2 2L NC, productive cough  Plan of Care Reviewed With:   patient   spouse  Overall Patient Progress: improving  Goal: Patient-Specific Goal (Individualized)  Description: You can add care plan individualizations to a care plan. Examples of Individualization might be:  \"Parent requests to be called daily at 9am for status\", \"I have a hard time hearing out of my right ear\", or \"Do not touch me to wake me up as it startles  me\".  Outcome: Progressing  Goal: Absence of Hospital-Acquired Illness or Injury  Outcome: Progressing  Intervention: Identify and Manage Fall Risk  Recent Flowsheet Documentation  Taken 4/30/2024 1016 by Deisy Luu RN  Safety Promotion/Fall Prevention: safety round/check " completed  Intervention: Prevent Skin Injury  Recent Flowsheet Documentation  Taken 4/30/2024 1536 by Deisy Luu RN  Body Position: side-lying 30 degrees  Taken 4/30/2024 1016 by Deisy Luu RN  Body Position:   log-rolled   weight shifting   supine, legs elevated  Skin Protection: pulse oximeter probe site changed  Intervention: Prevent and Manage VTE (Venous Thromboembolism) Risk  Recent Flowsheet Documentation  Taken 4/30/2024 1016 by Deisy Luu RN  VTE Prevention/Management: SCDs (sequential compression devices) off  Intervention: Prevent Infection  Recent Flowsheet Documentation  Taken 4/30/2024 1016 by Deisy Luu RN  Infection Prevention: hand hygiene promoted  Goal: Optimal Comfort and Wellbeing  Outcome: Progressing  Goal: Readiness for Transition of Care  Outcome: Progressing

## 2024-04-30 NOTE — PROGRESS NOTES
[facility]  Infectious Disease Progress Note          Assessment and Plan:   Date of Admission:  4/25/2024  Date of Consult (When I saw the patient): 04/25/24        Assessment & Plan  Jaylen Lamas is a 73 year old who was admitted on 4/25/2024.      Impression: 1 73-year-old male, well-known to me, just discharged from the hospital now readmitted, unclear whether this is still all part of #2 below or some new infection, quite unlikely this is new bacterial infection, no positive microbiology, bronchoscopy cultures all unremarkable, recent and prior courses of antibiotics without particular benefit suspect this is not bacterial infection currently  2 2 and half month illness with intermittent fever, weight loss, malaise and fatigue and respiratory symptoms, prior antibiotic courses without resolution or benefit no positive microbiology, recent bronchoscopy without so far obvious cause  3 CMV, EBV and Candida in bronchoscopy very unlikely clinically relevant in this clinical scenario  4 intermittent diarrhea again now rule out new C. difficile, is prior C. 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  Discussed with Dr. Neil the theory of possible aspiration here if it is much of what is been occurring including the new infiltrates.  As best aspiration precautions we can continue the Zosyn for now seems improved  2 has had a very large FUO and pulmonary workup including noninvasive studies and bronchoscopy that is not yielding any more unusual infection because, this is aspiration obviously will be problematic  long-term  3 slightly improved, fever down, no positive microbiology of note, likely tracking towards a assumption this is recurrent aspiration as such back to Augmentin as soon as tomorrow if this is the diagnosis, antimicrobial therapy of limited value short and long-term                 Interval History:     no new complaints slept poorly, minimal cough, ongoing diarrhea stool studies still pending, no fever overnight, oxygen levels excellent on only 2 L currently, definitely new increased infiltrate in the right lower lobe but conceivably just progression from the prior admission aspiration now thought as a possible explanation for this overall illness              Medications:     Current Facility-Administered Medications   Medication Dose Route Frequency Provider Last Rate Last Admin    acetylcysteine (MUCOMYST) 10 % nebulizer solution 4 mL  4 mL Nebulization Q6H Guy Vega MD   4 mL at 04/30/24 0826    albuterol (PROVENTIL) neb solution 2.5 mg  2.5 mg Nebulization Q6H GaryGuy MD   2.5 mg at 04/30/24 0826    apixaban ANTICOAGULANT (ELIQUIS) tablet 5 mg  5 mg Oral BID Priti Tovar MD   5 mg at 04/30/24 0906    atorvastatin (LIPITOR) tablet 40 mg  40 mg Oral QPM Priti Tovar MD   40 mg at 04/29/24 2055    diclofenac (VOLTAREN) 1 % topical gel 4 g  4 g Topical 4x Daily Poornima Velez MD   4 g at 04/29/24 1625    digoxin (LANOXIN) tablet 125 mcg  125 mcg Oral QPM Priti Tovar MD   125 mcg at 04/29/24 2055    escitalopram (LEXAPRO) tablet 20 mg  20 mg Oral Daily Priti Tovar MD   20 mg at 04/30/24 0906    fluticasone-vilanterol (BREO ELLIPTA) 100-25 MCG/ACT inhaler 1 puff [patient supplied medication]  1 puff Inhalation Daily Priti Tovar MD   1 puff at 04/29/24 0726    metoprolol succinate ER (TOPROL XL) 24 hr tablet 50 mg  50 mg Oral At Bedtime Poornima Velez MD   50 mg at 04/29/24 2139    piperacillin-tazobactam (ZOSYN) 4.5 g  "vial to attach to  mL bag  4.5 g Intravenous Q6H Priti Tovar  mL/hr at 04/29/24 1407 4.5 g at 04/30/24 1424    sodium chloride (PF) 0.9% PF flush 3 mL  3 mL Intracatheter Q8H Poornima Velez MD   3 mL at 04/30/24 0907    sodium chloride (PF) 0.9% PF flush 3 mL  3 mL Intracatheter Q8H Priti Tovar MD   3 mL at 04/30/24 1424    sodium phosphate 15 mmol in NS 250mL intermittent infusion  15 mmol Intravenous Once Ronak Bañuelos MD                      Physical Exam:   Blood pressure 120/57, pulse 80, temperature 97.8  F (36.6  C), temperature source Oral, resp. rate 22, height 1.702 m (5' 7\"), weight 63.1 kg (139 lb 1.8 oz), SpO2 93%.  Wt Readings from Last 2 Encounters:   04/25/24 63.1 kg (139 lb 1.8 oz)   04/22/24 66.1 kg (145 lb 11.6 oz)     Vital Signs with Ranges  Temp:  [97.8  F (36.6  C)-98.2  F (36.8  C)] 97.8  F (36.6  C)  Pulse:  [80-91] 80  Resp:  [20-24] 22  BP: (109-123)/(49-57) 120/57  FiO2 (%):  [30 %] 30 %  SpO2:  [87 %-97 %] 93 %    Constitutional: Awake, alert, cooperative, no apparent distress looks similarly chronically ill not particularly acutely ill on low-flow oxygen     Lungs: Clear to auscultation bilaterally, no crackles or wheezing   Cardiovascular: Regular rate and rhythm, normal S1 and S2, and no murmur noted   Abdomen: Normal bowel sounds, soft, non-distended, non-tender   Skin: No rashes, no cyanosis, no edema   Other:           Data:   All microbiology laboratory data reviewed.  Recent Labs   Lab Test 04/28/24  0625 04/27/24  0604 04/25/24  0713   WBC 10.2 11.5* 9.8   HGB 9.4* 9.1* 9.6*   HCT 31.1* 29.7* 31.3*   MCV 84 84 85    268 273     Recent Labs   Lab Test 04/30/24  0645 04/29/24  0633 04/28/24  0625   CR 0.95 0.98 1.03     No lab results found.  No lab results found.    Invalid input(s): \"UC\"     "

## 2024-04-30 NOTE — PROVIDER NOTIFICATION
MD paged: Pt has been off BiPAP since 07:30AM and does not qualify for IMC status. Ok to take a verbal to discontinue IMC status and orders? Thanks.     Okay per MD.

## 2024-04-30 NOTE — CONSULTS
"SPIRITUAL HEALTH SERVICES - Consult Note  RH Med/Surg 3  Referral Source/Reason for Visit: [facility] consult.    Summary and Recommendations -  Pt Jaylen reported that he has good support from his family.  He is Muslim and affiliated with [employer] [facility] in [place].  Jaylen welcomed my offer to contact his Scientologist.    Plan: Informed pt how he can request further  support.  This author and other chaplains remain available per pt/family request.     Alberto Watkins M.Div., [facility]  Staff     SHS available 24/7 for emergent requests/referrals, either by paging the on-call  or by entering an ASAP/STAT consult in [facility], which will also page the on-call .    Assessment    Saw pt Jaylen Lamas per [facility] consult to assess spiritual and emotional needs because patient responded \"Yes\" to the question in the admission assessment, \"Do you have any spiritual or Scientologist beliefs that will affect your care?\"  His spouse Tonia was present.    Patient/Family Understanding of Illness and Goals of Care - see distress below.    Distress and Loss -   Jaylen reported that this is his second admission after being home for a day from his first admission, which was eight days.  He gave voice to his desire \"to be home and stay home.\"  Tonia acknowledged feeling distressed by Jaylen's length of stay.      Strengths, Coping, and Resources -   Jaylen named Tonia, his daughter, and siblings as being core to his support network.  He siblings visited him yesterday.  Jaylen also has three young grandchildren.  Tonia shared that her son and her family are supportive.  She and Jaylen have been  four years.    Meaning, Beliefs, and Spirituality - Jaylen is Muslim and affiliated with [employer] [facility] in [place].  He welcomed prayer and my offer to contact his parish.  "

## 2024-04-30 NOTE — PLAN OF CARE
"/51 (BP Location: Right arm, Patient Position: Semi-Abreu's, Cuff Size: Adult Regular)   Pulse 80   Temp 98.2  F (36.8  C) (Oral)   Resp 23   Ht 1.702 m (5' 7\")   Wt 63.1 kg (139 lb 1.8 oz)   SpO2 93%   BMI 21.79 kg/m      Lethargic, but Ox4. Denies pain. Reports SOB. VSS except pt on 1.5 - 5 L via NC. Unable to maintain sats > 89% so placed on BiPAP at 30% FiO2 on NOC. LS: coarse and congested cough. Incontinent of bowel and bladder. External catheter placed. Edema noted in BLE 1+ to 2+. Some redness noted on sacrum / coccyx. PIV to L hand SL. PIV to R AC SL. K and Mg in for AM draw. A1 GB and walker. 2 gm Na diet. Plan: Continue IV Zosyn q6hr. Follow blood cultures. Pt needs to remain upright for 1 hour after eating. Continue w/ POC.    Goal Outcome Evaluation:      Plan of Care Reviewed With: patient    Overall Patient Progress: no changeOverall Patient Progress: no change    Outcome Evaluation: 1+ to 2+ edema to BLE especially in lower shins and ankles; afebrile and no growth from blood cultures; sating well on 2 L via NC, but still SOB at times; adequate appetite.      Problem: Adult Inpatient Plan of Care  Goal: Plan of Care Review  Description: The Plan of Care Review/Shift note should be completed every shift.  The Outcome Evaluation is a brief statement about your assessment that the patient is improving, declining, or no change.  This information will be displayed automatically on your shift  note.  Outcome: Progressing  Flowsheets (Taken 4/29/2024 2213)  Outcome Evaluation:   1+ to 2+ edema to BLE especially in lower shins and ankles   afebrile and no growth from blood cultures   sating well on 2 L via NC, but still SOB at times   adequate appetite.  Plan of Care Reviewed With: patient  Overall Patient Progress: no change  Goal: Patient-Specific Goal (Individualized)  Description: You can add care plan individualizations to a care plan. Examples of Individualization might be:  \"Parent " "requests to be called daily at 9am for status\", \"I have a hard time hearing out of my right ear\", or \"Do not touch me to wake me up as it startles  me\".  Outcome: Progressing  Goal: Absence of Hospital-Acquired Illness or Injury  Outcome: Progressing  Intervention: Identify and Manage Fall Risk  Recent Flowsheet Documentation  Taken 4/29/2024 2056 by Chikis Bowen RN  Safety Promotion/Fall Prevention: safety round/check completed  Intervention: Prevent Skin Injury  Recent Flowsheet Documentation  Taken 4/29/2024 2056 by Chikis Bowen RN  Body Position:   log-rolled   weight shifting   supine, legs elevated  Intervention: Prevent and Manage VTE (Venous Thromboembolism) Risk  Recent Flowsheet Documentation  Taken 4/29/2024 2056 by Chikis Bowen RN  VTE Prevention/Management: SCDs (sequential compression devices) off  Intervention: Prevent Infection  Recent Flowsheet Documentation  Taken 4/29/2024 2056 by Chikis Bowen RN  Infection Prevention:   single patient room provided   rest/sleep promoted   personal protective equipment utilized   hand hygiene promoted   equipment surfaces disinfected   environmental surveillance performed  Goal: Optimal Comfort and Wellbeing  Outcome: Progressing  Goal: Readiness for Transition of Care  Outcome: Progressing     Problem: Pneumonia  Goal: Fluid Balance  Outcome: Not Progressing  Goal: Resolution of Infection Signs and Symptoms  Outcome: Progressing  Goal: Effective Oxygenation and Ventilation  Outcome: Progressing  Intervention: Promote Airway Secretion Clearance  Recent Flowsheet Documentation  Taken 4/29/2024 2056 by Chikis Bowen RN  Cough And Deep Breathing: done independently per patient  Intervention: Optimize Oxygenation and Ventilation  Recent Flowsheet Documentation  Taken 4/29/2024 2056 by Chikis Bowen RN  Head of Bed (HOB) Positioning: HOB at 20-30 degrees     Problem: Gas Exchange Impaired  Goal: Optimal Gas Exchange  Outcome: " Progressing  Intervention: Optimize Oxygenation and Ventilation  Recent Flowsheet Documentation  Taken 4/29/2024 2056 by Chikis Bowen RN  Head of Bed (HOB) Positioning: HOB at 20-30 degrees     Problem: Malnutrition  Goal: Improved Nutritional Intake  Outcome: Progressing

## 2024-04-30 NOTE — PROGRESS NOTES
A BiPAP of  10/5 @ 30% was applied to the pt via the mask for an increase in WOB and/or SOB.  The bridge of the nose looks good and remains intact. Pt is tolerating it well. Will continue to monitor and assess the pt's current respiratory status and needs.    Lotus Banks, RT on 4/30/2024 at 4:55 AM

## 2024-05-01 NOTE — PROGRESS NOTES
CLINICAL NUTRITION SERVICES - REASSESSMENT NOTE    Recommendations Ordered by Registered Dietitian (RD):   Diet per MD - continue to encourage PO intake unless otherwise directed by MD. Continue high calorie and high protein diet as able.     Ordered Ensure Enlive with breakfast     Continue MVI+M as ordered    Malnutrition:   % Weight Loss: Unable to determine, limited trends overall and suspect currently masked by fluid  % Intake:  <75% for > 7 days (moderate malnutrition)  Subcutaneous Fat Loss:  Upper arm region mild to moderate depletion   Muscle Loss:  Temporal region mild depletion, Clavicle bone region mild to moderate depletion, and Acromion bone region moderate depletion  Fluid Retention: Mild, LEs --> masking true wt trends     Malnutrition Diagnosis: Moderate malnutrition  In Context of:  Acute illness or injury  Chronic illness or disease     EVALUATION OF PROGRESS TOWARD GOALS   Diet: 2 g Na Diet + Ensure Enlive with meals and snacks PRN     Intake/Tolerance:  Very little information recorded in the flowsheets to comment on since last reassessment - 3 meals documented at 100% consumed and 1 meal documented at 25% consumed. Ordering 1-3 meals per day.     Spoke with patient who notes his appetite has likely worsened since last RD assessment. Offered oral nutritional supplements again and patient agreed to schedule ensure with breakfast once per day to begin. Aware that he can order PRN. Discusses high calorie and high protein foods again and patient was without questions.     ASSESSED NUTRITION NEEDS:  Dosing Weight 59 kg - dry wt from last admit?  Estimated Energy Needs: 30-35 Kcal/Kg  Justification: repletion  Estimated Protein Needs: >/=1.5 g pro/Kg  Justification: Repletion and preservation of lean body mass  Estimated Fluid Needs: per MD    NEW FINDINGS:   - pulmonology following   - labs:  Labs:  Electrolytes  Potassium (mmol/L)   Date Value   05/01/2024 3.8   04/30/2024 3.7   04/29/2024 4.3    06/12/2020 3.8     Phosphorus (mg/dL)   Date Value   05/01/2024 2.5   04/30/2024 2.5   04/30/2024 1.8 (L)   04/27/2024 2.2 (L)    Blood Glucose  Glucose (mg/dL)   Date Value   05/01/2024 90   04/30/2024 107 (H)   04/29/2024 109 (H)   04/28/2024 100 (H)   04/27/2024 94   06/12/2020 123 (A)     GLUCOSE BY METER POCT (mg/dL)   Date Value   12/04/2023 146 (H)     Hemoglobin A1C (%)   Date Value   04/14/2024 6.3 (H)    Inflammatory Markers  WBC Count (10e3/uL)   Date Value   04/28/2024 10.2   04/27/2024 11.5 (H)   04/25/2024 9.8     Albumin (g/dL)   Date Value   04/27/2024 2.4 (L)   04/25/2024 2.4 (L)   04/14/2024 2.7 (L)      Magnesium (mg/dL)   Date Value   05/01/2024 1.7   04/30/2024 1.7   04/29/2024 1.6 (L)     Sodium (mmol/L)   Date Value   05/01/2024 139   04/30/2024 137   04/29/2024 138    Renal  Urea Nitrogen (mg/dL)   Date Value   05/01/2024 12.7   04/30/2024 11.2   04/29/2024 13.0     Creatinine (mg/dL)   Date Value   05/01/2024 1.04   04/30/2024 0.95   04/29/2024 0.98   06/12/2020 0.92     Additional  Ketones Urine (mg/dL)   Date Value   04/26/2024 Trace (A)        - medications:  Current Facility-Administered Medications   Medication Dose Route Frequency Provider Last Rate Last Admin    acetylcysteine (MUCOMYST) 10 % nebulizer solution 4 mL  4 mL Nebulization Q6H Guy Vega MD   4 mL at 05/01/24 0746    albuterol (PROVENTIL) neb solution 2.5 mg  2.5 mg Nebulization Q6H Guy Vega MD   2.5 mg at 05/01/24 0746    amoxicillin-clavulanate (AUGMENTIN) 875-125 MG per tablet 1 tablet  1 tablet Oral Q12H [facility] (08/20) Ronak Bañuelos MD   1 tablet at 05/01/24 0922    apixaban ANTICOAGULANT (ELIQUIS) tablet 5 mg  5 mg Oral BID Priti Tovar MD   5 mg at 05/01/24 0922    atorvastatin (LIPITOR) tablet 40 mg  40 mg Oral QPM Priti Tovar MD   40 mg at 04/30/24 8547    diclofenac (VOLTAREN) 1 % topical gel 4 g  4 g Topical 4x Daily Poornima Velez MD   4 g at 05/01/24 4240     digoxin (LANOXIN) tablet 125 mcg  125 mcg Oral QPM Priti Tovar MD   125 mcg at 04/30/24 2106    escitalopram (LEXAPRO) tablet 20 mg  20 mg Oral Daily Priti Tovar MD   20 mg at 05/01/24 0922    fluticasone-vilanterol (BREO ELLIPTA) 100-25 MCG/ACT inhaler 1 puff [patient supplied medication]  1 puff Inhalation Daily Priti Tovar MD   1 puff at 05/01/24 0746    furosemide (LASIX) injection 40 mg  40 mg Intravenous Daily Ronak Bañuelos MD        iron sucrose (VENOFER) 200 mg in sodium chloride 0.9 % 120 mL intermittent infusion  200 mg Intravenous Q24H Ronak Bañuelos  mL/hr at 04/30/24 2253 200 mg at 04/30/24 2253    metoprolol succinate ER (TOPROL XL) 24 hr tablet 50 mg  50 mg Oral At Bedtime Poornima Velez MD   50 mg at 04/30/24 2106    sodium chloride (PF) 0.9% PF flush 3 mL  3 mL Intracatheter Q8H Priti Tovar MD   3 mL at 04/30/24 2110      Current Facility-Administered Medications   Medication Dose Route Frequency Provider Last Rate Last Admin    No lozenges or gum should be given while patient on BIPAP/AVAPS/AVAPS AE   Does not apply Continuous PRN Ronak Bañuelos MD        Patient may continue current oral medications   Does not apply Continuous PRN Ronak Bañuelos MD          Current Facility-Administered Medications   Medication Dose Route Frequency Provider Last Rate Last Admin    albuterol (PROVENTIL HFA/VENTOLIN HFA) inhaler  2 puff Inhalation Q6H PRN Priti Tovar MD        benzonatate (TESSALON) capsule 200 mg  200 mg Oral TID PRN Priti Tovar MD   200 mg at 04/25/24 2144    calcium carbonate (TUMS) chewable tablet 1,000 mg  1,000 mg Oral 4x Daily PRN Priti Tovar MD        carboxymethylcellulose PF (REFRESH PLUS) 0.5 % ophthalmic solution 1 drop  1 drop Both Eyes Q1H PRN Ronak Bañuelos MD        diphenhydrAMINE (BENADRYL) injection 50 mg  50 mg Intravenous Once PRN Ronak Bañuelos MD        diphenoxylate-atropine (LOMOTIL)  2.5-0.025 MG per tablet 1-2 tablet  1-2 tablet Oral 4x Daily PRN Ronak Bañuelos MD   2 tablet at 04/30/24 1424    EPINEPHrine (ADRENALIN) kit 0.3 mg  0.3 mg Intramuscular Q3 Min PRN Ronak Bañuelos MD        famotidine (PEPCID) injection 20 mg  20 mg Intravenous Once PRN Ronak Bañuelos MD        fluticasone (FLONASE) 50 MCG/ACT spray 2 spray  2 spray Both Nostrils Daily PRN Priti Tovar MD        guaiFENesin (ROBITUSSIN) 20 mg/mL solution 200 mg  200 mg Oral Q4H PRN Poornima Velez MD   200 mg at 04/27/24 2047    ipratropium - albuterol 0.5 mg/2.5 mg/3 mL (DUONEB) neb solution 3 mL  1 vial Nebulization TID PRN Priti Tovar MD   3 mL at 04/25/24 1304    lidocaine (LMX4) cream   Topical Q1H PRN Priti Tovar MD        lidocaine 1 % 0.1-1 mL  0.1-1 mL Other Q1H PRN Priti Tovar MD        methylPREDNISolone sodium succinate (solu-MEDROL) injection 125 mg  125 mg Intravenous Once PRN Ronak Bañuelos MD        No lozenges or gum should be given while patient on BIPAP/AVAPS/AVAPS AE   Does not apply Continuous PRN Ronak Bañuelos MD        Patient may continue current oral medications   Does not apply Continuous PRN Ronak Bañuelos MD        senna-docusate (SENOKOT-S/PERICOLACE) 8.6-50 MG per tablet 1 tablet  1 tablet Oral BID PRN Priti Tovar MD        Or    senna-docusate (SENOKOT-S/PERICOLACE) 8.6-50 MG per tablet 2 tablet  2 tablet Oral BID PRN Priti Tovar MD        sodium chloride (PF) 0.9% PF flush 3 mL  3 mL Intracatheter q1 min prn Priti Tovar MD   3 mL at 04/26/24 0757        - weight:  Vitals:    04/25/24 0023   Weight: 63.1 kg (139 lb 1.8 oz)       Previous Goals:   PO intakes of at least 50-75% of meals or snacks TID vs possible need to consider additional nutrition interventions.   Evaluation: Not met    Previous Nutrition Diagnosis:   Malnutrition related to decreased oral intake with low appetite and early satiety, component of acute on  chronic pulmonary vs acute infection? as evidenced by meeting <75% of nutrition needs or less for a period of weeks w/ wt loss masked by fluid, fat/muscle loss.   Evaluation: No change    CURRENT NUTRITION DIAGNOSIS  Malnutrition related to decreased oral intake with low appetite and early satiety, component of acute on chronic pulmonary vs acute infection? as evidenced by meeting <75% of nutrition needs or less for a period of weeks w/ wt loss masked by fluid, fat/muscle loss.     INTERVENTIONS  Recommendations / Nutrition Prescription  Diet per MD - continue to encourage PO intake unless otherwise directed by MD. Continue high calorie and high protein diet as able.     Ordered Ensure Enlive with breakfast     Continue MVI+M as ordered     Implementation  Medical Food Supplement: as above     Goals  PO intakes of at least 50-75% of meals or snacks TID vs possible need to consider additional nutrition interventions.     MONITORING AND EVALUATION:  Progress towards goals will be monitored and evaluated per protocol and Practice Guidelines    Acacia Owens RD, LD  Clinical Dietitian     3rd floor/ICU: [phone]  All other floors: [phone]  Weekend/holiday: [phone]  Office: [phone]

## 2024-05-01 NOTE — PLAN OF CARE
"Goal Outcome Evaluation:      Plan of Care Reviewed With: patient, spouse    Overall Patient Progress: no change  Outcome Evaluation: No significant change, remains on O2.  Resp: productive yellow sputum, a lot in upper chest. Suction catheter supplied. O2 room air dips to low 80's. 2L NC to maintain O2 sats > 90%. RT following. Coarse lung sounds.   Cardiac: PPM. increased BLE edema. Edema wear applied BLE. Tenderness to lower legs bilaterally from edema.   GI/: GI 2 loose stools, one incontinent. Lomotil given. PO intake good in a.m., refused noon meal, 50% pm. External catheter for frequent urination/I & O. Significant output 800ml after x1 IV lasix given at 1500. Daily weight.   Skin: Mepilex to coccyx/sacrum. Blanchable redness. Repositions self during day. Up in chair/ambulate in [place] w/RN writer.   Plan: now on po antibiotic, continue increasing activity, pulmonary toileting, I & O's. ID & Pulmonary following.       Problem: Adult Inpatient Plan of Care  Goal: Plan of Care Review  Description: The Plan of Care Review/Shift note should be completed every shift.  The Outcome Evaluation is a brief statement about your assessment that the patient is improving, declining, or no change.  This information will be displayed automatically on your shift  note.  Outcome: Not Progressing  Flowsheets (Taken 5/1/2024 1810)  Outcome Evaluation: No significant change, remains on O2.  Plan of Care Reviewed With:   patient   spouse  Overall Patient Progress: no change  Goal: Patient-Specific Goal (Individualized)  Description: You can add care plan individualizations to a care plan. Examples of Individualization might be:  \"Parent requests to be called daily at 9am for status\", \"I have a hard time hearing out of my right ear\", or \"Do not touch me to wake me up as it startles  me\".  Outcome: Not Progressing  Goal: Absence of Hospital-Acquired Illness or Injury  Outcome: Not Progressing  Intervention: Identify and Manage Fall " Risk  Recent Flowsheet Documentation  Taken 5/1/2024 1545 by Deisy Luu RN  Safety Promotion/Fall Prevention: safety round/check completed  Taken 5/1/2024 1200 by Deisy Luu RN  Safety Promotion/Fall Prevention: safety round/check completed  Intervention: Prevent Skin Injury  Recent Flowsheet Documentation  Taken 5/1/2024 1545 by Deisy Luu RN  Body Position:   side-lying 30 degrees   right  Taken 5/1/2024 0933 by Deisy Luu RN  Body Position: position changed independently  Intervention: Prevent and Manage VTE (Venous Thromboembolism) Risk  Recent Flowsheet Documentation  Taken 5/1/2024 0933 by Deisy Luu RN  VTE Prevention/Management: compression stockings on  Intervention: Prevent Infection  Recent Flowsheet Documentation  Taken 5/1/2024 1545 by Deisy Luu RN  Infection Prevention: hand hygiene promoted  Goal: Optimal Comfort and Wellbeing  Outcome: Not Progressing  Goal: Readiness for Transition of Care  Outcome: Not Progressing

## 2024-05-01 NOTE — PROGRESS NOTES
[facility]  Infectious Disease Progress Note          Assessment and Plan:   Date of Admission:  4/25/2024  Date of Consult (When I saw the patient): 04/25/24        Assessment & Plan  Jaylen Lamas is a 73 year old who was admitted on 4/25/2024.      Impression: 1 73-year-old male, well-known to me, just discharged from the hospital now readmitted, unclear whether this is still all part of #2 below or some new infection, quite unlikely this is new bacterial infection, no positive microbiology, bronchoscopy cultures all unremarkable, recent and prior courses of antibiotics without particular benefit suspect this is not bacterial infection currently  2 2 and half month illness with intermittent fever, weight loss, malaise and fatigue and respiratory symptoms, prior antibiotic courses without resolution or benefit no positive microbiology, recent bronchoscopy without so far obvious cause  3 CMV, EBV and Candida in bronchoscopy very unlikely clinically relevant in this clinical scenario  4 intermittent diarrhea again now rule out new C. difficile, is prior C. 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  2 BAL, multiple serologic studies all negative and very notably in the hospital last week on Zosyn no fever, this implies this is not some more complicated FUO or unusual infection  3 aspiration fitting quite well here, now down to oral Augmentin would do a week of that, if this is recurrent aspiration of course antimicrobial therapy not going to be a logical long-term  strategy,                 Interval History:     Feels lousy again today, slightly more hypoxic, general malaise and weakness.  Of note though no fever, no positive microbiology on any of the studies that serves any significance.              Medications:     Current Facility-Administered Medications   Medication Dose Route Frequency Provider Last Rate Last Admin    acetylcysteine (MUCOMYST) 10 % nebulizer solution 4 mL  4 mL Nebulization Q6H Guy Vega MD   4 mL at 05/01/24 0746    albuterol (PROVENTIL) neb solution 2.5 mg  2.5 mg Nebulization Q6H Guy Vega MD   2.5 mg at 05/01/24 0746    amoxicillin-clavulanate (AUGMENTIN) 875-125 MG per tablet 1 tablet  1 tablet Oral Q12H [facility] (08/20) Ronak Bañuelos MD   1 tablet at 05/01/24 0922    apixaban ANTICOAGULANT (ELIQUIS) tablet 5 mg  5 mg Oral BID Priti Tovar MD   5 mg at 05/01/24 0922    atorvastatin (LIPITOR) tablet 40 mg  40 mg Oral QPM Priti Tovar MD   40 mg at 04/30/24 2106    diclofenac (VOLTAREN) 1 % topical gel 4 g  4 g Topical 4x Daily Poornima Velez MD   4 g at 05/01/24 0925    digoxin (LANOXIN) tablet 125 mcg  125 mcg Oral QPM Priti Tovar MD   125 mcg at 04/30/24 2106    escitalopram (LEXAPRO) tablet 20 mg  20 mg Oral Daily Priti Tovar MD   20 mg at 05/01/24 0922    fluticasone-vilanterol (BREO ELLIPTA) 100-25 MCG/ACT inhaler 1 puff [patient supplied medication]  1 puff Inhalation Daily Priti Tovar MD   1 puff at 05/01/24 0746    iron sucrose (VENOFER) 200 mg in sodium chloride 0.9 % 120 mL intermittent infusion  200 mg Intravenous Q24H Ronak Bañuelos  mL/hr at 04/30/24 2253 200 mg at 04/30/24 2253    metoprolol succinate ER (TOPROL XL) 24 hr tablet 50 mg  50 mg Oral At Bedtime Poornima Velez MD   50 mg at 04/30/24 2106    sodium chloride (PF) 0.9% PF flush 3 mL  3 mL Intracatheter Q8H Priti Tovar MD   3 mL at 04/30/24 2110                  Physical  "Exam:   Blood pressure 108/48, pulse 85, temperature 98  F (36.7  C), temperature source Oral, resp. rate 24, height 1.702 m (5' 7\"), weight 63.1 kg (139 lb 1.8 oz), SpO2 93%.  Wt Readings from Last 2 Encounters:   04/25/24 63.1 kg (139 lb 1.8 oz)   04/22/24 66.1 kg (145 lb 11.6 oz)     Vital Signs with Ranges  Temp:  [98  F (36.7  C)-98.2  F (36.8  C)] 98  F (36.7  C)  Pulse:  [80-85] 85  Resp:  [18-30] 24  BP: (106-122)/(42-51) 108/48  FiO2 (%):  [30 %] 30 %  SpO2:  [88 %-96 %] 93 %    Constitutional: Awake, alert, cooperative, no apparent distress looks similarly chronically ill not particularly acutely ill on low-flow oxygen     Lungs: Clear to auscultation bilaterally, no crackles or wheezing   Cardiovascular: Regular rate and rhythm, normal S1 and S2, and no murmur noted   Abdomen: Normal bowel sounds, soft, non-distended, non-tender   Skin: No rashes, no cyanosis, no edema   Other:           Data:   All microbiology laboratory data reviewed.  Recent Labs   Lab Test 04/28/24  0625 04/27/24  0604 04/25/24  0713   WBC 10.2 11.5* 9.8   HGB 9.4* 9.1* 9.6*   HCT 31.1* 29.7* 31.3*   MCV 84 84 85    268 273     Recent Labs   Lab Test 05/01/24  0700 04/30/24  0645 04/29/24  0633   CR 1.04 0.95 0.98     No lab results found.  No lab results found.    Invalid input(s): \"UC\"     "

## 2024-05-01 NOTE — PLAN OF CARE
Goal Outcome Evaluation:      Plan of Care Reviewed With: patient    Overall Patient Progress: improvingOverall Patient Progress: improving    Outcome Evaluation: 2L NC productive cough. iron sucrose given with no reaction.      Problem: Adult Inpatient Plan of Care  Goal: Plan of Care Review  Description: The Plan of Care Review/Shift note should be completed every shift.  The Outcome Evaluation is a brief statement about your assessment that the patient is improving, declining, or no change.  This information will be displayed automatically on your shift  note.  Outcome: Progressing  Flowsheets (Taken 5/1/2024 0040)  Outcome Evaluation: 2L NC productive cough. iron sucrose given with no reaction.  Plan of Care Reviewed With: patient  Overall Patient Progress: improving  Goal: Absence of Hospital-Acquired Illness or Injury  Intervention: Identify and Manage Fall Risk  Recent Flowsheet Documentation  Taken 4/30/2024 2341 by Chris Barrios, RN  Safety Promotion/Fall Prevention:   activity supervised   assistive device/personal items within reach   clutter free environment maintained   mobility aid in reach   nonskid shoes/slippers when out of bed   room organization consistent   safety round/check completed   supervised activity   toileting scheduled  Taken 4/30/2024 2113 by Chris Barrios, RN  Safety Promotion/Fall Prevention:   activity supervised   assistive device/personal items within reach   clutter free environment maintained   mobility aid in reach   nonskid shoes/slippers when out of bed   room organization consistent   safety round/check completed   supervised activity   toileting scheduled

## 2024-05-01 NOTE — PROGRESS NOTES
"[facility]    Hospitalist Progress Note      Assessment & Plan   Jaylen Lamas is a 73 year old man who was admitted on 4/25/2024.     PMH significant for COPD, tobacco use, HFpEF, bioprosthetic aortic valve replacement, bioprosthetic mitral valve replacement, paroxysmal atrial fibrillation s/p ablation and MAZE procedure, 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 difficile, hypertension, CKD, history of duodenal ulcer, depression/anxiety, testicular cancer (s/p orchiectomy and XRT in 1982).     Recent history is most significant for pneumonia for which he was hospitalized from 4/19-4/22/2024 and discharged on Augmentin. During that admission, he had reported that his sx had been going on for about 10 weeks prior to the worsening.  During recent hospitalization 4- to 4/22/2024 patient underwent bronchoscopy with BAL positive for CMV, EBV and Candida.  Bacterial cultures were negative.  Patient was treated with Zosyn and azithromycin while in hospital and discharged on Augmentin.  Underwent workup for possible aspiration.    Mr. Reeder returned on 4/25 with worsening shortness of breath, cough, and fever for which he was again admitted. Patient is being followed by pulmonology and infectious disease. Continues on IV Zosyn at this time under direction of ID. Recommendations to start back on Augmentin tomorrow.       DX:  Acute hypoxic respiratory failure thought due to RLL pneumonia possibly related to aspiration. Felt to not be compatible with \"conventional CAP\".  Baseline COPD, thought to NOT be in exacerbation. Note that prior to the most recent hospitalization, 4/14-4/22/24, when she was diagnosed with PNA, he did NOT require supplemental oxygen.  He does not have chronic resp failure.  S/p BAL 4/19 negative for bacterial growth, with candida isolated but other fungal pathogens negative. EBV and CMV pcrs are positive, but " felt to be non-significant at this time.   Initiated on BiPAP on 4/28 due to increased WOB, though has required it only intermittently.  Notably, did not have demonstrably worsened resp failure at that time.   Continues empiric bronchodilators.  Also on pulmonary toilet strategy with Mucomyst, Duonebs and percussion therapy.  2.   Poss Sepsis (Hypotension, Hypoxia, leukocytosis, fever at home).  Resolved.  3.   HFpEF with RV volume overload/pulm HTN by echo on 4/19/24.  Mitral and Aortic bioprosthetic valve replacements. A fib, s/p MAZE procedure and now with pacemaker.  Non-obstructive CAD. Developing peripheral edema.  4.  Prediabetes. HbA1c 6.3.  5.  Anemia. Iron deficiency confirmed with ferritin 4 on 4/26/24. RLS may also be the result of low iron.   6.  Moderate malnutrition in the setting of acute illness superimposed on chronic illness. Weight loss. Assoc hypokalemia.   7.   Hx duodenal ulcer.   8.   Remote hx C diff.    PLAN:  1.   ID and pulmonary medicine guiding care of suspected chronic/recurrent aspiration pneumonitis. Question underlying right lung mass?  2.   Cont with pulmonary toilet.  3.   Usual home meds resumed including Toprol, Digoxin and Apixaban.   4.   Zosyn (4/25 through 4/30) and started Augmentin to complete 10 day course on 5/1.  5.   Already received 3/5 doses of Iron sucrose. Resume tonight with plan to continue as long as he is here.  6.   Start lomotil for recurrent diarrhea.  7.   PT eval. Encouraging mobility and nutrition.   8.   Lasix 40 mg IV daily x 3.  Monitor BMP.     DVT Prophylaxis: DOAC  Code Status: Full Code  Expected discharge: TBD.  Pt was moved to [facility] on 4/28. Several more days before discharge, at least, awaiting improvement in function/stability..     Ronak Bañuelos MD FACP  Hospitalist Service  [facility]      Interval History   On BiPAP again overnight, though with minimal support.  Generally remained without new or worsening concerns.     His wife  indicated concern for areas of dependent edema that are possibly new in the past couple of days.  She also feels that he is more agitated today, though appears fully coherent to me.     -Data reviewed today: I reviewed all new labs and imaging results over the last 24 hours.     Physical Exam   Temp: 98.2  F (36.8  C) Temp src: Oral BP: 106/46 Pulse: 80   Resp: 24 SpO2: 91 % O2 Device: BiPAP/CPAP Oxygen Delivery: 2 LPM  Vitals:    04/25/24 0023   Weight: 63.1 kg (139 lb 1.8 oz)     Vital Signs with Ranges  Temp:  [97.8  F (36.6  C)-98.2  F (36.8  C)] 98.2  F (36.8  C)  Pulse:  [80-83] 80  Resp:  [18-30] 24  BP: (106-122)/(42-57) 106/46  FiO2 (%):  [30 %] 30 %  SpO2:  [90 %-97 %] 91 %  I/O last 3 completed shifts:  In: 623 [P.O.:620; I.V.:3]  Out: 550 [Urine:550]    Constitutional:alert and comforttable today. He got up with RN and walked about 30 yards with nurse CGA became exhausted.  Appears very weak.  HEENT: NCAT. EOMI.   Respiratory: Loose rhonchi throughout. Good air entry. No wheeze.  Cardiovascular: Regular rate and rhythm. No murmur.  GI: Soft, nontender, nondistended. Normoactive bowel sounds.   Musculoskeletal: No gross deformities. 2+ edema to bilateral lower extremities.   Neurologic: Alert and oriented x3. No focal neurologic deficits.       Medications   Current Facility-Administered Medications   Medication Dose Route Frequency Provider Last Rate Last Admin    No lozenges or gum should be given while patient on BIPAP/AVAPS/AVAPS AE   Does not apply Continuous PRN Poornima Velez MD        Patient may continue current oral medications   Does not apply Continuous PRN Poornima Velez MD         Current Facility-Administered Medications   Medication Dose Route Frequency Provider Last Rate Last Admin    acetylcysteine (MUCOMYST) 10 % nebulizer solution 4 mL  4 mL Nebulization Q6H Guy Vega MD   4 mL at 05/01/24 1545    albuterol (PROVENTIL) neb solution 2.5 mg  2.5 mg  Nebulization Q6H Guy Vega MD   2.5 mg at 05/01/24 1545    amoxicillin-clavulanate (AUGMENTIN) 875-125 MG per tablet 1 tablet  1 tablet Oral Q12H [facility] (08/20) Ronak Bañuelos MD   1 tablet at 05/01/24 0922    apixaban ANTICOAGULANT (ELIQUIS) tablet 5 mg  5 mg Oral BID Priti Tovar MD   5 mg at 05/01/24 0922    atorvastatin (LIPITOR) tablet 40 mg  40 mg Oral QPM Priti Tovar MD   40 mg at 04/30/24 2106    diclofenac (VOLTAREN) 1 % topical gel 4 g  4 g Topical 4x Daily Poornima Velez MD   4 g at 05/01/24 1447    digoxin (LANOXIN) tablet 125 mcg  125 mcg Oral QPM Priti Tovar MD   125 mcg at 04/30/24 2106    escitalopram (LEXAPRO) tablet 20 mg  20 mg Oral Daily Priti Tovar MD   20 mg at 05/01/24 0922    fluticasone-vilanterol (BREO ELLIPTA) 100-25 MCG/ACT inhaler 1 puff [patient supplied medication]  1 puff Inhalation Daily Priti Tovar MD   1 puff at 05/01/24 0746    furosemide (LASIX) injection 40 mg  40 mg Intravenous Daily Ronak Bañuelos MD   40 mg at 05/01/24 1459    iron sucrose (VENOFER) 200 mg in sodium chloride 0.9 % 120 mL intermittent infusion  200 mg Intravenous Q24H Ronak Bañuelos  mL/hr at 04/30/24 2253 200 mg at 04/30/24 2253    metoprolol succinate ER (TOPROL XL) 24 hr tablet 50 mg  50 mg Oral At Bedtime Poornima Velez MD   50 mg at 04/30/24 2106    sodium chloride (PF) 0.9% PF flush 3 mL  3 mL Intracatheter Q8H Priti Tovar MD   3 mL at 04/30/24 2110       Data   Recent Labs   Lab 05/01/24  0700 04/30/24  0645 04/29/24  1428 04/29/24  0633 04/28/24  0625 04/27/24  1426 04/27/24  0604 04/26/24  0601 04/25/24  0713 04/25/24  0020   WBC  --   --   --   --  10.2  --  11.5*  --  9.8 12.2*   HGB  --   --   --   --  9.4*  --  9.1*  --  9.6* 9.8*   MCV  --   --   --   --  84  --  84  --  85 85   PLT  --   --   --   --  280  --  268  --  273 327   INR  --   --   --   --   --   --   --   --   --  1.85*    137  --  138  140  --  134*  --  133* 132*   POTASSIUM 3.8 3.7 4.3 3.3*  3.0* 3.8   < > 3.3*   < > 3.7 3.3*   CHLORIDE 103 103  --  102 107  --  103  --  102 98   CO2 25 27  --  28 22  --  22  --  23 26   BUN 12.7 11.2  --  13.0 15.4  --  17.1  --  15.2 14.1   CR 1.04 0.95  --  0.98 1.03  --  1.03  --  0.92 0.96   ANIONGAP 11 7  --  8 11  --  9  --  8 8   MELO 9.1 9.1  --  9.1 9.0  --  9.0  --  8.9 9.1   GLC 90 107*  --  109* 100*  --  94  --  151* 138*   ALBUMIN  --   --   --   --   --   --  2.4*  --   --  2.4*   PROTTOTAL  --   --   --   --   --   --  5.2*  --   --  5.2*   BILITOTAL  --   --   --   --   --   --  0.4  --   --  0.3   ALKPHOS  --   --   --   --   --   --  107  --   --  114   ALT  --   --   --   --   --   --  25  --   --  24   AST  --   --   --   --   --   --  27  --   --  19    < > = values in this interval not displayed.       No results found for this or any previous visit (from the past 24 hour(s)).

## 2024-05-01 NOTE — PLAN OF CARE
"  Problem: Adult Inpatient Plan of Care  Goal: Plan of Care Review  Description: The Plan of Care Review/Shift note should be completed every shift.  The Outcome Evaluation is a brief statement about your assessment that the patient is improving, declining, or no change.  This information will be displayed automatically on your shift  note.  Outcome: Progressing  Flowsheets (Taken 5/1/2024 0632)  Outcome Evaluation: pt on  BIPAP during shift, iron sucrose infusion intitated, running A-Fib/CVR/BBB on tele. plan to start PO abx. POC maintained  Plan of Care Reviewed With: spouse  Overall Patient Progress: no change  Goal: Patient-Specific Goal (Individualized)  Description: You can add care plan individualizations to a care plan. Examples of Individualization might be:  \"Parent requests to be called daily at 9am for status\", \"I have a hard time hearing out of my right ear\", or \"Do not touch me to wake me up as it startles  me\".  Outcome: Progressing  Goal: Absence of Hospital-Acquired Illness or Injury  Outcome: Progressing  Intervention: Prevent Skin Injury  Recent Flowsheet Documentation  Taken 5/1/2024 0243 by Yani Dunn, RN  Body Position: position changed independently  Goal: Optimal Comfort and Wellbeing  Outcome: Progressing  Goal: Readiness for Transition of Care  Outcome: Progressing   Goal Outcome Evaluation:      Plan of Care Reviewed With: spouse    Overall Patient Progress: no changeOverall Patient Progress: no change    Outcome Evaluation: pt on  BIPAP during shift, iron sucrose infusion intitated, running A-Fib/CVR/BBB on tele. plan to start PO abx. POC maintained      "

## 2024-05-01 NOTE — PLAN OF CARE
Goal Outcome Evaluation:       Pt continues to consume <75% of nutritional needs. Ordered ensure with breakfast.     Acacia Owens RD, LD  Clinical Dietitian     Pager - 3rd floor/ICU: [phone]  Pager - All other floors: [phone]  Pager - Weekend/holiday: [phone]  Office phone: [phone]

## 2024-05-01 NOTE — PROGRESS NOTES
A BiPAP of  10/5 @ 30% was applied to the pt via the mask for an increase in WOB and/or SOB.  The bridge of the nose looks good and remains intact. Pt is tolerating it well. Will continue to monitor and assess the pt's current respiratory status and needs.    Price Rowan RT on 5/1/2024 at 5:49 AM

## 2024-05-01 NOTE — PROGRESS NOTES
[facility]    Pulmonary, Allergy, Critical Care and Sleep Medicine    Pulmonary Consult Progress Note    Jaylen Lamas MRN# [id number]   Age: 73 year old YOB: 1950     Date of Admission: 4/25/2024  Date of Service: 05/01/2024     ==================================================  INTERVAL EVENTS:  -Currently alternating between BiPAP overnight and 2 L during the day (improved from 3L)  - Feels that there hasn't been much improvement  -Lower extremity edema noticeably increased from Monday despite his intake and output indicating that he is net negative      CHANGES FOR TODAY:  -Agree with diuresis  -Please continue to encourage pulmonary clearance therapies.  (He told me this evening that Ronak Bañuelos told him he did not need to do his clearance therapies.  I suspect this is a misunderstanding, but I told him I would pass on this recommendation)      ==================================================    73M COPD, tobacco abuse, HFpEF, AVR, MVR, pAfib s/p ablation, CHB s/p PPM, CAD, HTN, CKD admitted 4/25 w/ worsening dyspnea and fever. CT w/ worsening RLL consolidation.    ASSESSMENT AND RECOMMENDATIONS:    ## COPD  ## Acute hypoxic respiratory failure  ## Heart failure with preserved ejection fraction  ## Pulm hypertension    Current versus persistent right lower lobe consolidation.  Last hospitalization, he had a bronchoscopy that was unremarkable. There have been no positive microbiologic data. His swallow study showed aspiration w/ thin liquids, but otherwise OK. Despite this his presentation does appear consistent with some element of aspiration given the enlarging RLL consolidation which you would otherwise not expect.  Admission CT with large right lower lobe consolidation and patulous esophagus with liquid in the lumen.  This consolidation could be consistent with other atypical pneumonias, such as Blastomycosis, but studies have been negative. His CT findings are not  "consistent with organizing pneumonias and again BAL did not support this. I do not feel his COPD is current in exacerbation.    Persistent dyspnea without chest tightness.  Mild wheezing.  Blastomycosis serology negative.  ABG this admission with normal CO2      -Remain upright for 60 minutes after meals (patient care order placed)  - ABx per ID  - continue ICS-LABA (Breo)  - no indication for steroids, at this time  -Continue aggressive airway clearance with duonebs, acetylcysteine nebs, and Volera  -Diuresis          Fuentes Neil M.D.  Pulmonary & Critical Care  Pager: Click Here to page    I spent 48 minutes dedicated to this care so far today excluding procedures, including review of medical records, review of imaging (results & images), time with patient and time in documentation.    ==================================================      PHYSICAL EXAM  /48 (BP Location: Right arm)   Pulse 85   Temp 98  F (36.7  C) (Oral)   Resp 24   Ht 1.702 m (5' 7\")   Wt 63.1 kg (139 lb 1.8 oz)   SpO2 93%   BMI 21.79 kg/m        Intake/Output Summary (Last 24 hours) at 5/1/2024 1213  Last data filed at 5/1/2024 0937  Gross per 24 hour   Intake 600 ml   Output 575 ml   Net 25 ml           Vitals:    04/25/24 0023   Weight: 63.1 kg (139 lb 1.8 oz)       General: Alert, interactive, NAD  Resp: Coarse rhonchi throughout, slightly worse on the right.  Cardiac: RRR, NS1,S2, No m/r/g  Abdomen: Soft, nontender, nondistended. +BS.  No HSM or masses, no rebound or guarding.  Extremities: 2+ lower extremity edema  Skin: Warm and dry, no jaundice or rash  Neuro: Alert & oriented x 3, moves all extremities equally      Recent Labs   Lab Test 04/28/24  0625 04/27/24  0604 04/25/24  0713   WBC 10.2 11.5* 9.8   RBC 3.69* 3.55* 3.68*   HGB 9.4* 9.1* 9.6*    268 273       Recent Labs   Lab Test 05/01/24  0700 04/30/24  0645 04/29/24  1428 04/29/24  0633    137  --  138   POTASSIUM 3.8 3.7 4.3 3.3*  3.0* " "  CHLORIDE 103 103  --  102   CO2 25 27  --  28   BUN 12.7 11.2  --  13.0   CR 1.04 0.95  --  0.98   GLC 90 107*  --  109*   MELO 9.1 9.1  --  9.1   MAG 1.7 1.7  --  1.6*           No results for input(s): \"CULT\" in the last 168 hours.      No results found for this or any previous visit (from the past 48 hour(s)).        "

## 2024-05-02 NOTE — PROGRESS NOTES
[facility]  Infectious Disease Progress Note          Assessment and Plan:   Date of Admission:  4/25/2024  Date of Consult (When I saw the patient): 04/25/24        Assessment & Plan  Jaylen Lamas is a 73 year old who was admitted on 4/25/2024.      Impression: 1 73-year-old male, well-known to me, just discharged from the hospital now readmitted, unclear whether this is still all part of #2 below or some new infection, quite unlikely this is new bacterial infection, no positive microbiology, bronchoscopy cultures all unremarkable, recent and prior courses of antibiotics without particular benefit suspect this is not bacterial infection currently  2 2 and half month illness with intermittent fever, weight loss, malaise and fatigue and respiratory symptoms, prior antibiotic courses without resolution or benefit no positive microbiology, recent bronchoscopy without so far obvious cause  3 CMV, EBV and Candida in bronchoscopy very unlikely clinically relevant in this clinical scenario  4 intermittent diarrhea again now rule out new C. difficile, is prior C. 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     REC 1 f intermittently feels worse, but today better than yesterday, did not develop any fever or particularly worsening oxygenation  2 BAL, multiple serologic studies all negative and very notably in the hospital last week on Zosyn no fever, this implies this is not some more complicated FUO or unusual infection  3 aspiration fitting quite well here, now down to oral Augmentin would do a week of that, if this is recurrent aspiration of course antimicrobial therapy not going to be a logical  long-term strategy,  If disposition do 1 more week of oral Augmentin                 Interval History:     F yesterday felt worse slightly more hypoxic, general malaise and weakness but today back feeling okay.  Of note though no fever, no positive microbiology on any of the studies that serves any significance.              Medications:     Current Facility-Administered Medications   Medication Dose Route Frequency Provider Last Rate Last Admin    acetylcysteine (MUCOMYST) 10 % nebulizer solution 4 mL  4 mL Nebulization Q6H Guy Vega MD   4 mL at 05/02/24 0757    albuterol (PROVENTIL) neb solution 2.5 mg  2.5 mg Nebulization Q6H GaryGuy young MD   2.5 mg at 05/02/24 0757    amoxicillin-clavulanate (AUGMENTIN) 875-125 MG per tablet 1 tablet  1 tablet Oral Q12H [facility] (08/20) Ronak Bañuelos MD   1 tablet at 05/02/24 0909    apixaban ANTICOAGULANT (ELIQUIS) tablet 5 mg  5 mg Oral BID Priti Tovar MD   5 mg at 05/02/24 0909    atorvastatin (LIPITOR) tablet 40 mg  40 mg Oral QPM Priti Tovar MD   40 mg at 05/01/24 2109    diclofenac (VOLTAREN) 1 % topical gel 4 g  4 g Topical 4x Daily Poornima Velez MD   4 g at 05/02/24 0926    digoxin (LANOXIN) tablet 125 mcg  125 mcg Oral QPM Priti Tovar MD   125 mcg at 05/01/24 2109    escitalopram (LEXAPRO) tablet 20 mg  20 mg Oral Daily Priti Tovar MD   20 mg at 05/02/24 0909    fluticasone-vilanterol (BREO ELLIPTA) 100-25 MCG/ACT inhaler 1 puff [patient supplied medication]  1 puff Inhalation Daily Priti Tovar MD   1 puff at 05/02/24 0757    furosemide (LASIX) injection 40 mg  40 mg Intravenous Daily Ronak Bañuelos MD   40 mg at 05/02/24 0909    iron sucrose (VENOFER) 200 mg in sodium chloride 0.9 % 120 mL intermittent infusion  200 mg Intravenous Q24H Ronak Bañuelos  mL/hr at 05/01/24 2126 200 mg at 05/01/24 2126    metoprolol succinate ER (TOPROL XL) 24 hr tablet 50 mg  50 mg Oral At Bedtime  "Poornima Velez MD   50 mg at 05/01/24 2109    sodium chloride (PF) 0.9% PF flush 3 mL  3 mL Intracatheter Q8H Priti Tovar MD   3 mL at 05/02/24 0554    sodium phosphate 15 mmol in NS 250mL intermittent infusion  15 mmol Intravenous Once Fito Brenner MD   15 mmol at 05/02/24 1017                  Physical Exam:   Blood pressure 137/57, pulse 85, temperature 97.4  F (36.3  C), temperature source Oral, resp. rate 20, height 1.702 m (5' 7\"), weight 68.9 kg (151 lb 14.4 oz), SpO2 96%.  Wt Readings from Last 2 Encounters:   05/02/24 68.9 kg (151 lb 14.4 oz)   04/22/24 66.1 kg (145 lb 11.6 oz)     Vital Signs with Ranges  Temp:  [97.4  F (36.3  C)-98.3  F (36.8  C)] 97.4  F (36.3  C)  Pulse:  [81-91] 85  Resp:  [20-32] 20  BP: (105-137)/(41-57) 137/57  FiO2 (%):  [30 %] 30 %  SpO2:  [88 %-96 %] 96 %    Constitutional: Awake, alert, cooperative, no apparent distress looks similarly chronically ill not particularly acutely ill on low-flow oxygen     Lungs: Clear to auscultation bilaterally, no crackles or wheezing   Cardiovascular: Regular rate and rhythm, normal S1 and S2, and no murmur noted   Abdomen: Normal bowel sounds, soft, non-distended, non-tender   Skin: No rashes, no cyanosis, no edema   Other:           Data:   All microbiology laboratory data reviewed.  Recent Labs   Lab Test 05/02/24  0646 04/28/24  0625 04/27/24  0604   WBC 13.9* 10.2 11.5*   HGB 9.6* 9.4* 9.1*   HCT 32.1* 31.1* 29.7*   MCV 85 84 84    280 268     Recent Labs   Lab Test 05/02/24  0646 05/01/24  0700 04/30/24  0645   CR 1.01 1.04 0.95     No lab results found.  No lab results found.    Invalid input(s): \"UC\"     "

## 2024-05-02 NOTE — PROGRESS NOTES
05/02/24 1100   Appointment Info   Signing Clinician's Name / Credentials (SLP) Carmen Patel MA CCC-SLP   General Information   Onset of Illness/Injury or Date of Surgery 04/25/24   Referring Physician Fito Brenner MD   Pertinent History of Current Problem Jaylen Lamas is a 73 year old man who was admitted on 4/25/2024.      PMH significant for COPD, tobacco use, HFpEF, bioprosthetic aortic valve replacement, bioprosthetic mitral valve replacement, paroxysmal atrial fibrillation s/p ablation and MAZE procedure, 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 difficile, hypertension, CKD, history of duodenal ulcer, depression/anxiety, testicular cancer (s/p orchiectomy and XRT in 1982).      Recent history is most significant for pneumonia for which he was hospitalized from 4/19-4/22/2024 and discharged on Augmentin. During that admission, he had reported that his sx had been going on for about 10 weeks prior to the worsening.  During recent hospitalization 4- to 4/22/2024 patient underwent bronchoscopy with BAL positive for CMV, EBV and Candida.  Bacterial cultures were negative.  Patient was treated with Zosyn and azithromycin while in hospital and discharged on Augmentin.  Underwent workup for possible aspiration.     Mr. Reeder returned on 4/25 with worsening shortness of breath, cough, and fever for which he was again admitted. Patient is being followed by pulmonology and infectious disease. Continues on IV Zosyn at this time under direction of ID. Recommendations to start back on Augmentin tomorrow.   Type of Evaluation   Type of Evaluation Swallow Evaluation   Oral Motor   Oral Musculature generally intact  (deconditioned)   Structural Abnormalities none present   Dentition (Oral Motor)   Dentition (Oral Motor) natural dentition;some missing teeth   Facial Symmetry (Oral Motor)   Facial Symmetry (Oral Motor) WNL   Lip Function (Oral Motor)    Lip Range of Motion (Oral Motor) WNL   Lip Strength (Oral Motor) WFL   Tongue Function (Oral Motor)   Tongue ROM (Oral Motor) WNL   Tongue Strength (Oral Motor) WFL   Cough/Swallow/Gag Reflex (Oral Motor)   Soft Palate/Velum (Oral Motor) WNL   Volitional Throat Clear/Cough (Oral Motor) WNL   Volitional Swallow (Oral Motor) WNL   Vocal Quality/Secretion Management (Oral Motor)   Vocal Quality (Oral Motor) WFL   General Swallowing Observations   Current Diet/Method of Nutritional Intake (General Swallowing Observations, NIS) thin liquids (level 0);regular diet   Respiratory Support nasal cannula  (3 li)   Swallowing Evaluation Clinical swallow evaluation   Comment, Secretions/Suctioning VFSS 4/22/24 recommended a regular diet/thin liquids, gave option of chin tuck to improve safety with thin.   Clinical Swallow Eval: Thin Liquid Texture Trial   Mode of Presentation, Thin Liquids straw;self-fed   Volume of Liquid or Food Presented 4 oz thin H20   Oral Phase of Swallow WFL   Pharyngeal Phase of Swallow intact   Diagnostic Statement No overt s/sx of aspiration   Clinical Swallow Evaluation: Solid Food Texture Trial   Mode of Presentation self-fed   Volume Presented wilda cracker, Armenian toast   Oral Phase WFL   Pharyngeal Phase intact   Diagnostic Statement No overt signs or symptoms of aspiration   Swallowing Recommendations   Diet Consistency Recommendations thin liquids (level 0);regular diet   Supervision Level for Intake other (see comments)   Mode of Delivery Recommendations bolus size, small;slow rate of intake  (chin tuck per pt request)   Swallowing Maneuver Recommendations alternate food and liquid intake   Monitoring/Assistance Required (Eating/Swallowing) stop eating activities when fatigue is present;monitor for cough or change in vocal quality with intake   Recommended Feeding/Eating Techniques (Swallow Eval) maintain upright sitting position for eating   Medication Administration Recommendations,  Swallowing (SLP) as tolerated   Instrumental Assessment Recommendations instrumental evaluation not recommended at this time   General Therapy Interventions   Planned Therapy Interventions Dysphagia Treatment   Dysphagia treatment Instruction of safe swallow strategies;Modified diet education   Clinical Impression   Criteria for Skilled Therapeutic Interventions Met (SLP Eval) Yes, treatment indicated   SLP Diagnosis Oropharyngeal dysphagia   Risks & Benefits of therapy have been explained evaluation/treatment results reviewed;current/potential barriers reviewed;participants voiced agreement with care plan;participants included;patient   Clinical Impression Comments Clinical dysphagia eval completed per MD order. This patient was recently seen for VFSS. A regular diet/thin liquids was recommended with a chin tuck suggested with thin liquids to max safety. During today's clinical assessment the pt sat up in the chair and fed self meal tray. Mastication mildly slow due to some missing dentition but bolus control WFL. No overt signs or symptoms of aspriation were present. MD requested MD follow to reinforce safe swallow strategies.   SLP Total Evaluation Time   Eval: oral/pharyngeal swallow function, clinical swallow Minutes (35243) 20   Swallowing Dysfunction &/or Oral Function for Feeding   Treatment of Swallowing Dysfunction &/or Oral Function for Feeding Minutes (05329) 10   Treatment Detail/Skilled Intervention Educated the pt re: previous VFSS result, diet rec & safe swallow precautions to reduce aspiration risk.   SLP Discharge Planning   SLP Plan diet tolerance, train strategies as needed   SLP Discharge Recommendation home with assist   SLP Rationale for DC Rec benefits from a short course of SLP tx to maximize use of safe swallow strategies   SLP Brief overview of current status  Recommend a regular diet/thin liquids. Sit upright for PO, take small bites/sips at a slow pace. Encourage chin tuck with thin  liquids.   Total Session Time   Total Session Time (sum of timed and untimed services) 30

## 2024-05-02 NOTE — PROGRESS NOTES
A BiPAP of  10/5 @ 30% was applied to the pt via the mask for an increase in WOB and/or SOB.  The bridge of the nose looks good and remains intact. Pt is tolerating it well. Will continue to monitor and assess the pt's current respiratory status and needs.    Lotus Banks, RT on 5/2/2024 at 2:54 AM

## 2024-05-02 NOTE — PLAN OF CARE
"Goal Outcome Evaluation:      Plan of Care Reviewed With: patient    Overall Patient Progress: no changeOverall Patient Progress: no change    Outcome Evaluation: Lung sounds with crackles, has a congested cough, refused volera therapy, O2 at 2-3L NC, sat's at 91-93%, placed on Bipap at 0300, Pt. continues on PO abx (Augmentin).      Problem: Adult Inpatient Plan of Care  Goal: Plan of Care Review  Description: The Plan of Care Review/Shift note should be completed every shift.  The Outcome Evaluation is a brief statement about your assessment that the patient is improving, declining, or no change.  This information will be displayed automatically on your shift  note.  Outcome: Progressing  Flowsheets (Taken 5/2/2024 0428)  Outcome Evaluation: Lung sounds with crackles, has a congested cough, refused volera therapy, O2 at 2-3L NC, sat's at 91-93%, placed on Bipap at 0300, Pt. continues on PO abx (Augmentin).  Plan of Care Reviewed With: patient  Overall Patient Progress: no change  Goal: Patient-Specific Goal (Individualized)  Description: You can add care plan individualizations to a care plan. Examples of Individualization might be:  \"Parent requests to be called daily at 9am for status\", \"I have a hard time hearing out of my right ear\", or \"Do not touch me to wake me up as it startles  me\".  Outcome: Progressing  Goal: Absence of Hospital-Acquired Illness or Injury  Outcome: Progressing  Intervention: Identify and Manage Fall Risk  Recent Flowsheet Documentation  Taken 5/1/2024 2101 by Bianca Jay, RN  Safety Promotion/Fall Prevention:   activity supervised   clutter free environment maintained   increased rounding and observation   increase visualization of patient   lighting adjusted   nonskid shoes/slippers when out of bed   safety round/check completed  Intervention: Prevent Skin Injury  Recent Flowsheet Documentation  Taken 5/1/2024 7545 by Bianca Jay, RN  Body Position: weight shifting  Taken " 5/1/2024 2101 by Bianca Jay RN  Body Position: side-lying 30 degrees  Skin Protection: incontinence pads utilized  Device Skin Pressure Protection: positioning supports utilized  Intervention: Prevent Infection  Recent Flowsheet Documentation  Taken 5/1/2024 2101 by Bianca Jay RN  Infection Prevention:   rest/sleep promoted   hand hygiene promoted  Goal: Optimal Comfort and Wellbeing  Outcome: Progressing  Goal: Readiness for Transition of Care  Outcome: Progressing     Problem: Pneumonia  Goal: Fluid Balance  Outcome: Progressing  Intervention: Monitor and Manage Fluid Balance  Recent Flowsheet Documentation  Taken 5/1/2024 2101 by Bianca Jay RN  Fluid/Electrolyte Management: fluids provided  Goal: Resolution of Infection Signs and Symptoms  Outcome: Progressing  Goal: Effective Oxygenation and Ventilation  Outcome: Progressing  Intervention: Promote Airway Secretion Clearance  Recent Flowsheet Documentation  Taken 5/1/2024 2355 by Bianca Jay RN  Cough And Deep Breathing: done with encouragement  Taken 5/1/2024 2101 by Bianca Jay RN  Cough And Deep Breathing: done with encouragement  Intervention: Optimize Oxygenation and Ventilation  Recent Flowsheet Documentation  Taken 5/1/2024 2355 by Bianca Jay RN  Airway/Ventilation Management: calming measures promoted  Head of Bed (HOB) Positioning: HOB at 30-45 degrees  Taken 5/1/2024 2101 by Bianca Jay RN  Airway/Ventilation Management:   airway patency maintained   calming measures promoted     Problem: Gas Exchange Impaired  Goal: Optimal Gas Exchange  Outcome: Progressing  Intervention: Optimize Oxygenation and Ventilation  Recent Flowsheet Documentation  Taken 5/1/2024 2355 by Bianca Jay RN  Airway/Ventilation Management: calming measures promoted  Head of Bed (HOB) Positioning: HOB at 30-45 degrees  Taken 5/1/2024 2101 by Bianca Jay RN  Airway/Ventilation Management:   airway patency maintained   calming measures  promoted     Problem: Malnutrition  Goal: Improved Nutritional Intake  Outcome: Progressing

## 2024-05-02 NOTE — PROVIDER NOTIFICATION
1520: Patient has a visibly/prominent pulsating Right jugular vein. Please advise    - Patient seen by Dr. Brenner at bedside, Repeat TTE ordered

## 2024-05-02 NOTE — PROGRESS NOTES
[facility]    Hospitalist Progress Note      Assessment & Plan   Jaylen Lamas is a 73 year old man w PMH significant for COPD, tobacco use, HFpEF, bioprosthetic aortic valve replacement, bioprosthetic mitral valve replacement, paroxysmal atrial fibrillation s/p ablation and MAZE procedure, 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 difficile, hypertension, CKD, history of duodenal ulcer, depression/anxiety, testicular cancer (s/p orchiectomy and XRT in 1982) who presents with shortness of breath.     #Recurrent acute hypoxemic respiratory failure and sepsis suspected secondary to recurrent aspiration pneumonia: Recent history is most significant for pneumonia for which he was hospitalized from 4/19-4/22/2024 and discharged on Augmentin. During that admission, he had reported that his sx had been going on for about 10 weeks prior to the worsening.  During recent hospitalization 4- to 4/22/2024 patient underwent bronchoscopy with BAL positive for CMV, EBV and Candida.  Bacterial cultures were negative.  Patient was treated with Zosyn and azithromycin while in hospital and discharged on Augmentin.  Underwent workup for possible aspiration.  -Mr. Reeder returned on 4/25 with worsening shortness of breath, cough, and fever for which he was again admitted. Patient is being followed by pulmonology and infectious disease.  Patient was treated with IV Zosyn (4/25 - 4/30) and transition to Augmentin therapy for 10-day course.  -Swallow evaluation.  Patient needs reinforcement of aspiration precautions.  Needs to be fully upright 30 minutes after eating/drinking.  -Continue airway clearance therapies per pulmonary.  Bronchodilators.   -Patient did require BiPAP on 4/28 but now seems to be saturating okay on 2 L via nasal cannula.  -Continue 40 mg IV Lasix daily to optimize lung function.  -PT consulted to assess functional status as  he does seem quite deconditioned.    Addendum: Pt with some JVD on exam.  His vitals are stable and he feels better. Will repeat TTE to evaluate RV function and better assess fluid status.     #HFpEF with RV volume overload/pulm HTN by echo on 4/19/24.  Mitral and Aortic bioprosthetic valve replacements. A fib, s/p MAZE procedure and now with pacemaker.  Non-obstructive CAD.:  Continue statin, digoxin, metoprolol, apixaban    #Anemia. Iron deficiency:  Ferritin 4 on 4/26/24. RLS may also be the result of low iron. Treating with IV iron infusions during hospitalization.     #Moderate malnutrition in the setting of acute illness superimposed on chronic illness. Nutrition consulted    #Hypokalemia, hyphophosphatemia. hypomagnesemia: Replacement    #Hx duodenal ulcer  #Hx of c diff in remote past     DVT Prophylaxis: DOAC  Code Status: Full Code  Expected discharge: Unclear. PT evaluate for dispo. Wean O2 as able.     Wife updated by phone on 5/2.     Fito Brenner MD    Interval History   Patient known to me.  Reassumed care today.  Patient notes he is still feeling generally fatigued.  No worsening shortness of breath.  Having some breakfast.  Poor appetite.  Denies any pain.    -Data reviewed today: I reviewed all new labs and imaging results over the last 24 hours. I personally reviewed     Physical Exam   Temp: 97.4  F (36.3  C) Temp src: Oral BP: 137/57 Pulse: 85   Resp: 20 SpO2: 96 % O2 Device: Nasal cannula Oxygen Delivery: 3 LPM  Vitals:    04/25/24 0023 05/02/24 0453   Weight: 63.1 kg (139 lb 1.8 oz) 68.9 kg (151 lb 14.4 oz)     Vital Signs with Ranges  Temp:  [97.4  F (36.3  C)-98.3  F (36.8  C)] 97.4  F (36.3  C)  Pulse:  [81-91] 85  Resp:  [20-32] 20  BP: (105-137)/(41-57) 137/57  FiO2 (%):  [30 %] 30 %  SpO2:  [88 %-96 %] 96 %  I/O last 3 completed shifts:  In: 1100 [P.O.:1100]  Out: 1500 [Urine:1500]    Constitutional:alert and oriented. Generally deconditioned.   HEENT: NCAT. EOMI.   Respiratory: Still  some persistent inspiratory crackles at the bases.  No wheeze noted.  Cardiovascular: Regular rate and rhythm. No murmur.  GI: Soft, nontender, nondistended. Normoactive bowel sounds.   Musculoskeletal: No gross deformities. 2+ edema to bilateral lower extremities.   Neurologic: Alert and oriented x3. No focal neurologic deficits.     Medications   Current Facility-Administered Medications   Medication Dose Route Frequency Provider Last Rate Last Admin    No lozenges or gum should be given while patient on BIPAP/AVAPS/AVAPS AE   Does not apply Continuous PRN Ronak Bañuelos MD        Patient may continue current oral medications   Does not apply Continuous PRN Ronak Bañuelos MD         Current Facility-Administered Medications   Medication Dose Route Frequency Provider Last Rate Last Admin    acetylcysteine (MUCOMYST) 10 % nebulizer solution 4 mL  4 mL Nebulization Q6H Guy Vega MD   4 mL at 05/02/24 0757    albuterol (PROVENTIL) neb solution 2.5 mg  2.5 mg Nebulization Q6H GaryGuy MD   2.5 mg at 05/02/24 0757    amoxicillin-clavulanate (AUGMENTIN) 875-125 MG per tablet 1 tablet  1 tablet Oral Q12H [facility] (08/20) Ronak Bañuelos MD   1 tablet at 05/02/24 0909    apixaban ANTICOAGULANT (ELIQUIS) tablet 5 mg  5 mg Oral BID Priti Tovar MD   5 mg at 05/02/24 0909    atorvastatin (LIPITOR) tablet 40 mg  40 mg Oral QPM Priti Tovar MD   40 mg at 05/01/24 2109    diclofenac (VOLTAREN) 1 % topical gel 4 g  4 g Topical 4x Daily Poornima Velez MD   4 g at 05/02/24 0926    digoxin (LANOXIN) tablet 125 mcg  125 mcg Oral QPM Priti Tovar MD   125 mcg at 05/01/24 2109    escitalopram (LEXAPRO) tablet 20 mg  20 mg Oral Daily Priti Tovar MD   20 mg at 05/02/24 0909    fluticasone-vilanterol (BREO ELLIPTA) 100-25 MCG/ACT inhaler 1 puff [patient supplied medication]  1 puff Inhalation Daily Priti Tovar MD   1 puff at 05/02/24 0757    furosemide (LASIX)  injection 40 mg  40 mg Intravenous Daily Ronak Bañuelos MD   40 mg at 05/02/24 0909    iron sucrose (VENOFER) 200 mg in sodium chloride 0.9 % 120 mL intermittent infusion  200 mg Intravenous Q24H Ronak Bañuelos  mL/hr at 05/01/24 2126 200 mg at 05/01/24 2126    metoprolol succinate ER (TOPROL XL) 24 hr tablet 50 mg  50 mg Oral At Bedtime Poornima Velez MD   50 mg at 05/01/24 2109    sodium chloride (PF) 0.9% PF flush 3 mL  3 mL Intracatheter Q8H Priti Tovar MD   3 mL at 05/02/24 0554    sodium phosphate 15 mmol in NS 250mL intermittent infusion  15 mmol Intravenous Once Fito Brenner MD   15 mmol at 05/02/24 1017       Data   Recent Labs   Lab 05/02/24  0646 05/01/24  0700 04/30/24  0645 04/29/24  0633 04/28/24  0625 04/27/24  1426 04/27/24  0604   WBC 13.9*  --   --   --  10.2  --  11.5*   HGB 9.6*  --   --   --  9.4*  --  9.1*   MCV 85  --   --   --  84  --  84     --   --   --  280  --  268    139 137   < > 140  --  134*   POTASSIUM 3.2* 3.8 3.7   < > 3.8   < > 3.3*   CHLORIDE 102 103 103   < > 107  --  103   CO2 27 25 27   < > 22  --  22   BUN 12.8 12.7 11.2   < > 15.4  --  17.1   CR 1.01 1.04 0.95   < > 1.03  --  1.03   ANIONGAP 9 11 7   < > 11  --  9   MELO 9.3 9.1 9.1   < > 9.0  --  9.0   GLC 98 90 107*   < > 100*  --  94   ALBUMIN  --   --   --   --   --   --  2.4*   PROTTOTAL  --   --   --   --   --   --  5.2*   BILITOTAL  --   --   --   --   --   --  0.4   ALKPHOS  --   --   --   --   --   --  107   ALT  --   --   --   --   --   --  25   AST  --   --   --   --   --   --  27    < > = values in this interval not displayed.       No results found for this or any previous visit (from the past 24 hour(s)).

## 2024-05-02 NOTE — PROGRESS NOTES
05/02/24 1054   Appointment Info   Signing Clinician's Name / Credentials (PT) Chikis Naidu, DPT   Living Environment   People in Home spouse   Current Living Arrangements house   Home Accessibility stairs within home   Number of Stairs, Within Home, Primary greater than 10 stairs   Stair Railings, Within Home, Primary railings safe and in good condition   Self-Care   Equipment Currently Used at Home cane, quad;grab bar, tub/shower;grab bar, toilet;shower chair;walker, standard   Fall history within last six months no   Activity/Exercise/Self-Care Comment Pt reports using a SEC at home. Does have a walker at home. Typically can perform ADLs. Pt wife performings IADLs   General Information   Onset of Illness/Injury or Date of Surgery 04/25/24   Referring Physician Ronak Bañuelos   Patient/Family Therapy Goals Statement (PT) Pt hopeful to DC home   Pertinent History of Current Problem (include personal factors and/or comorbidities that impact the POC) Jaylen Lamas is a 73 year old man who was admitted on 4/25/2024.      PMH significant for COPD, tobacco use, HFpEF, bioprosthetic aortic valve replacement, bioprosthetic mitral valve replacement, paroxysmal atrial fibrillation s/p ablation and MAZE procedure, 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 difficile, hypertension, CKD, history of duodenal ulcer, depression/anxiety, testicular cancer (s/p orchiectomy and XRT in 1982).      Recent history is most significant for pneumonia for which he was hospitalized from 4/19-4/22/2024 and discharged on Augmentin. During that admission, he had reported that his sx had been going on for about 10 weeks prior to the worsening.  During recent hospitalization 4- to 4/22/2024 patient underwent bronchoscopy with BAL positive for CMV, EBV and Candida.  Bacterial cultures were negative.  Patient was treated with Zosyn and azithromycin while in hospital and  discharged on Augmentin.  Underwent workup for possible aspiration.     Mr. Reeder returned on 4/25 with worsening shortness of breath, cough, and fever for which he was again admitted. Patient is being followed by pulmonology and infectious disease. Continues on IV Zosyn at this time under direction of ID. Recommendations to start back on Augmentin tomorrow.   Existing Precautions/Restrictions fall   General Observations 3 liters   Cognition   Affect/Mental Status (Cognition) WFL   Orientation Status (Cognition) oriented x 3   Follows Commands (Cognition) WFL   Pain Assessment   Patient Currently in Pain Yes, see Vital Sign flowsheet   Posture    Posture Forward head position;Protracted shoulders   Range of Motion (ROM)   Range of Motion ROM is WFL   Strength (Manual Muscle Testing)   Strength (Manual Muscle Testing) Deficits observed during functional mobility   Bed Mobility   Comment, (Bed Mobility) SBA   Transfers   Comment, (Transfers) min A   Gait/Stairs (Locomotion)   Comment, (Gait/Stairs) min A/CGA   Balance   Balance Comments poor balance with attempt to use SEC, needing min A for safe transfer into BR   Clinical Impression   Criteria for Skilled Therapeutic Intervention Yes, treatment indicated   PT Diagnosis (PT) decreased functional mobility   Influenced by the following impairments decreased strength, decreased endurance   Functional limitations due to impairments decreased ambulation, stairs, transfers, bed mob   Clinical Presentation (PT Evaluation Complexity) stable   Clinical Presentation Rationale improving today   Clinical Decision Making (Complexity) low complexity   Planned Therapy Interventions (PT) bed mobility training;gait training;patient/family education;transfer training;home program guidelines;risk factor education;progressive activity/exercise;strengthening   Risk & Benefits of therapy have been explained evaluation/treatment results reviewed;care plan/treatment goals  reviewed;risks/benefits reviewed;current/potential barriers reviewed;participants voiced agreement with care plan;participants included;patient   PT Total Evaluation Time   PT Eval, Low Complexity Minutes (02861) 5   Physical Therapy Goals   PT Frequency Daily   PT Predicted Duration/Target Date for Goal Attainment 05/05/24   PT Goals Bed Mobility;Transfers;Gait;Stairs   PT: Bed Mobility Independent;Supine to/from sit   PT: Transfers Modified independent;Sit to/from stand;Bed to/from chair;Assistive device   PT: Gait Modified independent;100 feet;Rolling walker   PT: Stairs Modified independent;Greater than 10 stairs;Rail on right   Interventions   Interventions Quick Adds Therapeutic Activity   Therapeutic Activity   Therapeutic Activities: dynamic activities to improve functional performance Minutes (00838) 23   Symptoms Noted During/After Treatment Fatigue   Treatment Detail/Skilled Intervention Pt was cued donning brief  on toilet, needing A with this, needing A with hygeine management as well. Pt was cued for sit to stand from toilet, needing mod A for transition. Pt was cued for ambulation with FWW as unsteady with SEC. Pt was cued for  ambulation in hallway, fatigued with this, cues for improving prox to walker, with mild improvements, 40 feet.   Sats stable on 3 liters   PT Discharge Planning   PT Plan Progress all mobility, monitor sats, FWW vs. SEC   PT Discharge Recommendation (DC Rec) Transitional Care Facility;home with assist;home with home care physical therapy   PT Rationale for DC Rec Pt needing A with ADLs at this time as well. Pt lives with wife in 2 level home and is inde with cares at baseline. Pt below baseline at this time and would benefit from TCU stay, but will monitor for progress as grastically improved from yesterday when PT attempting session.   PT Brief overview of current status ambulation in hallway with FWW 40 feet   Total Session Time   Timed Code Treatment Minutes 23   Total  Session Time (sum of timed and untimed services) 28

## 2024-05-02 NOTE — PLAN OF CARE
"/57 (BP Location: Right arm)   Pulse 85   Temp 97.4  F (36.3  C) (Oral)   Resp 20   Ht 1.702 m (5' 7\")   Wt 68.9 kg (151 lb 14.4 oz)   SpO2 96%   BMI 23.79 kg/m      A&O. A2 GBW. 3L O2 via NC. On and off Bipap. RT following, getting nebs and Volera treatments. BLE edema, compression stockings in place, getting IV Lasxin. Augmentin for PNA.       Goal Outcome Evaluation:      Plan of Care Reviewed With: patient, spouse    Overall Patient Progress: improvingOverall Patient Progress: improving    Outcome Evaluation: LS coarse. 3L O2 via NC. BLE edema, IV Lasix. RT following for Volera, nebs.      Problem: Adult Inpatient Plan of Care  Goal: Plan of Care Review  Description: The Plan of Care Review/Shift note should be completed every shift.  The Outcome Evaluation is a brief statement about your assessment that the patient is improving, declining, or no change.  This information will be displayed automatically on your shift  note.  Outcome: Progressing  Flowsheets (Taken 5/2/2024 1330)  Outcome Evaluation: LS coarse. 3L O2 via NC. BLE edema, IV Lasix. RT following for Volera, nebs.  Plan of Care Reviewed With:   patient   spouse  Overall Patient Progress: improving  Goal: Patient-Specific Goal (Individualized)  Description: You can add care plan individualizations to a care plan. Examples of Individualization might be:  \"Parent requests to be called daily at 9am for status\", \"I have a hard time hearing out of my right ear\", or \"Do not touch me to wake me up as it startles  me\".  Outcome: Progressing  Goal: Absence of Hospital-Acquired Illness or Injury  Outcome: Progressing  Intervention: Identify and Manage Fall Risk  Recent Flowsheet Documentation  Taken 5/2/2024 0916 by Connie Gibbs RN  Safety Promotion/Fall Prevention:   activity supervised   assistive device/personal items within reach   clutter free environment maintained   nonskid shoes/slippers when out of bed   safety round/check " completed  Intervention: Prevent and Manage VTE (Venous Thromboembolism) Risk  Recent Flowsheet Documentation  Taken 5/2/2024 0916 by Connie Gibbs RN  VTE Prevention/Management: compression stockings on  Intervention: Prevent Infection  Recent Flowsheet Documentation  Taken 5/2/2024 0916 by Connie Gibbs RN  Infection Prevention: rest/sleep promoted  Goal: Optimal Comfort and Wellbeing  Outcome: Progressing  Goal: Readiness for Transition of Care  Outcome: Progressing     Problem: Pneumonia  Goal: Fluid Balance  Outcome: Progressing  Goal: Resolution of Infection Signs and Symptoms  Outcome: Progressing  Goal: Effective Oxygenation and Ventilation  Outcome: Progressing  Intervention: Promote Airway Secretion Clearance  Recent Flowsheet Documentation  Taken 5/2/2024 0916 by Connie Gibbs RN  Cough And Deep Breathing: done with encouragement     Problem: Gas Exchange Impaired  Goal: Optimal Gas Exchange  Outcome: Progressing     Problem: Malnutrition  Goal: Improved Nutritional Intake  Outcome: Progressing     Heart Failure Care Map  GOALS TO BE MET BEFORE DISCHARGE:    1. Decrease congestion and/or edema with diuretic therapy to achieve near optimal volume status.     Dyspnea improved: No, further care required to meet this goal. Please explain MANCUSO, edema   Edema improved: No, further care required to meet this goal. Please explain Getting IV Lasix        Last 24 hour I/O:   Intake/Output Summary (Last 24 hours) at 5/2/2024 1335  Last data filed at 5/2/2024 1100  Gross per 24 hour   Intake 740 ml   Output 1750 ml   Net -1010 ml           Net I/O and Weights since admission:   04/02 1500 - 05/02 1459  In: 3588 [P.O.:3570; I.V.:18]  Out: 5170 [Urine:5170]  Net: -1582     Vitals:    04/25/24 0023 05/02/24 0453   Weight: 63.1 kg (139 lb 1.8 oz) 68.9 kg (151 lb 14.4 oz)       2.  O2 sats > 90% on room air, or at prior home O2 therapy level.      Able to wean O2 this shift to keep sats above 90%?: No,  further care required to meet this goal. Please explain requiring oxygen  - 3L and bipap   Does patient use Home O2? No          Current oxygenation status:   SpO2: 96 %     O2 Device: Nasal cannula, Oxygen Delivery: 3 LPM    3.  Tolerates ambulation and mobility near baseline.     Ambulation: No, further care required to meet this goal. Please explain working with PT   Times patient ambulated with staff this shift: 2    Please review the Heart Failure Care Map for additional HF goal outcomes.    Connie Gibbs RN  5/2/2024

## 2024-05-03 NOTE — PROGRESS NOTES
[facility]  Infectious Disease Progress Note          Assessment and Plan:   Date of Admission:  4/25/2024  Date of Consult (When I saw the patient): 04/25/24        Assessment & Plan  Jaylen Lamas is a 73 year old who was admitted on 4/25/2024.      Impression: 1 73-year-old male, well-known to me, just discharged from the hospital now readmitted, unclear whether this is still all part of #2 below or some new infection, quite unlikely this is new bacterial infection, no positive microbiology, bronchoscopy cultures all unremarkable, recent and prior courses of antibiotics without particular benefit suspect this is not bacterial infection currently  2 2 and half month illness with intermittent fever, weight loss, malaise and fatigue and respiratory symptoms, prior antibiotic courses without resolution or benefit no positive microbiology, recent bronchoscopy without so far obvious cause  3 CMV, EBV and Candida in bronchoscopy very unlikely clinically relevant in this clinical scenario  4 intermittent diarrhea again now rule out new C. difficile, is prior C. 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     REC 1 f intermittently feels worse, but today better than yesterday, did not develop any fever or particularly worsen intermittently worsened respiratory status, no new fever or signs of sepsis, now on oral Augmentin plan about 1 week more of that  2 assuming aspiration this is likely to be a recurrent problem  3 jugular vein issue noted, await studies unclear cause                 Interval History:     Intermittently worse respiratory status and feels worse without obvious signs of sepsis or  positive microbiology of note though no fever, no positive microbiology on any of the studies that serves any significance.              Medications:     Current Facility-Administered Medications   Medication Dose Route Frequency Provider Last Rate Last Admin    acetylcysteine (MUCOMYST) 10 % nebulizer solution 4 mL  4 mL Nebulization Q6H Guy Vega MD   4 mL at 05/03/24 0753    albuterol (PROVENTIL) neb solution 2.5 mg  2.5 mg Nebulization Q6H Guy Vega MD   2.5 mg at 05/03/24 0753    amoxicillin-clavulanate (AUGMENTIN) 875-125 MG per tablet 1 tablet  1 tablet Oral Q12H [facility] (08/20) Ronak Bañuelos MD   1 tablet at 05/03/24 0928    apixaban ANTICOAGULANT (ELIQUIS) tablet 5 mg  5 mg Oral BID Priti Tovar MD   5 mg at 05/03/24 0928    atorvastatin (LIPITOR) tablet 40 mg  40 mg Oral QPM Priti Tovar MD   40 mg at 05/02/24 2021    diclofenac (VOLTAREN) 1 % topical gel 4 g  4 g Topical 4x Daily Poornima Velez MD   4 g at 05/02/24 2136    digoxin (LANOXIN) tablet 125 mcg  125 mcg Oral QPM Priti Tovar MD   125 mcg at 05/02/24 2022    escitalopram (LEXAPRO) tablet 20 mg  20 mg Oral Daily Priti Tovar MD   20 mg at 05/03/24 0928    fluticasone-vilanterol (BREO ELLIPTA) 100-25 MCG/ACT inhaler 1 puff [patient supplied medication]  1 puff Inhalation Daily Priti Tovar MD   1 puff at 05/02/24 0757    metoprolol succinate ER (TOPROL XL) 24 hr tablet 50 mg  50 mg Oral At Bedtime Poornima Velez MD   50 mg at 05/02/24 2022    miconazole (MICATIN) 2 % powder   Topical BID Martin Will MD        potassium & sodium phosphates (NEUTRA-PHOS) Packet 1 packet  1 packet Oral or Feeding Tube Q4H Fito Brenner MD   1 packet at 05/03/24 0928    sodium chloride (PF) 0.9% PF flush 3 mL  3 mL Intracatheter Q8H Priti Toavr MD   3 mL at 05/03/24 0914                  Physical Exam:   Blood pressure 115/47, pulse 81, temperature 98.4  F (36.9  " C), temperature source Axillary, resp. rate 29, height 1.702 m (5' 7\"), weight 62.5 kg (137 lb 12.6 oz), SpO2 91%.  Wt Readings from Last 2 Encounters:   05/03/24 62.5 kg (137 lb 12.6 oz)   04/22/24 66.1 kg (145 lb 11.6 oz)     Vital Signs with Ranges  Temp:  [97.7  F (36.5  C)-100.8  F (38.2  C)] 98.4  F (36.9  C)  Pulse:  [80-84] 81  Resp:  [22-36] 29  BP: (104-118)/(42-47) 115/47  FiO2 (%):  [30 %] 30 %  SpO2:  [88 %-95 %] 91 %    Constitutional: Awake, alert, cooperative, no apparent distress looks similarly chronically ill not particularly acutely ill on low-flow oxygen     Lungs: Clear to auscultation bilaterally, no crackles or wheezing   Cardiovascular: Regular rate and rhythm, normal S1 and S2, and no murmur noted   Abdomen: Normal bowel sounds, soft, non-distended, non-tender   Skin: No rashes, no cyanosis, no edema   Other:           Data:   All microbiology laboratory data reviewed.  Recent Labs   Lab Test 05/03/24  1001 05/02/24  0646 04/28/24  0625   WBC 16.7* 13.9* 10.2   HGB 10.7* 9.6* 9.4*   HCT 35.8* 32.1* 31.1*   MCV 86 85 84    268 280     Recent Labs   Lab Test 05/03/24  0659 05/02/24  0646 05/01/24  0700   CR 0.87 1.01 1.04     No lab results found.  No lab results found.    Invalid input(s): \"UC\"     "

## 2024-05-03 NOTE — PROGRESS NOTES
[facility]    Pulmonary, Allergy, Critical Care and Sleep Medicine    Pulmonary Consult Progress Note    Jaylen Lamas MRN# [id number]   Age: 73 year old YOB: 1950     Date of Admission: 4/25/2024  Date of Service: 05/03/2024     ==================================================  INTERVAL EVENTS:  -Currently alternating between BiPAP overnight and 3 L during the day   - I/O -2.3L, weight trend unclear. Per the chart he was up 5 kg yesterday from admit, but then below admit weight today  -He feels like there has been much improvement, but reports that his breathing does not feel too bad bad  - 1+ edema in BLE    CHANGES FOR TODAY:  -Agree with increased diuresis  -Please continue to encourage pulmonary clearance therapies.   - Please encourage to remain upright for 60 min after eating or drinking anything. He was eating dinner on my visit only about 30deg upright. Ideally should be up to chair for meals.      ==================================================    73M COPD, tobacco abuse, HFpEF, AVR, MVR, pAfib s/p ablation, CHB s/p PPM, CAD, HTN, CKD admitted 4/25 w/ worsening dyspnea and fever. CT w/ worsening RLL consolidation.    ASSESSMENT AND RECOMMENDATIONS:    ## COPD  ## Acute hypoxic respiratory failure  ## Heart failure with preserved ejection fraction  ## Pulm hypertension    Current versus persistent right lower lobe consolidation.  Last hospitalization, he had a bronchoscopy that was unremarkable. There have been no positive microbiologic data. His swallow study showed aspiration w/ thin liquids, but otherwise OK. Despite this his presentation does appear consistent with some element of aspiration given the enlarging RLL consolidation which you would otherwise not expect.  Admission CT with large right lower lobe consolidation and patulous esophagus with liquid in the lumen.  This consolidation could be consistent with other atypical pneumonias, such as  "Blastomycosis, but studies have been negative. His CT findings are not consistent with organizing pneumonias and again BAL did not support this. I do not feel his COPD is current in exacerbation.    Persistent dyspnea without chest tightness.  Mild wheezing.  Blastomycosis serology negative.  ABG this admission with normal CO2      -Remain upright for 60 minutes after meals (patient care order placed)      - Would be even better if up to chair for meals  - ABx per ID  - continue ICS-LABA (Breo)  - no indication for steroids, at this time  -Continue aggressive airway clearance with duonebs, acetylcysteine nebs, and Volera  -Diuresis          Fuentes Neil M.D.  Pulmonary & Critical Care  Pager: Click Here to page    I spent 45 minutes dedicated to this care so far today excluding procedures, including review of medical records, review of imaging (results & images), time with patient and time in documentation.    ==================================================      PHYSICAL EXAM  /47 (BP Location: Right arm)   Pulse 81   Temp 98.4  F (36.9  C) (Axillary)   Resp 29   Ht 1.702 m (5' 7\")   Wt 62.5 kg (137 lb 12.6 oz)   SpO2 92%   BMI 21.58 kg/m          Intake/Output Summary (Last 24 hours) at 5/3/2024 1943  Last data filed at 5/3/2024 1500  Gross per 24 hour   Intake 780 ml   Output 1400 ml   Net -620 ml           Vitals:    04/25/24 0023 05/02/24 0453 05/03/24 0626   Weight: 63.1 kg (139 lb 1.8 oz) 68.9 kg (151 lb 14.4 oz) 62.5 kg (137 lb 12.6 oz)       General: Alert, interactive, NAD  Resp: Coarse rhonchi throughout, worse on the left with liquid gurgling sound  Cardiac: RRR, NS1,S2, No m/r/g  Abdomen: Soft, nontender, nondistended. +BS.  No HSM or masses, no rebound or guarding.  Extremities: 1+ lower extremity edema  Skin: Warm and dry, no jaundice or rash  Neuro: Alert & oriented x 3, moves all extremities equally      Recent Labs   Lab Test 05/03/24  1001 05/02/24  0646 04/28/24  0625   WBC " "16.7* 13.9* 10.2   RBC 4.18* 3.76* 3.69*   HGB 10.7* 9.6* 9.4*    268 280       Recent Labs   Lab Test 24  0659 24  1345 24  0646 24  0700     --  138 139   POTASSIUM 3.9 3.9 3.2* 3.8   CHLORIDE 100  --  102 103   CO2 30*  --  27 25   BUN 10.8  --  12.8 12.7   CR 0.87  --  1.01 1.04   *  --  98 90   MELO 9.5  --  9.3 9.1   MAG 1.6*  --  1.7 1.7           No results for input(s): \"CULT\" in the last 168 hours.      Recent Results (from the past 48 hour(s))   Echocardiogram Complete   Result Value    LVEF  >70%    Narrative    [id number]  [id number]  [id number]  [id number]^FELISA^ZEV     [facility]  Echocardiography Laboratory  [address] [place] [address]     Name: SHANE ISSA  MRN: [id number]  : 1950  Study Date: 2024 08:42 AM  Age: 73 yrs  Gender: Male  Patient Location: [facility]  Reason For Study: SOB  Ordering Physician: ZEV ROBERTO  Performed By: Kade Mills RDCS     BSA: 1.7 m2  Height: 67 in  Weight: 137 lb  BP: 104/46 mmHg  ______________________________________________________________________________  Procedure  Complete Portable Echo Adult. Optison (NDC #8835-1794) given intravenously.  ______________________________________________________________________________  Interpretation Summary     Hyperdynamic left ventricular function. The visual ejection fraction is >70%.  Flattened septum is consistent with RV pressure/volume overload.  Right ventricle is not well visualized, however global systolic function is  probably normal. There is a pacemaker lead in the right ventricle.  Pulmonary hypertension present. The right ventricular systolic pressure is  approximated at 43mmHg plus the right atrial pressure.  There is a bioprosthetic mitral valve. 27 mm Velasquez Thermafix bioprosthetic  valve. Elevated gradients, mean inflow gradient 10 mmHg at 80 bpm. There is no  mitral regurgitation noted.  There is a bioprosthetic aortic valve. " The prosthetic aortic valve is not well  visualized. Normal gradients, mean gradient 19 mmHg. There is trace aortic  regurgitation.  There is mild (1+) tricuspid regurgitation.  The inferior vena cava was normal in size with preserved respiratory  variability.     This study was compared to a TTE from 4/19/2024. There has been no significant  change.  ______________________________________________________________________________  Left Ventricle  The left ventricle is normal in size. There is concentric remodeling present.  Hyperdynamic left ventricular function. The visual ejection fraction is >70%.  Left ventricular diastolic function is not assessable. Flattened septum is  consistent with RV pressure/volume overload.     Right Ventricle  The right ventricle is not well visualized. Right ventricle is not well  visualized, however global systolic function is probably normal. There is a  pacemaker lead in the right ventricle.     Atria  The left atrium is not well visualized. Right atrium not well visualized.     Mitral Valve  There is no mitral regurgitation noted. There is a bioprosthetic mitral valve.  27 mm Velasquez Thermafix bioprosthetic valve. Elevated gradients, mean inflow  gradient 10 mmHg at 80 bpm.     Tricuspid Valve  There is mild (1+) tricuspid regurgitation. The right ventricular systolic  pressure is approximated at 43mmHg plus the right atrial pressure. Pulmonary  hypertension.     Aortic Valve  There is trace aortic regurgitation. There is a bioprosthetic aortic valve.  The prosthetic aortic valve is not well visualized. Normal gradients, mean  gradient 19 mmHg.     Pulmonic Valve  The pulmonic valve is not well seen, but is grossly normal.     Vessels  The aortic root is normal size. Normal size ascending aorta. The inferior vena  cava was normal in size with preserved respiratory variability.     ______________________________________________________________________________  MMode/2D Measurements  & Calculations  IVSd: 0.88 cm  LVIDd: 4.1 cm  LVIDs: 2.3 cm  LVPWd: 0.87 cm  IVC diam: 1.7 cm  FS: 44.7 %  LV mass(C)d: 110.3 grams  LV mass(C)dI: 64.0 grams/m2  Ao root diam: 2.7 cm  LA dimension: 4.0 cm     asc Aorta Diam: 3.3 cm  LA/Ao: 1.5  LVOT diam: 1.6 cm  LVOT area: 2.1 cm2  Ao root diam index Ht(cm/m): 1.6  Ao root diam index BSA (cm/m2): 1.6  Asc Ao diam index BSA (cm/m2): 1.9  Asc Ao diam index Ht(cm/m): 2.0  RWT: 0.43     Time Measurements  Aortic HR: 80.0 BPM     Doppler Measurements & Calculations  MV max P.7 mmHg  MV mean PG: 10.6 mmHg  MV V2 VTI: 66.0 cm  MVA(VTI): 0.93 cm2  MV P1/2t max : 251.9 cm/sec  MV P1/2t: 116.8 msec  MVA(P1/2t): 1.9 cm2  MV dec slope: 631.6 cm/sec2  Ao V2 max: 290.4 cm/sec  Ao max P.0 mmHg  Ao V2 mean: 202.7 cm/sec  Ao mean P.0 mmHg  Ao V2 VTI: 51.9 cm  LORENZO(I,D): 1.2 cm2  LORENZO(V,D): 1.1 cm2  LV V1 max P.7 mmHg  LV V1 max: 155.5 cm/sec  LV V1 VTI: 28.8 cm     CO(LVOT): 4.9 l/min  CI(LVOT): 2.9 l/min/m2  SV(LVOT): 61.4 ml  SI(LVOT): 35.7 ml/m2  TR max : 324.8 cm/sec  TR max P.3 mmHg  AV  Ratio (DI): 0.54  LORENZO Index (cm2/m2): 0.69     ______________________________________________________________________________  Report approved by: Violette Shelton 2024 11:49 AM

## 2024-05-03 NOTE — PLAN OF CARE
"  Problem: Adult Inpatient Plan of Care  Goal: Plan of Care Review  Description: The Plan of Care Review/Shift note should be completed every shift.  The Outcome Evaluation is a brief statement about your assessment that the patient is improving, declining, or no change.  This information will be displayed automatically on your shift  note.  Outcome: Progressing  Flowsheets (Taken 5/3/2024 0650)  Outcome Evaluation: A&Ox4, lethargic, up with assist of 1, gait belt and walker, Bipap on most of the night, O2 at 3L when off bipap with sat's at 92-93%.Lung sounds diminished, coarse crackles, productive cough at times. Redness to groin area, barrier cream applied. Pt. denies any needs at this time. Will continue with POC  Plan of Care Reviewed With: patient  Overall Patient Progress: no change  Goal: Patient-Specific Goal (Individualized)  Description: You can add care plan individualizations to a care plan. Examples of Individualization might be:  \"Parent requests to be called daily at 9am for status\", \"I have a hard time hearing out of my right ear\", or \"Do not touch me to wake me up as it startles  me\".  Outcome: Progressing  Goal: Absence of Hospital-Acquired Illness or Injury  Outcome: Progressing  Intervention: Identify and Manage Fall Risk  Recent Flowsheet Documentation  Taken 5/2/2024 2006 by Bianca Jay RN  Safety Promotion/Fall Prevention:   activity supervised   clutter free environment maintained   increased rounding and observation   increase visualization of patient   lighting adjusted   nonskid shoes/slippers when out of bed   safety round/check completed  Intervention: Prevent Skin Injury  Recent Flowsheet Documentation  Taken 5/2/2024 2006 by Bianca Jay RN  Body Position: weight shifting  Intervention: Prevent and Manage VTE (Venous Thromboembolism) Risk  Recent Flowsheet Documentation  Taken 5/2/2024 2351 by Bianca Jay RN  VTE Prevention/Management: SCDs (sequential compression " devices) off  Taken 5/2/2024 2006 by Bianca Jay RN  VTE Prevention/Management: compression stockings off  Goal: Optimal Comfort and Wellbeing  Outcome: Progressing  Goal: Readiness for Transition of Care  Outcome: Progressing     Problem: Pneumonia  Goal: Fluid Balance  Outcome: Progressing  Goal: Resolution of Infection Signs and Symptoms  Outcome: Progressing  Goal: Effective Oxygenation and Ventilation  Outcome: Progressing  Intervention: Promote Airway Secretion Clearance  Recent Flowsheet Documentation  Taken 5/2/2024 2006 by Bianca Jay RN  Cough And Deep Breathing: done with encouragement     Problem: Gas Exchange Impaired  Goal: Optimal Gas Exchange  Outcome: Progressing     Problem: Malnutrition  Goal: Improved Nutritional Intake  Outcome: Progressing   Goal Outcome Evaluation:      Plan of Care Reviewed With: patient    Overall Patient Progress: no changeOverall Patient Progress: no change    Outcome Evaluation: A&Ox4, lethargic, up with assist of 1, gait belt and walker, Bipap on most of the night, O2 at 3L when off bipap with sat's at 92-93%.Lung sounds diminished, coarse crackles, productive cough at times. Redness to groin area, barrier cream applied. Pt. denies any needs at this time. Will continue with POC

## 2024-05-03 NOTE — PLAN OF CARE
"Goal Outcome Evaluation:      Plan of Care Reviewed With: patient    Overall Patient Progress: improvingOverall Patient Progress: improving    Outcome Evaluation: A/O x4. 3L nasal cannula. LS coarse. SOB with activity. A x2 w/ Gb walker. BLE edema. Compression. RT Following.     Problem: Adult Inpatient Plan of Care  Goal: Plan of Care Review  Description: The Plan of Care Review/Shift note should be completed every shift.  The Outcome Evaluation is a brief statement about your assessment that the patient is improving, declining, or no change.  This information will be displayed automatically on your shift  note.  Outcome: Progressing  Flowsheets (Taken 5/2/2024 1938)  Outcome Evaluation: A/O x4. 3L nasal cannula. LS coarse. SOB with activity.  Plan of Care Reviewed With: patient  Overall Patient Progress: improving  Goal: Patient-Specific Goal (Individualized)  Description: You can add care plan individualizations to a care plan. Examples of Individualization might be:  \"Parent requests to be called daily at 9am for status\", \"I have a hard time hearing out of my right ear\", or \"Do not touch me to wake me up as it startles  me\".  Outcome: Progressing  Goal: Absence of Hospital-Acquired Illness or Injury  Outcome: Progressing  Intervention: Identify and Manage Fall Risk  Recent Flowsheet Documentation  Taken 5/2/2024 1645 by Ronna Ballesteros, RN  Safety Promotion/Fall Prevention:   activity supervised   assistive device/personal items within reach   clutter free environment maintained   nonskid shoes/slippers when out of bed   safety round/check completed  Intervention: Prevent and Manage VTE (Venous Thromboembolism) Risk  Recent Flowsheet Documentation  Taken 5/2/2024 1645 by Ronna Ballesteros, RN  VTE Prevention/Management: compression stockings on  Goal: Optimal Comfort and Wellbeing  Outcome: Progressing  Goal: Readiness for Transition of Care  Outcome: Progressing     Problem: Pneumonia  Goal: Fluid Balance  Outcome: " Progressing  Goal: Resolution of Infection Signs and Symptoms  Outcome: Progressing  Goal: Effective Oxygenation and Ventilation  Outcome: Progressing     Problem: Gas Exchange Impaired  Goal: Optimal Gas Exchange  Outcome: Progressing     Problem: Malnutrition  Goal: Improved Nutritional Intake  Outcome: Progressing

## 2024-05-03 NOTE — PROGRESS NOTES
[facility]    Hospitalist Progress Note      Assessment & Plan   Jaylen Lamas is a 73 year old man w PMH significant for COPD, tobacco use, HFpEF, bioprosthetic aortic valve replacement, bioprosthetic mitral valve replacement, paroxysmal atrial fibrillation s/p ablation and MAZE procedure, 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 difficile, hypertension, CKD, history of duodenal ulcer, depression/anxiety, testicular cancer (s/p orchiectomy and XRT in 1982) who presents with shortness of breath.     #Recurrent acute hypoxemic respiratory failure and sepsis suspected secondary to recurrent aspiration pneumonia. Known pulmonary HTN and RV pressure overload: Recent history is most significant for pneumonia for which he was hospitalized from 4/19-4/22/2024 and discharged on Augmentin. During that admission, he had reported that his sx had been going on for about 10 weeks prior to the worsening.  During recent hospitalization 4- to 4/22/2024 patient underwent bronchoscopy with BAL positive for CMV, EBV and Candida.  Bacterial cultures were negative.  Patient was treated with Zosyn and azithromycin while in hospital and discharged on Augmentin.  Underwent workup for possible aspiration.  -Mr. Reeder returned on 4/25 with worsening shortness of breath, cough, and fever for which he was again admitted. Patient is being followed by pulmonology and infectious disease.  Patient was treated with IV Zosyn (4/25 - 4/30) and transition to Augmentin therapy for 10-day course.  -Swallow evaluation.  Patient needs reinforcement of aspiration precautions.  Needs to be fully upright 30 minutes after eating/drinking.  -Continue airway clearance therapies per pulmonary.  Bronchodilators.   -Patient did require BiPAP on 4/28 and has been using intermittently since (mainly at night-time)  -TTE obtained as patient did seem to have elevated JVD on  exam on 5/2.  It showed evidence of pulmonary HTN with RV volume overload with preservation of RV function.  Will continue IV lasix but increase to 40 mg BID on 5/3.    -Pt still with low grade fever on 5/3.  He is already on abx as above with airway clearance therapies.  Monitor fever curve and white count.   -Keep head of bed at 45 degrees or higher.   -Appreciate pulmonary and ID recommendations.     -PT consulted to assess functional status as he does seem quite deconditioned.     #HFpEF with RV volume overload/pulm HTN by echo on 4/19/24.  Mitral and Aortic bioprosthetic valve replacements. A fib, s/p MAZE procedure and now with pacemaker.  Non-obstructive CAD.:  Continue statin, digoxin, metoprolol, apixaban     #Anemia. Iron deficiency:  Ferritin 4 on 4/26/24. RLS may also be the result of low iron. Treating with IV iron infusions during hospitalization.      #Moderate malnutrition in the setting of acute illness superimposed on chronic illness. Nutrition consulted     #Hypokalemia, hyphophosphatemia. hypomagnesemia: Replacement     #Hx duodenal ulcer  #Hx of c diff in remote past     DVT Prophylaxis: DOAC  Code Status: Full Code  Expected discharge: Unclear. PT evaluate for dispo. Wean O2 as able.      Wife updated on 5/3.     Fito Brenner MD    Interval History   Low grade fever overnight. Pt notes he feels about the same. Still with some cough. Generally weak. No chest pain. No palpitations.     -Data reviewed today: I reviewed all new labs and imaging results over the last 24 hours. I personally reviewed     Physical Exam   Temp: 98.4  F (36.9  C) Temp src: Axillary BP: 115/47 Pulse: 81   Resp: 29 SpO2: 91 % O2 Device: BiPAP/CPAP Oxygen Delivery: 3 LPM  Vitals:    04/25/24 0023 05/02/24 0453 05/03/24 0626   Weight: 63.1 kg (139 lb 1.8 oz) 68.9 kg (151 lb 14.4 oz) 62.5 kg (137 lb 12.6 oz)     Vital Signs with Ranges  Temp:  [97.7  F (36.5  C)-100.8  F (38.2  C)] 98.4  F (36.9  C)  Pulse:  [80-84] 81  Resp:   [22-36] 29  BP: (104-118)/(42-47) 115/47  FiO2 (%):  [30 %] 30 %  SpO2:  [88 %-95 %] 91 %  I/O last 3 completed shifts:  In: 840 [P.O.:840]  Out: 600 [Urine:600]    Constitutional:alert and oriented. Generally deconditioned.   HEENT: NCAT. EOMI.  Moderate elevation of JVD noted.   Respiratory: Still some persistent inspiratory crackles at the bases.  No wheeze noted.  Cardiovascular: Regular rate and rhythm. No murmur.  GI: Soft, nontender, nondistended. Normoactive bowel sounds.   Musculoskeletal: No gross deformities. 2+ edema to bilateral lower extremities.   Neurologic: Alert and oriented x3. No focal neurologic deficits.     Medications   Current Facility-Administered Medications   Medication Dose Route Frequency Provider Last Rate Last Admin    No lozenges or gum should be given while patient on BIPAP/AVAPS/AVAPS AE   Does not apply Continuous PRN Ronak Bañuelos MD        Patient may continue current oral medications   Does not apply Continuous PRN Ronak Bañuelos MD         Current Facility-Administered Medications   Medication Dose Route Frequency Provider Last Rate Last Admin    acetylcysteine (MUCOMYST) 10 % nebulizer solution 4 mL  4 mL Nebulization Q6H Guy Vega MD   4 mL at 05/03/24 0753    albuterol (PROVENTIL) neb solution 2.5 mg  2.5 mg Nebulization Q6H BethelGuy MD   2.5 mg at 05/03/24 0753    amoxicillin-clavulanate (AUGMENTIN) 875-125 MG per tablet 1 tablet  1 tablet Oral Q12H [facility] (08/20) Ronak Bañuelos MD   1 tablet at 05/03/24 0928    apixaban ANTICOAGULANT (ELIQUIS) tablet 5 mg  5 mg Oral BID Priti Tovar MD   5 mg at 05/03/24 0928    atorvastatin (LIPITOR) tablet 40 mg  40 mg Oral QPM Priti Tovar MD   40 mg at 05/02/24 2021    diclofenac (VOLTAREN) 1 % topical gel 4 g  4 g Topical 4x Daily Poornima Velez MD   4 g at 05/03/24 1055    digoxin (LANOXIN) tablet 125 mcg  125 mcg Oral QPM Priti Tovar MD   125 mcg at 05/02/24 2022     escitalopram (LEXAPRO) tablet 20 mg  20 mg Oral Daily Priti Tovar MD   20 mg at 05/03/24 0928    fluticasone-vilanterol (BREO ELLIPTA) 100-25 MCG/ACT inhaler 1 puff [patient supplied medication]  1 puff Inhalation Daily Priti Tovar MD   1 puff at 05/02/24 0757    metoprolol succinate ER (TOPROL XL) 24 hr tablet 50 mg  50 mg Oral At Bedtime Poornima Velez MD   50 mg at 05/02/24 2022    miconazole (MICATIN) 2 % powder   Topical BID Martin Will MD        potassium & sodium phosphates (NEUTRA-PHOS) Packet 1 packet  1 packet Oral or Feeding Tube Q4H Fito Brenner MD   1 packet at 05/03/24 0928    sodium chloride (PF) 0.9% PF flush 3 mL  3 mL Intracatheter Q8H Priti Tovar MD   3 mL at 05/03/24 0941       Data   Recent Labs   Lab 05/03/24  1001 05/03/24  0659 05/02/24  1345 05/02/24  0646 05/01/24  0700 04/29/24  0633 04/28/24  0625 04/27/24  1426 04/27/24  0604   WBC 16.7*  --   --  13.9*  --   --  10.2  --  11.5*   HGB 10.7*  --   --  9.6*  --   --  9.4*  --  9.1*   MCV 86  --   --  85  --   --  84  --  84     --   --  268  --   --  280  --  268   NA  --  137  --  138 139   < > 140  --  134*   POTASSIUM  --  3.9 3.9 3.2* 3.8   < > 3.8   < > 3.3*   CHLORIDE  --  100  --  102 103   < > 107  --  103   CO2  --  30*  --  27 25   < > 22  --  22   BUN  --  10.8  --  12.8 12.7   < > 15.4  --  17.1   CR  --  0.87  --  1.01 1.04   < > 1.03  --  1.03   ANIONGAP  --  7  --  9 11   < > 11  --  9   MELO  --  9.5  --  9.3 9.1   < > 9.0  --  9.0   GLC  --  116*  --  98 90   < > 100*  --  94   ALBUMIN  --   --   --   --   --   --   --   --  2.4*   PROTTOTAL  --   --   --   --   --   --   --   --  5.2*   BILITOTAL  --   --   --   --   --   --   --   --  0.4   ALKPHOS  --   --   --   --   --   --   --   --  107   ALT  --   --   --   --   --   --   --   --  25   AST  --   --   --   --   --   --   --   --  27    < > = values in this interval not displayed.       Recent Results (from the  past 24 hour(s))   Echocardiogram Complete   Result Value    LVEF  >70%    [facility]    [id number]  [id number]  [id number]  [id number]^ZEV     [facility]  Echocardiography Laboratory  [address] [place] [address]     Name: SHANE ISSA  MRN: [id number]  : 1950  Study Date: 2024 08:42 AM  Age: 73 yrs  Gender: Male  Patient Location: [facility]  Reason For Study: SOB  Ordering Physician: ZEV ROBERTO  Performed By: Kade Mills RDCS     BSA: 1.7 m2  Height: 67 in  Weight: 137 lb  BP: 104/46 mmHg  ______________________________________________________________________________  Procedure  Complete Portable Echo Adult. Optison (NDC #[id number]) given intravenously.  ______________________________________________________________________________  Interpretation Summary     Hyperdynamic left ventricular function. The visual ejection fraction is >70%.  Flattened septum is consistent with RV pressure/volume overload.  Right ventricle is not well visualized, however global systolic function is  probably normal. There is a pacemaker lead in the right ventricle.  Pulmonary hypertension present. The right ventricular systolic pressure is  approximated at 43mmHg plus the right atrial pressure.  There is a bioprosthetic mitral valve. 27 mm Velasquez Thermafix bioprosthetic  valve. Elevated gradients, mean inflow gradient 10 mmHg at 80 bpm. There is no  mitral regurgitation noted.  There is a bioprosthetic aortic valve. The prosthetic aortic valve is not well  visualized. Normal gradients, mean gradient 19 mmHg. There is trace aortic  regurgitation.  There is mild (1+) tricuspid regurgitation.  The inferior vena cava was normal in size with preserved respiratory  variability.     This study was compared to a TTE from 2024. There has been no significant  change.  ______________________________________________________________________________  Left Ventricle  The left ventricle is normal in size.  There is concentric remodeling present.  Hyperdynamic left ventricular function. The visual ejection fraction is >70%.  Left ventricular diastolic function is not assessable. Flattened septum is  consistent with RV pressure/volume overload.     Right Ventricle  The right ventricle is not well visualized. Right ventricle is not well  visualized, however global systolic function is probably normal. There is a  pacemaker lead in the right ventricle.     Atria  The left atrium is not well visualized. Right atrium not well visualized.     Mitral Valve  There is no mitral regurgitation noted. There is a bioprosthetic mitral valve.  27 mm Velasquez Thermafix bioprosthetic valve. Elevated gradients, mean inflow  gradient 10 mmHg at 80 bpm.     Tricuspid Valve  There is mild (1+) tricuspid regurgitation. The right ventricular systolic  pressure is approximated at 43mmHg plus the right atrial pressure. Pulmonary  hypertension.     Aortic Valve  There is trace aortic regurgitation. There is a bioprosthetic aortic valve.  The prosthetic aortic valve is not well visualized. Normal gradients, mean  gradient 19 mmHg.     Pulmonic Valve  The pulmonic valve is not well seen, but is grossly normal.     Vessels  The aortic root is normal size. Normal size ascending aorta. The inferior vena  cava was normal in size with preserved respiratory variability.     ______________________________________________________________________________  MMode/2D Measurements & Calculations  IVSd: 0.88 cm  LVIDd: 4.1 cm  LVIDs: 2.3 cm  LVPWd: 0.87 cm  IVC diam: 1.7 cm  FS: 44.7 %  LV mass(C)d: 110.3 grams  LV mass(C)dI: 64.0 grams/m2  Ao root diam: 2.7 cm  LA dimension: 4.0 cm     asc Aorta Diam: 3.3 cm  LA/Ao: 1.5  LVOT diam: 1.6 cm  LVOT area: 2.1 cm2  Ao root diam index Ht(cm/m): 1.6  Ao root diam index BSA (cm/m2): 1.6  Asc Ao diam index BSA (cm/m2): 1.9  Asc Ao diam index Ht(cm/m): 2.0  RWT: 0.43     Time Measurements  Aortic HR: 80.0 BPM     Doppler  Measurements & Calculations  MV max P.7 mmHg  MV mean PG: 10.6 mmHg  MV V2 VTI: 66.0 cm  MVA(VTI): 0.93 cm2  MV P1/2t max : 251.9 cm/sec  MV P1/2t: 116.8 msec  MVA(P1/2t): 1.9 cm2  MV dec slope: 631.6 cm/sec2  Ao V2 max: 290.4 cm/sec  Ao max P.0 mmHg  Ao V2 mean: 202.7 cm/sec  Ao mean P.0 mmHg  Ao V2 VTI: 51.9 cm  LOREZNO(I,D): 1.2 cm2  LORENZO(V,D): 1.1 cm2  LV V1 max P.7 mmHg  LV V1 max: 155.5 cm/sec  LV V1 VTI: 28.8 cm     CO(LVOT): 4.9 l/min  CI(LVOT): 2.9 l/min/m2  SV(LVOT): 61.4 ml  SI(LVOT): 35.7 ml/m2  TR max : 324.8 cm/sec  TR max P.3 mmHg  AV  Ratio (DI): 0.54  LORENZO Index (cm2/m2): 0.69     ______________________________________________________________________________  Report approved by: Violette Shelton 2024 11:49 AM

## 2024-05-03 NOTE — PROGRESS NOTES
Care Management Follow Up    Length of Stay (days): 8    Expected Discharge Date: 05/06/2024     Concerns to be Addressed: discharge planning     Patient plan of care discussed at interdisciplinary rounds: Yes    Anticipated Discharge Disposition: Transitional Care     Anticipated Discharge Services: None  Anticipated Discharge DME:      Patient/family educated on Medicare website which has current facility and service quality ratings:  Yes  Education Provided on the Discharge Plan:    Patient/Family in Agreement with the Plan: yes    Referrals Placed by CM/SW: Post Acute Facilities  Private pay costs discussed: private room/amenity fees    Additional Information:  Therapy is recommending TCU, met with pt to discuss need for TCU. Pt is in agreement to have referrals sent to [facility] in a shared room. Called spouse and discussed plan.   Spouse feels she will be able to provide the transportation as long as she has assistance with getting pt in and out of the vehicle.  Referrals sent.  Will continue to follow for discharge planning.    Ailyn Mejia RN   Inpatient Care Coordination  [facility]   Phone: [phone]

## 2024-05-03 NOTE — PROGRESS NOTES
A BiPAP of  10/5 @ 30% was applied to the pt via the mask for an increase in WOB and/or SOB.  The bridge of the nose looks good and remains intact. Pt is tolerating it well. Will continue to monitor and assess the pt's current respiratory status and needs.     Lotus Banks, RT on 5/3/2024 at 2:42 AM

## 2024-05-03 NOTE — PLAN OF CARE
"VSS, denies pain, A1-2 gaitbelt walker, 2g Na diet aspiration precautions. AO x4, 3L NC, permanent pacemaker. 2+ BLE edema, incontinent of bowel, externa cath changed. K 3.9 and Mg 1.6 Am draws, P 2.2 oral replace. VBGs collected (see results), ID, PT and care coordination consulted today (see notes), plan to discharge to TCU pending placement      Goal Outcome Evaluation:  Plan of Care Reviewed With: patient, family   Overall Patient Progress: improving   Outcome Evaluation: 3L NC, may try to ween/ d/c bipap    Problem: Adult Inpatient Plan of Care  Goal: Plan of Care Review  Description: The Plan of Care Review/Shift note should be completed every shift.  The Outcome Evaluation is a brief statement about your assessment that the patient is improving, declining, or no change.  This information will be displayed automatically on your shift  note.  Outcome: Progressing  Flowsheets (Taken 5/3/2024 1536)  Outcome Evaluation: 3L NC, may try to ween/ d/c bipap  Plan of Care Reviewed With:   patient   family  Overall Patient Progress: improving  Goal: Patient-Specific Goal (Individualized)  Description: You can add care plan individualizations to a care plan. Examples of Individualization might be:  \"Parent requests to be called daily at 9am for status\", \"I have a hard time hearing out of my right ear\", or \"Do not touch me to wake me up as it startles  me\".  Outcome: Progressing  Goal: Absence of Hospital-Acquired Illness or Injury  Outcome: Progressing  Intervention: Identify and Manage Fall Risk  Recent Flowsheet Documentation  Taken 5/3/2024 1100 by Anaya Pillai, RN  Safety Promotion/Fall Prevention: safety round/check completed  Goal: Optimal Comfort and Wellbeing  Outcome: Progressing  Goal: Readiness for Transition of Care  Outcome: Progressing     Problem: Pneumonia  Goal: Fluid Balance  Outcome: Progressing  Goal: Resolution of Infection Signs and Symptoms  Outcome: Progressing  Goal: Effective Oxygenation and " Ventilation  Outcome: Progressing     Problem: Gas Exchange Impaired  Goal: Optimal Gas Exchange  Outcome: Progressing     Problem: Malnutrition  Goal: Improved Nutritional Intake  Outcome: Progressing

## 2024-05-04 NOTE — PROGRESS NOTES
[facility]    Hospitalist Progress Note      Assessment & Plan   Jaylen Lamas is a 73 year old man w PMH significant for COPD, tobacco use, HFpEF, bioprosthetic aortic valve replacement, bioprosthetic mitral valve replacement, paroxysmal atrial fibrillation s/p ablation and MAZE procedure, 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 difficile, hypertension, CKD, history of duodenal ulcer, depression/anxiety, testicular cancer (s/p orchiectomy and XRT in 1982) who presents with shortness of breath.     #Recurrent acute hypoxemic respiratory failure and sepsis suspected secondary to recurrent aspiration pneumonia. Known pulmonary HTN and RV pressure overload: Recent history is most significant for pneumonia for which he was hospitalized from 4/19-4/22/2024 and discharged on Augmentin. During that admission, he had reported that his sx had been going on for about 10 weeks prior to the worsening.  During recent hospitalization 4- to 4/22/2024 patient underwent bronchoscopy with BAL positive for CMV, EBV and Candida.  Bacterial cultures were negative.  Patient was treated with Zosyn and azithromycin while in hospital and discharged on Augmentin.  Underwent workup for possible aspiration.  -Mr. Reeder returned on 4/25 with worsening shortness of breath, cough, and fever for which he was again admitted. Patient is being followed by pulmonology and infectious disease.  Patient was treated with IV Zosyn (4/25 - 4/30) and transition to Augmentin therapy for 10-day course.  -Swallow evaluation.  Patient needs reinforcement of aspiration precautions.  Needs to be fully upright 30 minutes after eating/drinking.  -Continue airway clearance therapies per pulmonary.  Bronchodilators.   -Patient did require BiPAP on 4/28 and has been using intermittently since (mainly at night-time)  -TTE obtained as patient did seem to have elevated JVD on  exam on 5/2.  It showed evidence of pulmonary HTN with RV volume overload with preservation of RV function.  Will continue IV lasix but increase to 40 mg BID on 5/3.  Watching daily BMP; bicarb trending up but renal function holding steady. Will continue for another day.   -Pt still with low grade fever on 5/3.  He is already on abx as above with airway clearance therapies.  Monitor fever curve and white count.   -Keep head of bed at 45 degrees or higher. Patient care order placed.   -Appreciate pulmonary and ID recommendations.     -PT consulted to assess functional status as he does seem quite deconditioned.     #HFpEF with RV volume overload/pulm HTN by echo on 4/19/24.  Mitral and Aortic bioprosthetic valve replacements. A fib, s/p MAZE procedure and now with pacemaker.  Non-obstructive CAD.:  Continue statin, digoxin, metoprolol, apixaban     #Anemia. Iron deficiency:  Ferritin 4 on 4/26/24. RLS may also be the result of low iron. Treating with IV iron infusions during hospitalization.      #Moderate malnutrition in the setting of acute illness superimposed on chronic illness. Nutrition consulted     #Hypokalemia, hyphophosphatemia. hypomagnesemia: Replacement     #Hx duodenal ulcer  #Hx of c diff in remote past     DVT Prophylaxis: DOAC  Code Status: Full Code  Expected discharge: Unclear. PT evaluate for dispo. Wean O2 as able.    Fito Brenner MD    Interval History   Patient thinks his breathing may be a bit better today. Does not like being a bit upright at night but understands reasonining.  He denies any worsening pain.  I reinforced reasoning for aspiration precautions.  I instructed him and nursing that he needs to be fully upright with meals.      -Data reviewed today: I reviewed all new labs and imaging results over the last 24 hours. I personally reviewed     Physical Exam   Temp: 98  F (36.7  C) Temp src: Oral BP: 124/51 Pulse: 80   Resp: 20 SpO2: 98 % O2 Device: BiPAP/CPAP Oxygen Delivery: 3  LPM  Vitals:    04/25/24 0023 05/02/24 0453 05/03/24 0626   Weight: 63.1 kg (139 lb 1.8 oz) 68.9 kg (151 lb 14.4 oz) 62.5 kg (137 lb 12.6 oz)     Vital Signs with Ranges  Temp:  [97.9  F (36.6  C)-98.9  F (37.2  C)] 98  F (36.7  C)  Pulse:  [78-86] 80  Resp:  [18-29] 20  BP: ()/(39-52) 124/51  FiO2 (%):  [35 %] 35 %  SpO2:  [92 %-98 %] 98 %  I/O last 3 completed shifts:  In: 480 [P.O.:480]  Out: 1600 [Urine:1600]    Constitutional:alert and oriented. Generally deconditioned.   HEENT: NCAT. EOMI.  Moderate elevation of JVD noted.   Respiratory: Still some persistent inspiratory crackles at the bases.  No wheeze noted.  Cardiovascular: Regular rate and rhythm. No murmur.  GI: Soft, nontender, nondistended. Normoactive bowel sounds.   Musculoskeletal: No gross deformities. Mild edema  Neurologic: Alert and oriented x3. No focal neurologic deficits.     Medications   Current Facility-Administered Medications   Medication Dose Route Frequency Provider Last Rate Last Admin    No lozenges or gum should be given while patient on BIPAP/AVAPS/AVAPS AE   Does not apply Continuous PRN Ronak Bañuelos MD        Patient may continue current oral medications   Does not apply Continuous PRN Ronak Bañuelos MD         Current Facility-Administered Medications   Medication Dose Route Frequency Provider Last Rate Last Admin    acetylcysteine (MUCOMYST) 10 % nebulizer solution 4 mL  4 mL Nebulization Q6H Guy Vega MD   4 mL at 05/04/24 0734    albuterol (PROVENTIL) neb solution 2.5 mg  2.5 mg Nebulization Q6H Guy Vega MD   2.5 mg at 05/04/24 0734    amoxicillin-clavulanate (AUGMENTIN) 875-125 MG per tablet 1 tablet  1 tablet Oral Q12H [facility] (08/20) Ronak Bañuelos MD   1 tablet at 05/04/24 0849    apixaban ANTICOAGULANT (ELIQUIS) tablet 5 mg  5 mg Oral BID Mitshantelli, Priti Aziz, MD   5 mg at 05/04/24 0848    atorvastatin (LIPITOR) tablet 40 mg  40 mg Oral QPM Priti Tovar MD   40 mg at 05/03/24      diclofenac (VOLTAREN) 1 % topical gel 4 g  4 g Topical 4x Daily Poornima Velez MD   4 g at 24    digoxin (LANOXIN) tablet 125 mcg  125 mcg Oral QPM Priti Tovar MD   125 mcg at 24    escitalopram (LEXAPRO) tablet 20 mg  20 mg Oral Daily Priti Tovar MD   20 mg at 24 0848    fluticasone-vilanterol (BREO ELLIPTA) 100-25 MCG/ACT inhaler 1 puff [patient supplied medication]  1 puff Inhalation Daily Priti Tovar MD   1 puff at 24 0757    furosemide (LASIX) injection 40 mg  40 mg Intravenous BID Zev Brenner MD   40 mg at 24 0850    metoprolol succinate ER (TOPROL XL) 24 hr tablet 50 mg  50 mg Oral At Bedtime Poornima Velez MD   50 mg at 24    miconazole (MICATIN) 2 % powder   Topical BID Martin Will MD   Given at 24    sodium chloride (PF) 0.9% PF flush 3 mL  3 mL Intracatheter Q8H Priti Tovar MD   6 mL at 24 0902       Data   Recent Labs   Lab 24  0708 24  1001 24  0659 24  1345 24  0646   WBC 14.3* 16.7*  --   --  13.9*   HGB 9.5* 10.7*  --   --  9.6*   MCV 85 86  --   --  85    286  --   --  268     --  137  --  138   POTASSIUM 3.3*  --  3.9 3.9 3.2*   CHLORIDE 98  --  100  --  102   CO2 34*  --  30*  --  27   BUN 11.2  --  10.8  --  12.8   CR 0.88  --  0.87  --  1.01   ANIONGAP 7  --  7  --  9   MELO 9.6  --  9.5  --  9.3   *  --  116*  --  98       Recent Results (from the past 24 hour(s))   Echocardiogram Complete   Result Value    LVEF  >70%    [facility]    [id number]  [id number]  [id number]  [id number]     [facility]  Echocardiography Laboratory  [address] [place] [address]     Name: SHANE ISSA  MRN: [id number]  : 1950  Study Date: 2024 08:42 AM  Age: 73 yrs  Gender: Male  Patient Location: [facility]  Reason For Study: SOB  Ordering Physician: ZEV BRENNER  Performed By: Kade Mills  RDCS     BSA: 1.7 m2  Height: 67 in  Weight: 137 lb  BP: 104/46 mmHg  ______________________________________________________________________________  Procedure  Complete Portable Echo Adult. Optison (NDC #8039-5822) given intravenously.  ______________________________________________________________________________  Interpretation Summary     Hyperdynamic left ventricular function. The visual ejection fraction is >70%.  Flattened septum is consistent with RV pressure/volume overload.  Right ventricle is not well visualized, however global systolic function is  probably normal. There is a pacemaker lead in the right ventricle.  Pulmonary hypertension present. The right ventricular systolic pressure is  approximated at 43mmHg plus the right atrial pressure.  There is a bioprosthetic mitral valve. 27 mm Velasquez Thermafix bioprosthetic  valve. Elevated gradients, mean inflow gradient 10 mmHg at 80 bpm. There is no  mitral regurgitation noted.  There is a bioprosthetic aortic valve. The prosthetic aortic valve is not well  visualized. Normal gradients, mean gradient 19 mmHg. There is trace aortic  regurgitation.  There is mild (1+) tricuspid regurgitation.  The inferior vena cava was normal in size with preserved respiratory  variability.     This study was compared to a TTE from 4/19/2024. There has been no significant  change.  ______________________________________________________________________________  Left Ventricle  The left ventricle is normal in size. There is concentric remodeling present.  Hyperdynamic left ventricular function. The visual ejection fraction is >70%.  Left ventricular diastolic function is not assessable. Flattened septum is  consistent with RV pressure/volume overload.     Right Ventricle  The right ventricle is not well visualized. Right ventricle is not well  visualized, however global systolic function is probably normal. There is a  pacemaker lead in the right ventricle.     Atria  The  left atrium is not well visualized. Right atrium not well visualized.     Mitral Valve  There is no mitral regurgitation noted. There is a bioprosthetic mitral valve.  27 mm Velasquez Thermafix bioprosthetic valve. Elevated gradients, mean inflow  gradient 10 mmHg at 80 bpm.     Tricuspid Valve  There is mild (1+) tricuspid regurgitation. The right ventricular systolic  pressure is approximated at 43mmHg plus the right atrial pressure. Pulmonary  hypertension.     Aortic Valve  There is trace aortic regurgitation. There is a bioprosthetic aortic valve.  The prosthetic aortic valve is not well visualized. Normal gradients, mean  gradient 19 mmHg.     Pulmonic Valve  The pulmonic valve is not well seen, but is grossly normal.     Vessels  The aortic root is normal size. Normal size ascending aorta. The inferior vena  cava was normal in size with preserved respiratory variability.     ______________________________________________________________________________  MMode/2D Measurements & Calculations  IVSd: 0.88 cm  LVIDd: 4.1 cm  LVIDs: 2.3 cm  LVPWd: 0.87 cm  IVC diam: 1.7 cm  FS: 44.7 %  LV mass(C)d: 110.3 grams  LV mass(C)dI: 64.0 grams/m2  Ao root diam: 2.7 cm  LA dimension: 4.0 cm     asc Aorta Diam: 3.3 cm  LA/Ao: 1.5  LVOT diam: 1.6 cm  LVOT area: 2.1 cm2  Ao root diam index Ht(cm/m): 1.6  Ao root diam index BSA (cm/m2): 1.6  Asc Ao diam index BSA (cm/m2): 1.9  Asc Ao diam index Ht(cm/m): 2.0  RWT: 0.43     Time Measurements  Aortic HR: 80.0 BPM     Doppler Measurements & Calculations  MV max P.7 mmHg  MV mean PG: 10.6 mmHg  MV V2 VTI: 66.0 cm  MVA(VTI): 0.93 cm2  MV P1/2t max lencho: 251.9 cm/sec  MV P1/2t: 116.8 msec  MVA(P1/2t): 1.9 cm2  MV dec slope: 631.6 cm/sec2  Ao V2 max: 290.4 cm/sec  Ao max P.0 mmHg  Ao V2 mean: 202.7 cm/sec  Ao mean P.0 mmHg  Ao V2 VTI: 51.9 cm  LORENZO(I,D): 1.2 cm2  LORENZO(V,D): 1.1 cm2  LV V1 max P.7 mmHg  LV V1 max: 155.5 cm/sec  LV V1 VTI: 28.8 cm     CO(LVOT): 4.9  l/min  CI(LVOT): 2.9 l/min/m2  SV(LVOT): 61.4 ml  SI(LVOT): 35.7 ml/m2  TR max : 324.8 cm/sec  TR max P.3 mmHg  AV  Ratio (DI): 0.54  LORENZO Index (cm2/m2): 0.69     ______________________________________________________________________________  Report approved by: Violette Shelton 2024 11:49 AM

## 2024-05-04 NOTE — PLAN OF CARE
"  Problem: Adult Inpatient Plan of Care  Goal: Plan of Care Review  Description: The Plan of Care Review/Shift note should be completed every shift.  The Outcome Evaluation is a brief statement about your assessment that the patient is improving, declining, or no change.  This information will be displayed automatically on your shift  note.  Outcome: Progressing  Flowsheets (Taken 5/4/2024 0045)  Outcome Evaluation: alert with some intermittent confusion. oxygen at 3l nc. takes meds well with water. turns independently in bed. urine and bowel incontinence and assisted with perineal hygiene and external catheter. using micatin powder and barrier cream.  Plan of Care Reviewed With: patient  Overall Patient Progress: improving  Goal: Patient-Specific Goal (Individualized)  Description: You can add care plan individualizations to a care plan. Examples of Individualization might be:  \"Parent requests to be called daily at 9am for status\", \"I have a hard time hearing out of my right ear\", or \"Do not touch me to wake me up as it startles  me\".  Outcome: Progressing  Goal: Absence of Hospital-Acquired Illness or Injury  Outcome: Progressing  Intervention: Identify and Manage Fall Risk  Recent Flowsheet Documentation  Taken 5/3/2024 1616 by Tasneem Mtz RN  Safety Promotion/Fall Prevention:   activity supervised   clutter free environment maintained   increased rounding and observation   lighting adjusted   nonskid shoes/slippers when out of bed   patient and family education   room near nurse's station   room door open   room organization consistent   safety round/check completed  Intervention: Prevent Skin Injury  Recent Flowsheet Documentation  Taken 5/3/2024 1653 by Tasneem Mtz, RN  Body Position: position changed independently  Taken 5/3/2024 1616 by Tasneem Mtz RN  Body Position: position changed independently  Intervention: Prevent and Manage VTE (Venous Thromboembolism) Risk  Recent Flowsheet Documentation  Taken " 5/3/2024 1616 by Tasneem Mtz RN  VTE Prevention/Management: SCDs (sequential compression devices) off  Intervention: Prevent Infection  Recent Flowsheet Documentation  Taken 5/3/2024 1616 by Tasneem Mtz RN  Infection Prevention: rest/sleep promoted  Goal: Optimal Comfort and Wellbeing  Outcome: Progressing  Intervention: Monitor Pain and Promote Comfort  Recent Flowsheet Documentation  Taken 5/3/2024 1653 by Tasneem Mtz RN  Pain Management Interventions:   repositioned   rest  Goal: Readiness for Transition of Care  Outcome: Progressing     Problem: Pneumonia  Goal: Fluid Balance  Outcome: Progressing  Goal: Resolution of Infection Signs and Symptoms  Outcome: Progressing  Intervention: Prevent Infection Progression  Recent Flowsheet Documentation  Taken 5/3/2024 1616 by Tasneem Mtz RN  Infection Management: aseptic technique maintained  Goal: Effective Oxygenation and Ventilation  Outcome: Progressing  Intervention: Promote Airway Secretion Clearance  Recent Flowsheet Documentation  Taken 5/3/2024 1616 by Tasneem Mtz RN  Cough And Deep Breathing: done with encouragement  Intervention: Optimize Oxygenation and Ventilation  Recent Flowsheet Documentation  Taken 5/3/2024 1653 by Tasneem Mtz RN  Head of Bed (HOB) Positioning: HOB at 20-30 degrees  Taken 5/3/2024 1616 by Tasneem Mtz RN  Airway/Ventilation Management: calming measures promoted  Head of Bed (HOB) Positioning: HOB at 20-30 degrees     Problem: Gas Exchange Impaired  Goal: Optimal Gas Exchange  Outcome: Progressing  Intervention: Optimize Oxygenation and Ventilation  Recent Flowsheet Documentation  Taken 5/3/2024 1653 by Tasneem Mtz RN  Head of Bed (HOB) Positioning: HOB at 20-30 degrees  Taken 5/3/2024 1616 by Tasneem Mtz RN  Airway/Ventilation Management: calming measures promoted  Head of Bed (HOB) Positioning: HOB at 20-30 degrees     Problem: Malnutrition  Goal: Improved Nutritional Intake  Outcome: Progressing   Goal Outcome Evaluation:       Plan of Care Reviewed With: patient    Overall Patient Progress: improvingOverall Patient Progress: improving    Outcome Evaluation: alert with some intermittent confusion. oxygen at 3l nc. takes meds well with water. turns independently in bed. urine and bowel incontinence and assisted with perineal hygiene and external catheter. using micatin powder and barrier cream.

## 2024-05-04 NOTE — PLAN OF CARE
"VSS, A1 gaitbelt walker, denies pain, AO x4 intermittent confusion when waking. 3L NC awake and BiPAP HS with scheduled nebs; lungs course crackles productive cough large amounts of sputum. Permanent pacemaker apical regualr. Bruising on arms and BLE edema 1-2+. Incontinent of bowel and bladder: external cath in place and lomotil given x1 for loose/watery stools. 2g Na diet aspiration precautions poor intake. Mg1.6 and P2.5 AM draw, K 3.3 oral replace redraw 3.5 now AM draw. ID consulted today, RT endorses lack of active participation with respiratory tx. HOB to be at least 45* per order      Problem: Adult Inpatient Plan of Care  Goal: Plan of Care Review  Description: The Plan of Care Review/Shift note should be completed every shift.  The Outcome Evaluation is a brief statement about your assessment that the patient is improving, declining, or no change.  This information will be displayed automatically on your shift  note.  5/4/2024 1353 by Anaya Pillai, RN  Outcome: Not Progressing  Flowsheets (Taken 5/4/2024 1353)  Outcome Evaluation: lungs course large sputum output with cough, 3L NC BiPAP HS, MANCUSO, HOB to be 45* or more at all times per orders.  Plan of Care Reviewed With:   patient   spouse  Overall Patient Progress: declining  5/4/2024 1341 by Anaya Pillai, RN  Outcome: Not Progressing  Flowsheets (Taken 5/4/2024 1341)  Outcome Evaluation: HOB to be at least 45* at all times per orders, stable on 3L NC, BiPAP HS  Plan of Care Reviewed With: patient  Overall Patient Progress: no change  Goal: Patient-Specific Goal (Individualized)  Description: You can add care plan individualizations to a care plan. Examples of Individualization might be:  \"Parent requests to be called daily at 9am for status\", \"I have a hard time hearing out of my right ear\", or \"Do not touch me to wake me up as it startles  me\".  5/4/2024 1353 by Anaya Pillai, RN  Outcome: Not Progressing  5/4/2024 1341 by Anaya Pillai, RN  Outcome: Not " Progressing  Goal: Readiness for Transition of Care  5/4/2024 1353 by Anaya Pillai RN  Outcome: Not Progressing  5/4/2024 1341 by Anaya Pillai RN  Outcome: Not Progressing     Problem: Pneumonia  Goal: Fluid Balance  5/4/2024 1353 by Anaya Pillai RN  Outcome: Not Progressing  5/4/2024 1341 by Anaya Pillai RN  Outcome: Not Progressing  Goal: Resolution of Infection Signs and Symptoms  5/4/2024 1353 by Anaya Pillai RN  Outcome: Not Progressing  5/4/2024 1341 by Anaya Pillai RN  Outcome: Not Progressing  Goal: Effective Oxygenation and Ventilation  5/4/2024 1353 by Anaya Pillai RN  Outcome: Not Progressing  5/4/2024 1341 by Anaya Pillai RN  Outcome: Not Progressing  Intervention: Promote Airway Secretion Clearance  Recent Flowsheet Documentation  Taken 5/4/2024 0835 by Anaya Pillai RN  Cough And Deep Breathing: done independently per patient  Intervention: Optimize Oxygenation and Ventilation  Recent Flowsheet Documentation  Taken 5/4/2024 1050 by Anaya Pillai RN  Head of Bed (HOB) Positioning: HOB at 45 degrees     Problem: Gas Exchange Impaired  Goal: Optimal Gas Exchange  5/4/2024 1353 by Anaya Pillai RN  Outcome: Not Progressing  5/4/2024 1341 by Anaya Pillai RN  Outcome: Not Progressing  Intervention: Optimize Oxygenation and Ventilation  Recent Flowsheet Documentation  Taken 5/4/2024 1050 by Anaya Pillai RN  Head of Bed (HOB) Positioning: HOB at 45 degrees     Problem: Malnutrition  Goal: Improved Nutritional Intake  5/4/2024 1353 by Anaya Pillai, RN  Outcome: Not Progressing  5/4/2024 1341 by Anaya Pillai, RN  Outcome: Not Progressing

## 2024-05-04 NOTE — PROGRESS NOTES
[facility]  Infectious Disease Progress Note          Assessment and Plan:   Date of Admission:  4/25/2024  Date of Consult (When I saw the patient): 04/25/24        Assessment & Plan  Jaylen Lamas is a 73 year old who was admitted on 4/25/2024.      Impression: 1 73-year-old male, well-known to me, just discharged from the hospital now readmitted, unclear whether this is still all part of #2 below or some new infection, quite unlikely this is new bacterial infection, no positive microbiology, bronchoscopy cultures all unremarkable, recent and prior courses of antibiotics without particular benefit suspect this is not bacterial infection currently  2 2 and half month illness with intermittent fever, weight loss, malaise and fatigue and respiratory symptoms, prior antibiotic courses without resolution or benefit no positive microbiology, recent bronchoscopy without so far obvious cause  3 CMV, EBV and Candida in bronchoscopy very unlikely clinically relevant in this clinical scenario  4 intermittent diarrhea again now rule out new C. difficile, is prior C. 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               "Medications:     Current Facility-Administered Medications   Medication Dose Route Frequency Provider Last Rate Last Admin    acetylcysteine (MUCOMYST) 10 % nebulizer solution 4 mL  4 mL Nebulization Q6H Guy Vega MD   4 mL at 05/04/24 1329    albuterol (PROVENTIL) neb solution 2.5 mg  2.5 mg Nebulization Q6H Guy Vega MD   2.5 mg at 05/04/24 1329    amoxicillin-clavulanate (AUGMENTIN) 875-125 MG per tablet 1 tablet  1 tablet Oral Q12H [facility] (08/20) Ronak Bañuelos MD   1 tablet at 05/04/24 0849    apixaban ANTICOAGULANT (ELIQUIS) tablet 5 mg  5 mg Oral BID Priti Tovar MD   5 mg at 05/04/24 0848    atorvastatin (LIPITOR) tablet 40 mg  40 mg Oral QPM Priti Tovar MD   40 mg at 05/03/24 2153    diclofenac (VOLTAREN) 1 % topical gel 4 g  4 g Topical 4x Daily Poornima Velez MD   4 g at 05/03/24 2159    digoxin (LANOXIN) tablet 125 mcg  125 mcg Oral QPM Priti Tovar MD   125 mcg at 05/03/24 2158    escitalopram (LEXAPRO) tablet 20 mg  20 mg Oral Daily Priti Tovar MD   20 mg at 05/04/24 0848    fluticasone-vilanterol (BREO ELLIPTA) 100-25 MCG/ACT inhaler 1 puff [patient supplied medication]  1 puff Inhalation Daily Priti Tovar MD   1 puff at 05/02/24 0757    furosemide (LASIX) injection 40 mg  40 mg Intravenous BID Fito Brenner MD   40 mg at 05/04/24 0850    metoprolol succinate ER (TOPROL XL) 24 hr tablet 50 mg  50 mg Oral At Bedtime Poornima Velez MD   50 mg at 05/03/24 2153    miconazole (MICATIN) 2 % powder   Topical BID Martin Will MD   Given at 05/03/24 2152    sodium chloride (PF) 0.9% PF flush 3 mL  3 mL Intracatheter Q8H Priti Tovar MD   6 mL at 05/04/24 0902                  Physical Exam:   Blood pressure 124/51, pulse 83, temperature 98  F (36.7  C), temperature source Oral, resp. rate 24, height 1.702 m (5' 7\"), weight 62.5 kg (137 lb 12.6 oz), SpO2 91%.  Wt Readings from Last 2 Encounters:   05/03/24 " "62.5 kg (137 lb 12.6 oz)   04/22/24 66.1 kg (145 lb 11.6 oz)     Vital Signs with Ranges  Temp:  [97.9  F (36.6  C)-98.9  F (37.2  C)] 98  F (36.7  C)  Pulse:  [78-86] 83  Resp:  [18-29] 24  BP: ()/(39-52) 124/51  FiO2 (%):  [35 %] 35 %  SpO2:  [91 %-98 %] 91 %    Constitutional: Awake, alert, cooperative, no apparent distress looks similarly chronically ill not particularly acutely ill on low-flow oxygen     Lungs: Clear to auscultation bilaterally, no crackles or wheezing   Cardiovascular: Regular rate and rhythm, normal S1 and S2, and no murmur noted   Abdomen: Normal bowel sounds, soft, non-distended, non-tender   Skin: No rashes, no cyanosis, no edema   Other:           Data:   All microbiology laboratory data reviewed.  Recent Labs   Lab Test 05/04/24  0708 05/03/24  1001 05/02/24  0646   WBC 14.3* 16.7* 13.9*   HGB 9.5* 10.7* 9.6*   HCT 31.6* 35.8* 32.1*   MCV 85 86 85    286 268     Recent Labs   Lab Test 05/04/24  0708 05/03/24  0659 05/02/24  0646   CR 0.88 0.87 1.01     No lab results found.  No lab results found.    Invalid input(s): \"UC\"     "

## 2024-05-04 NOTE — PLAN OF CARE
"Goal Outcome Evaluation:    A&O X4 , VSS Sat well on 34 L and tolerated BiPAP at bed time .   Decrease air entry , crackles and expiratory wheezing at the right upper and lateral lung field . Incontinent to both bowel and bladder . Perineal care provided .       Problem: Adult Inpatient Plan of Care  Goal: Plan of Care Review  Description: The Plan of Care Review/Shift note should be completed every shift.  The Outcome Evaluation is a brief statement about your assessment that the patient is improving, declining, or no change.  This information will be displayed automatically on your shift  note.  Outcome: Progressing  Goal: Patient-Specific Goal (Individualized)  Description: You can add care plan individualizations to a care plan. Examples of Individualization might be:  \"Parent requests to be called daily at 9am for status\", \"I have a hard time hearing out of my right ear\", or \"Do not touch me to wake me up as it startles  me\".  Outcome: Progressing  Goal: Absence of Hospital-Acquired Illness or Injury  Outcome: Met  Intervention: Identify and Manage Fall Risk  Recent Flowsheet Documentation  Taken 5/4/2024 0551 by Irwin Melvin RN  Safety Promotion/Fall Prevention: safety round/check completed  Taken 5/3/2024 2337 by Irwin Melvin RN  Safety Promotion/Fall Prevention:   activity supervised   clutter free environment maintained   increased rounding and observation   lighting adjusted   nonskid shoes/slippers when out of bed   room near nurse's station   room door open   room organization consistent   safety round/check completed   mobility aid in reach  Intervention: Prevent Skin Injury  Recent Flowsheet Documentation  Taken 5/3/2024 2337 by Irwin Melvin, RN  Body Position: position changed independently  Intervention: Prevent and Manage VTE (Venous Thromboembolism) Risk  Recent Flowsheet Documentation  Taken 5/3/2024 2337 by Irwin Melvin, RN  VTE Prevention/Management: SCDs (sequential compression " devices) off  Intervention: Prevent Infection  Recent Flowsheet Documentation  Taken 5/3/2024 2337 by Irwin Melvin, RN  Infection Prevention:   rest/sleep promoted   hand hygiene promoted  Goal: Optimal Comfort and Wellbeing  Outcome: Met  Goal: Readiness for Transition of Care  Outcome: Progressing

## 2024-05-05 NOTE — PLAN OF CARE
Speech Language Therapy Discharge Summary    Reason for therapy discharge:    All goals and outcomes met, no further needs identified.    Progress towards therapy goal(s). See goals on Care Plan in [facility] electronic health record for goal details.  Goals met    Therapy recommendation(s):    No further therapy is recommended.    Patient is tolerting a regular diet/thin liquids and is independently using the following safe swallow strategies: sit upright for PO, take small bites/sips at a slow pace, chin tuck with thin liquids.

## 2024-05-05 NOTE — PLAN OF CARE
"Goal Outcome Evaluation:      Plan of Care Reviewed With: patient    Overall Patient Progress: no changeOverall Patient Progress: no change       Problem: Adult Inpatient Plan of Care  Goal: Plan of Care Review  Description: The Plan of Care Review/Shift note should be completed every shift.  The Outcome Evaluation is a brief statement about your assessment that the patient is improving, declining, or no change.  This information will be displayed automatically on your shift  note.  Outcome: Progressing  Flowsheets (Taken 5/5/2024 2244)  Plan of Care Reviewed With: patient  Overall Patient Progress: no change  Goal: Patient-Specific Goal (Individualized)  Description: You can add care plan individualizations to a care plan. Examples of Individualization might be:  \"Parent requests to be called daily at 9am for status\", \"I have a hard time hearing out of my right ear\", or \"Do not touch me to wake me up as it startles  me\".  Outcome: Progressing  Goal: Readiness for Transition of Care  Outcome: Progressing     Problem: Pneumonia  Goal: Fluid Balance  Outcome: Progressing  Goal: Resolution of Infection Signs and Symptoms  Outcome: Progressing  Goal: Effective Oxygenation and Ventilation  Outcome: Progressing  Intervention: Promote Airway Secretion Clearance  Recent Flowsheet Documentation  Taken 5/5/2024 0242 by Omar Love RN  Cough And Deep Breathing: done independently per patient  Intervention: Optimize Oxygenation and Ventilation  Recent Flowsheet Documentation  Taken 5/4/2024 2158 by Omar Love, RN  Head of Bed (HOB) Positioning: HOB at 45 degrees     Problem: Gas Exchange Impaired  Goal: Optimal Gas Exchange  Outcome: Progressing  Intervention: Optimize Oxygenation and Ventilation  Recent Flowsheet Documentation  Taken 5/4/2024 2158 by Omar Love RN  Head of Bed (HOB) Positioning: HOB at 45 degrees     Problem: Malnutrition  Goal: Improved Nutritional Intake  Outcome: Progressing     Problem: Fall " Injury Risk  Goal: Absence of Fall and Fall-Related Injury  Outcome: Progressing  Intervention: Identify and Manage Contributors  Recent Flowsheet Documentation  Taken 5/5/2024 0242 by Omar Love RN  Medication Review/Management: medications reviewed  Intervention: Promote Injury-Free Environment  Recent Flowsheet Documentation  Taken 5/5/2024 0242 by Omar Love RN  Safety Promotion/Fall Prevention: safety round/check completed     Problem: Adult Inpatient Plan of Care  Goal: Absence of Hospital-Acquired Illness or Injury  Intervention: Identify and Manage Fall Risk  Recent Flowsheet Documentation  Taken 5/5/2024 0242 by Omar Love RN  Safety Promotion/Fall Prevention: safety round/check completed  Intervention: Prevent Skin Injury  Recent Flowsheet Documentation  Taken 5/4/2024 2158 by Omar Love RN  Body Position: supine, head elevated  Taken 5/4/2024 2106 by Omar Love RN  Body Position: heels elevated  Intervention: Prevent and Manage VTE (Venous Thromboembolism) Risk  Recent Flowsheet Documentation  Taken 5/5/2024 0242 by Omar Love RN  VTE Prevention/Management: SCDs (sequential compression devices) off  Intervention: Prevent Infection  Recent Flowsheet Documentation  Taken 5/5/2024 0242 by Omar Love RN  Infection Prevention:   rest/sleep promoted   hand hygiene promoted   single patient room provided

## 2024-05-05 NOTE — PLAN OF CARE
"VSS, A1 gaitbelt walker short distances, denies pain, HOB to remain 45* at all times aspiration precautions. Intermittent confusion very lethargic today, 3L NC, apical pulse regular permanent pacemaker. BLE edema graduated compression socks or PCDs in place, regular diet sit upright when eat/drinking,incontinent of bowel and bladder pure wick in place. Skinn barrier cream applied to buttox meplex on sacrum, pt suctions self for secretions productive cough. Palliative ID and Pulm to consult tomorrow, SLP PT and RT worked with pt today. Lasix decreased today, family at bedside.      Goal Outcome Evaluation:  Plan of Care Reviewed With: patient, family   Overall Patient Progress: declining   Outcome Evaluation: paliative consulted, pt lethargic poor intake, declined BiPAP last night, RT reports little/no involvement in tx    Problem: Adult Inpatient Plan of Care  Goal: Plan of Care Review  Description: The Plan of Care Review/Shift note should be completed every shift.  The Outcome Evaluation is a brief statement about your assessment that the patient is improving, declining, or no change.  This information will be displayed automatically on your shift  note.  Outcome: Not Progressing  Flowsheets (Taken 5/5/2024 1511)  Outcome Evaluation: paliative consulted, pt lethargic poor intake, declined BiPAP last night, RT reports little/no involvement in tx  Plan of Care Reviewed With:   patient   family  Overall Patient Progress: declining  Goal: Patient-Specific Goal (Individualized)  Description: You can add care plan individualizations to a care plan. Examples of Individualization might be:  \"Parent requests to be called daily at 9am for status\", \"I have a hard time hearing out of my right ear\", or \"Do not touch me to wake me up as it startles  me\".  Outcome: Not Progressing  Goal: Readiness for Transition of Care  Outcome: Not Progressing     Problem: Pneumonia  Goal: Fluid Balance  Outcome: Not Progressing  Goal: " Resolution of Infection Signs and Symptoms  Outcome: Not Progressing  Goal: Effective Oxygenation and Ventilation  Outcome: Not Progressing  Intervention: Promote Airway Secretion Clearance  Recent Flowsheet Documentation  Taken 5/5/2024 0930 by Anaya Pillai RN  Cough And Deep Breathing: done with encouragement     Problem: Gas Exchange Impaired  Goal: Optimal Gas Exchange  Outcome: Not Progressing     Problem: Malnutrition  Goal: Improved Nutritional Intake  Outcome: Not Progressing     Problem: Fall Injury Risk  Goal: Absence of Fall and Fall-Related Injury  Outcome: Not Progressing  Intervention: Promote Injury-Free Environment  Recent Flowsheet Documentation  Taken 5/5/2024 0930 by Anaya Pillai, RN  Safety Promotion/Fall Prevention:   safety round/check completed   supervised activity

## 2024-05-05 NOTE — PROGRESS NOTES
[facility]    Hospitalist Progress Note      Assessment & Plan   Jaylen Lamas is a 73 year old man w PMH significant for COPD, tobacco use, HFpEF, bioprosthetic aortic valve replacement, bioprosthetic mitral valve replacement, paroxysmal atrial fibrillation s/p ablation and MAZE procedure, 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 difficile, hypertension, CKD, history of duodenal ulcer, depression/anxiety, testicular cancer (s/p orchiectomy and XRT in 1982) who presents with shortness of breath.    Changes today:  -Will decrease lasix to once daily dosing given rising bicarb  -Will consult palliative for goals of care for tomorrow given prolonged hospitalization and lack of functional/clinical improvement.  Concern that even despite aggressive treatment for respiratory failure he is still very functionally deconditioned.  He should have TCU but tells me he only wants to go home.  My concern is that where has been for last few days may be where he clinically lands for some time until he continues long term physical therapy. He is agreeable to meeting with palliative care.   -Await pulmonary and ID recs tomorrow.      #Recurrent acute hypoxemic respiratory failure and sepsis suspected secondary to recurrent aspiration pneumonia. Known pulmonary HTN and RV pressure overload: Recent history is most significant for pneumonia for which he was hospitalized from 4/19-4/22/2024 and discharged on Augmentin. During that admission, he had reported that his sx had been going on for about 10 weeks prior to the worsening.  During recent hospitalization 4- to 4/22/2024 patient underwent bronchoscopy with BAL positive for CMV, EBV and Candida.  Bacterial cultures were negative.  Patient was treated with Zosyn and azithromycin while in hospital and discharged on Augmentin.  Underwent workup for possible aspiration.  -Mr. Reeder returned on  4/25 with worsening shortness of breath, cough, and fever for which he was again admitted. Patient is being followed by pulmonology and infectious disease.  Patient was treated with IV Zosyn (4/25 - 4/30) and transition to Augmentin therapy for 10-day course.  -Swallow evaluation.  Patient needs reinforcement of aspiration precautions.  Needs to be fully upright 30 minutes after eating/drinking.  -Continue airway clearance therapies per pulmonary.  Bronchodilators.   -Patient did require BiPAP on 4/28 and has been using intermittently since (mainly at night-time)  -TTE obtained as patient did seem to have elevated JVD on exam on 5/2.  It showed evidence of pulmonary HTN with RV volume overload with preservation of RV function.  Will continue IV lasix but increase to 40 mg BID on 5/5.  Watching daily BMP; bicarb trending up but renal function holding steady. Decrease to once daily on 5/5.   -Pt with low grade fever on 5/3.  He is already on abx as above with airway clearance therapies.  Monitor fever curve and white count.   -Keep head of bed at 45 degrees or higher. Patient care order placed.   -Appreciate pulmonary and ID recommendations.     -PT consulted to assess functional status as he does seem quite deconditioned.  -Palliative consulted for goals of care.      #HFpEF with RV volume overload/pulm HTN by echo on 4/19/24.  Mitral and Aortic bioprosthetic valve replacements. A fib, s/p MAZE procedure and now with pacemaker.  Non-obstructive CAD.:  Continue statin, digoxin, metoprolol, apixaban. Lasix as above.      #Anemia. Iron deficiency:  Ferritin 4 on 4/26/24. RLS may also be the result of low iron. Treating with IV iron infusions during hospitalization.      #Moderate malnutrition in the setting of acute illness superimposed on chronic illness. Nutrition consulted     #Hypokalemia, hyphophosphatemia. hypomagnesemia: Replacement     #Hx duodenal ulcer  #Hx of c diff in remote past     DVT Prophylaxis:  "DOAC  Code Status: Full Code  Expected discharge: Unclear. PT evaluate for dispo. Wean O2 as able.    Sister updated at bedside. Will plan to update wife this afternoon.    Fito Brenner MD    Interval History   No events. He tells me he feels \"a little better\" today but is functionally very declined.  No pain. No n/v. Fatigued just moving from chair back to bed.     -Data reviewed today: I reviewed all new labs and imaging results over the last 24 hours. I personally reviewed     Physical Exam   Temp: 98  F (36.7  C) Temp src: Oral BP: 112/47 Pulse: 81   Resp: 20 SpO2: 96 % O2 Device: Nasal cannula Oxygen Delivery: 3 LPM  Vitals:    04/25/24 0023 05/02/24 0453 05/03/24 0626   Weight: 63.1 kg (139 lb 1.8 oz) 68.9 kg (151 lb 14.4 oz) 62.5 kg (137 lb 12.6 oz)     Vital Signs with Ranges  Temp:  [97.8  F (36.6  C)-99.2  F (37.3  C)] 98  F (36.7  C)  Pulse:  [73-84] 81  Resp:  [18-25] 20  BP: (101-159)/(44-86) 112/47  FiO2 (%):  [35 %] 35 %  SpO2:  [91 %-97 %] 96 %  I/O last 3 completed shifts:  In: 1450 [P.O.:1450]  Out: 600 [Urine:600]    Constitutional:alert and oriented. Generally deconditioned.   HEENT: NCAT. EOMI.  Moderate elevation of JVD noted.   Respiratory: Still some persistent inspiratory crackles at the bases but moving air a little better.  No wheeze noted.  Cardiovascular: Regular rate and rhythm. No murmur.  GI: Soft, nontender, nondistended. Normoactive bowel sounds.   Musculoskeletal: No gross deformities. Mild edema  Neurologic: Alert and oriented x3. No focal neurologic deficits.     Medications   Current Facility-Administered Medications   Medication Dose Route Frequency Provider Last Rate Last Admin    No lozenges or gum should be given while patient on BIPAP/AVAPS/AVAPS AE   Does not apply Continuous PRN Ronak Bañuelos MD        Patient may continue current oral medications   Does not apply Continuous PRN Ronak Bañuelos MD         Current Facility-Administered Medications   Medication Dose Route " Frequency Provider Last Rate Last Admin    acetylcysteine (MUCOMYST) 10 % nebulizer solution 4 mL  4 mL Nebulization Q6H Guy Vega MD   4 mL at 05/05/24 0837    albuterol (PROVENTIL) neb solution 2.5 mg  2.5 mg Nebulization Q6H Guy Vega MD   2.5 mg at 05/05/24 0837    amoxicillin-clavulanate (AUGMENTIN) 875-125 MG per tablet 1 tablet  1 tablet Oral Q12H [facility] (08/20) Ronak Bañuelos MD   1 tablet at 05/05/24 0951    apixaban ANTICOAGULANT (ELIQUIS) tablet 5 mg  5 mg Oral BID Priti Tovar MD   5 mg at 05/05/24 0951    atorvastatin (LIPITOR) tablet 40 mg  40 mg Oral QPM Priti Tovar MD   40 mg at 05/04/24 2106    diclofenac (VOLTAREN) 1 % topical gel 4 g  4 g Topical 4x Daily Poornima Velez MD   4 g at 05/05/24 0952    digoxin (LANOXIN) tablet 125 mcg  125 mcg Oral QPM Priti Tovar MD   125 mcg at 05/04/24 2106    escitalopram (LEXAPRO) tablet 20 mg  20 mg Oral Daily Priti Tovar MD   20 mg at 05/05/24 0950    fluticasone-vilanterol (BREO ELLIPTA) 100-25 MCG/ACT inhaler 1 puff [patient supplied medication]  1 puff Inhalation Daily Priti Tovar MD   1 puff at 05/02/24 0757    furosemide (LASIX) injection 40 mg  40 mg Intravenous BID Fito Brenner MD   40 mg at 05/05/24 0956    metoprolol succinate ER (TOPROL XL) 24 hr tablet 50 mg  50 mg Oral At Bedtime Poornima Velez MD   50 mg at 05/04/24 2106    miconazole (MICATIN) 2 % powder   Topical BID Martin Will MD   Given at 05/04/24 2108    potassium & sodium phosphates (NEUTRA-PHOS) Packet 2 packet  2 packet Oral or Feeding Tube Q4H Fito Brenner MD   2 packet at 05/05/24 0949    sodium chloride (PF) 0.9% PF flush 3 mL  3 mL Intracatheter Q8H Priti Tovar MD   3 mL at 05/05/24 0957       Data   Recent Labs   Lab 05/05/24  0624 05/04/24  1311 05/04/24  0708 05/03/24  1001 05/03/24  0659 05/02/24  1345 05/02/24  0646   WBC  --   --  14.3* 16.7*  --   --  13.9*   HGB  --    --  9.5* 10.7*  --   --  9.6*   MCV  --   --  85 86  --   --  85   PLT  --   --  269 286  --   --  268     --  139  --  137  --  138   POTASSIUM 3.8 3.5 3.3*  --  3.9   < > 3.2*   CHLORIDE 95*  --  98  --  100  --  102   CO2 35*  --  34*  --  30*  --  27   BUN 13.8  --  11.2  --  10.8  --  12.8   CR 0.90  --  0.88  --  0.87  --  1.01   ANIONGAP 7  --  7  --  7  --  9   MELO 10.0  --  9.6  --  9.5  --  9.3   *  --  106*  --  116*  --  98    < > = values in this interval not displayed.       No results found for this or any previous visit (from the past 24 hour(s)).

## 2024-05-05 NOTE — PROGRESS NOTES
A BiPAP of  12/6 @ 35% was applied to the pt via the mask for overnight use.  The bridge of the nose looks good and remains intact. Pt is tolerating it well. Will continue to monitor and assess the pt's current respiratory status and needs.      Bianca Schwartz, RT

## 2024-05-06 NOTE — PROGRESS NOTES
[facility]    Medicine Progress Note - Hospitalist Service    Date of Admission:  4/25/2024    Assessment & Plan    Jaylen Lamas is a 73 year old man w PMH significant for COPD, tobacco use, HFpEF, bioprosthetic aortic valve replacement, bioprosthetic mitral valve replacement, paroxysmal atrial fibrillation s/p ablation and MAZE procedure, 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 difficile, hypertension, CKD, history of duodenal ulcer, depression/anxiety, testicular cancer (s/p orchiectomy and XRT in 1982) who presents with shortness of breath.     Poor prognosis  -chronically ill appearing with multiple chronic medical problems, not significantly improved despite two hospitalization 3 days apart  -seen by palliative care and patient/family elect for restorative measures and full code  -not candidate for TCU any longer due to poor functional status, now will need long term care  -recommended hospice to him but not agreeable at this time     Recurrent acute hypoxemic respiratory failure  Sepsis  recurrent aspiration pneumonia  pulmonary HTN and RV pressure overload  -Recent history is most significant for pneumonia for which he was hospitalized from 4/19-4/22/2024 and discharged on Augmentin. During that admission, he had reported that his sx had been going on for about 10 weeks prior to the worsening.  During recent hospitalization 4- to 4/22/2024 patient underwent bronchoscopy with BAL positive for CMV, EBV and Candida.  Bacterial cultures were negative.  Patient was treated with Zosyn and azithromycin while in hospital and discharged on Augmentin.  Underwent workup for possible aspiration.  -Mr. Reeder returned on 4/25 with worsening shortness of breath, cough, and fever for which he was again admitted. Patient is being followed by pulmonology and infectious disease.  Patient was treated with IV Zosyn (4/25 - 4/30) and  transition to Augmentin therapy for 10-day course.  -Seen by ST and  needs reinforcement of aspiration precautions.  Needs to be fully upright 30 minutes after eating/drinking.  -previously seen by pulmonology, nothing further to add. Cont bronchodilators and swallow precautions  -Patient did require BiPAP on 4/28 and has been using intermittently since (mainly at night-time)  -TTE w/pulmonary HTN with RV volume overload with preservation of RV function.   -started on IV lasix, dose decreased to 40 IV daily due to rising bicarb  -ID following, plan to complete course of augmentin in 2 days  -remains on 3L NC, doubt we will be able to get him off with recurrent aspirations, pneumonias and poor functional status     HFpEF   pulm HTN, R ventricular pressure increased  Mitral and Aortic bioprosthetic valve replacements  A fib, s/p MAZE procedure and now with pacemaker.  Non-obstructive CAD  -Continue statin, digoxin, metoprolol, apixaban. Lasix as above.      Anemia. Iron deficiency:  Ferritin 4 on 4/26/24. RLS may also be the result of low iron. Treating with IV iron infusions during hospitalization.      Moderate malnutrition in the setting of acute illness superimposed on chronic illness. Nutrition consulted     Hypokalemia, hyphophosphatemia. hypomagnesemia: Replacement     Hx duodenal ulcer  Hx of c diff in remote past        Diet: Combination Diet 2 gm NA Diet  Snacks/Supplements Adult: Ensure Enlive; With Meals    DVT Prophylaxis: DOAC  Krueger Catheter: Not present  Lines: None     Cardiac Monitoring: None  Code Status: Full Code      Disposition Plan     Medically Ready for Discharge: Anticipated in 2-4 Days         Alberto Nguyễn DO  Hospitalist Service  [facility]  Securely message with [facility] (more info)  Text page via [facility] Paging/Directory   ______________________________________________________________________    Interval History   No overnight events, still requiring oxygen at 3L.  Occasional cough, no fevers overnight. Remains in poor overall shape. Him and his family talked to palliative care today and they want restorative cares    Physical Exam   Vital Signs: Temp: 98.7  F (37.1  C) Temp src: Oral BP: 104/43 Pulse: 81   Resp: 18 SpO2: 95 % O2 Device: Nasal cannula Oxygen Delivery: 3 LPM  Weight: 137 lbs 12.6 oz    Constitutional: awake, alert, and cooperative. Chronically ill appearing  Eyes: pupils equal, round and reactive to light and conjunctiva normal  ENT: normocephalic, without obvious abnormality, atraumatic  Respiratory: ronchi bilaterally, no wheezing, on oxygen, occasional cough  Cardiovascular: regular rate and rhythm and no murmur noted  GI: normal bowel sounds, soft, and non-distended  Skin: no bruising or bleeding  Neurologic: alert, oriented, no focal deficits but generally very weak      45 MINUTES SPENT BY ME on the date of service doing chart review, history, exam, documentation & further activities per the note.      Data   ------------------------- PAST 24 HR DATA REVIEWED -----------------------------------------------    I have personally reviewed the following data over the past 24 hrs:    14.9 (H)  \   10.2 (L)   / 243     136 93 (L) 17.7 /  116 (H)   4.0 34 (H) 0.97 \       Imaging results reviewed over the past 24 hrs:   No results found for this or any previous visit (from the past 24 hour(s)).

## 2024-05-06 NOTE — PROGRESS NOTES
[facility]  Infectious Disease Progress Note          Assessment and Plan:   Date of Admission:  4/25/2024  Date of Consult (When I saw the patient): 04/25/24        Assessment & Plan  Jaylen Lamas is a 73 year old who was admitted on 4/25/2024.      Impression: 1 73-year-old male, well-known to me, just discharged from the hospital now readmitted, unclear whether this is still all part of #2 below or some new infection, quite unlikely this is new bacterial infection, no positive microbiology, bronchoscopy cultures all unremarkable, recent and prior courses of antibiotics without particular benefit suspect this is not bacterial infection currently  2 2 and half month illness with intermittent fever, weight loss, malaise and fatigue and respiratory symptoms, prior antibiotic courses without resolution or benefit no positive microbiology, recent bronchoscopy without so far obvious cause  3 CMV, EBV and Candida in bronchoscopy very unlikely clinically relevant in this clinical scenario  4 intermittent diarrhea again now rule out new C. difficile, is prior C. 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     REC 1 somewhat improved again, still hypoxia, ongoing breathing issues, likely this is going to be to some degree chronic and recurrent problem regardless of antibiotic strategy.  I favor finishing 1 week of oral Augmentin with 2 more days then likely stop antibiotics, no real prophylactic advantage with aspiration to do ongoing antibiotic                 Interval History:     Intermittently worse respiratory status and feels worse without obvious signs of sepsis or positive microbiology of note  though no fever, no positive microbiology on any of the studies that serves any significance.              Medications:     Current Facility-Administered Medications   Medication Dose Route Frequency Provider Last Rate Last Admin    acetylcysteine (MUCOMYST) 10 % nebulizer solution 4 mL  4 mL Nebulization Q6H Guy eVga MD   4 mL at 05/06/24 0752    albuterol (PROVENTIL) neb solution 2.5 mg  2.5 mg Nebulization Q6H Guy Vega MD   2.5 mg at 05/06/24 0752    amoxicillin-clavulanate (AUGMENTIN) 875-125 MG per tablet 1 tablet  1 tablet Oral Q12H [facility] (08/20) Ronak Bañuelos MD   1 tablet at 05/06/24 1019    apixaban ANTICOAGULANT (ELIQUIS) tablet 5 mg  5 mg Oral BID Priti Tovar MD   5 mg at 05/06/24 1019    atorvastatin (LIPITOR) tablet 40 mg  40 mg Oral QPM Priti Tovar MD   40 mg at 05/05/24 2147    diclofenac (VOLTAREN) 1 % topical gel 4 g  4 g Topical 4x Daily Poornima Velez MD   4 g at 05/06/24 1020    digoxin (LANOXIN) tablet 125 mcg  125 mcg Oral QPM Priti Tovar MD   125 mcg at 05/05/24 2143    escitalopram (LEXAPRO) tablet 20 mg  20 mg Oral Daily Priti Tovar MD   20 mg at 05/06/24 1019    fluticasone-vilanterol (BREO ELLIPTA) 100-25 MCG/ACT inhaler 1 puff [patient supplied medication]  1 puff Inhalation Daily Priti Tovar MD   1 puff at 05/02/24 0757    furosemide (LASIX) injection 40 mg  40 mg Intravenous Daily Fito Brenner MD   40 mg at 05/06/24 1019    metoprolol succinate ER (TOPROL XL) 24 hr tablet 50 mg  50 mg Oral At Bedtime Poornima Velez MD   50 mg at 05/05/24 2147    miconazole (MICATIN) 2 % powder   Topical BID Martin Will MD   Given at 05/06/24 1020    potassium & sodium phosphates (NEUTRA-PHOS) Packet 1 packet  1 packet Oral or Feeding Tube Q4H Alberto Nguyễn, DO   1 packet at 05/06/24 1232    sodium chloride (PF) 0.9% PF flush 3 mL  3 mL Intracatheter Q8H Priti Tovar MD   3 mL at 05/06/24  "1018                  Physical Exam:   Blood pressure 104/43, pulse 81, temperature 98.7  F (37.1  C), temperature source Oral, resp. rate 18, height 1.702 m (5' 7\"), weight 62.5 kg (137 lb 12.6 oz), SpO2 95%.  Wt Readings from Last 2 Encounters:   05/03/24 62.5 kg (137 lb 12.6 oz)   04/22/24 66.1 kg (145 lb 11.6 oz)     Vital Signs with Ranges  Temp:  [97.9  F (36.6  C)-98.7  F (37.1  C)] 98.7  F (37.1  C)  Pulse:  [76-86] 81  Resp:  [16-30] 18  BP: ()/(41-48) 104/43  FiO2 (%):  [35 %] 35 %  SpO2:  [93 %-98 %] 95 %    Constitutional: Awake, alert, cooperative, no apparent distress looks similarly chronically ill not particularly acutely ill on low-flow oxygen     Lungs: Clear to auscultation bilaterally, no crackles or wheezing   Cardiovascular: Regular rate and rhythm, normal S1 and S2, and no murmur noted   Abdomen: Normal bowel sounds, soft, non-distended, non-tender   Skin: No rashes, no cyanosis, no edema   Other:           Data:   All microbiology laboratory data reviewed.  Recent Labs   Lab Test 05/06/24  0551 05/04/24  0708 05/03/24  1001   WBC 14.9* 14.3* 16.7*   HGB 10.2* 9.5* 10.7*   HCT 34.0* 31.6* 35.8*   MCV 85 85 86    269 286     Recent Labs   Lab Test 05/06/24  0551 05/05/24  0624 05/04/24  0708   CR 0.97 0.90 0.88     No lab results found.  No lab results found.    Invalid input(s): \"UC\"     "

## 2024-05-06 NOTE — PROGRESS NOTES
CLINICAL NUTRITION SERVICES - REASSESSMENT NOTE     Nutrition Prescription    Malnutrition Status:    % Weight Loss:  Unable to determine, limited trends overall and suspect currently masked by fluid   % Intake:  <75% for > 7 days (moderate malnutrition)  Subcutaneous Fat Loss:  Upper arm region mild to moderate depletion, buccal- moderate   Muscle Loss:  Temporal region mild to moderate depletion, Clavicle bone region mild to moderate depletion, and Acromion bone region moderate depletion, thoracic- moderate  Fluid Retention:  Mild     Malnutrition Diagnosis: Severe malnutrition  In Context of:  Acute illness or injury  Chronic illness or disease    Recommendations already ordered by Registered Dietitian (RD):  Medical food supplement therapy- decrease Ensure Enlive to daily with option to order additional prn, added Magic Cup daily  Multivitamin/mineral supplement therapy    Future/Additional Recommendations:  Monitor intake, acceptance of supplements--adjust prn, monitor weight trends     EVALUATION OF THE PROGRESS TOWARD GOALS   Diet: 2 g Sodium    Intake/Tolerance:  Many meals not documented, but patient appears to be consuming [phone] kcal (based on orders and only 1 meal for this day) and 12-42 g protein/day. His intake has worsened significantly over past 3 days.     NEW FINDINGS   General: Pt reports decreased intake d/t poor PO. He would like to continue to receive the Ensure Enlive but only once per day and is willing to try Magic Cup.     Weight:   Date/Time Weight Weight Method   05/03/24 0626 62.5 kg (137 lb 12.6 oz) Bed scale   05/02/24 0453 68.9 kg (151 lb 14.4 oz) Bed scale   04/25/24 0023 63.1 kg (139 lb 1.8 oz) --       Labs:   - Na 1.6  - P 2.2  - WBC 14.9    GI: 1-4 BM/day, has been having diarrhea off and on, is notably on Augmentin    Skin: no concerns noted    Meds:   - Augmentin  - Neutra-Phos    ASSESSED NUTRITION NEEDS PER APPROVED PRACTICE GUIDELINES  Dosing Weight 59 kg - dry wt from  last admit?  Estimated Energy Needs: 9276-2948 kcal/day (30-35 Kcal/Kg)  Justification: repletion  Estimated Protein Needs: 89+ grams/day (>/=1.5 g pro/Kg)  Justification: Repletion and preservation of lean body mass  Estimated Fluid Needs: per MD    MALNUTRITION  % Weight Loss:  Unable to determine, limited trends overall and suspect currently masked by fluid   % Intake:  <75% for > 7 days (moderate malnutrition)  Subcutaneous Fat Loss:  Upper arm region mild to moderate depletion, buccal- moderate   Muscle Loss:  Temporal region mild to moderate depletion, Clavicle bone region mild to moderate depletion, and Acromion bone region moderate depletion, thoracic- moderate  Fluid Retention:  Mild     Malnutrition Diagnosis: Severe malnutrition  In Context of:  Acute illness or injury  Chronic illness or disease    Previous Goals   PO intakes of at least 50-75% of meals or snacks TID vs possible need to consider additional nutrition interventions.   Evaluation: Not met    Previous Nutrition Diagnosis  Malnutrition related to decreased oral intake with low appetite and early satiety, component of acute on chronic pulmonary vs acute infection? as evidenced by meeting <75% of nutrition needs or less for a period of weeks w/ wt loss masked by fluid, fat/muscle loss.   Evaluation: Declining    CURRENT NUTRITION DIAGNOSIS  Malnutrition related to decreased oral intake with low appetite and early satiety, component of acute on chronic pulmonary vs acute infection? as evidenced by meeting <75% of nutrition needs or less for a period of weeks w/ wt loss masked by fluid, fat/muscle loss.     INTERVENTIONS  Implementation  Medical food supplement therapy- decrease Ensure Enlive to daily with option to order additional prn, added Magic Cup daily  Multivitamin/mineral supplement therapy    Goals  Patient to consume % of nutritionally adequate meal trays TID, or the equivalent with  supplements/snacks.    Monitoring/Evaluation  Progress toward goals will be monitored and evaluated per protocol.    Jess Wiley RD, LD, [facility]  Pager - 3rd floor/ICU: [phone]  Pager - All other floors: [phone]  Pager - Weekend/holiday: [phone]  Office: [phone]

## 2024-05-06 NOTE — PLAN OF CARE
Problem: Malnutrition  Goal: Improved Nutritional Intake  Outcome: Not Progressing   Goal Outcome Evaluation: intake has decreased significantly over the past few days, pt reports poor appetite. Adjusted supplements, added mvit/M for patient. RD following closely.

## 2024-05-06 NOTE — CONSULTS
Palliative Care Consultation Note  [facility]      Patient: Jaylen Lamas  Date of Admission:  4/25/2024    Requesting Clinician / Team: hospitalist  Reason for consult: Goals of care  Decisional support  Patient and family support       Recommendations & Counseling     GOALS OF CARE:   Restorative without limits   He is willing to go to TCU in the hopes to get back home as able  Addressed code status today as well and patient, wife and DIL all report that he has had CPR and intubation in the past and would be ok to have this again.     ADVANCE CARE PLANNING:  No health care directive on file. Per  informed consent policy, next of kin should be involved if patient becomes unable.  Wife is NOK- Dulce Maria  There is no POLST form on file, defer to patient and/or next of kin for decisions   Code status: Full Code    MEDICAL MANAGEMENT:   #Dyspnea,COPD  Repositioning as able  for comfort  Continue with oxygen per medical team  Continue with PRN bipap    #General Weakness/Debility   Patient is open to going to TCU  Appreciate the input of therapies for discharge recommendations    PSYCHOSOCIAL/SPIRITUAL SUPPORT:  Family His and her children-Patient has tow adult children and wife has one adult living child.   [facility]: [facility]     Palliative Care will continue to follow. Thank you for the consult and allowing us to aid in the care of Jaylen Lamas.    These recommendations have been discussed with medical team.    Yesenia Dior NP  Securely message with [employer] (more info)  Text page via [facility] Paging/Directory       Assessment      Jaylen Lamas is a 73 year old male with a past medical history of COPD, tobacco use, heart failure,  bioprosthetic aortic valve replacement, bioprosthetic mitral valve replacement, paroxysmal atrial fibrillation s/p ablation and MAZE procedure, 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  difficile, hypertension, CKD, history of duodenal ulcer, depression/anxiety, testicular cancer s/p orchiectomy who presented on 4/25 with shortness of breath, cough and fever-recently discharged on 4/22 with pneumonia.      Today, the patient was seen for:  Goals of care    Palliative Care Summary:   Met with patient, wife and DIL at the bedside.   I introduced our role as an extra layer of support and how we help patients and families dealing with serious, potentially life-limiting illnesses. I explained the composition of the palliative care team.  Palliative care helps patients and families navigate their care while focusing on the whole person; providing emotional, social and spiritual support  Palliative care often assists with symptom management, information sharing about what to expect from the illness, available treatment options and what effect those options may have on the disease course, and provide effective communication and caring support.    Prognosis, Goals, & Planning:    Functional Status just prior to this current hospitalization:  Weight loss of 7-10  pounds over the last 6 months  Overall decline in condition    Prognosis, Goals, and/or Advance Care Planning:  Discussed what continuing restorative/life-prolonging care entails, including continued (re)admissions to the hospital, continuing with preventative and primary care, seeing disease/organ specific specialty consultations for medical treatments in hopes to prolong life for as long as possible.      Code Status was addressed today:   Yes, We discussed potential risks and rationale of attempting cardiac resuscitation, intubation, and mechanical ventilation.  We also discussed probability of survival as well as quality of life implications.  Based on this discussion, patient or surrogate response/decision: he wishes to remain full code          Patient has decision-making capacity today for complex decisions: Questionable            Coping,  Meaning, & Spirituality:   Mood, coping, and/or meaning in the context of serious illness were addressed today: Yes    Social:   Living situation:lives with significant other/spouse    Medications:  I have reviewed this patient's medication profile and medications from this hospitalization. Notable medications: none     ROS:  Comprehensive ROS is reviewed and is negative except as here & per HPI:     Physical Exam   Vital Signs with Ranges  Temp:  [97.9  F (36.6  C)-98  F (36.7  C)] 97.9  F (36.6  C)  Pulse:  [76-86] 83  Resp:  [16-30] 18  BP: ()/(41-48) 101/48  FiO2 (%):  [35 %] 35 %  SpO2:  [93 %-98 %] 94 %  137 lbs 12.6 oz    PHYSICAL EXAM:  Constitutional: alert and no distress   Cardiovascular: negative  Respiratory: positive findings: congested cough, on supplemental oxygen, wheezes throughout  Psychiatric: mentation appears normal and affect normal/bright  Abdomen: Abdomen soft, non-tender. BS normal. No masses, organomegaly    Data reviewed:  Results for orders placed or performed during the hospital encounter of 04/25/24 (from the past 24 hour(s))   Phosphorus   Result Value Ref Range    Phosphorus 2.1 (L) 2.5 - 4.5 mg/dL   Basic metabolic panel   Result Value Ref Range    Sodium 136 135 - 145 mmol/L    Potassium 4.0 3.4 - 5.3 mmol/L    Chloride 93 (L) 98 - 107 mmol/L    Carbon Dioxide (CO2) 34 (H) 22 - 29 mmol/L    Anion Gap 9 7 - 15 mmol/L    Urea Nitrogen 17.7 8.0 - 23.0 mg/dL    Creatinine 0.97 0.67 - 1.17 mg/dL    GFR Estimate 83 >60 mL/min/1.73m2    Calcium 9.7 8.8 - 10.2 mg/dL    Glucose 116 (H) 70 - 99 mg/dL   CBC with platelets   Result Value Ref Range    WBC Count 14.9 (H) 4.0 - 11.0 10e3/uL    RBC Count 4.02 (L) 4.40 - 5.90 10e6/uL    Hemoglobin 10.2 (L) 13.3 - 17.7 g/dL    Hematocrit 34.0 (L) 40.0 - 53.0 %    MCV 85 78 - 100 fL    MCH 25.4 (L) 26.5 - 33.0 pg    MCHC 30.0 (L) 31.5 - 36.5 g/dL    RDW 15.6 (H) 10.0 - 15.0 %    Platelet Count 243 150 - 450 10e3/uL   Magnesium   Result Value  Ref Range    Magnesium 1.6 (L) 1.7 - 2.3 mg/dL   Phosphorus   Result Value Ref Range    Phosphorus 2.2 (L) 2.5 - 4.5 mg/dL       Medical Decision Making       90 MINUTES SPENT BY ME on the date of service doing chart review, history, exam, documentation & further activities per the note.

## 2024-05-06 NOTE — PLAN OF CARE
"  Problem: Adult Inpatient Plan of Care  Goal: Plan of Care Review  Description: The Plan of Care Review/Shift note should be completed every shift.  The Outcome Evaluation is a brief statement about your assessment that the patient is improving, declining, or no change.  This information will be displayed automatically on your shift  note.  Outcome: Progressing  Flowsheets (Taken 5/5/2024 2305)  Outcome Evaluation: wife present and supportive. dtr visiting with her  and daughter and supportive, giving her dad a back rub. pt looked very comfortable. hob elevated to 45 degrees. incontinent, linens changed and up in chair. sleeping between cares. poor intake.  Plan of Care Reviewed With: patient  Overall Patient Progress: improving  Goal: Patient-Specific Goal (Individualized)  Description: You can add care plan individualizations to a care plan. Examples of Individualization might be:  \"Parent requests to be called daily at 9am for status\", \"I have a hard time hearing out of my right ear\", or \"Do not touch me to wake me up as it startles  me\".  Outcome: Progressing  Goal: Readiness for Transition of Care  Outcome: Progressing     Problem: Pneumonia  Goal: Fluid Balance  Outcome: Progressing  Goal: Resolution of Infection Signs and Symptoms  Outcome: Progressing  Intervention: Prevent Infection Progression  Recent Flowsheet Documentation  Taken 5/5/2024 1617 by Tasneem Mtz RN  Infection Management: aseptic technique maintained  Goal: Effective Oxygenation and Ventilation  Outcome: Progressing  Intervention: Promote Airway Secretion Clearance  Recent Flowsheet Documentation  Taken 5/5/2024 1617 by Tasneem Mtz, RN  Cough And Deep Breathing: done with encouragement  Intervention: Optimize Oxygenation and Ventilation  Recent Flowsheet Documentation  Taken 5/5/2024 1617 by Tasneem Mzt RN  Airway/Ventilation Management: airway patency maintained  Head of Bed (HOB) Positioning: HOB at 45 degrees     Problem: Gas " Exchange Impaired  Goal: Optimal Gas Exchange  Outcome: Progressing  Intervention: Optimize Oxygenation and Ventilation  Recent Flowsheet Documentation  Taken 5/5/2024 1617 by Tasneem Mtz RN  Airway/Ventilation Management: airway patency maintained  Head of Bed (HOB) Positioning: HOB at 45 degrees     Problem: Malnutrition  Goal: Improved Nutritional Intake  Outcome: Progressing     Problem: Fall Injury Risk  Goal: Absence of Fall and Fall-Related Injury  Outcome: Progressing  Intervention: Identify and Manage Contributors  Recent Flowsheet Documentation  Taken 5/5/2024 1617 by Tasneem Mtz RN  Medication Review/Management: medications reviewed  Intervention: Promote Injury-Free Environment  Recent Flowsheet Documentation  Taken 5/5/2024 1617 by Tasneem Mtz, RN  Safety Promotion/Fall Prevention:   activity supervised   clutter free environment maintained   lighting adjusted   nonskid shoes/slippers when out of bed   room door open   room near nurse's station   room organization consistent   safety round/check completed   supervised activity   Goal Outcome Evaluation:      Plan of Care Reviewed With: patient    Overall Patient Progress: improvingOverall Patient Progress: improving    Outcome Evaluation: wife present and supportive. dtr visiting with her  and daughter and supportive, giving her dad a back rub. pt looked very comfortable. hob elevated to 45 degrees. incontinent, linens changed and up in chair. sleeping between cares. poor intake.

## 2024-05-06 NOTE — PLAN OF CARE
"      Plan of Care Reviewed With: patient    Overall Patient Progress: improvingOverall Patient Progress: improving    Outcome Evaluation: VSS, 2Liters NC. On Augumentin q12hrs.    Goal Outcome Evaluation:  Alert and oriented flat affect.  On 3L NC, refusing CPAP at night. SOB on exertion. Head elevated 45 degree. Saline locked. Incontinent B&B. Denied pain. On mag, K, Phos protocol. Plan for consult with palliative care today.       Problem: Adult Inpatient Plan of Care  Goal: Plan of Care Review  Description: The Plan of Care Review/Shift note should be completed every shift.  The Outcome Evaluation is a brief statement about your assessment that the patient is improving, declining, or no change.  This information will be displayed automatically on your shift  note.  Outcome: Not Progressing  Flowsheets (Taken 5/6/2024 0726)  Outcome Evaluation: VSS, 2Liters NC. On Augumentin q12hrs.  Plan of Care Reviewed With: patient  Overall Patient Progress: improving  Goal: Patient-Specific Goal (Individualized)  Description: You can add care plan individualizations to a care plan. Examples of Individualization might be:  \"Parent requests to be called daily at 9am for status\", \"I have a hard time hearing out of my right ear\", or \"Do not touch me to wake me up as it startles  me\".  Outcome: Not Progressing  Goal: Readiness for Transition of Care  Outcome: Not Progressing     Problem: Pneumonia  Goal: Fluid Balance  Outcome: Not Progressing  Goal: Resolution of Infection Signs and Symptoms  Outcome: Not Progressing  Intervention: Prevent Infection Progression  Recent Flowsheet Documentation  Taken 5/6/2024 0347 by Batsheva Nelson RN  Infection Management: aseptic technique maintained  Goal: Effective Oxygenation and Ventilation  Outcome: Not Progressing  Intervention: Promote Airway Secretion Clearance  Recent Flowsheet Documentation  Taken 5/6/2024 0347 by Batsheva Nelson RN  Cough And Deep Breathing: done with " encouragement  Intervention: Optimize Oxygenation and Ventilation  Recent Flowsheet Documentation  Taken 5/6/2024 0347 by Batsheva Nelson RN  Airway/Ventilation Management: airway patency maintained  Head of Bed (HOB) Positioning: HOB at 45 degrees     Problem: Gas Exchange Impaired  Goal: Optimal Gas Exchange  Outcome: Not Progressing  Intervention: Optimize Oxygenation and Ventilation  Recent Flowsheet Documentation  Taken 5/6/2024 0347 by Batsheva Nelson RN  Airway/Ventilation Management: airway patency maintained  Head of Bed (HOB) Positioning: HOB at 45 degrees     Problem: Malnutrition  Goal: Improved Nutritional Intake  Outcome: Not Progressing     Problem: Fall Injury Risk  Goal: Absence of Fall and Fall-Related Injury  Outcome: Not Progressing  Intervention: Identify and Manage Contributors  Recent Flowsheet Documentation  Taken 5/6/2024 0347 by Batsheva Nelson RN  Medication Review/Management: medications reviewed  Intervention: Promote Injury-Free Environment  Recent Flowsheet Documentation  Taken 5/6/2024 0347 by Batsheva Nelson, RN  Safety Promotion/Fall Prevention:   activity supervised   clutter free environment maintained   lighting adjusted   nonskid shoes/slippers when out of bed   room door open   room near nurse's station   room organization consistent   safety round/check completed   supervised activity

## 2024-05-06 NOTE — PROGRESS NOTES
Care Management Follow Up    Length of Stay (days): 11    Expected Discharge Date: 05/06/2024     Concerns to be Addressed: discharge planning     Patient plan of care discussed at interdisciplinary rounds: Yes    Anticipated Discharge Disposition: Transitional Care     Anticipated Discharge Services: None  Anticipated Discharge DME:      Patient/family educated on Medicare website which has current facility and service quality ratings:    Education Provided on the Discharge Plan:    Patient/Family in Agreement with the Plan: yes    Referrals Placed by CM/SW: Post Acute Facilities      Additional Information:  Spoke with [facility]. They can no longer accept in TCU as the bed is no longer available. They may have a LTC bed opened if patient elects for LTC. SW will follow for GOC.     Addendum 1429: SW spoke with wife. Referral at [facility] is still pending. If they cannot accept, family prefers [facility].     Addendum 1520: Updated wife on [facility] acceptance. She can still transport at discharge.     ANA Walsh, [facility]  Emergency Room   Please contact the SW on the floor in which the patient is staying for any questions or concerns

## 2024-05-07 NOTE — PLAN OF CARE
"Pt is confused. Has an infrequent congested cough. Tessalon given. Lung sounds coarse. Non labored breathing. On 2L of O2 NC saturating at95%. Had a BM today. Incontinent of bowel and bladder. Denies pain. Asssisf of 2 with lift. Skin is moist,red and hurts when doing cares. Barrier cream and powder applied. Mepilex applied. 2g sodium diet. Was pulling at CPAP, decided to put back on NC.     Problem: Adult Inpatient Plan of Care  Goal: Plan of Care Review  Description: The Plan of Care Review/Shift note should be completed every shift.  The Outcome Evaluation is a brief statement about your assessment that the patient is improving, declining, or no change.  This information will be displayed automatically on your shift  note.  Outcome: Progressing  Flowsheets (Taken 5/7/2024 0256)  Outcome Evaluation: 2L of O2, denies pain, tessalon given for imfrequent cough  Plan of Care Reviewed With: patient  Overall Patient Progress: improving  Goal: Patient-Specific Goal (Individualized)  Description: You can add care plan individualizations to a care plan. Examples of Individualization might be:  \"Parent requests to be called daily at 9am for status\", \"I have a hard time hearing out of my right ear\", or \"Do not touch me to wake me up as it startles  me\".  Outcome: Progressing  Goal: Readiness for Transition of Care  Outcome: Progressing     Problem: Pneumonia  Goal: Fluid Balance  Outcome: Progressing  Intervention: Monitor and Manage Fluid Balance  Recent Flowsheet Documentation  Taken 5/7/2024 0247 by Karen Hernandez, RN  Fluid/Electrolyte Management: fluids provided  Taken 5/6/2024 7451 by Karen Hernandez RN  Fluid/Electrolyte Management: fluids provided  Goal: Resolution of Infection Signs and Symptoms  Outcome: Progressing  Goal: Effective Oxygenation and Ventilation  Outcome: Progressing  Intervention: Promote Airway Secretion Clearance  Recent Flowsheet Documentation  Taken 5/7/2024 0247 by Karen Hernandez RN  Cough And " Deep Breathing: done independently per patient  Taken 5/6/2024 2259 by Karen Hernandez RN  Cough And Deep Breathing: done independently per patient  Intervention: Optimize Oxygenation and Ventilation  Recent Flowsheet Documentation  Taken 5/7/2024 0247 by Karen Hernandez RN  Head of Bed ([facility]) Positioning: HOB at 45 degrees  Taken 5/6/2024 2259 by Karen Hernandez RN  Head of Bed ([facility]) Positioning: HOB at 45 degrees     Problem: Gas Exchange Impaired  Goal: Optimal Gas Exchange  Outcome: Progressing  Intervention: Optimize Oxygenation and Ventilation  Recent Flowsheet Documentation  Taken 5/7/2024 0247 by Karen Hernandez RN  Head of Bed ([facility]) Positioning: HOB at 45 degrees  Taken 5/6/2024 2259 by Karen Hernandez RN  Head of Bed ([facility]) Positioning: HOB at 45 degrees     Problem: Malnutrition  Goal: Improved Nutritional Intake  Outcome: Progressing     Problem: Fall Injury Risk  Goal: Absence of Fall and Fall-Related Injury  Outcome: Progressing  Intervention: Identify and Manage Contributors  Recent Flowsheet Documentation  Taken 5/7/2024 0247 by Karen Hernandez RN  Medication Review/Management: medications reviewed  Taken 5/6/2024 2259 by Karen Hernandez RN  Medication Review/Management: medications reviewed  Intervention: Promote Injury-Free Environment  Recent Flowsheet Documentation  Taken 5/7/2024 0247 by Karen Hernandez RN  Safety Promotion/Fall Prevention:   safety round/check completed   room organization consistent   room near nurse's station   nonskid shoes/slippers when out of bed   mobility aid in reach   lighting adjusted   clutter free environment maintained  Taken 5/6/2024 2259 by Karen Hernandez RN  Safety Promotion/Fall Prevention:   safety round/check completed   room organization consistent   room near nurse's station   nonskid shoes/slippers when out of bed   mobility aid in reach   lighting adjusted   clutter free environment maintained   Goal Outcome Evaluation:      Plan of Care Reviewed With:  patient    Overall Patient Progress: improvingOverall Patient Progress: improving    Outcome Evaluation: 2L of O2, denies pain, tessalon given for imfrequent cough

## 2024-05-07 NOTE — PLAN OF CARE
"A/O2-3. Confused. External cath removed d/t redness. Mepilex on bottom. SOB. K, Mag, Phos AM Redraws. Phos replaced. Denies pain. Bed alarm on.     Goal Outcome Evaluation:      Plan of Care Reviewed With: patient, spouse    Overall Patient Progress: no changeOverall Patient Progress: no change    Outcome Evaluation: Stable on 3L NC. Confused.      Problem: Adult Inpatient Plan of Care  Goal: Plan of Care Review  Description: The Plan of Care Review/Shift note should be completed every shift.  The Outcome Evaluation is a brief statement about your assessment that the patient is improving, declining, or no change.  This information will be displayed automatically on your shift  note.  Outcome: Progressing  Flowsheets (Taken 5/6/2024 1835)  Outcome Evaluation: Stable on 3L NC. Confused.  Plan of Care Reviewed With:   patient   spouse  Overall Patient Progress: no change  Goal: Patient-Specific Goal (Individualized)  Description: You can add care plan individualizations to a care plan. Examples of Individualization might be:  \"Parent requests to be called daily at 9am for status\", \"I have a hard time hearing out of my right ear\", or \"Do not touch me to wake me up as it startles  me\".  Outcome: Progressing  Goal: Absence of Hospital-Acquired Illness or Injury  Intervention: Identify and Manage Fall Risk  Recent Flowsheet Documentation  Taken 5/6/2024 1723 by Acacia Prather, RN  Safety Promotion/Fall Prevention: safety round/check completed  Taken 5/6/2024 1457 by Acacia Prather, RN  Safety Promotion/Fall Prevention: safety round/check completed  Taken 5/6/2024 1229 by Acacia Prather RN  Safety Promotion/Fall Prevention: safety round/check completed  Taken 5/6/2024 1030 by Acacia Prather, RN  Safety Promotion/Fall Prevention: safety round/check completed  Intervention: Prevent Skin Injury  Recent Flowsheet Documentation  Taken 5/6/2024 1457 by Acacia Prather, RN  Body Position: supine, head elevated  Device " Skin Pressure Protection: absorbent pad utilized/changed  Taken 5/6/2024 1229 by Acacia Prather RN  Body Position: supine, head elevated  Taken 5/6/2024 1030 by Acacia Prather RN  Body Position: supine, head elevated  Intervention: Prevent and Manage VTE (Venous Thromboembolism) Risk  Recent Flowsheet Documentation  Taken 5/6/2024 1030 by Acacia Prather RN  VTE Prevention/Management: SCDs (sequential compression devices) off  Intervention: Prevent Infection  Recent Flowsheet Documentation  Taken 5/6/2024 1723 by Acacia Prather RN  Infection Prevention:   rest/sleep promoted   hand hygiene promoted  Goal: Readiness for Transition of Care  Outcome: Progressing

## 2024-05-07 NOTE — PROGRESS NOTES
Care Management Follow Up    Length of Stay (days): 12    Expected Discharge Date: 05/08/2024     Concerns to be Addressed: discharge planning     Patient plan of care discussed at interdisciplinary rounds: Yes    Anticipated Discharge Disposition: [facility] Transitional Care     Anticipated Discharge Services: None  Anticipated Discharge DME:  none    Patient/family educated on Medicare website which has current facility and service quality ratings:  yes  Education Provided on the Discharge Plan:    Patient/Family in Agreement with the Plan: yes    Referrals Placed by CM/SW: Post Acute Facilities      Additional Information:  CM following for discharge planning needs and Transitional Care Unit. Patient has been accepted by [facility] Transitional Care Unit. Per MD today, patient will be ready in the next 1-2 days. Message left with [facility] admissions updating on discharge plan in 1-2 days and asking about when they would have bed available. Awaiting return call. Will cont to follow for discharge planning.     ADDENDUM 1050:  Call received from [facility] that they can accept patient whenever he is ready for discharge. CM will update them when he is ready. Anticipate in the next 1-2 days.           Laurence Rojas RN BSN CM  Inpatient Care Coordination  [facility]  [phone]

## 2024-05-07 NOTE — PROGRESS NOTES
[facility]    Medicine Progress Note - Hospitalist Service    Date of Admission:  4/25/2024    Assessment & Plan   Jaylen Lamas is a 73 year old man w PMH significant for COPD, tobacco use, HFpEF, bioprosthetic aortic valve replacement, bioprosthetic mitral valve replacement, paroxysmal atrial fibrillation s/p ablation and MAZE procedure, 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 difficile, hypertension, CKD, history of duodenal ulcer, depression/anxiety, testicular cancer (s/p orchiectomy and XRT in 1982) who presents with shortness of breath.     Poor prognosis  -chronically ill appearing with multiple chronic medical problems, not significantly improved despite two hospitalization 3 days apart  -seen by palliative care and patient/family elect for restorative measures and full code  -not candidate for TCU any longer due to poor functional status, now will need long term care  -discussed with wife 5/7, recommend hospice to patient and wife but not ready at this time     Recurrent acute hypoxemic respiratory failure  Sepsis  recurrent aspiration pneumonia  pulmonary HTN and RV pressure overload  -Recent history is most significant for pneumonia for which he was hospitalized from 4/19-4/22/2024 and discharged on Augmentin. During that admission, he had reported that his sx had been going on for about 10 weeks prior to the worsening.  During recent hospitalization 4- to 4/22/2024 patient underwent bronchoscopy with BAL positive for CMV, EBV and Candida.  Bacterial cultures were negative.  Patient was treated with Zosyn and azithromycin while in hospital and discharged on Augmentin.  Underwent workup for possible aspiration.  -Mr. Reeder returned on 4/25 with worsening shortness of breath, cough, and fever for which he was again admitted. Patient is being followed by pulmonology and infectious disease.  Patient was treated  with IV Zosyn (4/25 - 4/30) and transition to Augmentin therapy for 10-day course.  -Seen by ST and  needs reinforcement of aspiration precautions.  Needs to be fully upright 30 minutes after eating/drinking.  -previously seen by pulmonology, nothing further to add. Cont bronchodilators and swallow precautions  -Patient did require BiPAP on 4/28 and has been using intermittently since (mainly at night-time)  -TTE w/pulmonary HTN with RV volume overload with preservation of RV function.   -started on IV lasix, dose decreased to 40 IV daily due to rising bicarb  -ID following, plan to complete course of augmentin today  -remains on 3L NC, doubt we will be able to get him off with recurrent aspirations, pneumonias and poor functional status. Anticipate discharge to LTC on oxygen at some point, likely medically stable tomorrow     Delirium  insomnia  -2/2 poor sleep, medical illness, etc.   -having some hallucinations, confused at times  -start low dose seroquel with low dose PRN haldol. Try to avoid oversedation with respiratory failure  -try to reorient and get on good sleep/wake cycle  -melatonon qhs    HFpEF   pulm HTN, R ventricular pressure increased  Mitral and Aortic bioprosthetic valve replacements  A fib, s/p MAZE procedure and now with pacemaker.  Non-obstructive CAD  -Continue statin, digoxin, metoprolol, apixaban. Lasix as above.      Anemia. Iron deficiency:  Ferritin 4 on 4/26/24. RLS may also be the result of low iron. Treating with IV iron infusions during hospitalization.      Moderate malnutrition in the setting of acute illness superimposed on chronic illness. Nutrition consulted     Hypokalemia, hyphophosphatemia. hypomagnesemia: Replacement     Hx duodenal ulcer  Hx of c diff in remote past          Diet: Combination Diet 2 gm NA Diet  Snacks/Supplements Adult: Ensure Enlive; With Meals  Snacks/Supplements Adult: Magic Cup; With Meals    DVT Prophylaxis: DOAC  [place] Catheter: Not present  Lines:  None     Cardiac Monitoring: None  Code Status: Full Code        Disposition Plan     Medically Ready for Discharge: Anticipated Tomorrow         Alberto Nguyễn DO  Hospitalist Service  [facility]  Securely message with [employer] (more info)  Text page via [employer] Paging/Directory   ______________________________________________________________________    Interval History   Was able to wear bipap overnight but confused this am. Seems like he didn't rest well. Discussed poor prognosis with his wife, she's slowly coming to terms with his poor prognosis but is still hopeful he'll improve. Discussed that I believe that is unlikely    Physical Exam   Vital Signs: Temp: 98.1  F (36.7  C) Temp src: Oral BP: 132/62 Pulse: 83   Resp: (!) 36 SpO2: 94 % O2 Device: Oxymask Oxygen Delivery: 2 LPM  Weight: 137 lbs 12.6 oz  Constitutional: awake, alert, and cooperative. Chronically ill appearing  Eyes: pupils equal, round and reactive to light and conjunctiva normal  ENT: normocephalic, without obvious abnormality, atraumatic  Respiratory: ronchi bilaterally, no wheezing, on oxygen, occasional cough  Cardiovascular: regular rate and rhythm and no murmur noted  GI: normal bowel sounds, soft, and non-distended  Skin: no bruising or bleeding  Neurologic: alert, oriented, no focal deficits but generally very weak    45 MINUTES SPENT BY ME on the date of service doing chart review, history, exam, documentation & further activities per the note.      Data   ------------------------- PAST 24 HR DATA REVIEWED -----------------------------------------------    I have personally reviewed the following data over the past 24 hrs:    N/A  \   N/A   / N/A     136 92 (L) 20.5 /  111 (H)   3.8 34 (H) 0.89 \       Imaging results reviewed over the past 24 hrs:   No results found for this or any previous visit (from the past 24 hour(s)).

## 2024-05-07 NOTE — PROGRESS NOTES
[facility]  Infectious Disease Progress Note          Assessment and Plan:   Date of Admission:  4/25/2024  Date of Consult (When I saw the patient): 04/25/24        Assessment & Plan  Jaylen Lamas is a 73 year old who was admitted on 4/25/2024.      Impression: 1 73-year-old male, well-known to me, just discharged from the hospital now readmitted, unclear whether this is still all part of #2 below or some new infection, quite unlikely this is new bacterial infection, no positive microbiology, bronchoscopy cultures all unremarkable, recent and prior courses of antibiotics without particular benefit suspect this is not bacterial infection currently  2 2 and half month illness with intermittent fever, weight loss, malaise and fatigue and respiratory symptoms, prior antibiotic courses without resolution or benefit no positive microbiology, recent bronchoscopy without so far obvious cause  3 CMV, EBV and Candida in bronchoscopy very unlikely clinically relevant in this clinical scenario  4 intermittent diarrhea again now rule out new C. difficile, is prior C. 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              Medications:     Current  Facility-Administered Medications   Medication Dose Route Frequency Provider Last Rate Last Admin    acetylcysteine (MUCOMYST) 10 % nebulizer solution 4 mL  4 mL Nebulization Q6H Guy Vega MD   4 mL at 05/07/24 0813    albuterol (PROVENTIL) neb solution 2.5 mg  2.5 mg Nebulization Q6H Guy Vega MD   2.5 mg at 05/07/24 0813    amoxicillin-clavulanate (AUGMENTIN) 875-125 MG per tablet 1 tablet  1 tablet Oral Q12H [facility] (08/20) Ronak Bañuelos MD   1 tablet at 05/07/24 1034    apixaban ANTICOAGULANT (ELIQUIS) tablet 5 mg  5 mg Oral BID Priti Tovar MD   5 mg at 05/07/24 1034    atorvastatin (LIPITOR) tablet 40 mg  40 mg Oral QPM Priti Tovar MD   40 mg at 05/06/24 2159    diclofenac (VOLTAREN) 1 % topical gel 4 g  4 g Topical 4x Daily Poornima Velez MD   4 g at 05/07/24 1035    digoxin (LANOXIN) tablet 125 mcg  125 mcg Oral QPM Priti Tovar MD   125 mcg at 05/06/24 2201    escitalopram (LEXAPRO) tablet 20 mg  20 mg Oral Daily Priti Tovar MD   20 mg at 05/07/24 1034    fluticasone-vilanterol (BREO ELLIPTA) 100-25 MCG/ACT inhaler 1 puff [patient supplied medication]  1 puff Inhalation Daily Priti Tovar MD   1 puff at 05/02/24 0757    furosemide (LASIX) injection 40 mg  40 mg Intravenous Daily Fito Brenner MD   40 mg at 05/07/24 1034    haloperidol lactate (HALDOL) injection 2 mg  2 mg Intravenous Once Gopi, Alberto A, DO        melatonin tablet 5 mg  5 mg Oral At Bedtime Gopi, Alberto A, DO        metoprolol succinate ER (TOPROL XL) 24 hr tablet 50 mg  50 mg Oral At Bedtime Poornima Velez MD   50 mg at 05/06/24 2259    miconazole (MICATIN) 2 % powder   Topical BID Martin Will MD   Given at 05/06/24 2207    multivitamin w/minerals (THERA-VIT-M) tablet 1 tablet  1 tablet Oral Daily Alberto Nguyễn, DO   1 tablet at 05/07/24 1034    sodium chloride (PF) 0.9% PF flush 3 mL  3 mL Intracatheter Q8H Priti Tovar MD   3 mL  "at 05/07/24 1024                  Physical Exam:   Blood pressure 132/62, pulse 83, temperature 98.1  F (36.7  C), temperature source Oral, resp. rate (!) 36, height 1.702 m (5' 7\"), weight 62.5 kg (137 lb 12.6 oz), SpO2 94%.  Wt Readings from Last 2 Encounters:   05/03/24 62.5 kg (137 lb 12.6 oz)   04/22/24 66.1 kg (145 lb 11.6 oz)     Vital Signs with Ranges  Temp:  [98  F (36.7  C)-98.1  F (36.7  C)] 98.1  F (36.7  C)  Pulse:  [79-86] 83  Resp:  [18-36] 36  BP: ()/(43-62) 132/62  FiO2 (%):  [35 %] 35 %  SpO2:  [91 %-98 %] 94 %    Constitutional: Awake, alert, cooperative, no apparent distress looks similarly chronically ill not particularly acutely ill on low-flow oxygen     Lungs: Clear to auscultation bilaterally, no crackles or wheezing   Cardiovascular: Regular rate and rhythm, normal S1 and S2, and no murmur noted   Abdomen: Normal bowel sounds, soft, non-distended, non-tender   Skin: No rashes, no cyanosis, no edema   Other:           Data:   All microbiology laboratory data reviewed.  Recent Labs   Lab Test 05/06/24  0551 05/04/24  0708 05/03/24  1001   WBC 14.9* 14.3* 16.7*   HGB 10.2* 9.5* 10.7*   HCT 34.0* 31.6* 35.8*   MCV 85 85 86    269 286     Recent Labs   Lab Test 05/07/24  0706 05/06/24  0551 05/05/24  0624   CR 0.89 0.97 0.90     No lab results found.  No lab results found.    Invalid input(s): \"UC\"     "

## 2024-05-07 NOTE — PROVIDER NOTIFICATION
MD notified for increased agitation/restlessness after haldol given and now has a sitter, MD ordered another dose of haldol.

## 2024-05-08 NOTE — PROGRESS NOTES
[facility]  Infectious Disease Progress Note          Assessment and Plan:   Date of Admission:  4/25/2024  Date of Consult (When I saw the patient): 04/25/24        Assessment & Plan  Jaylen Lamas is a 73 year old who was admitted on 4/25/2024.      Impression: 1 73-year-old male, well-known to me, just discharged from the hospital now readmitted, unclear whether this is still all part of #2 below or some new infection, quite unlikely this is new bacterial infection, no positive microbiology, bronchoscopy cultures all unremarkable, recent and prior courses of antibiotics without particular benefit suspect this is not bacterial infection currently  2 2 and half month illness with intermittent fever, weight loss, malaise and fatigue and respiratory symptoms, prior antibiotic courses without resolution or benefit no positive microbiology, recent bronchoscopy without so far obvious cause  3 CMV, EBV and Candida in bronchoscopy very unlikely clinically relevant in this clinical scenario  4 intermittent diarrhea again now rule out new C. difficile, is prior C. 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     REC 1 intermittent worsening, without major fever or illness symptoms, some increased hypoxia, off Augmentin, very likely recurrent aspiration and antibiotics benefit here limited  This point some degree of chronic hypoxia, not having fever currently, at obvious risk for further aspiration but no antibiotic strategy this can work for that, I will sign off at this point but call if issues                 Interval History:     Intermittently worse respiratory status and feels worse without obvious  signs of sepsis or positive microbiology of note though no fever, no positive microbiology on any of the studies that serves any significance.              Medications:     Current Facility-Administered Medications   Medication Dose Route Frequency Provider Last Rate Last Admin    acetylcysteine (MUCOMYST) 10 % nebulizer solution 4 mL  4 mL Nebulization Q6H Guy Vega MD   4 mL at 05/08/24 0826    albuterol (PROVENTIL) neb solution 2.5 mg  2.5 mg Nebulization Q6H Guy Vega MD   2.5 mg at 05/08/24 0826    [Held by provider] apixaban ANTICOAGULANT (ELIQUIS) tablet 5 mg  5 mg Oral BID Priti Tovar MD   5 mg at 05/07/24 1034    [Held by provider] atorvastatin (LIPITOR) tablet 40 mg  40 mg Oral QPM Priti Tovar MD   40 mg at 05/06/24 2159    diclofenac (VOLTAREN) 1 % topical gel 4 g  4 g Topical 4x Daily Poornima Velez MD   4 g at 05/07/24 2113    [Held by provider] digoxin (LANOXIN) tablet 125 mcg  125 mcg Oral QPM Priti Tovar MD   125 mcg at 05/06/24 2201    [Held by provider] escitalopram (LEXAPRO) tablet 20 mg  20 mg Oral Daily Priti Tovar MD   20 mg at 05/07/24 1034    fluticasone-vilanterol (BREO ELLIPTA) 100-25 MCG/ACT inhaler 1 puff [patient supplied medication]  1 puff Inhalation Daily Priti Tovar MD   1 puff at 05/02/24 0757    [Held by provider] furosemide (LASIX) injection 40 mg  40 mg Intravenous Daily Fito Brenner MD   40 mg at 05/07/24 1034    [Held by provider] melatonin tablet 10 mg  10 mg Oral At Bedtime Alberto Nguyễn DO        [Held by provider] metoprolol succinate ER (TOPROL XL) 24 hr tablet 50 mg  50 mg Oral At Bedtime Poornima Velez MD   50 mg at 05/06/24 2259    miconazole (MICATIN) 2 % powder   Topical BID Martin Will MD   Given at 05/07/24 2114    [Held by provider] multivitamin w/minerals (THERA-VIT-M) tablet 1 tablet  1 tablet Oral Daily Alberto Nguyễn DO   1 tablet at 05/07/24 7084     "[Held by provider] QUEtiapine (SEROquel) tablet 25 mg  25 mg Oral BID Alberto Nguyễn DO   25 mg at 05/07/24 1721    sodium chloride (PF) 0.9% PF flush 3 mL  3 mL Intracatheter Q8H Priti Tovar MD   3 mL at 05/08/24 0821                  Physical Exam:   Blood pressure 124/46, pulse 76, temperature 98.4  F (36.9  C), temperature source Axillary, resp. rate 24, height 1.702 m (5' 7\"), weight 62.5 kg (137 lb 12.6 oz), SpO2 92%.  Wt Readings from Last 2 Encounters:   05/03/24 62.5 kg (137 lb 12.6 oz)   04/22/24 66.1 kg (145 lb 11.6 oz)     Vital Signs with Ranges  Temp:  [97.6  F (36.4  C)-98.4  F (36.9  C)] 98.4  F (36.9  C)  Pulse:  [76-90] 76  Resp:  [20-36] 24  BP: (101-153)/(38-66) 124/46  SpO2:  [91 %-99 %] 92 %    Constitutional: Awake, alert, cooperative, no apparent distress looks similarly chronically ill not particularly acutely ill on low-flow oxygen     Lungs: Clear to auscultation bilaterally, no crackles or wheezing   Cardiovascular: Regular rate and rhythm, normal S1 and S2, and no murmur noted   Abdomen: Normal bowel sounds, soft, non-distended, non-tender   Skin: No rashes, no cyanosis, no edema   Other:           Data:   All microbiology laboratory data reviewed.  Recent Labs   Lab Test 05/06/24  0551 05/04/24  0708 05/03/24  1001   WBC 14.9* 14.3* 16.7*   HGB 10.2* 9.5* 10.7*   HCT 34.0* 31.6* 35.8*   MCV 85 85 86    269 286     Recent Labs   Lab Test 05/08/24  0735 05/07/24  0706 05/06/24  0551   CR 0.86 0.89 0.97     No lab results found.  No lab results found.    Invalid input(s): \"UC\"     "

## 2024-05-08 NOTE — PROGRESS NOTES
[facility]    Medicine Progress Note - Hospitalist Service    Date of Admission:  4/25/2024    Assessment & Plan   Jaylen Lamas is a 73 year old man w PMH significant for COPD, tobacco use, HFpEF, bioprosthetic aortic valve replacement, bioprosthetic mitral valve replacement, paroxysmal atrial fibrillation s/p ablation and MAZE procedure, 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 difficile, hypertension, CKD, history of duodenal ulcer, depression/anxiety, testicular cancer (s/p orchiectomy and XRT in 1982) who presents with shortness of breath.     Poor prognosis  -chronically ill appearing with multiple chronic medical problems, not significantly improved despite two hospitalization 3 days apart  -long discussion with patient, wife, daughters on 5/8. They have elected for comfort measures and orders adjusted. He has also been made DNR/DNI     Recurrent acute hypoxemic respiratory failure  Sepsis  recurrent aspiration pneumonia  pulmonary HTN and RV pressure overload  -Recent history is most significant for pneumonia for which he was hospitalized from 4/19-4/22/2024 and discharged on Augmentin. During that admission, he had reported that his sx had been going on for about 10 weeks prior to the worsening.  During recent hospitalization 4- to 4/22/2024 patient underwent bronchoscopy with BAL positive for CMV, EBV and Candida.  Bacterial cultures were negative.  Patient was treated with Zosyn and azithromycin while in hospital and discharged on Augmentin.  Underwent workup for possible aspiration.  -Mr. Reeder returned on 4/25 with worsening shortness of breath, cough, and fever for which he was again admitted. Patient is being followed by pulmonology and infectious disease.  Patient was treated with IV Zosyn (4/25 - 4/30) and transition to Augmentin therapy for 10-day course.  -Seen by ST and  needs reinforcement of  No hemoptysis    aspiration precautions.  Needs to be fully upright 30 minutes after eating/drinking.  -previously seen by pulmonology, nothing further to add. Cont bronchodilators and swallow precautions  -Patient did require BiPAP on 4/28 and has been using intermittently since (mainly at night-time)  -TTE w/pulmonary HTN with RV volume overload with preservation of RV function.   -now comfort cares- stop lasix, prednisone, antibiotics. Other oral medications stopped as well as his swallowing is not safe  -morphine, ativan, etc ordered  -await discharge planning, wife unable to take home     Delirium  insomnia  -2/2 poor sleep, medical illness, etc.   -having some hallucinations, confused at times  -comfort medications as above    HFpEF   pulm HTN, R ventricular pressure increased  Mitral and Aortic bioprosthetic valve replacements  A fib, s/p MAZE procedure and now with pacemaker.  Non-obstructive CAD  -Continue statin, digoxin, metoprolol, apixaban. Lasix as above.      Anemia. Iron deficiency:  Ferritin 4 on 4/26/24. RLS may also be the result of low iron. Treating with IV iron infusions during hospitalization.      Moderate malnutrition in the setting of acute illness superimposed on chronic illness. Nutrition consulted     Hypokalemia, hyphophosphatemia. hypomagnesemia: Replacement     Hx duodenal ulcer  Hx of c diff in remote past        Diet: Snacks/Supplements Adult: Ensure Enlive; With Meals  Snacks/Supplements Adult: Magic Cup; With Meals  Regular Diet Adult    DVT Prophylaxis: none  Krueger Catheter: Not present  Lines: None     Cardiac Monitoring: None  Code Status: No CPR- Do NOT Intubate        Disposition Plan     Medically Ready for Discharge: await safe discharge plan, medically stable             Alberto Nguyễn DO  Hospitalist Service  [facility]  Securely message with [email or web address] (more info)  Text page via [facility] Paging/Directory    ______________________________________________________________________    Interval History   Had some agitation overnight and confusion. Long discussion with patient and family at bedside today including wife and daughter and they have elected for comfort cares. They wish for him to be DNR/DNI at this time. Orders adjusted to reflect their wishes    Physical Exam   Vital Signs: Temp: 98.4  F (36.9  C) Temp src: Axillary BP: 124/46 Pulse: 89   Resp: 24 SpO2: 92 % O2 Device: Oxymask Oxygen Delivery: 4 LPM  Weight: 137 lbs 12.6 oz  Constitutional: somnolent, no distress, chronically ill appearing  Eyes: pupils equal, round and reactive to light and conjunctiva normal  ENT: normocephalic, without obvious abnormality, atraumatic  Respiratory: ronchi bilaterally, no wheezing, on oxygen, occasional cough  Cardiovascular: regular rate and rhythm and no murmur noted  GI: normal bowel sounds, soft, and non-distended  Skin: no bruising or bleeding  Neurologic: somnolent, oriented x3 but hallucinating and mostly confused, generally very weak    45 MINUTES SPENT BY ME on the date of service doing chart review, history, exam, documentation & further activities per the note.      Data   ------------------------- PAST 24 HR DATA REVIEWED -----------------------------------------------    I have personally reviewed the following data over the past 24 hrs:    N/A  \   N/A   / N/A     138 94 (L) 21.2 /  110 (H)   3.5 35 (H) 0.86 \       Imaging results reviewed over the past 24 hrs:   No results found for this or any previous visit (from the past 24 hour(s)).

## 2024-05-08 NOTE — PLAN OF CARE
"Comfort cares. Confused. Restless still with sitter. Pulling at lines. Morphine given x3 for Sob/pain. Ativan given x1 for restlessness. Scopolamine patch in place. IV robinol given. Sitter at bedside. Bed alarm on.     Goal Outcome Evaluation:      Plan of Care Reviewed With: patient    Overall Patient Progress: decliningOverall Patient Progress: declining    Outcome Evaluation: Transition to comfort cares.    Problem: Adult Inpatient Plan of Care  Goal: Plan of Care Review  Description: The Plan of Care Review/Shift note should be completed every shift.  The Outcome Evaluation is a brief statement about your assessment that the patient is improving, declining, or no change.  This information will be displayed automatically on your shift  note.  Outcome: Not Progressing  Flowsheets (Taken 5/8/2024 1851)  Outcome Evaluation: Transition to comfort cares.  Plan of Care Reviewed With: patient  Overall Patient Progress: declining  Goal: Patient-Specific Goal (Individualized)  Description: You can add care plan individualizations to a care plan. Examples of Individualization might be:  \"Parent requests to be called daily at 9am for status\", \"I have a hard time hearing out of my right ear\", or \"Do not touch me to wake me up as it startles  me\".  Outcome: Not Progressing  Goal: Absence of Hospital-Acquired Illness or Injury  Intervention: Identify and Manage Fall Risk  Recent Flowsheet Documentation  Taken 5/8/2024 1617 by Acacia Prather, RN  Safety Promotion/Fall Prevention: bedside attendant  Taken 5/8/2024 0822 by Acacia Prather, RN  Safety Promotion/Fall Prevention:   bedside attendant   safety round/check completed  Intervention: Prevent and Manage VTE (Venous Thromboembolism) Risk  Recent Flowsheet Documentation  Taken 5/8/2024 0822 by Acacia Prather, RN  VTE Prevention/Management: SCDs (sequential compression devices) off  Intervention: Prevent Infection  Recent Flowsheet Documentation  Taken 5/8/2024 1617 by " Acacia Prather, RN  Infection Prevention:   single patient room provided   rest/sleep promoted   personal protective equipment utilized   hand hygiene promoted   equipment surfaces disinfected   environmental surveillance performed   cohorting utilized  Goal: Optimal Comfort and Wellbeing  Intervention: Monitor Pain and Promote Comfort  Recent Flowsheet Documentation  Taken 5/8/2024 1554 by Acacia Prather, RN  Pain Management Interventions: medication (see MAR)  Goal: Readiness for Transition of Care  Outcome: Not Progressing

## 2024-05-08 NOTE — PROVIDER NOTIFICATION
MD paged: Pt lethargic, confused, and unable to follow commands, drink water, or take PO medications safely. Can you hold or change route of medications for pt? Thanks.

## 2024-05-08 NOTE — PLAN OF CARE
"BP (!) 153/66 (BP Location: Right arm, Patient Position: Fowlers, Cuff Size: Adult Small)   Pulse 85   Temp 98.2  F (36.8  C) (Oral)   Resp 24   Ht 1.702 m (5' 7\")   Wt 62.5 kg (137 lb 12.6 oz)   SpO2 98%   BMI 21.58 kg/m      Lethargic, but restless and disoriented x4. Pt unable to report pain, but scoring on PAINAD scale. Repositioned for comfort. Pt too lethargic to take any PO medications and unable to follow commands. MD paged and PO medications held. Pt pulling at lines and at times attempting to get out of bed unassisted. Sitter in room for safety. VSS on 4 L via oxymask. LS: coarse and crackles. Tachypeneic. External catheter in place. Redness noted to scrotum. Blanchable redness to sacrum / coccyx. PIV SL to LUE w/ no-no in place to prevent pt from pulling lines. HOB at 45 degrees per orders. K, Mg, and Phos in for AM draw. Plan: Pt accepted to TCU, but is no longer a candidate for TCU. Will likely need LTC w/ O2 at discharge. PO Augmentin for PNA, but pt unable to take PO medications at this time. Continue w/ POC.      Goal Outcome Evaluation:      Plan of Care Reviewed With: patient    Overall Patient Progress: decliningOverall Patient Progress: declining    Outcome Evaluation: No edema noted; Afebrile; sating mid 90s on 4 L via oxymask, but tachypenic; unable to follow commands to drink / eat; impulsive and pulling at lines and trying to get out of bed unassisted. Sitter in room.      Problem: Adult Inpatient Plan of Care  Goal: Plan of Care Review  Description: The Plan of Care Review/Shift note should be completed every shift.  The Outcome Evaluation is a brief statement about your assessment that the patient is improving, declining, or no change.  This information will be displayed automatically on your shift  note.  Outcome: Not Progressing  Flowsheets (Taken 5/8/2024 0117)  Outcome Evaluation:   No edema noted   Afebrile   sating mid 90s on 4 L via oxymask, but tachypenic   unable to follow " "commands to drink / eat   impulsive and pulling at lines and trying to get out of bed unassisted. Sitter in room.  Plan of Care Reviewed With: patient  Overall Patient Progress: declining  Goal: Patient-Specific Goal (Individualized)  Description: You can add care plan individualizations to a care plan. Examples of Individualization might be:  \"Parent requests to be called daily at 9am for status\", \"I have a hard time hearing out of my right ear\", or \"Do not touch me to wake me up as it startles  me\".  Outcome: Not Progressing  Goal: Absence of Hospital-Acquired Illness or Injury  Intervention: Identify and Manage Fall Risk  Recent Flowsheet Documentation  Taken 5/8/2024 0053 by Chikis Bowen RN  Safety Promotion/Fall Prevention: safety round/check completed  Taken 5/7/2024 2107 by Chikis Bowen RN  Safety Promotion/Fall Prevention: safety round/check completed  Intervention: Prevent Skin Injury  Recent Flowsheet Documentation  Taken 5/8/2024 0047 by Chikis Bowen RN  Body Position:   weight shifting   supine, head elevated  Taken 5/7/2024 2107 by Chikis Bowen RN  Body Position:   turned   left  Intervention: Prevent and Manage VTE (Venous Thromboembolism) Risk  Recent Flowsheet Documentation  Taken 5/7/2024 2107 by Chikis Bowen RN  VTE Prevention/Management: SCDs (sequential compression devices) off  Intervention: Prevent Infection  Recent Flowsheet Documentation  Taken 5/8/2024 0053 by Chikis Bowen RN  Infection Prevention:   single patient room provided   rest/sleep promoted   personal protective equipment utilized   hand hygiene promoted   equipment surfaces disinfected   environmental surveillance performed   cohorting utilized  Taken 5/7/2024 2107 by Chikis Bowen RN  Infection Prevention:   single patient room provided   rest/sleep promoted   personal protective equipment utilized   hand hygiene promoted   equipment surfaces disinfected   cohorting utilized   environmental surveillance " performed  Goal: Optimal Comfort and Wellbeing  Intervention: Monitor Pain and Promote Comfort  Recent Flowsheet Documentation  Taken 5/8/2024 0047 by Chikis Bowen RN  Pain Management Interventions:   repositioned   rest  Taken 5/7/2024 2107 by Chikis Bowen RN  Pain Management Interventions:   repositioned   rest  Goal: Readiness for Transition of Care  Outcome: Not Progressing     Problem: Pneumonia  Goal: Fluid Balance  Outcome: Progressing  Goal: Resolution of Infection Signs and Symptoms  Outcome: Progressing  Goal: Effective Oxygenation and Ventilation  Outcome: Progressing  Intervention: Promote Airway Secretion Clearance  Recent Flowsheet Documentation  Taken 5/8/2024 0053 by Chikis Bowen RN  Cough And Deep Breathing: unable to perform  Taken 5/7/2024 2107 by Chikis Bowen RN  Cough And Deep Breathing: unable to perform  Intervention: Optimize Oxygenation and Ventilation  Recent Flowsheet Documentation  Taken 5/8/2024 0047 by Chikis Bowen RN  Head of Bed (HOB) Positioning: HOB at 45 degrees  Taken 5/7/2024 2107 by Chikis Bowen RN  Head of Bed (HOB) Positioning: HOB at 45 degrees     Problem: Gas Exchange Impaired  Goal: Optimal Gas Exchange  Outcome: Not Progressing  Intervention: Optimize Oxygenation and Ventilation  Recent Flowsheet Documentation  Taken 5/8/2024 0047 by Chikis Bowen RN  Head of Bed (HOB) Positioning: HOB at 45 degrees  Taken 5/7/2024 2107 by Chikis Bowen RN  Head of Bed (HOB) Positioning: HOB at 45 degrees     Problem: Malnutrition  Goal: Improved Nutritional Intake  Outcome: Not Progressing     Problem: Fall Injury Risk  Goal: Absence of Fall and Fall-Related Injury  Outcome: Not Progressing  Intervention: Identify and Manage Contributors  Recent Flowsheet Documentation  Taken 5/8/2024 0053 by Chikis Bowen RN  Medication Review/Management: medications reviewed  Taken 5/7/2024 2107 by Chikis Bowen RN  Medication Review/Management: medications  reviewed  Intervention: Promote Injury-Free Environment  Recent Flowsheet Documentation  Taken 5/8/2024 0053 by Chikis Bowen, RN  Safety Promotion/Fall Prevention: safety round/check completed  Taken 5/7/2024 2107 by Chikis Bowen, RN  Safety Promotion/Fall Prevention: safety round/check completed

## 2024-05-08 NOTE — PROGRESS NOTES
PALLIATIVE CARE PROGRESS NOTE  [facility]     Patient Name: Jaylen Lamas  Date of Admission: 4/25/2024   Today the patient was seen for:   Goals of care  Emotional and decisional     Recommendations & Counseling     GOALS OF CARE:   Comfort cares  Hospice on discharge    ADVANCE CARE PLANNING:  No health care directive on file. Per  informed consent policy, next of kin should be involved if patient becomes unable.  Wife is NOK- Dulce Maria  There is no POLST form on file, defer to patient and/or next of kin for decisions   Code status: No CPR- Do NOT Intubate    MEDICAL MANAGEMENT:   Comfort Care   Below are common symptoms routinely seen in patients with comfort focused goals and at the end of life. This patient may not currently exhibit all of these symptoms; however, if these were to occur our recommendations are as follows:     #Pain  1st choice:Morphine PO/SL 5-10 mg q 1 hour as needed.   2nd choice:Morphine IV 1-2 mg q 15 minutes as needed   Adjunct: Tylenol TX/PO    #Dyspnea   1st choice:Morphine PO/SL 5-10 mg q 1 hour as needed.   2nd choice:Morphine IV 1-2 mg q 15 minutes as needed     #Anxiety    1st choice: Lorazepam PO/SL q 1 mg  q 3 hour as needed.   2nd choice: Lorazepam IV q 1 mg  q 3 hour as needed    #Secretion burden   Robinul 0.1 mg (PO/IV) q 4 hours as needed. If ineffective, increase up to 0.3 mg   Consider atropine if Robinul ineffective after dose increase      #Nausea   Zofran 4 mg q 6 hours as needed. Can increase to 8 mg   Zyprexa 5 mg q 6 hours as needed     #Agitation  Aggressive symptoms control first, then  Added scheduled zyprexa ODT BID  1st choice: Zyprexa 5 mg ODT or IM   2nd choice: Increase dose of Zyprexa to 10 mg or consider switch to Haldol   3rd choice: Lorazepam as above     PSYCHOSOCIAL/SPIRITUAL:  Family   Terra community: [place]   Would appreciate Spiritual Health Services, Consult has been placed for support     Palliative Care will continue to follow.  Thank you for the consult and allowing us to aid in the care of Jaylen Lamas.    These recommendations have been discussed with medical team.    Yesenia Dior NP  Securely message with [employer] (more info)  Text page via [facility] Paging/Directory      Assessments          Jaylen Lamas is a 73 year old male with a past medical history of COPD, tobacco use, heart failure,  bioprosthetic aortic valve replacement, bioprosthetic mitral valve replacement, paroxysmal atrial fibrillation s/p ablation and MAZE procedure, 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 difficile, hypertension, CKD, history of duodenal ulcer, depression/anxiety, testicular cancer s/p orchiectomy who presented on 4/25 with shortness of breath, cough and fever-recently discharged on 4/22 with pneumonia.                 Interval History:     Per patient or family/caregivers today:  Met with wife and DIL at the bedside. Discussed and reviewed overall goals. Wife expressed that she does not think he would want to continue down the current plan of restorative cares. She does not think a swallow eval or modified diet would add to his quality of life. She would like to see him be comfortable and go some where on hospice. She is not sure about finances and will think about her options. Home is not an option as she is unable to care for him. If he continues to have breathing difficulties and swallowing his life expectancy is only weeks to month. He is not eating and drinking well.           Review of Systems:     Besides above, an additional system ROS was reviewed and is unremarkable               Physical Exam:   Temp: 98.4  F (36.9  C) Temp src: Axillary Temp  Min: 97.6  F (36.4  C)  Max: 98.4  F (36.9  C) BP: 124/46 Pulse: 89   Resp: 24 SpO2: 92 % O2 Device: Oxymask Oxygen Delivery: 4 LPM  Vital Signs with Ranges  Temp:  [97.6  F (36.4  C)-98.4  F (36.9  C)] 98.4  F (36.9  C)  Pulse:   [76-90] 89  Resp:  [20-36] 24  BP: (101-153)/(38-66) 124/46  SpO2:  [91 %-99 %] 92 %  137 lbs 12.6 oz    EXAM:  Constitutional: alert and confused   Cardiovascular: negative  Respiratory: positive findings: congested cough, on oxymask  Psychiatric: confused  Pain: no s/s of pain               Current Problem List:   Active Problems:    Diarrhea of infectious origin    Respiratory distress    Pneumonia of right lower lobe due to infectious organism      Allergies   Allergen Reactions    Azithromycin Other (See Comments)     Causes C-Diff-  Patients PCP states this is not true    Cefprozil GI Disturbance    Ciprofloxacin GI Disturbance    Erythromycin GI Disturbance    Metronidazole GI Disturbance    Spiriva Respimat [Tiotropium Bromide Monohydrate] Visual Disturbance    Venlafaxine Nausea and Vomiting            Data Reviewed:       Results for orders placed or performed during the hospital encounter of 04/25/24 (from the past 24 hour(s))   Basic metabolic panel   Result Value Ref Range    Sodium 138 135 - 145 mmol/L    Potassium 3.5 3.4 - 5.3 mmol/L    Chloride 94 (L) 98 - 107 mmol/L    Carbon Dioxide (CO2) 35 (H) 22 - 29 mmol/L    Anion Gap 9 7 - 15 mmol/L    Urea Nitrogen 21.2 8.0 - 23.0 mg/dL    Creatinine 0.86 0.67 - 1.17 mg/dL    GFR Estimate >90 >60 mL/min/1.73m2    Calcium 10.1 8.8 - 10.2 mg/dL    Glucose 110 (H) 70 - 99 mg/dL   Magnesium   Result Value Ref Range    Magnesium 1.8 1.7 - 2.3 mg/dL   Phosphorus   Result Value Ref Range    Phosphorus 2.6 2.5 - 4.5 mg/dL   Extra Purple Top EDTA (LAB USE ONLY)   Result Value Ref Range    Hold Specimen [facility]           Medical Decision Making     Medical complexity over the past 24 hours:  - Decision to DE-ESCALATE CARE based on prognosis  60 MINUTES SPENT BY ME on the date of service doing chart review, history, exam, documentation & further activities per the note.        Advance Care Planning Discussion 5/8/2024. Yesenia HULL NP met with  patient and  wife and DIL  today at the hospital to discuss Advance Care Planning. Jaylen Lamas does not have decisional capacity  and was present for this discussion.  Those present were informed of the voluntary nature of this discussion and wished to proceed.  The discussion included:  wishes and worries, goals of care, prognosis, discharge planning, family understanding . This discussion began at 1200 and ended at 1220 for a total of 20 minutes.

## 2024-05-08 NOTE — PLAN OF CARE
"A/Ox1. Does not know his birthdate. Disoriented to place, time, situation. Unable to follow commands. Hallucinating a dog, a little boy, someone sitting in the chair, stuff on the floor, etc. Pulling at lines and trying to get out of bed, so sitter at bedside now. Restless and agitated this whole shift. Haldol given x2. Finally sleeping since day time yesterday. Incontinent of urine and stool. External cath in place. K, Mag, Phos AM redraws. MANCUSO and SOB, now on 5L oxymask. Lungs coarse. Mepilex to bottom. Bed alarm on. Daughter updated at bedside this evening and family would like to speak with palliative tomorrow. Continue to closely monitor.     Goal Outcome Evaluation:      Plan of Care Reviewed With: patient    Overall Patient Progress: decliningOverall Patient Progress: declining    Outcome Evaluation: Increasily confused and hallucinating. Needing more oxygen and a sitter for restlessness.      Problem: Adult Inpatient Plan of Care  Goal: Plan of Care Review  Description: The Plan of Care Review/Shift note should be completed every shift.  The Outcome Evaluation is a brief statement about your assessment that the patient is improving, declining, or no change.  This information will be displayed automatically on your shift  note.  Outcome: Not Progressing  Flowsheets (Taken 5/7/2024 1950)  Outcome Evaluation: Increasily confused and hallucinating. Needing more oxygen and a sitter for restlessness.  Plan of Care Reviewed With: patient  Overall Patient Progress: declining  Goal: Patient-Specific Goal (Individualized)  Description: You can add care plan individualizations to a care plan. Examples of Individualization might be:  \"Parent requests to be called daily at 9am for status\", \"I have a hard time hearing out of my right ear\", or \"Do not touch me to wake me up as it startles  me\".  Outcome: Not Progressing  Goal: Absence of Hospital-Acquired Illness or Injury  Intervention: Identify and Manage Fall " Risk  Recent Flowsheet Documentation  Taken 5/7/2024 1559 by Acacia Prather, RN  Safety Promotion/Fall Prevention:   bedside attendant   safety round/check completed  Taken 5/7/2024 1106 by Acacia Prather RN  Safety Promotion/Fall Prevention:   bedside attendant   safety round/check completed  Intervention: Prevent Skin Injury  Recent Flowsheet Documentation  Taken 5/7/2024 1106 by Acacia Prather RN  Body Position: position changed independently  Intervention: Prevent Infection  Recent Flowsheet Documentation  Taken 5/7/2024 1106 by Acacia Prather RN  Infection Prevention:   rest/sleep promoted   hand hygiene promoted   single patient room provided  Goal: Readiness for Transition of Care  Outcome: Not Progressing

## 2024-05-08 NOTE — PROGRESS NOTES
Care Management Follow Up    Length of Stay (days): 13    Expected Discharge Date: 05/08/2024     Concerns to be Addressed: discharge planning     Patient plan of care discussed at interdisciplinary rounds: Yes    Anticipated Discharge Disposition: Transitional Care     Anticipated Discharge Services: None  Anticipated Discharge DME:      Patient/family educated on Medicare website which has current facility and service quality ratings: yes  Education Provided on the Discharge Plan:    Patient/Family in Agreement with the Plan: yes    Referrals Placed by CM/SW: Post Acute Facilities  Private pay costs discussed: insurance costs out of pocket expenses    Additional Information:  Patient has been accepted to [facility] for TCU when medically ready to discharge. Patient had a sitter overnight. Patient will need to be sitter free 24 hours before he can go to TCU. SW will continue to follow. Patient's wife plans to transport. PAS done.     ANA Walsh, [facility]  Emergency Room   Please contact the SW on the floor in which the patient is staying for any questions or concerns

## 2024-05-08 NOTE — CONSULTS
Care Management Follow Up    Length of Stay (days): 13    Expected Discharge Date: 05/10/2024     Concerns to be Addressed: discharge planning     Patient plan of care discussed at interdisciplinary rounds: Yes    Anticipated Discharge Disposition: LTC with hospice     Anticipated Discharge Services: None      Referrals Placed by CM/SW: Post Acute Facilities    Additional Information:  SW was informed patient is in need of hospice but told to follow up tomorrow. SW checked with [facility]. They have a LTC for hospice. Will discuss with patient and family tomorrow.     [facility], [facility]   Please contact the SW on the floor in which the patient is staying for any questions or concerns

## 2024-05-08 NOTE — PROGRESS NOTES
Several medications are held due to encephalopathy and  concern for swallowing.  Daytime rounder to reevaluate patient tomorrow morning and resume medications as appropriate.

## 2024-05-08 NOTE — CONSULTS
SPIRITUAL HEALTH SERVICES  SPIRITUAL ASSESSMENT Consult Note  [facility]. [address] medical     REFERRAL SOURCE: Consult placed for family member asking for spiritual/emotional support.    I met with Jaylen and his wife Caroline and have contacted their home parish, [facility] at their request for  to visit.    Caroline stated the [employer] volunteer visited this morning, provided prayer due to recommendations that pt not receive communion.    Plan: chaplains will continue to follow; Jaylen and Caroline state their welcome of both  and  support.    Yary Cuellar MDiv [facility]  Staff   Please place consult order for routine Spiritual Health Services referrals.  SHS available 24/7 for emergent requests either by having the on-call  paged or by entering an ASAP/STAT consult in Epic (this will also page the on-call ).

## 2024-05-09 NOTE — PROGRESS NOTES
PALLIATIVE CARE PROGRESS NOTE  [facility]     Patient Name: Jaylen Lamas  Date of Admission: 4/25/2024   Today the patient was seen for:   Goals of care  Emotional and decisional     Recommendations & Counseling     GOALS OF CARE:   Comfort cares  Hospice on discharge  Symptom management    ADVANCE CARE PLANNING:  No health care directive on file. Per  informed consent policy, next of kin should be involved if patient becomes unable.  Wife is NOK- Dulce Maria  There is no POLST form on file, defer to patient and/or next of kin for decisions   Code status: No CPR- Do NOT Intubate    MEDICAL MANAGEMENT:   Comfort Care    #Pain  1st choice:Morphine PO/SL 5-10 mg q 1 hour as needed.   2nd choice:Morphine IV 1-2 mg q 15 minutes as needed   Adjunct: Tylenol NJ/PO    #Dyspnea   1st choice:Morphine PO/SL 5-10 mg q 1 hour as needed.   2nd choice:Morphine IV 1-2 mg q 15 minutes as needed   Added scheduled morphine 5 mg every 4 hours for work of breathing    #Anxiety    1st choice: Lorazepam PO/SL q 1 mg  q 3 hour as needed.   2nd choice: Lorazepam IV q 1 mg  q 3 hour as needed    #Secretion burden   Robinul 0.1 mg (PO/IV) q 4 hours as needed. If ineffective, increase up to 0.3 mg   Consider atropine if Robinul ineffective after dose increase      #Nausea   Zofran 4 mg q 6 hours as needed. Can increase to 8 mg   Zyprexa 5 mg q 6 hours as needed     #Agitation  Aggressive symptoms control first, then  Added scheduled zyprexa ODT BID- ok to give even if unable to swallow  1st choice: Zyprexa 5 mg ODT or IM   2nd choice: Increase dose of Zyprexa to 10 mg or consider switch to Haldol   3rd choice: Lorazepam as above     PSYCHOSOCIAL/SPIRITUAL:  Family   Terra community: [employer]   Would appreciate Spiritual Health Services, Consult has been placed for support     Palliative Care will continue to follow. Thank you for the consult and allowing us to aid in the care of Jaylen Lamas.    These recommendations have  been discussed with medical team.    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 difficile, hypertension, CKD, history of duodenal ulcer, depression/anxiety, testicular cancer s/p orchiectomy who presented on 4/25 with shortness of breath, cough and fever-recently discharged on 4/22 with pneumonia.                 Interval History:     Per patient or family/caregivers today:  Met with family at the bedside. Discussed and reviewed symptoms and medications, discussed and reviewed prognosis of  days to weeks. Discussed and reviewed hospice on discharge and planning for that. SW will assist with this. OLP application signed to send this referral. Finances remain tight. Family is most concerned with his current symptoms and managing this SOB. Family would also like to be present for death, and have asked to stay the night, charge nurse today ok this. Will follow up tomorrow on symptoms          Review of Systems:     Besides above, an additional system ROS was reviewed and is unremarkable               Physical Exam:       No data recorded   Pulse: 91   Resp: (!) 40 SpO2: 96 % O2 Device: Nasal cannula Oxygen Delivery: 4 LPM  Vital Signs with Ranges  Pulse:  [91] 91  Resp:  [22-40] 40  SpO2:  [96 %] 96 %  137 lbs 12.6 oz    EXAM:  Constitutional: lethargic  Cardiovascular: negative  Respiratory: positive findings: secretions, labored breathing  Psychiatric: confused  Pain: no s/s of pain               Current Problem List:   Active Problems:    Diarrhea of infectious origin    Respiratory  distress    Pneumonia of right lower lobe due to infectious organism      Allergies   Allergen Reactions    Azithromycin Other (See Comments)     Causes C-Diff-  Patients PCP states this is not true    Cefprozil GI Disturbance    Ciprofloxacin GI Disturbance    Erythromycin GI Disturbance    Metronidazole GI Disturbance    Spiriva Respimat [Tiotropium Bromide Monohydrate] Visual Disturbance    Venlafaxine Nausea and Vomiting            Data Reviewed:       No results found for this or any previous visit (from the past 24 hour(s)).         Medical Decision Making     50 MINUTES SPENT BY ME on the date of service doing chart review, history, exam, documentation & further activities per the note.

## 2024-05-09 NOTE — PROGRESS NOTES
[facility]     Hospitalist Progress Note     Assessment & Plan     ASSESSMENT    73M with hx of COPD, tobacco use, HFpEF, bioprosthetic aortic valve replacement, bioprosthetic mitral valve replacement, paroxysmal atrial fibrillation s/p ablation and MAZE procedure, 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 difficile, hypertension, CKD, history of duodenal ulcer, depression/anxiety, testicular cancer (s/p orchiectomy and XRT in 1982), dysphasia, and recurrent admissions for respiratory failure 2/2 aspiration pneumonia presents with shortness of breath and found to have recurrent respiratory failure 2/2 aspiration pneumonia. Goals of care discussed with family and patient has since transitioned to comfort care given poor overall prognosis.    Appears comfortable. Appreciate palliative care assistance. Working on hospice discharge options.    PLAN    Comfort Care  Goals of Care and Advance Care Planning  -Re-presents with shortness of breath and found to have recurrent respiratory failure 2/2 aspiration pneumonia  -Poor overall prognosis  -Goals of care discussed with family and patient has since transitioned to comfort care  PLAN  -PRN medications for air hunger, pain, secretions, nausea, and agitation  -Palliative care consulting  -Working on hospice discharge options    Other Issues Addressed Prior to Comfort Care Transition  -Acute Hypoxic Respiratory Failure  -Sepsis 2/2 Recurrent Aspiration PNA  -Chronic HFpEF and Pulmonary HTN  -A fib, s/p MAZE procedure and now with pacemaker  -Non-obstructive CAD   -Acute Metabolic Encephalopathy  -Iron Deficiency Anemia  -Moderate Protein Calorie Malnutrition  -Hypokalemia  -Hypophosphatemia  -Hypomagnesemia  -Hx duodenal ulcer  -Hx of c diff in remote past    Disposition  -Discharge Location: Hospice facility but may pass in hospital before placement  -Medically Ready for Discharge:  "Today      Tra Garrett MD    Subjective     Seen at bedside. Many family members present. Appears comfortable but more tachypnea.         Objective   Blood pressure 124/46, pulse 91, temperature 98.4  F (36.9  C), temperature source Axillary, resp. rate (!) 40, height 1.702 m (5' 7\"), weight 62.5 kg (137 lb 12.6 oz), SpO2 96%.    PHYSICAL EXAM  General: In no acute distress  CV: RRR.  Lungs: Scattered rhonchi. Nl WOB.  Abd: Non-tender.  Ext: No edema.    LABS AND IMAGING  Reviewed and pertinent results discussed in assessment and plan.     "

## 2024-05-09 NOTE — PLAN OF CARE
Physical Therapy Discharge Summary    Reason for therapy discharge:    Change in medical status.    Progress towards therapy goal(s). See goals on Care Plan in [facility] electronic health record for goal details.  Goals not met.  Barriers to achieving goals:   limited tolerance for therapy and transition to comfort cares.    Therapy recommendation(s):    No further therapy is recommended.

## 2024-05-09 NOTE — PROGRESS NOTES
Care Management Follow Up    Length of Stay (days): 14    Expected Discharge Date: 05/10/2024     Concerns to be Addressed: discharge planning     Patient plan of care discussed at interdisciplinary rounds: Yes    Anticipated Discharge Disposition: [facility] with hospice     Anticipated Discharge Services: None  Anticipated Discharge DME:      Patient/family educated on Medicare website which has current facility and service quality ratings: yes  Education Provided on the Discharge Plan:    Patient/Family in Agreement with the Plan: yes    Referrals Placed by CM/SW: Post Acute Facilities, [facility]  Private pay costs discussed: transportation costs and insurance costs out of pocket expenses    Additional Information:  SW met with patient, multiple family members at bedside, and wife via speaker phone.     Discussed LTC with hospice costs. SW explained [facility] can take with hospice.     Patient's family would like a referral to [facility]. Sent referral.     Reviewed potential out of pocket cost for  [employer] stretcher transport. Current base rate is $1228.70 and $28.67 per mile to the destination. Pt/family expressed understanding and are agreeable to this.      Patient requires stretcher transportation due to hospice, unable to sit in w/c unassisted.     ANA Walsh, [facility]  Emergency Room   Please contact the SW on the floor in which the patient is staying for any questions or concerns

## 2024-05-09 NOTE — PLAN OF CARE
"  Problem: Adult Inpatient Plan of Care  Goal: Plan of Care Review  Description: The Plan of Care Review/Shift note should be completed every shift.  The Outcome Evaluation is a brief statement about your assessment that the patient is improving, declining, or no change.  This information will be displayed automatically on your shift  note.  Outcome: Unable to Meet  Flowsheets (Taken 5/9/2024 7748)  Outcome Evaluation: IV morphine givend for SOB and WOB.  Plan of Care Reviewed With: patient  Overall Patient Progress: declining  Goal: Patient-Specific Goal (Individualized)  Description: You can add care plan individualizations to a care plan. Examples of Individualization might be:  \"Parent requests to be called daily at 9am for status\", \"I have a hard time hearing out of my right ear\", or \"Do not touch me to wake me up as it startles  me\".  Outcome: Unable to Meet  Goal: Readiness for Transition of Care  Outcome: Unable to Meet     Problem: Pneumonia  Goal: Fluid Balance  Outcome: Unable to Meet  Goal: Resolution of Infection Signs and Symptoms  Outcome: Unable to Meet  Goal: Effective Oxygenation and Ventilation  Outcome: Unable to Meet     Problem: Gas Exchange Impaired  Goal: Optimal Gas Exchange  Outcome: Unable to Meet     Problem: Malnutrition  Goal: Improved Nutritional Intake  Outcome: Unable to Meet     Problem: Fall Injury Risk  Goal: Absence of Fall and Fall-Related Injury  Outcome: Unable to Meet     Problem: Palliative Care  Goal: Enhanced Quality of Life  Outcome: Unable to Meet  Intervention: Optimize Psychosocial Wellbeing  Recent Flowsheet Documentation  Taken 5/9/2024 5282 by Lesli Blue, RN  Supportive Measures: relaxation techniques promoted   Goal Outcome Evaluation:      Plan of Care Reviewed With: patient    Overall Patient Progress: decliningOverall Patient Progress: declining    Outcome Evaluation: IV morphine givend for SOB and WOB.      "

## 2024-05-09 NOTE — PROGRESS NOTES
CLINICAL NUTRITION SERVICES    Noted pt on comfort cares. RD will sign off at this time. Please place consult if further nutrition concerns arise.

## 2024-05-09 NOTE — PLAN OF CARE
At start of shift pt agitated, anxious, pulling at lines and linen. Reaching out and grabbing things in the air and hallucinating. Wanting to get up and go. Illogical, garbled speech. Pt tachypenic. You could hear the gargling of his lungs standing in the door way. Grimacing. Unable to make needs known. Couldn't sit still. Appeared to have air hunger. Gave prn medications with relief. Pt now resting quietly. Secretions much improved with Robinol. Skin warm/hot to touch. No mottling noted. Sitter at bed side.     Goal Outcome Evaluation:      Plan of Care Reviewed With: patient    Overall Patient Progress: decliningOverall Patient Progress: declining    Outcome Evaluation: Medications and care done for comfort.      Problem: Adult Inpatient Plan of Care  Goal: Plan of Care Review  Description: The Plan of Care Review/Shift note should be completed every shift.  The Outcome Evaluation is a brief statement about your assessment that the patient is improving, declining, or no change.  This information will be displayed automatically on your shift  note.  Flowsheets (Taken 5/9/2024 0116)  Outcome Evaluation: Medications and care done for comfort.  Plan of Care Reviewed With: patient  Overall Patient Progress: declining  Goal: Absence of Hospital-Acquired Illness or Injury  Intervention: Identify and Manage Fall Risk  Recent Flowsheet Documentation  Taken 5/8/2024 2200 by Suzette Encinas, RN  Safety Promotion/Fall Prevention:   bedside attendant   safety round/check completed  Intervention: Prevent Skin Injury  Recent Flowsheet Documentation  Taken 5/8/2024 2200 by Suzette Encinas, RN  Body Position:   turned   right   heels elevated     Problem: Pneumonia  Goal: Fluid Balance  Outcome: Not Progressing  Goal: Resolution of Infection Signs and Symptoms  Outcome: Not Progressing  Goal: Effective Oxygenation and Ventilation  Outcome: Not Progressing  Intervention: Optimize Oxygenation and Ventilation  Recent  Flowsheet Documentation  Taken 5/8/2024 2200 by Suzette Encinas RN  Head of Bed ([facility]) Positioning: HOB at 30 degrees     Problem: Gas Exchange Impaired  Goal: Optimal Gas Exchange  Outcome: Not Progressing  Intervention: Optimize Oxygenation and Ventilation  Recent Flowsheet Documentation  Taken 5/8/2024 2200 by Suzette Encinas RN  Head of Bed ([facility]) Positioning: HOB at 30 degrees     Problem: Malnutrition  Goal: Improved Nutritional Intake  Outcome: Not Progressing     Problem: Fall Injury Risk  Goal: Absence of Fall and Fall-Related Injury  Outcome: Not Progressing  Intervention: Promote Injury-Free Environment  Recent Flowsheet Documentation  Taken 5/8/2024 2200 by Suzette Encinas, RN  Safety Promotion/Fall Prevention:   bedside attendant   safety round/check completed     Problem: Palliative Care  Goal: Enhanced Quality of Life  Outcome: Progressing

## 2024-05-10 NOTE — PROGRESS NOTES
PALLIATIVE CARE PROGRESS NOTE  [facility]     Patient Name: Jaylen Lamas  Date of Admission: 4/25/2024   Today the patient was seen for:   Goals of care  Emotional and decisional     Recommendations & Counseling     GOALS OF CARE:   Comfort cares  Hospice on discharge  Symptom management    ADVANCE CARE PLANNING:  No health care directive on file. Per  informed consent policy, next of kin should be involved if patient becomes unable.  Wife is NOK- Dulce Maria  There is no POLST form on file, defer to patient and/or next of kin for decisions   Code status: No CPR- Do NOT Intubate    MEDICAL MANAGEMENT:   Comfort Care    #Pain  1st choice:Morphine PO/SL 5-10 mg q 1 hour as needed.   2nd choice:Morphine IV 1-2 mg q 15 minutes as needed   Adjunct: Tylenol UT/PO    #Dyspnea   1st choice:Morphine PO/SL 5-10 mg q 1 hour as needed.   2nd choice:Morphine IV 1-2 mg q 15 minutes as needed   Added scheduled morphine 5 mg every 4 hours for work of breathing    #Anxiety    1st choice: Lorazepam PO/SL q 1 mg  q 3 hour as needed.   2nd choice: Lorazepam IV q 1 mg  q 3 hour as needed    #Secretion burden   Robinul 0.1 mg (PO/IV) q 4 hours as needed. If ineffective, increase up to 0.3 mg   Consider atropine if Robinul ineffective after dose increase      #Nausea   Zofran 4 mg q 6 hours as needed. Can increase to 8 mg   Zyprexa 5 mg q 6 hours as needed     #Agitation  Aggressive symptoms control first, then  Added scheduled zyprexa ODT BID- ok to give even if unable to swallow  1st choice: Zyprexa 5 mg ODT or IM   2nd choice: Increase dose of Zyprexa to 10 mg or consider switch to Haldol   3rd choice: Lorazepam as above     PSYCHOSOCIAL/SPIRITUAL:  Family   [place] community: [employer]   Would appreciate Spiritual Health Services, Consult has been placed for support     Palliative Care will continue to follow. Thank you for the consult and allowing us to aid in the care of Jaylen Lamas.    These recommendations have  been discussed with medical team.    Yesenia Dior NP  Securely message with [employer] (more info)  Text page via [facility] Paging/Directory      Assessments          Jaylen Lamas is a 73 year old male with a past medical history of COPD, tobacco use, heart failure,  bioprosthetic aortic valve replacement, bioprosthetic mitral valve replacement, paroxysmal atrial fibrillation s/p ablation and MAZE procedure, 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 difficile, hypertension, CKD, history of duodenal ulcer, depression/anxiety, testicular cancer s/p orchiectomy who presented on 4/25 with shortness of breath, cough and fever-recently discharged on 4/22 with pneumonia.                 Interval History:     Per patient or family/caregivers today:  Patient passed this morning. He was comfortable however what not last night. Wife and grandson at the bedside. paged for prayer.           Review of Systems:     Besides above, an additional system ROS was reviewed and is unremarkable               Physical Exam:       No data recorded       Resp: 28        Vital Signs with Ranges  Resp:  [24-40] 28  137 lbs 12.6 oz    EXAM:  Constitutional: unresponsive  Cardiovascular: no pulse  Respiratory: no breathing  Psychiatric: unresponsive  Pain: no s/s of pain               Current Problem List:   Active Problems:    Diarrhea of infectious origin    Respiratory distress    Pneumonia of right lower lobe due to infectious organism      Allergies   Allergen Reactions    Azithromycin Other (See Comments)     Causes C-Diff-  Patients PCP states this is not true    Cefprozil GI Disturbance    Ciprofloxacin GI Disturbance    Erythromycin GI Disturbance    Metronidazole GI Disturbance    Spiriva Respimat [Tiotropium Bromide Monohydrate] Visual Disturbance    Venlafaxine Nausea and Vomiting            Data Reviewed:       No results found for this or any previous visit  (from the past 24 hour(s)).         Medical Decision Making     35 MINUTES SPENT BY ME on the date of service doing chart review, history, exam, documentation & further activities per the note.

## 2024-05-10 NOTE — PLAN OF CARE
Goal Outcome Evaluation:       Pt has a lot of secretion,PRN meds was given, but did not help much, nurse paged MD, MD ordered Atropine drops for secretion, PRN Morphine for pain been given, PRN Lorazepam was given for  anxiety, repo @2 hr, family at the bed side.

## 2024-05-10 NOTE — PLAN OF CARE
"Pt is lethargic, occasional opening of eyes with repositioning. IV morphine, Lorazepam, Glycopyrrolate administered per order. Atropine SL administered as well.    Comfort care maintained. Oral care provided and repositioning maintained throughout the shift. Family at bedside. Ongoing monitoring.    Plan: Continue POC.    /46 (BP Location: Right arm)   Pulse 91   Temp 98.4  F (36.9  C) (Axillary)   Resp 28   Ht 1.702 m (5' 7\")   Wt 62.5 kg (137 lb 12.6 oz)   SpO2 96%   BMI 21.58 kg/m       Problem: Adult Inpatient Plan of Care  Goal: Plan of Care Review  Description: The Plan of Care Review/Shift note should be completed every shift.  The Outcome Evaluation is a brief statement about your assessment that the patient is improving, declining, or no change.  This information will be displayed automatically on your shift  note.  Outcome: Not Progressing  Flowsheets (Taken 5/10/2024 0648)  Outcome Evaluation: IV Morphine, Lorazepam, Glycopyrollate and Atropine SL administered per order.  Plan of Care Reviewed With: patient  Overall Patient Progress: declining  Goal: Patient-Specific Goal (Individualized)  Description: You can add care plan individualizations to a care plan. Examples of Individualization might be:  \"Parent requests to be called daily at 9am for status\", \"I have a hard time hearing out of my right ear\", or \"Do not touch me to wake me up as it startles  me\".  Outcome: Not Progressing  Goal: Readiness for Transition of Care  Outcome: Not Progressing     Problem: Pneumonia  Goal: Fluid Balance  Outcome: Not Progressing  Goal: Resolution of Infection Signs and Symptoms  Outcome: Not Progressing  Goal: Effective Oxygenation and Ventilation  Outcome: Not Progressing  Intervention: Optimize Oxygenation and Ventilation  Recent Flowsheet Documentation  Taken 5/10/2024 0000 by Roland Sears RN  Head of Bed (HOB) Positioning: HOB at 20-30 degrees     Problem: Gas Exchange Impaired  Goal: Optimal Gas " Exchange  Outcome: Not Progressing  Intervention: Optimize Oxygenation and Ventilation  Recent Flowsheet Documentation  Taken 5/10/2024 0000 by Roland Sears RN  Head of Bed (HOB) Positioning: HOB at 20-30 degrees     Problem: Malnutrition  Goal: Improved Nutritional Intake  Outcome: Not Progressing     Problem: Fall Injury Risk  Goal: Absence of Fall and Fall-Related Injury  Outcome: Not Progressing  Intervention: Identify and Manage Contributors  Recent Flowsheet Documentation  Taken 5/10/2024 0000 by Roland Sears RN  Medication Review/Management: medications reviewed  Intervention: Promote Injury-Free Environment  Recent Flowsheet Documentation  Taken 5/10/2024 0600 by Roland Sears RN  Safety Promotion/Fall Prevention: safety round/check completed  Taken 5/10/2024 0500 by Roland Sears RN  Safety Promotion/Fall Prevention: safety round/check completed  Taken 5/10/2024 0400 by Roland Sears RN  Safety Promotion/Fall Prevention: safety round/check completed  Taken 5/10/2024 0000 by Roland Sears RN  Safety Promotion/Fall Prevention:   safety round/check completed   activity supervised   clutter free environment maintained   increased rounding and observation   increase visualization of patient   lighting adjusted   patient and family education  Taken 5/9/2024 2300 by Roland Sears RN  Safety Promotion/Fall Prevention: safety round/check completed     Problem: Palliative Care  Goal: Enhanced Quality of Life  Outcome: Not Progressing  Intervention: Maximize Comfort  Recent Flowsheet Documentation  Taken 5/10/2024 0551 by Roland Sears RN  Pain Management Interventions: medication (see MAR)  Taken 5/10/2024 0520 by Roland Sears RN  Pain Management Interventions: medication (see MAR)  Taken 5/10/2024 0020 by Roland Sears RN  Pain Management Interventions: medication (see MAR)     Problem: Palliative Care  Goal: Enhanced Quality of Life  Outcome: Not Progressing  Intervention:  Maximize Comfort  Recent Flowsheet Documentation  Taken 5/10/2024 0551 by Roland Sears RN  Pain Management Interventions: medication (see MAR)  Taken 5/10/2024 0520 by Roland Sears RN  Pain Management Interventions: medication (see MAR)  Taken 5/10/2024 0020 by Roland Sears RN  Pain Management Interventions: medication (see MAR)   Goal Outcome Evaluation:      Plan of Care Reviewed With: patient    Overall Patient Progress: decliningOverall Patient Progress: declining    Outcome Evaluation: IV Morphine, Lorazepam, Glycopyrollate and Atropine SL administered per order.

## 2024-05-10 NOTE — PROGRESS NOTES
Care Management Discharge Note    Discharge Date: 05/10/2024       Additional Information:  Sw called and left a vm for [facility], updating them that the pt has passed and they can disregard the referral.    ANA Tejeda, [facility]  Inpatient Care Coordination  [facility]  [phone]

## 2024-05-10 NOTE — DISCHARGE SUMMARY
[facility]  Hospitalist Discharge Summary      Date of Admission:  2024  Date of Discharge:  5/10/2024  1:20 PM  Discharging Provider: Qing Moeller MD  Discharge Service: Hospitalist Service    Discharge Diagnoses     Acute hypoxic respiratory failure  Sepsis secondary to recurrent aspiration pneumonia  Chronic HFpEF and pulmonary hypertension  A-fib status post maze procedure  Metabolic encephalopathy  Iron deficiency anemia  Moderate protein calorie malnutrition  Electrolyte derangements      Clinically Significant Risk Factors     # Moderate Malnutrition: based on nutrition assessment      Follow-ups Needed After Discharge : None : patient  in the hospital       Unresulted Labs Ordered in the Past 30 Days of this Admission       Date and Time Order Name Status Description    2024 11:01 AM Nocardia culture - BAL Site 1 Preliminary     2024 11:01 AM Fungus Culture, non-blood - BAL Site 1 Preliminary     2024 11:01 AM Acid-Fast Bacilli Culture and Stain In process             Discharge Disposition   Discharged to home  Condition at discharge: Stable    Hospital Course   73M with hx of COPD, tobacco use, HFpEF, bioprosthetic aortic valve replacement, bioprosthetic mitral valve replacement, paroxysmal atrial fibrillation s/p ablation and MAZE procedure, 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 difficile, hypertension, CKD, history of duodenal ulcer, depression/anxiety, testicular cancer (s/p orchiectomy and XRT in ), dysphasia, and recurrent admissions for respiratory failure 2/2 aspiration pneumonia presents with shortness of breath and found to have recurrent respiratory failure 2/2 aspiration pneumonia. Goals of care discussed with family and patient has since transitioned to comfort care given poor overall prognosis.  Patient remained comfortable and passed away on 5/10/2024 with  his family present  at bedside.     Following problems were addressed during this hospitalization     Recurrent acute hypoxemic respiratory failure  Sepsis 2/2 recurrent aspiration pneumonia  pulmonary HTN and RV pressure overload  -Recent history is most significant for pneumonia for which he was hospitalized from 4/19-4/22/2024 and discharged on Augmentin. During that admission, he had reported that his sx had been going on for about 10 weeks prior to the worsening.  During recent hospitalization 4- to 4/22/2024 patient underwent bronchoscopy with BAL positive for  Candida.  Bacterial cultures were negative.  Patient was treated with Zosyn and azithromycin while in hospital and discharged on Augmentin.  Underwent workup for possible aspiration.  -Mr. Reeder returned on 4/25 with worsening shortness of breath, cough, and fever for which he was again admitted. Patient was followed by pulmonology and infectious disease.  Patient was treated with IV Zosyn (4/25 - 4/30) and transition to Augmentin therapy for 10-day course.  -Seen by SLP and  needs reinforcement of aspiration precautions.  Needs to be fully upright 30 minutes after eating/drinking.  -Patient did require BiPAP on 4/28 and has been using intermittently since (mainly at night-time)  -TTE w/pulmonary HTN with RV volume overload with preservation of RV function.   -Dr. Nguyễn has had a long discussion with patient's wife and daughters on 05/08. They elected for comfort cares. Patient remained comfortable till he passed away on 05/10/2024 while his family present by his side.       Other Issues Addressed Prior to Comfort Care Transition  -Acute Hypoxic Respiratory Failure  -Sepsis 2/2 Recurrent Aspiration PNA  -Chronic HFpEF and Pulmonary HTN  -A fib, s/p MAZE procedure and now with pacemaker  -Non-obstructive CAD   -Acute Metabolic Encephalopathy  -Iron Deficiency Anemia  -Moderate Protein Calorie Malnutrition  -Hypokalemia  -Hypophosphatemia  -Hypomagnesemia  -Hx  duodenal ulcer  -Hx of c diff in remote past      Consultations This Hospital Stay   PHARMACY TO DOSE VANCO  PHARMACY TO DOSE VANCO  INFECTIOUS DISEASES IP CONSULT  PULMONARY IP CONSULT  CARE MANAGEMENT / SOCIAL WORK IP CONSULT  SPIRITUAL HEALTH SERVICES IP CONSULT  PHYSICAL THERAPY ADULT IP CONSULT  SPEECH LANGUAGE PATH ADULT IP CONSULT  PALLIATIVE CARE ADULT IP CONSULT  SPIRITUAL HEALTH SERVICES IP CONSULT  CARE MANAGEMENT / SOCIAL WORK IP CONSULT    Code Status   Prior    Time Spent on this Encounter   I, Qing Moeller MD, personally saw the patient today and spent greater than 30 minutes discharging this patient.       Qing Moeller MD  [place] [id number] [facility]  [address] [place] [address]  Phone: [phone]  Fax: [phone]  ______________________________________________________________________    Physical Exam   Vital Signs:           Resp: (!) 1        Weight: 137 lbs 12.6 oz    Death exam performed:    -No response to noxious stimulus  - Pupil fixed and dilated, corneal reflex absent  - Absent breath sounds  - Absent heart sounds       Primary Care Physician   Alexys Bella    Discharge Orders   No discharge procedures on file.    Significant Results and Procedures   Most Recent 3 CBC's:  Recent Labs   Lab Test 05/06/24  0551 05/04/24  0708 05/03/24  1001   WBC 14.9* 14.3* 16.7*   HGB 10.2* 9.5* 10.7*   MCV 85 85 86    269 286     Most Recent 3 BMP's:  Recent Labs   Lab Test 05/08/24  0735 05/07/24  0706 05/06/24  0551    136 136   POTASSIUM 3.5 3.8 4.0   CHLORIDE 94* 92* 93*   CO2 35* 34* 34*   BUN 21.2 20.5 17.7   CR 0.86 0.89 0.97   ANIONGAP 9 10 9   MELO 10.1 9.8 9.7   * 111* 116*       Discharge Medications   Discharge Medication List as of 5/10/2024  1:22 PM        CONTINUE these medications which have NOT CHANGED    Details   albuterol (PROAIR HFA/PROVENTIL HFA/VENTOLIN HFA) 108 (90 Base) MCG/ACT inhaler Inhale 2 puffs into the lungs every 6 hours  as needed for shortness of breath or cough, Historical      benzonatate (TESSALON) 200 MG capsule Take 200 mg by mouth 3 times daily as needed for cough, Historical      BREO ELLIPTA 100-25 MCG/ACT inhaler Inhale 1 puff into the lungs daily, NEREYDA, Historical      digoxin (LANOXIN) 125 MCG tablet Take 125 mcg by mouth every evening, Historical      ELIQUIS ANTICOAGULANT 5 MG tablet Take 5 mg by mouth 2 times daily, NEREYDA, Historical      escitalopram (LEXAPRO) 20 MG tablet take 1 tablet by mouth once daily, Historical      fluticasone (FLONASE) 50 MCG/ACT nasal spray Spray 2 sprays into both nostrils daily as needed for rhinitis or allergies, Historical      ipratropium - albuterol 0.5 mg/2.5 mg/3 mL (DUONEB) 0.5-2.5 (3) MG/3ML neb solution Take 1 vial by nebulization 3 times daily, Historical      lactobacillus rhamnosus, GG, (CULTURELL) capsule Take 1 capsule by mouth 2 times daily, Disp-20 capsule, R-0, Local Print      metoprolol succinate ER (TOPROL XL) 50 MG 24 hr tablet Take 50 mg by mouth At Bedtime, Historical           STOP taking these medications       vancomycin (VANCOCIN) 125 MG capsule Comments:   Reason for Stopping:         atorvastatin (LIPITOR) 40 MG tablet Comments:   Reason for Stopping:             Allergies   Allergies   Allergen Reactions    Azithromycin Other (See Comments)     Causes C-Diff-  Patients PCP states this is not true    Cefprozil GI Disturbance    Ciprofloxacin GI Disturbance    Erythromycin GI Disturbance    Metronidazole GI Disturbance    Spiriva Respimat [Tiotropium Bromide Monohydrate] Visual Disturbance    Venlafaxine Nausea and Vomiting

## 2024-05-10 NOTE — PROGRESS NOTES
I was notified by palliative care that patient has passed and requested me to pronounce death.      Death exam  -No response to noxious stimulus  - Pupil fixed and dilated, corneal reflex absent  - Absent breath sounds  - Absent heart sounds  - Time of death 9:39 AM

## 2024-05-10 NOTE — PLAN OF CARE
Pt .    Problem: Adult Inpatient Plan of Care  Goal: Absence of Hospital-Acquired Illness or Injury  Intervention: Prevent Skin Injury  Recent Flowsheet Documentation  Taken 5/10/2024 0847 by Verna Pedraza RN  Body Position: (family wants pain more uncontrol before repo) refuses positioning     Problem: Palliative Care  Goal: Enhanced Quality of Life  Intervention: Maximize Comfort  Recent Flowsheet Documentation  Taken 5/10/2024 0847 by Verna Pedraza RN  Oral Care: lip/mouth moisturizer applied     Problem: Palliative Care  Goal: Enhanced Quality of Life  Intervention: Maximize Comfort  Recent Flowsheet Documentation  Taken 5/10/2024 0847 by Verna Pedraza RN  Oral Care: lip/mouth moisturizer applied

## 2024-05-10 NOTE — CONSULTS
SPIRITUAL HEALTH SERVICES  SPIRITUAL ASSESSMENT Consult Note  [facility]. [address] medical     REFERRAL SOURCE: page from palliative team as patient is dying.    Jaylen had  moments before my arrival; death confirmed during the visit.    I provided prayers of commendation and support for wife Caroline and two other family members.    Yary Cuellar MDiv [facility]  Staff   Please place consult order for routine Spiritual Health Services referrals.  SHS available  for emergent requests either by having the on-call  paged or by entering an ASAP/STAT consult in Epic (this will also page the on-call ).

## 2024-05-10 NOTE — PLAN OF CARE
Goal Outcome Evaluation:      Plan of Care Reviewed With: patient    Overall Patient Progress: decliningOverall Patient Progress: declining    Outcome Evaluation: Medications and care done for comfort.      Problem: Adult Inpatient Plan of Care  Goal: Absence of Hospital-Acquired Illness or Injury  Intervention: Prevent Skin Injury  Recent Flowsheet Documentation  Taken 5/9/2024 2345 by Suzette Encinas RN  Body Position:   supine   lower extremity elevated   neutral head position   neutral body alignment     Problem: Pneumonia  Goal: Effective Oxygenation and Ventilation  Intervention: Optimize Oxygenation and Ventilation  Recent Flowsheet Documentation  Taken 5/9/2024 2345 by Szuette Encinas RN  Head of Bed (HOB) Positioning: HOB at 20-30 degrees     Problem: Gas Exchange Impaired  Goal: Optimal Gas Exchange  Intervention: Optimize Oxygenation and Ventilation  Recent Flowsheet Documentation  Taken 5/9/2024 2345 by Suzette Encinas RN  Head of Bed (HOB) Positioning: HOB at 20-30 degrees     Problem: Palliative Care  Goal: Enhanced Quality of Life  Outcome: Not Progressing

## 2024-05-17 LAB
BACTERIA BRONCH: ABNORMAL
BACTERIA BRONCH: NO GROWTH

## 2024-06-14 LAB
ACID FAST STAIN (ARUP): NORMAL

## (undated) RX ORDER — ALBUTEROL SULFATE 0.83 MG/ML
SOLUTION RESPIRATORY (INHALATION)
Status: DISPENSED
Start: 2024-01-01

## (undated) RX ORDER — LIDOCAINE HYDROCHLORIDE 20 MG/ML
JELLY TOPICAL
Status: DISPENSED
Start: 2024-01-01

## (undated) RX ORDER — LIDOCAINE HYDROCHLORIDE 10 MG/ML
INJECTION, SOLUTION EPIDURAL; INFILTRATION; INTRACAUDAL; PERINEURAL
Status: DISPENSED
Start: 2024-01-01

## (undated) RX ORDER — LIDOCAINE HYDROCHLORIDE 40 MG/ML
INJECTION, SOLUTION RETROBULBAR
Status: DISPENSED
Start: 2024-01-01

## (undated) RX ORDER — LIDOCAINE HYDROCHLORIDE AND EPINEPHRINE 10; 10 MG/ML; UG/ML
INJECTION, SOLUTION INFILTRATION; PERINEURAL
Status: DISPENSED
Start: 2024-01-01

## (undated) RX ORDER — FENTANYL CITRATE 50 UG/ML
INJECTION, SOLUTION INTRAMUSCULAR; INTRAVENOUS
Status: DISPENSED
Start: 2024-01-01